# Patient Record
Sex: FEMALE | Race: WHITE | NOT HISPANIC OR LATINO | Employment: OTHER | RURAL
[De-identification: names, ages, dates, MRNs, and addresses within clinical notes are randomized per-mention and may not be internally consistent; named-entity substitution may affect disease eponyms.]

---

## 2020-10-08 ENCOUNTER — HISTORICAL (OUTPATIENT)
Dept: ADMINISTRATIVE | Facility: HOSPITAL | Age: 65
End: 2020-10-08

## 2022-05-20 ENCOUNTER — HOSPITAL ENCOUNTER (OUTPATIENT)
Dept: RADIOLOGY | Facility: HOSPITAL | Age: 67
Discharge: HOME | End: 2022-05-20
Attending: NURSE PRACTITIONER
Payer: MEDICARE

## 2022-05-20 DIAGNOSIS — Z08 ENCOUNTER FOR ROUTINE CANCER FOLLOW-UP: ICD-10-CM

## 2022-05-20 DIAGNOSIS — C64.1 RENAL CARCINOMA, RIGHT: ICD-10-CM

## 2022-05-20 PROCEDURE — 71046 X-RAY EXAM CHEST 2 VIEWS: CPT | Mod: TC

## 2022-09-30 ENCOUNTER — OFFICE VISIT (OUTPATIENT)
Dept: FAMILY MEDICINE | Facility: CLINIC | Age: 67
End: 2022-09-30
Payer: MEDICARE

## 2022-09-30 VITALS
WEIGHT: 248 LBS | SYSTOLIC BLOOD PRESSURE: 118 MMHG | HEART RATE: 88 BPM | TEMPERATURE: 98 F | BODY MASS INDEX: 43.94 KG/M2 | OXYGEN SATURATION: 96 % | DIASTOLIC BLOOD PRESSURE: 72 MMHG | HEIGHT: 63 IN

## 2022-09-30 DIAGNOSIS — H66.91 RIGHT OTITIS MEDIA, UNSPECIFIED OTITIS MEDIA TYPE: Primary | ICD-10-CM

## 2022-09-30 PROCEDURE — 99205 PR OFFICE/OUTPT VISIT, NEW, LEVL V, 60-74 MIN: ICD-10-PCS | Mod: ,,, | Performed by: STUDENT IN AN ORGANIZED HEALTH CARE EDUCATION/TRAINING PROGRAM

## 2022-09-30 PROCEDURE — 99205 OFFICE O/P NEW HI 60 MIN: CPT | Mod: ,,, | Performed by: STUDENT IN AN ORGANIZED HEALTH CARE EDUCATION/TRAINING PROGRAM

## 2022-09-30 RX ORDER — TIZANIDINE 4 MG/1
4 TABLET ORAL 2 TIMES DAILY PRN
COMMUNITY

## 2022-09-30 RX ORDER — AMOXICILLIN AND CLAVULANATE POTASSIUM 875; 125 MG/1; MG/1
1 TABLET, FILM COATED ORAL 2 TIMES DAILY
Qty: 14 TABLET | Refills: 0 | Status: SHIPPED | OUTPATIENT
Start: 2022-09-30 | End: 2022-10-07

## 2022-09-30 RX ORDER — ROSUVASTATIN CALCIUM 40 MG/1
10 TABLET, COATED ORAL NIGHTLY
Status: ON HOLD | COMMUNITY
End: 2023-02-20 | Stop reason: SDUPTHER

## 2022-09-30 RX ORDER — NIFEDIPINE 60 MG/1
60 TABLET, EXTENDED RELEASE ORAL DAILY
Status: ON HOLD | COMMUNITY
End: 2023-02-20 | Stop reason: HOSPADM

## 2022-09-30 RX ORDER — CARVEDILOL 12.5 MG/1
12.5 TABLET ORAL 2 TIMES DAILY WITH MEALS
COMMUNITY
End: 2022-10-17 | Stop reason: SDUPTHER

## 2022-09-30 RX ORDER — ACYCLOVIR 400 MG/1
TABLET ORAL 2 TIMES DAILY
COMMUNITY
End: 2022-12-21

## 2022-09-30 RX ORDER — INSULIN DEGLUDEC 100 U/ML
50 INJECTION, SOLUTION SUBCUTANEOUS DAILY
Status: ON HOLD | COMMUNITY
End: 2023-02-20 | Stop reason: SDUPTHER

## 2022-09-30 RX ORDER — ALLOPURINOL 100 MG/1
100 TABLET ORAL DAILY
COMMUNITY

## 2022-09-30 RX ORDER — ASPIRIN 81 MG/1
81 TABLET ORAL DAILY
Status: ON HOLD | COMMUNITY
End: 2023-02-20 | Stop reason: SDUPTHER

## 2022-09-30 RX ORDER — LIRAGLUTIDE 6 MG/ML
0.6 INJECTION SUBCUTANEOUS DAILY
Status: ON HOLD | COMMUNITY
End: 2023-02-20 | Stop reason: SDUPTHER

## 2022-09-30 RX ORDER — SODIUM BICARBONATE 650 MG/1
650 TABLET ORAL 3 TIMES DAILY
Status: ON HOLD | COMMUNITY
End: 2023-02-20 | Stop reason: SDUPTHER

## 2022-09-30 RX ORDER — AMOXICILLIN 250 MG
2 CAPSULE ORAL
Status: ON HOLD | COMMUNITY
End: 2023-02-20 | Stop reason: SDUPTHER

## 2022-09-30 RX ORDER — OXYMETAZOLINE HCL 0.05 %
2 SPRAY, NON-AEROSOL (ML) NASAL 2 TIMES DAILY
Qty: 15 ML | Refills: 0 | Status: SHIPPED | OUTPATIENT
Start: 2022-09-30 | End: 2022-10-03

## 2022-09-30 NOTE — PROGRESS NOTES
"Subjective:       Patient ID: Jessica Bay is a 67 y.o. female.    Chief Complaint: Sinus Problem (Cough, sore throat, R ear hurts x 1 week)    HPI    67-year-old woman with a complicated medical history listed below, who comes to clinic for right ear pain, cough, sore throat for 1 week.  She has had constitutional symptoms and has felt poorly.  She denies any fever, or chills.  She has taken Tylenol for her symptoms.  She has only had partial improvement.  She comes to the doctor today because she has been sick for several days and has not had any improvement with conservative therapy.  She does not like to use nasal sprays because she finds the post nasal drip uncomfortable.    She has a history of renal cell carcinoma and sees both Nephrology and Oncology.  She has limited mobility in uses a motorized wheelchair.  She also has a history of fibromyalgia diabetes.      She has had similar pain before, and reports a previous episode of meningitis that led to hospitalization.    She denies any allergy to penicillins.        Objective:    /72   Pulse 88   Temp 97.9 °F (36.6 °C) (Oral)   Ht 5' 3" (1.6 m)   Wt 112.5 kg (248 lb)   SpO2 96%   BMI 43.93 kg/m²     Physical Exam    Gen: well-groomed, well-dressed. No apparent distress  HEENT:  NCAT, symmetric; R  canal with some erythema; TM slightly bulging  Pulm: normal work of breathing, no accessory muscle use; speaking complete sentences; rare dry cough  Neuro: CN II-XII grossly normal; in a motorized wheelchair  Psych: pleasant affect, congruent mood. Linear thought; good eye contact  SKIN: no rashes present on face, neck, hands    Assessment / Plan:       Problem List Items Addressed This Visit          ENT    Right otitis media - Primary     S/s of AOM; will empirically treat with augmentin x 7 days. Nasal saline spray and flonase for congestion/obstructive symptoms. Will discuss follow-up with ENT at next visit. Pt. expressed understanding and " agreement with this plan and agreed to RTC  if symptoms worsen or fail to improve.           Relevant Medications    oxymetazoline (AFRIN, OXYMETAZOLINE,) 0.05 % nasal spray    sodium chloride (SALINE NASAL) 0.65 % nasal spray    amoxicillin-clavulanate 875-125mg (AUGMENTIN) 875-125 mg per tablet         Face to face encounter time 25 minutes.    Non face to face encounter time 35 minutes.  Reviewing separate obtained history, counseling and educating the patient, family and/or other caregivers, ordering medications, tests or procedures; referring and communicating with other health care professionals; documenting clinical information in the electronic medical record; care coordination; independently interpreting results and communicating results to the patient, family, and/or caregivers.    Total time for visit  70 minutes

## 2022-10-03 PROBLEM — H66.91 RIGHT OTITIS MEDIA: Status: ACTIVE | Noted: 2022-10-03

## 2022-10-03 NOTE — ASSESSMENT & PLAN NOTE
S/s of AOM; will empirically treat with augmentin x 7 days. Nasal saline spray and flonase for congestion/obstructive symptoms. Will discuss follow-up with ENT at next visit. Pt. expressed understanding and agreement with this plan and agreed to RTC  if symptoms worsen or fail to improve.

## 2022-10-17 ENCOUNTER — OFFICE VISIT (OUTPATIENT)
Dept: FAMILY MEDICINE | Facility: CLINIC | Age: 67
End: 2022-10-17
Payer: MEDICARE

## 2022-10-17 VITALS
WEIGHT: 246 LBS | TEMPERATURE: 98 F | HEART RATE: 86 BPM | HEIGHT: 63 IN | SYSTOLIC BLOOD PRESSURE: 129 MMHG | OXYGEN SATURATION: 96 % | BODY MASS INDEX: 43.59 KG/M2 | DIASTOLIC BLOOD PRESSURE: 80 MMHG

## 2022-10-17 DIAGNOSIS — R10.9 ABDOMINAL PAIN, UNSPECIFIED ABDOMINAL LOCATION: Primary | ICD-10-CM

## 2022-10-17 DIAGNOSIS — I10 HYPERTENSION, UNSPECIFIED TYPE: ICD-10-CM

## 2022-10-17 DIAGNOSIS — J30.89 ALLERGIC RHINITIS DUE TO OTHER ALLERGIC TRIGGER, UNSPECIFIED SEASONALITY: ICD-10-CM

## 2022-10-17 PROBLEM — Z90.5 ACQUIRED ABSENCE OF KIDNEY: Status: ACTIVE | Noted: 2021-09-20

## 2022-10-17 PROBLEM — E11.8 DISORDER ASSOCIATED WITH TYPE 2 DIABETES MELLITUS: Status: ACTIVE | Noted: 2022-10-17

## 2022-10-17 PROBLEM — G93.32 CHRONIC FATIGUE SYNDROME: Status: ACTIVE | Noted: 2022-10-17

## 2022-10-17 PROBLEM — N18.4 STAGE 4 CHRONIC KIDNEY DISEASE: Status: ACTIVE | Noted: 2021-09-20

## 2022-10-17 PROBLEM — M79.7 FIBROMYALGIA: Status: ACTIVE | Noted: 2022-10-17

## 2022-10-17 PROBLEM — E78.5 DYSLIPIDEMIA: Status: ACTIVE | Noted: 2021-09-20

## 2022-10-17 PROBLEM — G57.93 NEUROPATHY OF BOTH FEET: Status: ACTIVE | Noted: 2022-10-17

## 2022-10-17 PROBLEM — J30.9 ALLERGIC RHINITIS: Status: ACTIVE | Noted: 2022-10-17

## 2022-10-17 PROBLEM — E11.22 TYPE 2 DIABETES MELLITUS WITH DIABETIC CHRONIC KIDNEY DISEASE: Status: ACTIVE | Noted: 2021-09-20

## 2022-10-17 PROBLEM — I05.9 MITRAL VALVE DISORDER: Status: ACTIVE | Noted: 2022-10-17

## 2022-10-17 PROBLEM — Z79.4 LONG TERM CURRENT USE OF INSULIN: Status: ACTIVE | Noted: 2022-10-17

## 2022-10-17 PROBLEM — H91.93 BILATERAL HEARING LOSS: Status: ACTIVE | Noted: 2022-10-17

## 2022-10-17 PROBLEM — C64.9 PRIMARY MALIGNANT NEOPLASM OF KIDNEY: Status: ACTIVE | Noted: 2022-10-17

## 2022-10-17 PROBLEM — G25.81 RESTLESS LEGS SYNDROME: Status: ACTIVE | Noted: 2022-10-17

## 2022-10-17 PROBLEM — J30.1 ALLERGIC RHINITIS DUE TO POLLEN: Status: ACTIVE | Noted: 2022-10-17

## 2022-10-17 PROCEDURE — 99215 OFFICE O/P EST HI 40 MIN: CPT | Mod: ,,, | Performed by: STUDENT IN AN ORGANIZED HEALTH CARE EDUCATION/TRAINING PROGRAM

## 2022-10-17 PROCEDURE — 99215 PR OFFICE/OUTPT VISIT, EST, LEVL V, 40-54 MIN: ICD-10-PCS | Mod: ,,, | Performed by: STUDENT IN AN ORGANIZED HEALTH CARE EDUCATION/TRAINING PROGRAM

## 2022-10-17 RX ORDER — CARVEDILOL 12.5 MG/1
12.5 TABLET ORAL 2 TIMES DAILY WITH MEALS
Qty: 60 TABLET | Refills: 2 | Status: ON HOLD | OUTPATIENT
Start: 2022-10-17 | End: 2023-02-20 | Stop reason: HOSPADM

## 2022-10-17 RX ORDER — CHLORPHENIRAMINE MALEATE AND PHENYLEPHRINE HYDROCHLORIDE 4; 10 MG/1; MG/1
1 TABLET, COATED ORAL EVERY 6 HOURS PRN
Qty: 60 TABLET | Refills: 2 | Status: SHIPPED | OUTPATIENT
Start: 2022-10-17 | End: 2022-10-27

## 2022-10-17 NOTE — ASSESSMENT & PLAN NOTE
Encouraged flonase use (and proper administration; counseled on technique to reduce postnasal drip); educated on the ADR with systemic decongestants and recommended topical decongestants instead. Will refill for now; agreeable to try other treatments. Surprisingly, she derives a lot of benefit from using essential oils instead of chlorpheniramine. Continue.

## 2022-10-17 NOTE — PROGRESS NOTES
"Subjective:       Patient ID: Jessica Bay is a 67 y.o. female.    Chief Complaint: Consult (C/o hernia )    HPI    67-year-old woman with the medical problems listed below who comes to clinic  for several concerns.    Over she is feeling much better since last visit.    She is concerned she has a hernia because her RLQ is bigger than the right. It has been uncomfortable at times but it has not been hard or indurated. She has not had any change in the number or quality of bowel movements. No nausea/vomiting changes in bladder function or fever or chills. She thinks she has a hernia.    She has had kidney removal surgery for cancer previously.     Regarding her congestion, she has allergic rhinitis. Uses essential oils for allergies and works faster than chlorpheniramine.   Doesn't like flonase because the feeling on her throat makes her gag. She has not really tried Afrin. She is taking chlorpheniramine every morning, has rebound symptoms toward the end of the day.     Uses peppermint essential oil for mosqitos.    - -    Per review of epic; previously saw renal with Dr. Ren:    "[place]: 61 y/o CF h/o CAD, DM, HTN, DLD, fibromyalgia, OA, LAD stent d/t 90% stenosis has swelling of the legs for which she went to [facility] and was given Lasix and steroids and was told that she has congestive heart failure with pneumonia and sent home with tapering dose of Lasix. she had NEWTON (3.2Cr) and was admitted in [facility] for overdiuresis and was given fluids during which her creatinine got better to 1.1.She sees Dr Kim Khan for migraine and takes Turmeric with other herbal supplements and cherry extract. She takes acetazolamide 250mg qid given by neurology. review of records shows that 5/2/18 Rt Kidney 11.1cm, left kidney 14.9cm. with normal echotexture and no hydronephrosis with left simple renal cyst 6.3cm"    Interval History:     HTN: Well controlled on Coreg 12. 5 mg BID, Nifedipine 60 mg " "daily. Not monitored at home. Her BP is well controlled here in clinic today.     CAD s/p PCI 2018: [facility], [place]. Dr. Anton Jamil.     RCC s/p r nephrectomy: Seen by Dr. Jensen. No metastasis.     Nephrolithiasis: last stone 2019. None since. Not on oral medication.     DM2: well controlled on Tresiba, Ozempic.. Follows with Dr. Diamond, Endocrinologist, [facility]. Right now she is out of her Ozempic and she has an upcoming appointment with her Endocrinologist to get it refilled. "       Objective:    /80   Pulse 86   Temp 97.8 °F (36.6 °C) (Oral)   Ht 5' 3" (1.6 m)   Wt 111.6 kg (246 lb)   SpO2 96%   BMI 43.58 kg/m²     Physical Exam    Gen: well-groomed, well-dressed. No apparent distress  HEENT:  NCAT, symmetric  Pulm: normal work of breathing, no accessory muscle use; speaking complete sentences  Abdomen: obese  Neuro: CN II-XII grossly normal; in a motorized wheelchair  Psych: pleasant affect, congruent mood. Linear thought; good eye contact  SKIN: no rashes present on face, neck, hands  Assessment / Plan       Problem List Items Addressed This Visit          Cardiac/Vascular    Hypertension    Relevant Medications    carvediloL (COREG) 12.5 MG tablet       GI    Abdominal pain - Primary     Off and on; believes she has a hernia. Exam limited by obese body habitus; given her history of renal malignancy and prior abdominal surgery, will refer to gen surg to discuss further evaluation of abdominal pain and potential imaging.          Relevant Medications    chlorpheniramine-phenylephrine (ED A-HIST) 4-10 mg per tablet    Other Relevant Orders    Ambulatory referral/consult to General Surgery     Other Visit Diagnoses       Allergic rhinitis due to other allergic trigger, unspecified seasonality        Relevant Medications    chlorpheniramine-phenylephrine (ED A-HIST) 4-10 mg per tablet              Face to face encounter time 20 minutes.    Non face to face encounter time 30 minutes.  Reviewing " separate obtained history, counseling and educating the patient, family and/or other caregivers, ordering medications, tests or procedures; referring and communicating with other health care professionals; documenting clinical information in the electronic medical record; care coordination; independently interpreting results and communicating results to the patient, family, and/or caregivers.    Total time for visit  50 minutes

## 2022-10-17 NOTE — ASSESSMENT & PLAN NOTE
Off and on; believes she has a hernia. Exam limited by obese body habitus; given her history of renal malignancy and prior abdominal surgery, will refer to gen surg to discuss further evaluation of abdominal pain and potential imaging.

## 2022-11-09 DIAGNOSIS — Z71.89 COMPLEX CARE COORDINATION: ICD-10-CM

## 2022-11-22 ENCOUNTER — HOSPITAL ENCOUNTER (EMERGENCY)
Facility: HOSPITAL | Age: 67
Discharge: OTHER INSTITUTION | End: 2022-11-22
Payer: MEDICARE

## 2022-11-22 ENCOUNTER — OFFICE VISIT (OUTPATIENT)
Dept: FAMILY MEDICINE | Facility: CLINIC | Age: 67
End: 2022-11-22
Payer: MEDICARE

## 2022-11-22 VITALS
TEMPERATURE: 98 F | HEART RATE: 82 BPM | DIASTOLIC BLOOD PRESSURE: 64 MMHG | HEIGHT: 63 IN | OXYGEN SATURATION: 96 % | BODY MASS INDEX: 42.7 KG/M2 | SYSTOLIC BLOOD PRESSURE: 134 MMHG | RESPIRATION RATE: 11 BRPM | WEIGHT: 241 LBS

## 2022-11-22 VITALS
BODY MASS INDEX: 43.58 KG/M2 | SYSTOLIC BLOOD PRESSURE: 135 MMHG | HEIGHT: 63 IN | TEMPERATURE: 98 F | DIASTOLIC BLOOD PRESSURE: 77 MMHG | OXYGEN SATURATION: 99 % | HEART RATE: 77 BPM

## 2022-11-22 DIAGNOSIS — R10.9 ABDOMINAL PAIN, UNSPECIFIED ABDOMINAL LOCATION: ICD-10-CM

## 2022-11-22 DIAGNOSIS — M79.7 FIBROMYALGIA: ICD-10-CM

## 2022-11-22 DIAGNOSIS — R07.9 CHEST PAIN, UNSPECIFIED TYPE: Primary | ICD-10-CM

## 2022-11-22 DIAGNOSIS — N18.4 STAGE 4 CHRONIC KIDNEY DISEASE: ICD-10-CM

## 2022-11-22 DIAGNOSIS — I21.4 NSTEMI (NON-ST ELEVATED MYOCARDIAL INFARCTION): Primary | ICD-10-CM

## 2022-11-22 DIAGNOSIS — R07.9 CHEST PAIN: ICD-10-CM

## 2022-11-22 DIAGNOSIS — R79.89 ELEVATED BRAIN NATRIURETIC PEPTIDE (BNP) LEVEL: ICD-10-CM

## 2022-11-22 LAB
ALBUMIN SERPL BCP-MCNC: 3.3 G/DL (ref 3.5–5)
ALBUMIN/GLOB SERPL: 0.7 {RATIO}
ALP SERPL-CCNC: 104 U/L (ref 55–142)
ALT SERPL W P-5'-P-CCNC: 22 U/L (ref 13–56)
ANION GAP SERPL CALCULATED.3IONS-SCNC: 16 MMOL/L (ref 7–16)
AST SERPL W P-5'-P-CCNC: 26 U/L (ref 15–37)
BASOPHILS # BLD AUTO: 0.02 K/UL (ref 0–0.2)
BASOPHILS NFR BLD AUTO: 0.2 % (ref 0–1)
BILIRUB SERPL-MCNC: 0.3 MG/DL (ref ?–1.2)
BUN SERPL-MCNC: 48 MG/DL (ref 7–18)
BUN/CREAT SERPL: 19 (ref 6–20)
CALCIUM SERPL-MCNC: 9.6 MG/DL (ref 8.5–10.1)
CHLORIDE SERPL-SCNC: 101 MMOL/L (ref 98–107)
CO2 SERPL-SCNC: 25 MMOL/L (ref 21–32)
CREAT SERPL-MCNC: 2.56 MG/DL (ref 0.55–1.02)
DIFFERENTIAL METHOD BLD: ABNORMAL
EGFR (NO RACE VARIABLE) (RUSH/TITUS): 20 ML/MIN/1.73M²
EOSINOPHIL # BLD AUTO: 0.21 K/UL (ref 0–0.5)
EOSINOPHIL NFR BLD AUTO: 2 % (ref 1–4)
ERYTHROCYTE [DISTWIDTH] IN BLOOD BY AUTOMATED COUNT: 15.2 % (ref 11.5–14.5)
GLOBULIN SER-MCNC: 4.5 G/DL (ref 2–4)
GLUCOSE SERPL-MCNC: 132 MG/DL (ref 74–106)
HCT VFR BLD AUTO: 44.3 % (ref 38–47)
HGB BLD-MCNC: 13.8 G/DL (ref 12–16)
LYMPHOCYTES # BLD AUTO: 2.08 K/UL (ref 1–4.8)
LYMPHOCYTES NFR BLD AUTO: 20.1 % (ref 27–41)
MAGNESIUM SERPL-MCNC: 1.9 MG/DL (ref 1.7–2.3)
MCH RBC QN AUTO: 32.2 PG (ref 27–31)
MCHC RBC AUTO-ENTMCNC: 31.2 G/DL (ref 32–36)
MCV RBC AUTO: 103.3 FL (ref 80–96)
MONOCYTES # BLD AUTO: 0.73 K/UL (ref 0–0.8)
MONOCYTES NFR BLD AUTO: 7 % (ref 2–6)
MPC BLD CALC-MCNC: 9.6 FL (ref 9.4–12.4)
NEUTROPHILS # BLD AUTO: 7.32 K/UL (ref 1.8–7.7)
NEUTROPHILS NFR BLD AUTO: 70.7 % (ref 53–65)
NT-PROBNP SERPL-MCNC: 3863 PG/ML (ref 1–125)
PLATELET # BLD AUTO: 288 K/UL (ref 150–400)
POTASSIUM SERPL-SCNC: 4.7 MMOL/L (ref 3.5–5.1)
PROT SERPL-MCNC: 7.8 G/DL (ref 6.4–8.2)
RBC # BLD AUTO: 4.29 M/UL (ref 4.2–5.4)
SODIUM SERPL-SCNC: 137 MMOL/L (ref 136–145)
TROPONIN I SERPL HS-MCNC: 1546.3 PG/ML
WBC # BLD AUTO: 10.36 K/UL (ref 4.5–11)

## 2022-11-22 PROCEDURE — 99215 OFFICE O/P EST HI 40 MIN: CPT | Mod: ,,, | Performed by: STUDENT IN AN ORGANIZED HEALTH CARE EDUCATION/TRAINING PROGRAM

## 2022-11-22 PROCEDURE — 85025 COMPLETE CBC W/AUTO DIFF WBC: CPT | Performed by: NURSE PRACTITIONER

## 2022-11-22 PROCEDURE — 84484 ASSAY OF TROPONIN QUANT: CPT | Performed by: NURSE PRACTITIONER

## 2022-11-22 PROCEDURE — 99215 PR OFFICE/OUTPT VISIT, EST, LEVL V, 40-54 MIN: ICD-10-PCS | Mod: ,,, | Performed by: STUDENT IN AN ORGANIZED HEALTH CARE EDUCATION/TRAINING PROGRAM

## 2022-11-22 PROCEDURE — 80053 COMPREHEN METABOLIC PANEL: CPT | Performed by: NURSE PRACTITIONER

## 2022-11-22 PROCEDURE — 93005 ELECTROCARDIOGRAM TRACING: CPT

## 2022-11-22 PROCEDURE — 93010 ELECTROCARDIOGRAM REPORT: CPT | Mod: ,,, | Performed by: INTERNAL MEDICINE

## 2022-11-22 PROCEDURE — 83880 ASSAY OF NATRIURETIC PEPTIDE: CPT | Performed by: NURSE PRACTITIONER

## 2022-11-22 PROCEDURE — 99284 EMERGENCY DEPT VISIT MOD MDM: CPT | Mod: GF | Performed by: NURSE PRACTITIONER

## 2022-11-22 PROCEDURE — 83735 ASSAY OF MAGNESIUM: CPT | Performed by: NURSE PRACTITIONER

## 2022-11-22 PROCEDURE — 99285 EMERGENCY DEPT VISIT HI MDM: CPT | Mod: 25

## 2022-11-22 PROCEDURE — 93010 EKG 12-LEAD: ICD-10-PCS | Mod: ,,, | Performed by: INTERNAL MEDICINE

## 2022-11-22 PROCEDURE — 94761 N-INVAS EAR/PLS OXIMETRY MLT: CPT

## 2022-11-22 RX ORDER — DULOXETIN HYDROCHLORIDE 30 MG/1
30 CAPSULE, DELAYED RELEASE ORAL DAILY
Qty: 90 CAPSULE | Refills: 0 | Status: ON HOLD | OUTPATIENT
Start: 2022-11-22 | End: 2023-02-20 | Stop reason: SDUPTHER

## 2022-11-22 RX ORDER — ONDANSETRON 2 MG/ML
4 INJECTION INTRAMUSCULAR; INTRAVENOUS
Status: DISCONTINUED | OUTPATIENT
Start: 2022-11-22 | End: 2022-11-22 | Stop reason: HOSPADM

## 2022-11-22 RX ORDER — DULOXETIN HYDROCHLORIDE 30 MG/1
30 CAPSULE, DELAYED RELEASE ORAL DAILY
COMMUNITY
End: 2022-11-22 | Stop reason: SDUPTHER

## 2022-11-22 NOTE — PROGRESS NOTES
"Subjective:       Patient ID: Jessica Bay is a 67 y.o. female.    Chief Complaint: Chest Pain (X 1 week)    HPI    67-year-old women with prior MI and stents x2 to LAD who comes to clinic with 1 week of substernal burning chest pain radiating to her jaw and arms. It started suddenly last week; she called EMS but her EKG was normal per them, and she did not go to the hospital. Her symptoms have persisted daily since then, but they do wax and wane. The pain is severe at times and last approx an hour or longer. Tylenol has not helped. It does not feel like reflux per her and her chest is non-tender. She says it feels exactly like the last time she had a heart attack. She is adherent to ASA 81 daily; she stopped taking Brillinta several months ago and this was d/c'd by her cardiologist.         Objective:    /77   Pulse 77   Temp 97.9 °F (36.6 °C) (Oral)   Ht 5' 3" (1.6 m)   SpO2 99%   BMI 43.58 kg/m²     Physical Exam      Gen: well-groomed, well-dressed. No apparent distress  HEENT:  NCAT, symmetric  CV: normal rate, regular rhythm; no m/r/g appreciated   Pulm: normal work of breathing, no accessory muscle use; speaking complete sentences  Abdomen: obese  Neuro: CN II-XII grossly normal; in a motorized wheelchair  Psych: pleasant affect, congruent mood. Linear thought; good eye contact  SKIN: no rashes present on face, neck, hands  Assessment / Plan     Problem List Items Addressed This Visit          Cardiac/Vascular    Chest pain - Primary     Although she is well-appearing on exam her personal history and HPI today is c/f cardiac cause and needs EKG, troponins, and possible CT chest.             GI    Abdominal pain     Off and on; believes she has a hernia. Exam limited by obese body habitus; given her history of renal malignancy and prior abdominal surgery, will refer to gen surg to discuss further evaluation of abdominal pain and potential imaging.          Relevant Orders    Ambulatory " referral/consult to General Surgery       Orthopedic    Fibromyalgia     Requesting refill of cymbalta for fibromyalgia; rtc 4 weeks to assess for improvement.         Relevant Medications    DULoxetine (CYMBALTA) 30 MG capsule

## 2022-11-22 NOTE — ED NOTES
Pt states due to poor kidney function she can not have diuretics or IVP Dye. Reports left kidney is functioning at 27%. Right kidney was removed due to cancer.

## 2022-11-22 NOTE — ED PROVIDER NOTES
"Encounter Date: 11/22/2022       History     Chief Complaint   Patient presents with    Chest Pain     68 yo WF sent from [facility] for assessment of CP since Wednesday. Pain is intermittent occurring 1-2 x a day but lasting for 1-3 hours. Nothing makes better or worse. Pain is midsternal and radiates into bilateral jaws and bilateral arms. States left jaw is most affected. Described as "pressure and then sharp". Denies pain currently. Last episode occurred this morning appx 0100. Hx of MI and 2 stents. Follows with Dr. Jamil at [facility]. Denies pain currently. Denies cough, SOB. +Nausea when CP occurs, no vomiting. Not diaphoretic during event. Took 325 mg ASA earlier today.     The history is provided by the patient.   Review of patient's allergies indicates:   Allergen Reactions    Bactrim [sulfamethoxazole-trimethoprim] Blisters    Benadryl [diphenhydramine hcl]     Daypro [oxaprozin]      Past Medical History:   Diagnosis Date    Cancer     Diabetes mellitus, type 2     Fibromyalgia     History of kidney cancer 2018    Hyperlipidemia     Hypertension     Migraine headache     Renal cell carcinoma     Renal disorder      Past Surgical History:   Procedure Laterality Date    NEPHRECTOMY       History reviewed. No pertinent family history.  Social History     Tobacco Use    Smoking status: Unknown   Substance Use Topics    Alcohol use: Never    Drug use: Never     Review of Systems   Constitutional:  Negative for appetite change, chills, fatigue and fever.   Respiratory:  Negative for cough, shortness of breath and wheezing.    Cardiovascular:  Positive for chest pain. Negative for palpitations and leg swelling.   Gastrointestinal:  Positive for nausea. Negative for vomiting.   Neurological:  Negative for weakness, light-headedness and headaches.   Psychiatric/Behavioral:  Negative for confusion.    All other systems reviewed and are negative.    Physical Exam     Initial Vitals [11/22/22 1355]   BP Pulse " Resp Temp SpO2   (!) 159/83 77 19 98.4 °F (36.9 °C) 99 %      MAP       --         Physical Exam    Nursing note and vitals reviewed.  Constitutional: She appears well-developed and well-nourished. No distress.   HENT:   Head: Normocephalic and atraumatic.   Right Ear: External ear normal.   Left Ear: External ear normal.   Nose: Nose normal.   Mouth/Throat: Oropharynx is clear and moist.   Eyes: EOM are normal. Pupils are equal, round, and reactive to light.   Neck: Trachea normal. Neck supple.   Normal range of motion.   Full passive range of motion without pain.     Cardiovascular:  Normal rate, regular rhythm and normal heart sounds.           Pulmonary/Chest: Effort normal and breath sounds normal. No accessory muscle usage. No respiratory distress. She has no wheezes.   Abdominal: Abdomen is soft and protuberant. She exhibits no distension. There is no abdominal tenderness.   Musculoskeletal:         General: Normal range of motion.      Cervical back: Full passive range of motion without pain, normal range of motion and neck supple.     Neurological: She is alert and oriented to person, place, and time. She has normal strength. GCS score is 15. GCS eye subscore is 4. GCS verbal subscore is 5. GCS motor subscore is 6.   Skin: Skin is dry. Capillary refill takes less than 2 seconds.   Psychiatric: She has a normal mood and affect. Her behavior is normal. Judgment and thought content normal.       Medical Screening Exam   See Full Note    ED Course   Procedures  Labs Reviewed   COMPREHENSIVE METABOLIC PANEL - Abnormal; Notable for the following components:       Result Value    Glucose 132 (*)     BUN 48 (*)     Creatinine 2.56 (*)     Albumin 3.3 (*)     Globulin 4.5 (*)     eGFR 20 (*)     All other components within normal limits   TROPONIN I - Abnormal; Notable for the following components:    Troponin I High Sensitivity 1,546.3 (*)     All other components within normal limits   NT-PRO NATRIURETIC PEPTIDE -  Abnormal; Notable for the following components:    ProBNP 3,863 (*)     All other components within normal limits   CBC WITH DIFFERENTIAL - Abnormal; Notable for the following components:    .3 (*)     MCH 32.2 (*)     MCHC 31.2 (*)     RDW 15.2 (*)     Neutrophils % 70.7 (*)     Lymphocytes % 20.1 (*)     Monocytes % 7.0 (*)     All other components within normal limits   MAGNESIUM - Normal   CBC W/ AUTO DIFFERENTIAL    Narrative:     The following orders were created for panel order CBC auto differential.  Procedure                               Abnormality         Status                     ---------                               -----------         ------                     CBC with Differential[[id number]]        Abnormal            Final result                 Please view results for these tests on the individual orders.   URINALYSIS, REFLEX TO URINE CULTURE     EKG Readings: (Independently Interpreted)   Initial Reading: No STEMI. Rhythm: Normal Sinus Rhythm. Heart Rate: 76. Ectopy: No Ectopy. Conduction: Normal. Axis: Right Axis Deviation.   ECG Results              EKG 12-lead (In process)  Result time 11/22/22 14:15:10      In process by Interface, Lab In [facility] (11/22/22 14:15:10)                   Narrative:    Test Reason : R07.9,    Vent. Rate : 076 BPM     Atrial Rate : 076 BPM     P-R Int : 148 ms          QRS Dur : 086 ms      QT Int : 396 ms       P-R-T Axes : 000 121 055 degrees     QTc Int : 445 ms    Normal sinus rhythm  Right axis deviation  Abnormal ECG  No previous ECGs available    Referred By: [email or web address]   SELF           Confirmed By:                                   Imaging Results              X-Ray Chest AP Portable (Final result)  Result time 11/22/22 14:22:01      Final result by Emmanuel Tirado II, MD (11/22/22 14:22:01)                   Impression:      No acute findings or significant change.      Electronically signed by: Emmanuel  Wiana cristina  Date:    11/22/2022  Time:    14:22               Narrative:    EXAMINATION:  XR CHEST AP PORTABLE    CLINICAL HISTORY:  Chest Pain;    COMPARISON:  20 May 2022    FINDINGS:  The heart and mediastinum are normal in size and configuration.  The pulmonary vascularity is normal in caliber.  Area of increased density present in the right lower lung similar to previous study.  No new lung infiltrates, effusions, pneumothorax or other abnormality is demonstrated.                                       Medications   ondansetron injection 4 mg (has no administration in time range)     Medical Decision Making:   Clinical Tests:   Lab Tests: Ordered and Reviewed  Radiological Study: Reviewed and Ordered  Medical Tests: Ordered and Reviewed  ED Management:  - 66 yo ZACKERY presents from clinic for intermittent chest pain since Wednesday, last occurrence at 0100. Denies pain currently. Pain midsternal, radiates into bilateral jaws and arms.   - Troponin 1546, BNP 3863. BUN/Cr 48/2.56 - hx of CKD Stage 4  - Patient accepted to [facility] per Dr. Lr in ER.   - Patient agreeable with plan of care and transfer. All questions and concerns addressed.   Additional MDM:     CHAR Score:   Age over 65:                                    1.00   > or = to 3 CAD risk factors:           1.00  Established CAD:                            1.00  > or = to 2 anginal events in the past 24 hours: 1.00  Use of ASA in past 7 days:              1.00  Elevated Enzymes:                         1.00  ST Depression > or = to 0.05 mV:  0.00  CHAR score: 6        ED Course as of 11/22/22 1509   Tue Nov 22, 2022   1424 X-Ray Chest AP Portable  The heart and mediastinum are normal in size and configuration.  The pulmonary vascularity is normal in caliber.  Area of increased density present in the right lower lung similar to previous study.  No new lung infiltrates, effusions, pneumothorax or other abnormality is demonstrated. [MJ]   0686 Troponin I  High Sensitivity(!!): 1,546.3 [MJ]   1452 NT-proBNP(!): 3,863 [MJ]   1452 BUN(!): 48 [MJ]   1452 Creatinine(!): 2.56 [MJ]      ED Course User Index  [MJ] SULMA Ricci          Clinical Impression:   Final diagnoses:  [R07.9] Chest pain  [I21.4] NSTEMI (non-ST elevated myocardial infarction) (Primary)  [R79.89] Elevated brain natriuretic peptide (BNP) level  [N18.4] Stage 4 chronic kidney disease      ED Disposition Condition    Transfer to Another Facility Stable               SULMA Ricci  11/22/22 1510       SULMA Ricci  11/22/22 2111

## 2022-11-22 NOTE — ASSESSMENT & PLAN NOTE
Although she is well-appearing on exam her personal history and HPI today is c/f cardiac cause and needs EKG, troponins, and possible CT chest.

## 2022-11-22 NOTE — ED TRIAGE NOTES
Pt sent from Clinic, CP x1 week. Pt states she does not have CP at this time, last episode of CP around 0130 in center of chest that radiated up to jaw and down both arms.  Pt states pain was heavy. Aox3. EKG obtained upon arrival. Placed on cardiac monitor.

## 2022-12-05 ENCOUNTER — OFFICE VISIT (OUTPATIENT)
Dept: FAMILY MEDICINE | Facility: CLINIC | Age: 67
End: 2022-12-05
Payer: MEDICARE

## 2022-12-05 VITALS
OXYGEN SATURATION: 98 % | SYSTOLIC BLOOD PRESSURE: 134 MMHG | TEMPERATURE: 99 F | DIASTOLIC BLOOD PRESSURE: 79 MMHG | BODY MASS INDEX: 42.35 KG/M2 | HEIGHT: 63 IN | RESPIRATION RATE: 18 BRPM | HEART RATE: 84 BPM | WEIGHT: 239 LBS

## 2022-12-05 DIAGNOSIS — M79.7 FIBROMYALGIA: ICD-10-CM

## 2022-12-05 DIAGNOSIS — E78.5 DYSLIPIDEMIA: ICD-10-CM

## 2022-12-05 DIAGNOSIS — I10 HYPERTENSION, UNSPECIFIED TYPE: ICD-10-CM

## 2022-12-05 DIAGNOSIS — N18.4 STAGE 4 CHRONIC KIDNEY DISEASE: Primary | ICD-10-CM

## 2022-12-05 DIAGNOSIS — I21.4 NSTEMI (NON-ST ELEVATED MYOCARDIAL INFARCTION): ICD-10-CM

## 2022-12-05 PROBLEM — I25.119 CORONARY ARTERY DISEASE INVOLVING NATIVE CORONARY ARTERY OF NATIVE HEART WITH ANGINA PECTORIS: Status: ACTIVE | Noted: 2020-03-05

## 2022-12-05 PROCEDURE — 99215 PR OFFICE/OUTPT VISIT, EST, LEVL V, 40-54 MIN: ICD-10-PCS | Mod: ,,, | Performed by: STUDENT IN AN ORGANIZED HEALTH CARE EDUCATION/TRAINING PROGRAM

## 2022-12-05 PROCEDURE — 99215 OFFICE O/P EST HI 40 MIN: CPT | Mod: ,,, | Performed by: STUDENT IN AN ORGANIZED HEALTH CARE EDUCATION/TRAINING PROGRAM

## 2022-12-05 NOTE — PROGRESS NOTES
"Subjective:       Patient ID: Jessica Bay is a 67 y.o. female.    Chief Complaint: hospital follow-up    HPI    67-year-old woman with the medical problems listed below who comes to clinic after recent hospitalization for NSTEMI s/p PCI.    She has been doing fairly well and feels ok today.    At hospital discharge was recommended to start ticragrelor, ASA 81, and enrol in cardiac rehab (the patient refused.) She has been adherent to this.    Recommended by her nephrologist not to take ace/arb/entresto for now. Has follow-up with him (Dr. Wallace in January) but initially felt that this would be intolerable given her CKD and solitary kidney    - -    She is back on Cymbalta, 30 mg daily. Reports no problems with this.    She lives at home with her  who has had two strokes and dementia. She feels like she has been under a lot more stress and anxiety (especially work stress), almost like panic since having this second heart attack. She is more isolated. She also feels confined to the house and is not able to work in her vegetable/flower garden like she would like to.     She takes OTC vitamin D;   Her son is a charter  [employer]. His father  of kidney cancer (and Ms Bay also had RCC)      Objective:    /79 (BP Location: Left arm, Patient Position: Sitting, BP Method: Medium (Automatic))   Pulse 84   Temp 98.5 °F (36.9 °C) (Oral)   Resp 18   Ht 5' 3" (1.6 m)   Wt 108.4 kg (239 lb)   SpO2 98%   BMI 42.34 kg/m²      Physical Exam      Gen: well-groomed, well-dressed. No apparent distress  HEENT:  NCAT, symmetric  Pulm: normal work of breathing, no accessory muscle use; speaking complete sentences without having to stop   Neuro: CN II-XII grossly normal   Psych: pleasant affect, congruent mood. Linear thought; good eye contact  SKIN: no rashes present on face, neck, hands      Review of recent studies per epic:    [facility] in setting of acute Cp and felt " extending infarct-   Impression: Dr. Dean Keith  1. Critical single-vessel disease with a 99% mid RCA stenosis with CHAR grade II flow.  2. Widely patent overlapping stents in the mid LAD without apparent restenosis.  3. Minor luminal irregularities otherwise noted in the left coronary system.  4. Ventriculography not performed.  5. Successful PCI of the RCA placing a 2.5 x 18 mm Matthew stent at 14 kush with an excellent angiographic outcome.  6. Probable vasospasm of the radial artery during the middle of the procedure requiring 2 additional doses of radial artery cocktail administration and use of a 5 Citizen of [place] guide catheter.      Baseline sCr 2.1; most recent (10 days ago) 2.0      Repeat TTE s/p MI -> not complete yet  Assessment / Plan:        68 y/o F nonsmoker h/o Renal Cell Carcinoma s/p nephrectomy, CKD, DM, HTN, DLD, and CAD s/p PCI to prox LAD with 3.0x32mm Synergy CECY, PCI to mid LAD with 2.5x38mm Synergy CECY, successful balloon angioplasty to ostial 1st diag 6/18/18. EF 60%, mild MR on 10/31/18 echo.      Problem List Items Addressed This Visit          Cardiac/Vascular    Hypertension     Well-conrolled; at goal. continue current medical regimen           Dyslipidemia     Continue high-intensity statin         NSTEMI (non-ST elevated myocardial infarction)     S/p PCI with stent placement. Adherent to medical regimen without any adverse effects noted. continue current medical regimen for now. Has f/u for TTE and cards eval in several weeks with plan to optimize this at that time. Cards coordinating with nephrology given her advanced CKD / solitary kidney             Renal/    Stage 4 chronic kidney disease - Primary       Orthopedic    Fibromyalgia     Continue cymbalta; no adverse effects noted. Referral to intensive outpatient program to focus on anxiety / stress / social isolation with may be contributing to emotional pain. She expressed understanding and agreement with this plan.             Plan:        Follow-up appointments:   Dr. Navarro Diamond - endocrinology f/u 2/3/23  Dr. Jamil - cards f/u scheduled on 1/10/23 (TTE at that visit)   Dr. Raymond Wallace - nephrology 1/4/23    Face to face encounter time 30 minutes.    Non face to face encounter time 30 minutes.  Reviewing separate obtained history, counseling and educating the patient, family and/or other caregivers, ordering medications, tests or procedures; referring and communicating with other health care professionals; documenting clinical information in the electronic medical record; care coordination; independently interpreting results and communicating results to the patient, family, and/or caregivers.    Total time for visit  60 minutes

## 2022-12-05 NOTE — ASSESSMENT & PLAN NOTE
S/p PCI with stent placement. Adherent to medical regimen without any adverse effects noted. continue current medical regimen for now. Has f/u for TTE and cards eval in several weeks with plan to optimize this at that time. Cards coordinating with nephrology given her advanced CKD / solitary kidney

## 2022-12-05 NOTE — ASSESSMENT & PLAN NOTE
Continue cymbalta; no adverse effects noted. Referral to intensive outpatient program to focus on anxiety / stress / social isolation with may be contributing to emotional pain. She expressed understanding and agreement with this plan.

## 2022-12-15 ENCOUNTER — TELEPHONE (OUTPATIENT)
Dept: FAMILY MEDICINE | Facility: CLINIC | Age: 67
End: 2022-12-15
Payer: MEDICARE

## 2022-12-15 DIAGNOSIS — I21.4 NSTEMI (NON-ST ELEVATED MYOCARDIAL INFARCTION): Primary | ICD-10-CM

## 2023-02-12 ENCOUNTER — HOSPITAL ENCOUNTER (EMERGENCY)
Facility: HOSPITAL | Age: 68
Discharge: ANOTHER HOSPITAL | End: 2023-02-12
Payer: MEDICARE

## 2023-02-12 ENCOUNTER — HOSPITAL ENCOUNTER (INPATIENT)
Facility: HOSPITAL | Age: 68
LOS: 8 days | Discharge: NURSING, REHAB OR LONG-TERM CARE | DRG: 871 | End: 2023-02-20
Attending: EMERGENCY MEDICINE | Admitting: INTERNAL MEDICINE
Payer: MEDICARE

## 2023-02-12 VITALS
WEIGHT: 238 LBS | SYSTOLIC BLOOD PRESSURE: 165 MMHG | HEART RATE: 123 BPM | BODY MASS INDEX: 40.63 KG/M2 | DIASTOLIC BLOOD PRESSURE: 77 MMHG | OXYGEN SATURATION: 94 % | HEIGHT: 64 IN | TEMPERATURE: 101 F | RESPIRATION RATE: 27 BRPM

## 2023-02-12 DIAGNOSIS — R09.02 HYPOXIA: ICD-10-CM

## 2023-02-12 DIAGNOSIS — N18.4 CHRONIC RENAL DISEASE, STAGE 4, SEVERELY DECREASED GLOMERULAR FILTRATION RATE (GFR) BETWEEN 15-29 ML/MIN/1.73 SQUARE METER: ICD-10-CM

## 2023-02-12 DIAGNOSIS — Z90.5 ACQUIRED ABSENCE OF KIDNEY: ICD-10-CM

## 2023-02-12 DIAGNOSIS — A41.9 SEPSIS, DUE TO UNSPECIFIED ORGANISM, UNSPECIFIED WHETHER ACUTE ORGAN DYSFUNCTION PRESENT: ICD-10-CM

## 2023-02-12 DIAGNOSIS — R50.9 FEVER, UNSPECIFIED FEVER CAUSE: ICD-10-CM

## 2023-02-12 DIAGNOSIS — R07.9 CHEST PAIN: ICD-10-CM

## 2023-02-12 DIAGNOSIS — I25.10 CORONARY ARTERY DISEASE, UNSPECIFIED VESSEL OR LESION TYPE, UNSPECIFIED WHETHER ANGINA PRESENT, UNSPECIFIED WHETHER NATIVE OR TRANSPLANTED HEART: ICD-10-CM

## 2023-02-12 DIAGNOSIS — R00.0 TACHYCARDIA: ICD-10-CM

## 2023-02-12 DIAGNOSIS — M79.7 FIBROMYALGIA: ICD-10-CM

## 2023-02-12 DIAGNOSIS — R65.10 SIRS (SYSTEMIC INFLAMMATORY RESPONSE SYNDROME): ICD-10-CM

## 2023-02-12 DIAGNOSIS — E78.5 DYSLIPIDEMIA: ICD-10-CM

## 2023-02-12 DIAGNOSIS — N18.9 CHRONIC KIDNEY DISEASE, UNSPECIFIED CKD STAGE: ICD-10-CM

## 2023-02-12 DIAGNOSIS — I10 HYPERTENSION: ICD-10-CM

## 2023-02-12 DIAGNOSIS — R41.82 ALTERED MENTAL STATUS, UNSPECIFIED ALTERED MENTAL STATUS TYPE: ICD-10-CM

## 2023-02-12 DIAGNOSIS — G93.41 ENCEPHALOPATHY, METABOLIC: ICD-10-CM

## 2023-02-12 DIAGNOSIS — A41.9 SEPSIS, DUE TO UNSPECIFIED ORGANISM, UNSPECIFIED WHETHER ACUTE ORGAN DYSFUNCTION PRESENT: Primary | ICD-10-CM

## 2023-02-12 DIAGNOSIS — G93.41 METABOLIC ENCEPHALOPATHY: ICD-10-CM

## 2023-02-12 DIAGNOSIS — G03.9 MENINGITIS: ICD-10-CM

## 2023-02-12 DIAGNOSIS — G00.9 BACTERIAL MENINGITIS: Primary | ICD-10-CM

## 2023-02-12 DIAGNOSIS — R78.81 BACTEREMIA: ICD-10-CM

## 2023-02-12 DIAGNOSIS — N18.4 STAGE 4 CHRONIC KIDNEY DISEASE: ICD-10-CM

## 2023-02-12 DIAGNOSIS — N39.0 URINARY TRACT INFECTION WITHOUT HEMATURIA, SITE UNSPECIFIED: ICD-10-CM

## 2023-02-12 PROBLEM — C64.9 PRIMARY MALIGNANT NEOPLASM OF KIDNEY: Status: RESOLVED | Noted: 2022-10-17 | Resolved: 2023-02-12

## 2023-02-12 PROBLEM — H66.91 RIGHT OTITIS MEDIA: Status: RESOLVED | Noted: 2022-10-03 | Resolved: 2023-02-12

## 2023-02-12 PROBLEM — I25.119 CORONARY ARTERY DISEASE INVOLVING NATIVE CORONARY ARTERY OF NATIVE HEART WITH ANGINA PECTORIS: Status: RESOLVED | Noted: 2020-03-05 | Resolved: 2023-02-12

## 2023-02-12 PROBLEM — J30.1 ALLERGIC RHINITIS DUE TO POLLEN: Status: RESOLVED | Noted: 2022-10-17 | Resolved: 2023-02-12

## 2023-02-12 PROBLEM — J30.9 ALLERGIC RHINITIS: Status: RESOLVED | Noted: 2022-10-17 | Resolved: 2023-02-12

## 2023-02-12 PROBLEM — Z79.4 LONG TERM CURRENT USE OF INSULIN: Status: RESOLVED | Noted: 2022-10-17 | Resolved: 2023-02-12

## 2023-02-12 PROBLEM — H91.93 BILATERAL HEARING LOSS: Status: RESOLVED | Noted: 2022-10-17 | Resolved: 2023-02-12

## 2023-02-12 PROBLEM — E11.8 DISORDER ASSOCIATED WITH TYPE 2 DIABETES MELLITUS: Status: RESOLVED | Noted: 2022-10-17 | Resolved: 2023-02-12

## 2023-02-12 PROBLEM — R10.9 ABDOMINAL PAIN: Status: RESOLVED | Noted: 2022-10-17 | Resolved: 2023-02-12

## 2023-02-12 PROBLEM — I21.4 NSTEMI (NON-ST ELEVATED MYOCARDIAL INFARCTION): Status: RESOLVED | Noted: 2022-11-23 | Resolved: 2023-02-12

## 2023-02-12 LAB
ALBUMIN SERPL BCP-MCNC: 3.2 G/DL (ref 3.5–5)
ALBUMIN/GLOB SERPL: 0.6 {RATIO}
ALP SERPL-CCNC: 108 U/L (ref 55–142)
ALT SERPL W P-5'-P-CCNC: 18 U/L (ref 13–56)
ANION GAP SERPL CALCULATED.3IONS-SCNC: 16 MMOL/L (ref 7–16)
ANION GAP SERPL CALCULATED.3IONS-SCNC: 18 MMOL/L (ref 7–16)
ANISOCYTOSIS BLD QL SMEAR: ABNORMAL
AST SERPL W P-5'-P-CCNC: 23 U/L (ref 15–37)
BACTERIA #/AREA URNS HPF: ABNORMAL /HPF
BASOPHILS # BLD AUTO: 0.02 K/UL (ref 0–0.2)
BASOPHILS # BLD AUTO: 0.09 K/UL (ref 0–0.2)
BASOPHILS NFR BLD AUTO: 0.1 % (ref 0–1)
BASOPHILS NFR BLD AUTO: 0.3 % (ref 0–1)
BILIRUB SERPL-MCNC: 0.3 MG/DL (ref ?–1.2)
BILIRUB UR QL STRIP: NEGATIVE
BUN SERPL-MCNC: 40 MG/DL (ref 7–18)
BUN SERPL-MCNC: 46 MG/DL (ref 7–18)
BUN/CREAT SERPL: 15 (ref 6–20)
BUN/CREAT SERPL: 18 (ref 6–20)
CALCIUM SERPL-MCNC: 8.9 MG/DL (ref 8.5–10.1)
CALCIUM SERPL-MCNC: 9.6 MG/DL (ref 8.5–10.1)
CHLORIDE SERPL-SCNC: 103 MMOL/L (ref 98–107)
CHLORIDE SERPL-SCNC: 107 MMOL/L (ref 98–107)
CLARITY CSF: ABNORMAL
CLARITY UR: CLEAR
CO2 SERPL-SCNC: 20 MMOL/L (ref 21–32)
CO2 SERPL-SCNC: 23 MMOL/L (ref 21–32)
COLOR CSF: ABNORMAL
COLOR UR: YELLOW
CREAT SERPL-MCNC: 2.6 MG/DL (ref 0.55–1.02)
CREAT SERPL-MCNC: 2.6 MG/DL (ref 0.55–1.02)
CRYPTOCOCCUS AG, CSF: NEGATIVE
DIFFERENTIAL METHOD BLD: ABNORMAL
DIFFERENTIAL METHOD BLD: ABNORMAL
EGFR (NO RACE VARIABLE) (RUSH/TITUS): 20 ML/MIN/1.73M²
EGFR (NO RACE VARIABLE) (RUSH/TITUS): 20 ML/MIN/1.73M²
EOSINOPHIL # BLD AUTO: 0 K/UL (ref 0–0.5)
EOSINOPHIL # BLD AUTO: 0.21 K/UL (ref 0–0.5)
EOSINOPHIL NFR BLD AUTO: 0 % (ref 1–4)
EOSINOPHIL NFR BLD AUTO: 1 % (ref 1–4)
ERYTHROCYTE [DISTWIDTH] IN BLOOD BY AUTOMATED COUNT: 15.2 % (ref 11.5–14.5)
ERYTHROCYTE [DISTWIDTH] IN BLOOD BY AUTOMATED COUNT: 15.9 % (ref 11.5–14.5)
FLUAV AG UPPER RESP QL IA.RAPID: NEGATIVE
FLUBV AG UPPER RESP QL IA.RAPID: NEGATIVE
GLOBULIN SER-MCNC: 5.1 G/DL (ref 2–4)
GLUCOSE CSF-MCNC: <1 MG/DL (ref 40–70)
GLUCOSE SERPL-MCNC: 192 MG/DL (ref 70–105)
GLUCOSE SERPL-MCNC: 233 MG/DL (ref 70–105)
GLUCOSE SERPL-MCNC: 234 MG/DL (ref 70–105)
GLUCOSE SERPL-MCNC: 243 MG/DL (ref 74–106)
GLUCOSE SERPL-MCNC: 245 MG/DL (ref 70–105)
GLUCOSE SERPL-MCNC: 271 MG/DL (ref 74–106)
GLUCOSE UR STRIP-MCNC: 250 MG/DL
GRAM STN SPEC: NORMAL
GRANULOCYTES NFR CSF MANUAL: 96 % (ref 0–25)
HCO3 UR-SCNC: 19.2 MMOL/L (ref 21–28)
HCT VFR BLD AUTO: 40.1 % (ref 38–47)
HCT VFR BLD AUTO: 40.2 % (ref 38–47)
HCT VFR BLD CALC: 43 % (ref 35–51)
HGB BLD-MCNC: 12.6 G/DL (ref 12–16)
HGB BLD-MCNC: 12.8 G/DL (ref 12–16)
HYPOCHROMIA BLD QL SMEAR: ABNORMAL
IMM GRANULOCYTES # BLD AUTO: 0.23 K/UL (ref 0–0.04)
IMM GRANULOCYTES NFR BLD: 0.7 % (ref 0–0.4)
KETONES UR STRIP-SCNC: NEGATIVE MG/DL
LACTATE SERPL-SCNC: 1.6 MMOL/L (ref 0.4–2)
LACTATE SERPL-SCNC: 2.3 MMOL/L (ref 0.4–2)
LACTATE SERPL-SCNC: 2.4 MMOL/L (ref 0.4–2)
LACTATE SERPL-SCNC: 3.4 MMOL/L (ref 0.4–2)
LDH SERPL L TO P-CCNC: 1.9 MMOL/L (ref 0.3–1.2)
LEUKOCYTE ESTERASE UR QL STRIP: NEGATIVE
LYMPHOCYTES # BLD AUTO: 0.69 K/UL (ref 1–4.8)
LYMPHOCYTES # BLD AUTO: 1 K/UL (ref 1–4.8)
LYMPHOCYTES NFR BLD AUTO: 2 % (ref 27–41)
LYMPHOCYTES NFR BLD AUTO: 4.8 % (ref 27–41)
LYMPHOCYTES NFR BLD MANUAL: 3 % (ref 27–41)
LYMPHOCYTES NFR CSF MANUAL: 1 % (ref 40–80)
MACROCYTES BLD QL SMEAR: ABNORMAL
MCH RBC QN AUTO: 31.9 PG (ref 27–31)
MCH RBC QN AUTO: 32.1 PG (ref 27–31)
MCHC RBC AUTO-ENTMCNC: 31.3 G/DL (ref 32–36)
MCHC RBC AUTO-ENTMCNC: 31.9 G/DL (ref 32–36)
MCV RBC AUTO: 100 FL (ref 80–96)
MCV RBC AUTO: 102.6 FL (ref 80–96)
MONOCYTES # BLD AUTO: 0.81 K/UL (ref 0–0.8)
MONOCYTES # BLD AUTO: 1.65 K/UL (ref 0–0.8)
MONOCYTES NFR BLD AUTO: 3.9 % (ref 2–6)
MONOCYTES NFR BLD AUTO: 4.7 % (ref 2–6)
MONOCYTES NFR BLD MANUAL: 5 % (ref 2–6)
MONOCYTES NFR CSF MANUAL: 3 %
MPC BLD CALC-MCNC: 9.6 FL (ref 9.4–12.4)
MPC BLD CALC-MCNC: 9.7 FL (ref 9.4–12.4)
NEUTROPHILS # BLD AUTO: 18.77 K/UL (ref 1.8–7.7)
NEUTROPHILS # BLD AUTO: 32.19 K/UL (ref 1.8–7.7)
NEUTROPHILS NFR BLD AUTO: 90.2 % (ref 53–65)
NEUTROPHILS NFR BLD AUTO: 92.3 % (ref 53–65)
NEUTS BAND NFR BLD MANUAL: 20 % (ref 1–5)
NEUTS SEG NFR BLD MANUAL: 72 % (ref 50–62)
NITRITE UR QL STRIP: NEGATIVE
NRBC # BLD AUTO: 0 X10E3/UL
NRBC, AUTO (.00): 0 %
PCO2 BLDA: 29 MMHG (ref 35–48)
PH SMN: 7.43 [PH] (ref 7.35–7.45)
PH UR STRIP: 7 PH UNITS
PLATELET # BLD AUTO: 287 K/UL (ref 150–400)
PLATELET # BLD AUTO: 291 K/UL (ref 150–400)
PLATELET MORPHOLOGY: NORMAL
PO2 BLDA: 59 MMHG (ref 83–108)
POC BASE EXCESS: -3.9 MMOL/L (ref -2–3)
POC CO2: 20.1 MMOL/L
POC IONIZED CALCIUM: 1.17 MMOL/L (ref 1.15–1.35)
POC SATURATED O2: 91 % (ref 95–98)
POCT GLUCOSE: 244 MG/DL (ref 60–95)
POLYCHROMASIA BLD QL SMEAR: ABNORMAL
POTASSIUM BLD-SCNC: 3.8 MMOL/L (ref 3.4–4.5)
POTASSIUM SERPL-SCNC: 3.8 MMOL/L (ref 3.5–5.1)
POTASSIUM SERPL-SCNC: 4.8 MMOL/L (ref 3.5–5.1)
PROT SERPL-MCNC: 8.3 G/DL (ref 6.4–8.2)
PROT UR QL STRIP: >=300
RAPID GROUP B STREP: NEGATIVE
RAPID H. INFLUENZAE: NEGATIVE
RAPID N. MENING E. COLI: NEGATIVE
RAPID N. MENINGITIDIS: NEGATIVE
RAPID S. PNEUMONIAE: NEGATIVE
RBC # BLD AUTO: 3.92 M/UL (ref 4.2–5.4)
RBC # BLD AUTO: 4.01 M/UL (ref 4.2–5.4)
RBC # CSF MANUAL: 685 /CUMM
RBC # UR STRIP: ABNORMAL /UL
RBC #/AREA URNS HPF: ABNORMAL /HPF
SARS-COV+SARS-COV-2 AG RESP QL IA.RAPID: NEGATIVE
SODIUM BLD-SCNC: 142 MMOL/L (ref 136–145)
SODIUM SERPL-SCNC: 137 MMOL/L (ref 136–145)
SODIUM SERPL-SCNC: 141 MMOL/L (ref 136–145)
SP GR UR STRIP: 1.02
SQUAMOUS #/AREA URNS LPF: ABNORMAL /LPF
TUBE # CSF: 3
UROBILINOGEN UR STRIP-ACNC: 0.2 MG/DL
WBC # BLD AUTO: 20.81 K/UL (ref 4.5–11)
WBC # BLD AUTO: 34.85 K/UL (ref 4.5–11)
WBC # CSF MANUAL: 9870 /CUMM (ref 0–5)
WBC #/AREA URNS HPF: ABNORMAL /HPF
XANTHOCHROMIA CSF QL: NEGATIVE

## 2023-02-12 PROCEDURE — 84295 ASSAY OF SERUM SODIUM: CPT

## 2023-02-12 PROCEDURE — 87186 SC STD MICRODIL/AGAR DIL: CPT | Performed by: NURSE PRACTITIONER

## 2023-02-12 PROCEDURE — 93005 ELECTROCARDIOGRAM TRACING: CPT

## 2023-02-12 PROCEDURE — 96375 TX/PRO/DX INJ NEW DRUG ADDON: CPT

## 2023-02-12 PROCEDURE — 82962 GLUCOSE BLOOD TEST: CPT

## 2023-02-12 PROCEDURE — 85014 HEMATOCRIT: CPT

## 2023-02-12 PROCEDURE — 87205 SMEAR GRAM STAIN: CPT | Performed by: NURSE PRACTITIONER

## 2023-02-12 PROCEDURE — 86592 SYPHILIS TEST NON-TREP QUAL: CPT | Mod: 90 | Performed by: EMERGENCY MEDICINE

## 2023-02-12 PROCEDURE — 99285 EMERGENCY DEPT VISIT HI MDM: CPT | Mod: EDII,CS,, | Performed by: NURSE PRACTITIONER

## 2023-02-12 PROCEDURE — 25000003 PHARM REV CODE 250: Performed by: NURSE PRACTITIONER

## 2023-02-12 PROCEDURE — 87070 CULTURE OTHR SPECIMN AEROBIC: CPT | Performed by: EMERGENCY MEDICINE

## 2023-02-12 PROCEDURE — 99285 PR EMERGENCY DEPT VISIT,LEVEL V: ICD-10-PCS | Mod: EDII,CS,, | Performed by: NURSE PRACTITIONER

## 2023-02-12 PROCEDURE — 99285 EMERGENCY DEPT VISIT HI MDM: CPT | Mod: GF

## 2023-02-12 PROCEDURE — 96361 HYDRATE IV INFUSION ADD-ON: CPT

## 2023-02-12 PROCEDURE — 87428 SARSCOV & INF VIR A&B AG IA: CPT | Performed by: NURSE PRACTITIONER

## 2023-02-12 PROCEDURE — 88305 NON-GYN CYTOLOGY: ICD-10-PCS | Mod: 26,,, | Performed by: PATHOLOGY

## 2023-02-12 PROCEDURE — 63600175 PHARM REV CODE 636 W HCPCS: Performed by: NURSE PRACTITIONER

## 2023-02-12 PROCEDURE — 83605 ASSAY OF LACTIC ACID: CPT

## 2023-02-12 PROCEDURE — 87149 DNA/RNA DIRECT PROBE: CPT | Performed by: NURSE PRACTITIONER

## 2023-02-12 PROCEDURE — 83605 ASSAY OF LACTIC ACID: CPT | Performed by: NURSE PRACTITIONER

## 2023-02-12 PROCEDURE — 83605 ASSAY OF LACTIC ACID: CPT | Performed by: EMERGENCY MEDICINE

## 2023-02-12 PROCEDURE — 82947 ASSAY GLUCOSE BLOOD QUANT: CPT

## 2023-02-12 PROCEDURE — 88305 TISSUE EXAM BY PATHOLOGIST: CPT | Mod: 26,,, | Performed by: PATHOLOGY

## 2023-02-12 PROCEDURE — 87077 CULTURE AEROBIC IDENTIFY: CPT | Performed by: NURSE PRACTITIONER

## 2023-02-12 PROCEDURE — 81001 URINALYSIS AUTO W/SCOPE: CPT | Performed by: NURSE PRACTITIONER

## 2023-02-12 PROCEDURE — 99285 EMERGENCY DEPT VISIT HI MDM: CPT | Mod: 25,GC,, | Performed by: EMERGENCY MEDICINE

## 2023-02-12 PROCEDURE — 20000000 HC ICU ROOM

## 2023-02-12 PROCEDURE — 25000003 PHARM REV CODE 250: Performed by: EMERGENCY MEDICINE

## 2023-02-12 PROCEDURE — 62270 DX LMBR SPI PNXR: CPT

## 2023-02-12 PROCEDURE — C9113 INJ PANTOPRAZOLE SODIUM, VIA: HCPCS | Performed by: NURSE PRACTITIONER

## 2023-02-12 PROCEDURE — 85025 COMPLETE CBC W/AUTO DIFF WBC: CPT | Performed by: NURSE PRACTITIONER

## 2023-02-12 PROCEDURE — 86403 PARTICLE AGGLUT ANTBDY SCRN: CPT | Performed by: EMERGENCY MEDICINE

## 2023-02-12 PROCEDURE — 96365 THER/PROPH/DIAG IV INF INIT: CPT | Mod: 59

## 2023-02-12 PROCEDURE — 93010 EKG 12-LEAD: ICD-10-PCS | Mod: ,,, | Performed by: INTERNAL MEDICINE

## 2023-02-12 PROCEDURE — 88108 CYTOPATH CONCENTRATE TECH: CPT | Mod: 26,,, | Performed by: PATHOLOGY

## 2023-02-12 PROCEDURE — 82803 BLOOD GASES ANY COMBINATION: CPT

## 2023-02-12 PROCEDURE — 62270 DX LMBR SPI PNXR: CPT | Mod: ,,, | Performed by: EMERGENCY MEDICINE

## 2023-02-12 PROCEDURE — 89051 BODY FLUID CELL COUNT: CPT | Performed by: EMERGENCY MEDICINE

## 2023-02-12 PROCEDURE — 87529 HSV DNA AMP PROBE: CPT | Mod: 90 | Performed by: NURSE PRACTITIONER

## 2023-02-12 PROCEDURE — 25000003 PHARM REV CODE 250: Performed by: INTERNAL MEDICINE

## 2023-02-12 PROCEDURE — 63600175 PHARM REV CODE 636 W HCPCS: Performed by: EMERGENCY MEDICINE

## 2023-02-12 PROCEDURE — 99285 EMERGENCY DEPT VISIT HI MDM: CPT | Mod: 25

## 2023-02-12 PROCEDURE — 99285 PR EMERGENCY DEPT VISIT,LEVEL V: ICD-10-PCS | Mod: 25,GC,, | Performed by: EMERGENCY MEDICINE

## 2023-02-12 PROCEDURE — 85025 COMPLETE CBC W/AUTO DIFF WBC: CPT | Performed by: EMERGENCY MEDICINE

## 2023-02-12 PROCEDURE — 88108 NON-GYN CYTOLOGY: ICD-10-PCS | Mod: 26,,, | Performed by: PATHOLOGY

## 2023-02-12 PROCEDURE — 80048 BASIC METABOLIC PNL TOTAL CA: CPT | Mod: XB | Performed by: EMERGENCY MEDICINE

## 2023-02-12 PROCEDURE — 93010 ELECTROCARDIOGRAM REPORT: CPT | Mod: ,,, | Performed by: INTERNAL MEDICINE

## 2023-02-12 PROCEDURE — 87102 FUNGUS ISOLATION CULTURE: CPT | Performed by: EMERGENCY MEDICINE

## 2023-02-12 PROCEDURE — 80053 COMPREHEN METABOLIC PANEL: CPT | Performed by: NURSE PRACTITIONER

## 2023-02-12 PROCEDURE — 82945 GLUCOSE OTHER FLUID: CPT | Performed by: EMERGENCY MEDICINE

## 2023-02-12 PROCEDURE — 93010 EKG 12-LEAD: ICD-10-PCS | Mod: 77,,, | Performed by: INTERNAL MEDICINE

## 2023-02-12 PROCEDURE — 88305 TISSUE EXAM BY PATHOLOGIST: CPT | Mod: TC,MCY | Performed by: NURSE PRACTITIONER

## 2023-02-12 PROCEDURE — 93010 ELECTROCARDIOGRAM REPORT: CPT | Mod: 77,,, | Performed by: INTERNAL MEDICINE

## 2023-02-12 PROCEDURE — 84132 ASSAY OF SERUM POTASSIUM: CPT

## 2023-02-12 PROCEDURE — 82330 ASSAY OF CALCIUM: CPT

## 2023-02-12 PROCEDURE — 62270 PR SPINAL PUNCTURE,LUMBAR,DIAGNOSTIC: ICD-10-PCS | Mod: ,,, | Performed by: EMERGENCY MEDICINE

## 2023-02-12 PROCEDURE — 99223 1ST HOSP IP/OBS HIGH 75: CPT | Mod: AI,,, | Performed by: INTERNAL MEDICINE

## 2023-02-12 PROCEDURE — 99223 PR INITIAL HOSPITAL CARE,LEVL III: ICD-10-PCS | Mod: AI,,, | Performed by: INTERNAL MEDICINE

## 2023-02-12 PROCEDURE — 84157 ASSAY OF PROTEIN OTHER: CPT | Performed by: EMERGENCY MEDICINE

## 2023-02-12 RX ORDER — PROCHLORPERAZINE EDISYLATE 5 MG/ML
5 INJECTION INTRAMUSCULAR; INTRAVENOUS
Status: COMPLETED | OUTPATIENT
Start: 2023-02-12 | End: 2023-02-12

## 2023-02-12 RX ORDER — DEXAMETHASONE SODIUM PHOSPHATE 4 MG/ML
10 INJECTION, SOLUTION INTRA-ARTICULAR; INTRALESIONAL; INTRAMUSCULAR; INTRAVENOUS; SOFT TISSUE EVERY 6 HOURS
Status: DISCONTINUED | OUTPATIENT
Start: 2023-02-12 | End: 2023-02-14

## 2023-02-12 RX ORDER — ACETAMINOPHEN 650 MG/1
650 SUPPOSITORY RECTAL EVERY 6 HOURS PRN
Status: DISCONTINUED | OUTPATIENT
Start: 2023-02-12 | End: 2023-02-18

## 2023-02-12 RX ORDER — ENOXAPARIN SODIUM 100 MG/ML
40 INJECTION SUBCUTANEOUS EVERY 24 HOURS
Status: DISCONTINUED | OUTPATIENT
Start: 2023-02-12 | End: 2023-02-13

## 2023-02-12 RX ORDER — MUPIROCIN 20 MG/G
OINTMENT TOPICAL 2 TIMES DAILY
Status: COMPLETED | OUTPATIENT
Start: 2023-02-12 | End: 2023-02-17

## 2023-02-12 RX ORDER — LIDOCAINE HYDROCHLORIDE 10 MG/ML
10 INJECTION, SOLUTION EPIDURAL; INFILTRATION; INTRACAUDAL; PERINEURAL
Status: COMPLETED | OUTPATIENT
Start: 2023-02-12 | End: 2023-02-12

## 2023-02-12 RX ORDER — DEXAMETHASONE SODIUM PHOSPHATE 4 MG/ML
12 INJECTION, SOLUTION INTRA-ARTICULAR; INTRALESIONAL; INTRAMUSCULAR; INTRAVENOUS; SOFT TISSUE
Status: COMPLETED | OUTPATIENT
Start: 2023-02-12 | End: 2023-02-12

## 2023-02-12 RX ORDER — LABETALOL HYDROCHLORIDE 5 MG/ML
20 INJECTION, SOLUTION INTRAVENOUS EVERY 6 HOURS PRN
Status: DISCONTINUED | OUTPATIENT
Start: 2023-02-12 | End: 2023-02-13

## 2023-02-12 RX ORDER — GLUCAGON 1 MG
1 KIT INJECTION
Status: DISCONTINUED | OUTPATIENT
Start: 2023-02-12 | End: 2023-02-20 | Stop reason: HOSPADM

## 2023-02-12 RX ORDER — ASPIRIN 81 MG/1
81 TABLET ORAL DAILY
Status: DISCONTINUED | OUTPATIENT
Start: 2023-02-12 | End: 2023-02-12

## 2023-02-12 RX ORDER — INSULIN ASPART 100 [IU]/ML
1-10 INJECTION, SOLUTION INTRAVENOUS; SUBCUTANEOUS EVERY 6 HOURS PRN
Status: DISCONTINUED | OUTPATIENT
Start: 2023-02-12 | End: 2023-02-14

## 2023-02-12 RX ORDER — SODIUM CHLORIDE, SODIUM GLUCONATE, SODIUM ACETATE, POTASSIUM CHLORIDE AND MAGNESIUM CHLORIDE 30; 37; 368; 526; 502 MG/100ML; MG/100ML; MG/100ML; MG/100ML; MG/100ML
INJECTION, SOLUTION INTRAVENOUS CONTINUOUS
Status: DISPENSED | OUTPATIENT
Start: 2023-02-12 | End: 2023-02-15

## 2023-02-12 RX ORDER — PANTOPRAZOLE SODIUM 40 MG/10ML
40 INJECTION, POWDER, LYOPHILIZED, FOR SOLUTION INTRAVENOUS DAILY
Status: DISCONTINUED | OUTPATIENT
Start: 2023-02-12 | End: 2023-02-19

## 2023-02-12 RX ORDER — SODIUM CHLORIDE, SODIUM GLUCONATE, SODIUM ACETATE, POTASSIUM CHLORIDE AND MAGNESIUM CHLORIDE 30; 37; 368; 526; 502 MG/100ML; MG/100ML; MG/100ML; MG/100ML; MG/100ML
INJECTION, SOLUTION INTRAVENOUS CONTINUOUS
Status: ACTIVE | OUTPATIENT
Start: 2023-02-12 | End: 2023-02-12

## 2023-02-12 RX ORDER — ACETAMINOPHEN 650 MG/1
650 SUPPOSITORY RECTAL
Status: COMPLETED | OUTPATIENT
Start: 2023-02-12 | End: 2023-02-12

## 2023-02-12 RX ADMIN — LABETALOL HYDROCHLORIDE 20 MG: 5 INJECTION INTRAVENOUS at 08:02

## 2023-02-12 RX ADMIN — DEXAMETHASONE SODIUM PHOSPHATE 10 MG: 4 INJECTION, SOLUTION INTRA-ARTICULAR; INTRALESIONAL; INTRAMUSCULAR; INTRAVENOUS; SOFT TISSUE at 01:02

## 2023-02-12 RX ADMIN — VANCOMYCIN HYDROCHLORIDE 1500 MG: 1 INJECTION, POWDER, LYOPHILIZED, FOR SOLUTION INTRAVENOUS at 06:02

## 2023-02-12 RX ADMIN — INSULIN ASPART 1 UNITS: 100 INJECTION, SOLUTION INTRAVENOUS; SUBCUTANEOUS at 11:02

## 2023-02-12 RX ADMIN — AMPICILLIN SODIUM 2 G: 2 INJECTION, POWDER, FOR SOLUTION INTRAMUSCULAR; INTRAVENOUS at 11:02

## 2023-02-12 RX ADMIN — AMPICILLIN SODIUM 2 G: 2 INJECTION, POWDER, FOR SOLUTION INTRAMUSCULAR; INTRAVENOUS at 05:02

## 2023-02-12 RX ADMIN — DEXAMETHASONE SODIUM PHOSPHATE 10 MG: 4 INJECTION, SOLUTION INTRA-ARTICULAR; INTRALESIONAL; INTRAMUSCULAR; INTRAVENOUS; SOFT TISSUE at 11:02

## 2023-02-12 RX ADMIN — LIDOCAINE HYDROCHLORIDE 100 MG: 10 INJECTION, SOLUTION EPIDURAL; INFILTRATION; INTRACAUDAL; PERINEURAL at 09:02

## 2023-02-12 RX ADMIN — CEFTRIAXONE SODIUM 2 G: 2 INJECTION, POWDER, FOR SOLUTION INTRAMUSCULAR; INTRAVENOUS at 10:02

## 2023-02-12 RX ADMIN — SODIUM CHLORIDE 1000 ML: 9 INJECTION, SOLUTION INTRAVENOUS at 10:02

## 2023-02-12 RX ADMIN — ACYCLOVIR SODIUM 550 MG: 50 INJECTION, SOLUTION INTRAVENOUS at 10:02

## 2023-02-12 RX ADMIN — SODIUM CHLORIDE, SODIUM GLUCONATE, SODIUM ACETATE, POTASSIUM CHLORIDE AND MAGNESIUM CHLORIDE: 526; 502; 368; 37; 30 INJECTION, SOLUTION INTRAVENOUS at 01:02

## 2023-02-12 RX ADMIN — SODIUM CHLORIDE 500 ML: 9 INJECTION, SOLUTION INTRAVENOUS at 05:02

## 2023-02-12 RX ADMIN — CEFTRIAXONE SODIUM 2 G: 1 INJECTION, POWDER, FOR SOLUTION INTRAMUSCULAR; INTRAVENOUS at 05:02

## 2023-02-12 RX ADMIN — PROCHLORPERAZINE EDISYLATE 5 MG: 5 INJECTION INTRAMUSCULAR; INTRAVENOUS at 04:02

## 2023-02-12 RX ADMIN — INSULIN ASPART 4 UNITS: 100 INJECTION, SOLUTION INTRAVENOUS; SUBCUTANEOUS at 06:02

## 2023-02-12 RX ADMIN — DEXAMETHASONE SODIUM PHOSPHATE 10 MG: 4 INJECTION, SOLUTION INTRA-ARTICULAR; INTRALESIONAL; INTRAMUSCULAR; INTRAVENOUS; SOFT TISSUE at 05:02

## 2023-02-12 RX ADMIN — SODIUM CHLORIDE 1000 ML: 9 INJECTION, SOLUTION INTRAVENOUS at 06:02

## 2023-02-12 RX ADMIN — ENOXAPARIN SODIUM 40 MG: 100 INJECTION SUBCUTANEOUS at 05:02

## 2023-02-12 RX ADMIN — ACYCLOVIR SODIUM 550 MG: 50 INJECTION, SOLUTION INTRAVENOUS at 11:02

## 2023-02-12 RX ADMIN — PANTOPRAZOLE SODIUM 40 MG: 40 INJECTION, POWDER, FOR SOLUTION INTRAVENOUS at 01:02

## 2023-02-12 RX ADMIN — SODIUM CHLORIDE 500 ML: 9 INJECTION, SOLUTION INTRAVENOUS at 04:02

## 2023-02-12 RX ADMIN — DEXAMETHASONE SODIUM PHOSPHATE 12 MG: 4 INJECTION, SOLUTION INTRA-ARTICULAR; INTRALESIONAL; INTRAMUSCULAR; INTRAVENOUS; SOFT TISSUE at 10:02

## 2023-02-12 RX ADMIN — ACETAMINOPHEN 650 MG: 650 SUPPOSITORY RECTAL at 05:02

## 2023-02-12 RX ADMIN — MUPIROCIN: 20 OINTMENT TOPICAL at 08:02

## 2023-02-12 RX ADMIN — AMPICILLIN SODIUM 2 G: 2 INJECTION, POWDER, FOR SOLUTION INTRAMUSCULAR; INTRAVENOUS at 10:02

## 2023-02-12 RX ADMIN — SODIUM CHLORIDE, SODIUM GLUCONATE, SODIUM ACETATE, POTASSIUM CHLORIDE AND MAGNESIUM CHLORIDE: 526; 502; 368; 37; 30 INJECTION, SOLUTION INTRAVENOUS at 11:02

## 2023-02-12 NOTE — ED NOTES
Pt family member at bedside attempting to call other family members to drive him to [facility].

## 2023-02-12 NOTE — ASSESSMENT & PLAN NOTE
No true source at this time - suspect meningitis   - BC x2  - CT abdomen, pelvis and chest xray without infectious process   - started on broad spectrum abx of vancomycin, rocephin, ampicillin and acyclovir

## 2023-02-12 NOTE — SUBJECTIVE & OBJECTIVE
Past Medical History:   Diagnosis Date    Cancer     Diabetes mellitus, type 2     Fibromyalgia     History of kidney cancer 2018    Hyperlipidemia     Hypertension     Migraine headache     Renal cell carcinoma     Renal disorder        Past Surgical History:   Procedure Laterality Date    NEPHRECTOMY         Review of patient's allergies indicates:   Allergen Reactions    Bactrim [sulfamethoxazole-trimethoprim] Blisters    Benadryl [diphenhydramine hcl]     Daypro [oxaprozin]        Family History    None       Tobacco Use    Smoking status: Unknown    Smokeless tobacco: Not on file   Substance and Sexual Activity    Alcohol use: Not Currently    Drug use: Never    Sexual activity: Not Currently         Review of Systems  Objective:     Vital Signs (Most Recent):  Temp: 100 °F (37.8 °C) (02/12/23 0906)  Pulse: (!) 135 (02/12/23 0859)  Resp: (!) 35 (02/12/23 0859)  BP: (!) 176/68 (02/12/23 0859)  SpO2: (!) 91 % (02/12/23 0859)   Vital Signs (24h Range):  Temp:  [98.7 °F (37.1 °C)-102.4 °F (39.1 °C)] 100 °F (37.8 °C)  Pulse:  [118-135] 135  Resp:  [20-36] 35  SpO2:  [91 %-96 %] 91 %  BP: (144-176)/(65-94) 176/68     Weight: 108 kg (238 lb)  Body mass index is 40.85 kg/m².    No intake or output data in the 24 hours ending 02/12/23 1034    Physical Exam    Vents:       Lines/Drains/Airways       Peripheral Intravenous Line  Duration                  Peripheral IV - Single Lumen 02/12/23 18 G Left Antecubital <1 day         Peripheral IV - Single Lumen 02/12/23 20 G Anterior;Proximal;Right Forearm <1 day                    Significant Labs:    CBC/Anemia Profile:  Recent Labs   Lab 02/12/23 0412 02/12/23 0909 02/12/23 0918   WBC 20.81*  --  34.85*   HGB 12.6  --  12.8   HCT 40.2 43 40.1     --  287   .6*  --  100.0*   RDW 15.2*  --  15.9*        Chemistries:  Recent Labs   Lab 02/12/23 0412 02/12/23 0918    141   K 4.8 3.8    107   CO2 23 20*   BUN 46* 40*   CREATININE 2.60* 2.60*    CALCIUM 9.6 8.9   ALBUMIN 3.2*  --    PROT 8.3*  --    BILITOT 0.3  --    ALKPHOS 108  --    ALT 18  --    AST 23  --        All pertinent labs within the past 24 hours have been reviewed.    Significant Imaging:   I have reviewed all pertinent imaging results/findings within the past 24 hours.

## 2023-02-12 NOTE — ED TRIAGE NOTES
Presents to ED via EMS from [facility] for admission. Patient presented to [facility] for c/o headache and nausea/vomiting. Patient responsive to painful stimuli upon arrival, unable to participate in triage.

## 2023-02-12 NOTE — ASSESSMENT & PLAN NOTE
S/p nephrectomy secondary to renal cell carcinoma  - Cr 2.6  ;   Baseline 2.2   - renally adjust all medications   - avoid nephrotoxic medications  - daily weights   - strict I/Os

## 2023-02-12 NOTE — ED TRIAGE NOTES
67 year female presents to ER via EMS with c/o headache and nausea/vomiting x 2days. N/V abd pain began yesterday. Pt appears to have altered mental status, pt is moaning and c/o headache. Pt does not answer questions correctly, inaudible answers. Pt states her stomach and head are really hurting. EMS reports she has only vomited x once enroute to ER.

## 2023-02-12 NOTE — HPI
67-year-old female who was transferred from outside emergency department for altered mental status and fever.  According to medical records fair she told EMS that her meningitis was acting up.  She was treated with Rocephin and vancomycin before transfer.  On arrival here she is obtunded but oxygenating and ventilating well.  She is unable to provide any review of systems or medical history.  This was obtained from medical records.  Dating back to 2015 she has a medical history of meningitis.  It is unclear if this was viral or bacterial.  LP was performed at bedside by emergency physician.  She had elevated opening pressure of 40.  Fluid was cloudy.   Critical Care was asked to admit for altered mental status and concern for meningitis.    Reviewing her medical records it looks like she has a history of diabetes, hyperlipidemia, hypertension.  She is also had prescriptions for acyclovir written at least twice in the last 6 months. Other history includes fibromyalgia, CKD, renal cell carcinoma s/p nephrectomy. [facility] medical records indicate she had a NSTEMI 11/2022 and had a stent placed to the RCA -- she was discharged home on brilinta.

## 2023-02-12 NOTE — PROGRESS NOTES
Initial Pharmacy Consult    Consulted to assist in the management of Vanc therapy in this 66 y/o female with meningitis.     Labs: BUN    40            SCR:   2.6            CRCL: 25    A one time dose of Vanc 1500mg was given at [facility] prior to transfer Based on the patient's weight of 110kg and the most recent lab data available, the following therapy will be initiated: Vanc 2250mg iv q36hr beginning at 2/13 at 0800  . Will check Vanc  trough prior to the 3rd dose on 2/14 and make adjustments if necessary.  Will Continue to monitor.    DELIA Rios.Ph.  Vanc

## 2023-02-12 NOTE — ASSESSMENT & PLAN NOTE
associated with fever   - suspect meningitis - LP done in ER with cloudy CSF and elevated opening pressure    - also has history of meningitis per medical records  - started broad spectrum abx including vancomycin, rocephin and ampicillin -- also started acyclovir given she has this on her home medication list there is a concern for herpetic meningitis   - will continue to follow LP results and cultures   - she is obtunded at this time but oxygenating and ventilation well according to her ABGs

## 2023-02-12 NOTE — ED NOTES
Spoke with [facility] dispatch for confirmation of fax. Will send EMS truck now.

## 2023-02-12 NOTE — ED NOTES
Faxed pt information to [facility] for transport to [facility] in [place].

## 2023-02-12 NOTE — ED PROVIDER NOTES
ED NOTE:    History of present illness    Patient presents from EMS transferred from [facility].  Case was discussed with [facility] nurse practitioner and with EMS and with the patient and with family.  EMS says patient had told him this morning that she would had a headache and she was getting confused she thought it was a recurrence of meningitis.  Patient was treated with Rocephin 2 g of vancomycin 0.5 g at the [facility].  She was sent to our hospital for further evaluation.    Review of systems was unable to be obtained incomplete because of her altered mental status.  She has had a headache and altered mental status.    Physical exam shows patient is awake but obtunded.  She is confused.  She has not comprehensible speech.  Patient's pupils are 2 mm and reactive bilateral.  Full range of motion.  Patient is able to move all extremities a focal exam.  Lung sounds are clear.  Breathing comfortably.  Heart sounds are regular no murmurs.  Abdomen is soft and nontender.  Skin is warm and dry.  Head exam is atraumatic.    Differential diagnosis is sepsis electrolyte abnormality meningitis.    Pulses 130.  Temperature 99.4°.  Blood pressure 174/75.  O2 sat 100%    See procedure note below.  Assisted residents obtaining a lumbar puncture which was very cloudy raises suspicion for sepsis.    Case discussed with intensivist will be admitted      MDM    Patient presents for emergent evaluation of acute altered mental status that poses a threat to life and/or bodily function.    In the ED patient found to have acute meningitis.    I ordered labs and personally reviewed them.  Labs significant for elevated CSF cell count.      I ordered EKG and personally reviewed it.  EKG significant for no ectopy.  No ST elevation      Admission MDM  I discussed the patient presentationfindings with the consultant for intensive care medicine (speciality).    Patient was managed in the ED with IV  normal saline.    The response to treatment was stable.    Patient required emergent consultation to intensive care medicine (admitting physician) for admission.       Diagnosed with meningitis, altered mental status, urinary tract infection metabolic encephalopathy chronic kidney disease sepsis      RESIDENT ATTESTATION:  Patient was evaluated by the emergency physician.  Also patient was evaluated by resident physician.  Agree with the resident's note.  The resident performed lumbar puncture under direct supervision by the attending physician throughout the procedure but the rest of the care and evaluation was performed by attending physician          Lumbar Puncture    Date/Time: 2/12/2023 10:00 AM  Location procedure was performed: [facility] EMERGENCY DEPARTMENT  Performed by: Orlin Hallman DO  Authorized by: Yaakov Walker MD   Pre-operative diagnosis:  Meningitis ruleout  Consent Done: Emergent Situation  Indications: evaluation for infection and evaluation for altered mental status  Anesthesia: local infiltration    Anesthesia:  Local Anesthetic: lidocaine 1% without epinephrine  Anesthetic total: 7 mL    Patient sedated: no  Preparation: Patient was prepped and draped in the usual sterile fashion.  Lumbar space: L4-L5 interspace  Patient's position: left lateral decubitus  Needle type: spinal needle - Quincke tip  Needle length: 5.0 in  Opening pressure (cm H2O): 40.  Fluid appearance: cloudy  Tubes of fluid: 4  Total volume: 6 ml  Post-procedure: site cleaned and adhesive bandage applied  Complications: No  Estimated blood loss (mL): 1  Specimens: Yes  Implants: No  Patient tolerance: Patient tolerated the procedure well with no immediate complications  Comments: Cloudy CSF, collected 6ml in 4 tubes          Orlin Hallman DO  Resident  02/12/23 1006       Yaakov Walker MD  02/12/23 1652       Yaakov Walker MD  02/12/23 1658       Yaakov Walker MD  02/12/23 1706       Yaakov LEYVA  MD Aaron  02/24/23 0702

## 2023-02-12 NOTE — H&P
[facility] - Emergency Department  Pulmonology  H&P    Patient Name: Jessica Bay  MRN: [id number]  Admission Date: 2/12/2023  Code Status: No Order  Primary Care Provider: Jag Lopez MD   Principal Problem: <principal problem not specified>    Subjective:     HPI:  67-year-old female who was transferred from outside emergency department for altered mental status and fever.  According to medical records fair she told EMS that her meningitis was acting up.  She was treated with Rocephin and vancomycin before transfer.  On arrival here she is obtunded but oxygenating and ventilating well.  She is unable to provide any review of systems or medical history.  This was obtained from medical records.  Dating back to 2015 she has a medical history of meningitis.  It is unclear if this was viral or bacterial.  LP was performed at bedside by emergency physician.  She had elevated opening pressure of 40.  Fluid was cloudy.   Critical Care was asked to admit for altered mental status and concern for meningitis.    Reviewing her medical records it looks like she has a history of diabetes, hyperlipidemia, hypertension.  She is also had prescriptions for acyclovir written at least twice in the last 6 months. Other history includes fibromyalgia, CKD, renal cell carcinoma s/p nephrectomy. [facility]'s medical records indicate she had a NSTEMI 11/2022 and had a stent placed to the RCA -- she was discharged home on brilinta.       Past Medical History:   Diagnosis Date    Cancer     Diabetes mellitus, type 2     Fibromyalgia     History of kidney cancer 2018    Hyperlipidemia     Hypertension     Migraine headache     Renal cell carcinoma     Renal disorder        Past Surgical History:   Procedure Laterality Date    NEPHRECTOMY         Review of patient's allergies indicates:   Allergen Reactions    Bactrim [sulfamethoxazole-trimethoprim] Blisters    Benadryl [diphenhydramine hcl]     Daypro [oxaprozin]         Family History    None       Tobacco Use    Smoking status: Unknown    Smokeless tobacco: Not on file   Substance and Sexual Activity    Alcohol use: Not Currently    Drug use: Never    Sexual activity: Not Currently         Review of Systems  Objective:     Vital Signs (Most Recent):  Temp: 100 °F (37.8 °C) (02/12/23 0906)  Pulse: (!) 135 (02/12/23 0859)  Resp: (!) 35 (02/12/23 0859)  BP: (!) 176/68 (02/12/23 0859)  SpO2: (!) 91 % (02/12/23 0859)   Vital Signs (24h Range):  Temp:  [98.7 °F (37.1 °C)-102.4 °F (39.1 °C)] 100 °F (37.8 °C)  Pulse:  [118-135] 135  Resp:  [20-36] 35  SpO2:  [91 %-96 %] 91 %  BP: (144-176)/(65-94) 176/68     Weight: 108 kg (238 lb)  Body mass index is 40.85 kg/m².    No intake or output data in the 24 hours ending 02/12/23 1034    Physical Exam    Vents:       Lines/Drains/Airways       Peripheral Intravenous Line  Duration                  Peripheral IV - Single Lumen 02/12/23 18 G Left Antecubital <1 day         Peripheral IV - Single Lumen 02/12/23 20 G Anterior;Proximal;Right Forearm <1 day                    Significant Labs:    CBC/Anemia Profile:  Recent Labs   Lab 02/12/23 0412 02/12/23  0909 02/12/23 0918   WBC 20.81*  --  34.85*   HGB 12.6  --  12.8   HCT 40.2 43 40.1     --  287   .6*  --  100.0*   RDW 15.2*  --  15.9*        Chemistries:  Recent Labs   Lab 02/12/23 0412 02/12/23 0918    141   K 4.8 3.8    107   CO2 23 20*   BUN 46* 40*   CREATININE 2.60* 2.60*   CALCIUM 9.6 8.9   ALBUMIN 3.2*  --    PROT 8.3*  --    BILITOT 0.3  --    ALKPHOS 108  --    ALT 18  --    AST 23  --        All pertinent labs within the past 24 hours have been reviewed.    Significant Imaging:   I have reviewed all pertinent imaging results/findings within the past 24 hours.    Assessment/Plan:     Encephalopathy, metabolic  associated with fever   - suspect meningitis - LP done in ER with cloudy CSF and elevated opening pressure    - also has history of  meningitis per medical records  - started broad spectrum abx including vancomycin, rocephin and ampicillin -- also started acyclovir given she has this on her home medication list there is a concern for herpetic meningitis   - will continue to follow LP results and cultures   - she is obtunded at this time but oxygenating and ventilation well according to her ABGs     SIRS (systemic inflammatory response syndrome)  No true source at this time - suspect meningitis   - BC x2  - CT abdomen, pelvis and chest xray without infectious process   - started on broad spectrum abx of vancomycin, rocephin, ampicillin and acyclovir       CAD (coronary artery disease)  S/p stent to [facility] 11/2023 --- will need to resume brillinta and home medications once she is taking in PO     Type 2 diabetes mellitus with diabetic chronic kidney disease  accuchecks Q 6 hrs since she is NPO due to her AMS   Sliding scale insulin     Stage 4 chronic kidney disease  S/p nephrectomy secondary to renal cell carcinoma  - Cr 2.6  ;   Baseline 2.2   - renally adjust all medications   - avoid nephrotoxic medications  - daily weights   - strict I/Os       Acquired absence of kidney  S/p nephrectomy secondary to renal cell carcinoma     Fibromyalgia  Stable     Dyslipidemia  Continue statin when taking in PO     Body mass index (BMI) 40.0-44.9, adult  Complicates all levels of care     Hypertension  Stable, hold HTNs at this time              SACHA Muhammad-ACNP  Pulmonology  [facility] - Emergency Department

## 2023-02-12 NOTE — ASSESSMENT & PLAN NOTE
S/p stent to [facility] 11/2023 --- will need to resume brillinta and home medications once she is taking in PO

## 2023-02-12 NOTE — ED PROVIDER NOTES
"Encounter Date: 2/12/2023       History     Chief Complaint   Patient presents with    Headache    Nausea    Vomiting     67 yr old WF to ED via EMS stretcher with c/o headache for the past 2 days, abdominal pain, nausea today  and vomiting once this morning.  Her  states she woke this morning, feeling worse. Pt is moaning and groaning c/o pain but lethargic and is not answering questions - altered mental status. EMS states she reported "meningitis is acting up".      The history is provided by the patient.   Headache   This is a new problem. The current episode started today. The pain is located in the Occipital region. The pain does not radiate. The quality of the pain is described as excruciating. Associated symptoms include abdominal pain, nausea and vomiting. Pertinent negatives include no coughing. Nothing aggravates the symptoms. She has tried nothing for the symptoms.   Review of patient's allergies indicates:   Allergen Reactions    Bactrim [sulfamethoxazole-trimethoprim] Blisters    Benadryl [diphenhydramine hcl]     Daypro [oxaprozin]      Past Medical History:   Diagnosis Date    Cancer     Diabetes mellitus, type 2     Fibromyalgia     History of kidney cancer 2018    Hyperlipidemia     Hypertension     Migraine headache     Renal cell carcinoma     Renal disorder      Past Surgical History:   Procedure Laterality Date    NEPHRECTOMY       History reviewed. No pertinent family history.  Social History     Tobacco Use    Smoking status: Unknown   Substance Use Topics    Alcohol use: Not Currently    Drug use: Never     Review of Systems   Reason unable to perform ROS: not answering all questions, just telling complaints.   Respiratory:  Negative for cough and shortness of breath.    Gastrointestinal:  Positive for abdominal pain, nausea and vomiting.   Neurological:  Positive for headaches.     Physical Exam     Initial Vitals   BP Pulse Resp Temp SpO2   02/12/23 0420 02/12/23 0420 02/12/23 0457 " 02/12/23 0416 02/12/23 0457   (!) 161/65 (!) 130 20 98.7 °F (37.1 °C) 96 %      MAP       --                Physical Exam    Constitutional: She appears lethargic. She is Obese . She has a sickly appearance. She appears ill.   Neck: Neck supple.   Cardiovascular:    Tachycardia present.         Pulmonary/Chest: No respiratory distress. She has decreased breath sounds in the right lower field and the left lower field.   Abdominal: Abdomen is soft. There is abdominal tenderness.   Musculoskeletal:      Cervical back: Neck supple.     Neurological: She appears lethargic. She is disoriented.   Skin: Skin is dry.       Medical Screening Exam   See Full Note    ED Course   Procedures  Labs Reviewed   COMPREHENSIVE METABOLIC PANEL - Abnormal; Notable for the following components:       Result Value    Glucose 271 (*)     BUN 46 (*)     Creatinine 2.60 (*)     Total Protein 8.3 (*)     Albumin 3.2 (*)     Globulin 5.1 (*)     eGFR 20 (*)     All other components within normal limits   URINALYSIS, REFLEX TO URINE CULTURE - Abnormal; Notable for the following components:    Protein, UA >=300 (*)     Glucose,  (*)     Blood, UA Small (*)     All other components within normal limits   CBC WITH DIFFERENTIAL - Abnormal; Notable for the following components:    WBC 20.81 (*)     RBC 3.92 (*)     .6 (*)     MCH 32.1 (*)     MCHC 31.3 (*)     RDW 15.2 (*)     Neutrophils % 90.2 (*)     Lymphocytes % 4.8 (*)     Neutrophils, Abs 18.77 (*)     Monocytes, Absolute 0.81 (*)     All other components within normal limits   LACTIC ACID, PLASMA - Abnormal; Notable for the following components:    Lactic Acid 3.4 (*)     All other components within normal limits   LACTIC ACID, PLASMA - Abnormal; Notable for the following components:    Lactic Acid 2.4 (*)     All other components within normal limits   URINALYSIS, MICROSCOPIC - Abnormal; Notable for the following components:    WBC, UA 11-15 (*)     RBC, UA 3-5 (*)      Bacteria, UA Moderate (*)     All other components within normal limits   POCT GLUCOSE MONITORING CONTINUOUS - Abnormal; Notable for the following components:    POC Glucose 233 (*)     All other components within normal limits   SARS-COV2 (COVID) W/ FLU ANTIGEN - Normal    Narrative:     Negative SARS-CoV results should not be used as the sole basis for treatment or patient management decisions; negative results should be considered in the context of a patient's recent exposures, history and the presene of clinical signs and symptoms consistent with COVID-19.  Negative results should be treated as presumptive and confirmed by molecular assay, if necessary for patient management.   CULTURE, BLOOD   CULTURE, URINE   CULTURE, BLOOD   CBC W/ AUTO DIFFERENTIAL    Narrative:     The following orders were created for panel order CBC Auto Differential.  Procedure                               Abnormality         Status                     ---------                               -----------         ------                     CBC with Differential[[id number]]        Abnormal            Final result               Manual Differential[[id number]]                              Final result                 Please view results for these tests on the individual orders.   MANUAL DIFFERENTIAL        ECG Results              EKG 12-lead (In process)  Result time 02/12/23 05:02:05      In process by Interface, Lab In [facility] (02/12/23 05:02:05)                   Narrative:    Test Reason : I10,    Vent. Rate : 124 BPM     Atrial Rate : 125 BPM     P-R Int : 132 ms          QRS Dur : 070 ms      QT Int : 294 ms       P-R-T Axes : 114 -06 035 degrees     QTc Int : 422 ms    Accelerated Junctional rhythm  Low voltage QRS  Inferior infarct ,age undetermined  Possible Anterolateral infarct ,age undetermined  Abnormal ECG  When compared with ECG of 22-NOV-2022 13:56,  Junctional rhythm has replaced Sinus rhythm  Vent. rate has increased BY  48  BPM  The axis Shifted left  ST no longer elevated in Inferior leads    Referred By:             Confirmed By:                                   Imaging Results              X-Ray Chest AP Portable (In process)                      CT Head Without Contrast (In process)                      CT Abdomen Pelvis  Without Contrast (In process)                      Medications   vancomycin (VANCOCIN) 1,500 mg in dextrose 5 % (D5W) 250 mL IVPB (1,500 mg Intravenous New Bag 2/12/23 0628)   sodium chloride 0.9% bolus 1,000 mL 1,000 mL (1,000 mLs Intravenous New Bag 2/12/23 0623)   prochlorperazine injection Soln 5 mg (5 mg Intravenous Given 2/12/23 0420)   sodium chloride 0.9% bolus 500 mL 500 mL (0 mLs Intravenous Stopped 2/12/23 0522)   cefTRIAXone (ROCEPHIN) 2 g in dextrose 5 % (D5W) 50 mL IVPB (0 g Intravenous Stopped 2/12/23 0533)   acetaminophen suppository 650 mg (650 mg Rectal Given 2/12/23 0504)   sodium chloride 0.9% bolus 500 mL 500 mL (0 mLs Intravenous Stopped 2/12/23 0601)     Medical Decision Making:   Initial Assessment:   67 yr old WF to ED via EMS stretcher moaning and crying with headache and abdominal pain.  EMS reports vomiting en route.  Pt states c/o nausea. Her  reports she has had a headache for the past 2 days, nausea and abdominal pain Sat  and vomiting tonight.  Pt not acting herself.  Is moaning and groaning, not answering questions.  Will squeeze hands but will not cooperate to complete NIH exam. Moving all extremities spontaneously but not to demand. She is hypertensive and tachycardic.  She has Stage 4 renal disease.  Her  reports the MD has mentioned but has not started dialysis  Differential Diagnosis:   CVA   Bacterial/ viral illness  Sepsis  uti  Clinical Tests:   Lab Tests: Ordered and Reviewed  The following lab test(s) were unremarkable: CBC, CMP, Lactate and Urinalysis       <> Summary of Lab: WBC 20.8, GFR 20-pt with known stage 4 KD, lactic 3.4,   Radiological Study:  Ordered and Reviewed  Medical Tests: Ordered and Reviewed  ED Management:  Further evaluation of altered mental status, pain and vomiting.    AMS, fever, Sepsis of unknown origin.  Tx with abx, consult telemed, request transfer for ICU   Other:   I have discussed this case with another health care provider.       <> Summary of the Discussion: Telemed consult via audio/ video with Dr. Key who suggest transfer to hospital with ICU.  Discussed status and treatment,.  He says ok to give more IV fluids and vanc as needed for sepsis, even though pt with Stage 4 KD.      Consult with Dr Walker, [facility] ED who accepts for transfer.   Transfer to [facility] for further evaluation of sepsis, AMS and ICU            ED Course as of 02/12/23 0712   Sun Feb 12, 2023   0459 Rectal temp 102.4, will treat with tylenol supp.  Pt resting at present.  Drowsy but will respond to verbal / tactile stimuli. [CG]   0552 Pt vomiting when moved for chest xray   [CG]   0615 Telemed consult with Dr. Key who states need to transfer for ICU, pt  request [facility] if possible.  Dr Key states ok to give Vanc and IV fluids for sepsis, states they can do renal adjustment to vanc at receiving hospital. [CG]   0642 Consult with Dr Walker [facility] - ED accepts for transfer.  [CG]      ED Course User Index  [CG] SULMA Mcmahan          Clinical Impression:   Final diagnoses:  [I10] Hypertension  [R78.81] Bacteremia  [A41.9] Sepsis, due to unspecified organism, unspecified whether acute organ dysfunction present (Primary)  [N18.4] Chronic renal disease, stage 4, severely decreased glomerular filtration rate (GFR) between 15-29 mL/min/1.73 square meter  [R50.9] Fever, unspecified fever cause  [R41.82] Altered mental status, unspecified altered mental status type        ED Disposition Condition    Transfer to Another Facility Stable                SULMA Mcmahan  02/12/23 0712

## 2023-02-13 PROBLEM — N39.0 UTI (URINARY TRACT INFECTION): Status: ACTIVE | Noted: 2023-02-13

## 2023-02-13 PROBLEM — G00.9 BACTERIAL MENINGITIS: Status: ACTIVE | Noted: 2023-02-13

## 2023-02-13 LAB
ALBUMIN SERPL BCP-MCNC: 2.6 G/DL (ref 3.5–5)
ALBUMIN/GLOB SERPL: 0.6 {RATIO}
ALP SERPL-CCNC: 101 U/L (ref 55–142)
ALT SERPL W P-5'-P-CCNC: 16 U/L (ref 13–56)
ANION GAP SERPL CALCULATED.3IONS-SCNC: 20 MMOL/L (ref 7–16)
ANISOCYTOSIS BLD QL SMEAR: ABNORMAL
AST SERPL W P-5'-P-CCNC: 17 U/L (ref 15–37)
BASOPHILS # BLD AUTO: 0.04 K/UL (ref 0–0.2)
BASOPHILS NFR BLD AUTO: 0.1 % (ref 0–1)
BILIRUB SERPL-MCNC: 0.3 MG/DL (ref ?–1.2)
BUN SERPL-MCNC: 40 MG/DL (ref 7–18)
BUN/CREAT SERPL: 14 (ref 6–20)
CALCIUM SERPL-MCNC: 8.5 MG/DL (ref 8.5–10.1)
CHLORIDE SERPL-SCNC: 106 MMOL/L (ref 98–107)
CO2 SERPL-SCNC: 20 MMOL/L (ref 21–32)
CREAT SERPL-MCNC: 2.88 MG/DL (ref 0.55–1.02)
DIFFERENTIAL METHOD BLD: ABNORMAL
EGFR (NO RACE VARIABLE) (RUSH/TITUS): 17 ML/MIN/1.73M²
EOSINOPHIL # BLD AUTO: 0 K/UL (ref 0–0.5)
EOSINOPHIL NFR BLD AUTO: 0 % (ref 1–4)
ERYTHROCYTE [DISTWIDTH] IN BLOOD BY AUTOMATED COUNT: 16.1 % (ref 11.5–14.5)
GLOBULIN SER-MCNC: 4.2 G/DL (ref 2–4)
GLUCOSE SERPL-MCNC: 224 MG/DL (ref 70–105)
GLUCOSE SERPL-MCNC: 246 MG/DL (ref 70–105)
GLUCOSE SERPL-MCNC: 249 MG/DL (ref 74–106)
GLUCOSE SERPL-MCNC: 251 MG/DL (ref 70–105)
GLUCOSE SERPL-MCNC: 291 MG/DL (ref 70–105)
HCT VFR BLD AUTO: 39.5 % (ref 38–47)
HGB BLD-MCNC: 12.9 G/DL (ref 12–16)
IMM GRANULOCYTES # BLD AUTO: 0.54 K/UL (ref 0–0.04)
IMM GRANULOCYTES NFR BLD: 1.7 % (ref 0–0.4)
LYMPHOCYTES # BLD AUTO: 0.92 K/UL (ref 1–4.8)
LYMPHOCYTES NFR BLD AUTO: 2.9 % (ref 27–41)
LYMPHOCYTES NFR BLD MANUAL: 5 % (ref 27–41)
MCH RBC QN AUTO: 32.4 PG (ref 27–31)
MCHC RBC AUTO-ENTMCNC: 32.7 G/DL (ref 32–36)
MCV RBC AUTO: 99.2 FL (ref 80–96)
MONOCYTES # BLD AUTO: 0.75 K/UL (ref 0–0.8)
MONOCYTES NFR BLD AUTO: 2.4 % (ref 2–6)
MONOCYTES NFR BLD MANUAL: 3 % (ref 2–6)
MPC BLD CALC-MCNC: 10.2 FL (ref 9.4–12.4)
NEUTROPHILS # BLD AUTO: 29.32 K/UL (ref 1.8–7.7)
NEUTROPHILS NFR BLD AUTO: 92.9 % (ref 53–65)
NEUTS BAND NFR BLD MANUAL: 9 % (ref 1–5)
NEUTS SEG NFR BLD MANUAL: 83 % (ref 50–62)
NRBC # BLD AUTO: 0 X10E3/UL
NRBC, AUTO (.00): 0 %
OVALOCYTES BLD QL SMEAR: ABNORMAL
PLATELET # BLD AUTO: 280 K/UL (ref 150–400)
PLATELET MORPHOLOGY: NORMAL
POLYCHROMASIA BLD QL SMEAR: ABNORMAL
POTASSIUM SERPL-SCNC: 4.1 MMOL/L (ref 3.5–5.1)
PROT CSF-MCNC: 1284 MG/DL (ref 15–45)
PROT SERPL-MCNC: 6.8 G/DL (ref 6.4–8.2)
RBC # BLD AUTO: 3.98 M/UL (ref 4.2–5.4)
SODIUM SERPL-SCNC: 142 MMOL/L (ref 136–145)
TARGETS BLD QL SMEAR: ABNORMAL
VERIGENE RESULT: ABNORMAL
WBC # BLD AUTO: 31.57 K/UL (ref 4.5–11)

## 2023-02-13 PROCEDURE — C9113 INJ PANTOPRAZOLE SODIUM, VIA: HCPCS | Performed by: NURSE PRACTITIONER

## 2023-02-13 PROCEDURE — 80053 COMPREHEN METABOLIC PANEL: CPT | Performed by: NURSE PRACTITIONER

## 2023-02-13 PROCEDURE — 99233 SBSQ HOSP IP/OBS HIGH 50: CPT | Mod: ,,, | Performed by: NURSE PRACTITIONER

## 2023-02-13 PROCEDURE — 85025 COMPLETE CBC W/AUTO DIFF WBC: CPT | Performed by: NURSE PRACTITIONER

## 2023-02-13 PROCEDURE — 25000003 PHARM REV CODE 250: Performed by: NURSE PRACTITIONER

## 2023-02-13 PROCEDURE — 63600175 PHARM REV CODE 636 W HCPCS: Performed by: NURSE PRACTITIONER

## 2023-02-13 PROCEDURE — 25000003 PHARM REV CODE 250: Performed by: INTERNAL MEDICINE

## 2023-02-13 PROCEDURE — 25000003 PHARM REV CODE 250: Performed by: HOSPITALIST

## 2023-02-13 PROCEDURE — 99233 PR SUBSEQUENT HOSPITAL CARE,LEVL III: ICD-10-PCS | Mod: ,,, | Performed by: NURSE PRACTITIONER

## 2023-02-13 PROCEDURE — 63600175 PHARM REV CODE 636 W HCPCS: Performed by: INTERNAL MEDICINE

## 2023-02-13 PROCEDURE — 20000000 HC ICU ROOM

## 2023-02-13 PROCEDURE — 27000221 HC OXYGEN, UP TO 24 HOURS

## 2023-02-13 PROCEDURE — 82962 GLUCOSE BLOOD TEST: CPT

## 2023-02-13 RX ORDER — ALLOPURINOL 100 MG/1
100 TABLET ORAL NIGHTLY
Status: DISCONTINUED | OUTPATIENT
Start: 2023-02-13 | End: 2023-02-20 | Stop reason: HOSPADM

## 2023-02-13 RX ORDER — LABETALOL HYDROCHLORIDE 5 MG/ML
20 INJECTION, SOLUTION INTRAVENOUS
Status: DISCONTINUED | OUTPATIENT
Start: 2023-02-13 | End: 2023-02-20 | Stop reason: HOSPADM

## 2023-02-13 RX ORDER — SODIUM CHLORIDE, SODIUM GLUCONATE, SODIUM ACETATE, POTASSIUM CHLORIDE AND MAGNESIUM CHLORIDE 30; 37; 368; 526; 502 MG/100ML; MG/100ML; MG/100ML; MG/100ML; MG/100ML
INJECTION, SOLUTION INTRAVENOUS CONTINUOUS
Status: ACTIVE | OUTPATIENT
Start: 2023-02-13 | End: 2023-02-13

## 2023-02-13 RX ORDER — METOPROLOL TARTRATE 1 MG/ML
5 INJECTION, SOLUTION INTRAVENOUS ONCE
Status: COMPLETED | OUTPATIENT
Start: 2023-02-13 | End: 2023-02-13

## 2023-02-13 RX ORDER — ENOXAPARIN SODIUM 100 MG/ML
30 INJECTION SUBCUTANEOUS EVERY 24 HOURS
Status: DISCONTINUED | OUTPATIENT
Start: 2023-02-13 | End: 2023-02-20 | Stop reason: HOSPADM

## 2023-02-13 RX ORDER — QUETIAPINE FUMARATE 25 MG/1
50 TABLET, FILM COATED ORAL 2 TIMES DAILY
Status: DISCONTINUED | OUTPATIENT
Start: 2023-02-13 | End: 2023-02-20 | Stop reason: HOSPADM

## 2023-02-13 RX ORDER — TIZANIDINE 4 MG/1
4 TABLET ORAL NIGHTLY
Status: DISCONTINUED | OUTPATIENT
Start: 2023-02-13 | End: 2023-02-20 | Stop reason: HOSPADM

## 2023-02-13 RX ADMIN — QUETIAPINE FUMARATE 50 MG: 25 TABLET ORAL at 09:02

## 2023-02-13 RX ADMIN — CEFTRIAXONE SODIUM 2 G: 2 INJECTION, POWDER, FOR SOLUTION INTRAMUSCULAR; INTRAVENOUS at 03:02

## 2023-02-13 RX ADMIN — HUMAN INSULIN 15 UNITS: 100 INJECTION, SUSPENSION SUBCUTANEOUS at 09:02

## 2023-02-13 RX ADMIN — AMPICILLIN SODIUM 2 G: 2 INJECTION, POWDER, FOR SOLUTION INTRAMUSCULAR; INTRAVENOUS at 04:02

## 2023-02-13 RX ADMIN — INSULIN ASPART 6 UNITS: 100 INJECTION, SOLUTION INTRAVENOUS; SUBCUTANEOUS at 12:02

## 2023-02-13 RX ADMIN — SODIUM CHLORIDE, SODIUM GLUCONATE, SODIUM ACETATE, POTASSIUM CHLORIDE AND MAGNESIUM CHLORIDE: 526; 502; 368; 37; 30 INJECTION, SOLUTION INTRAVENOUS at 10:02

## 2023-02-13 RX ADMIN — LABETALOL HYDROCHLORIDE 20 MG: 5 INJECTION INTRAVENOUS at 03:02

## 2023-02-13 RX ADMIN — ALLOPURINOL 100 MG: 100 TABLET ORAL at 09:02

## 2023-02-13 RX ADMIN — VANCOMYCIN HYDROCHLORIDE 2250 MG: 500 INJECTION, POWDER, LYOPHILIZED, FOR SOLUTION INTRAVENOUS at 08:02

## 2023-02-13 RX ADMIN — METOPROLOL TARTRATE 5 MG: 1 INJECTION, SOLUTION INTRAVENOUS at 01:02

## 2023-02-13 RX ADMIN — AMPICILLIN SODIUM 2 G: 2 INJECTION, POWDER, FOR SOLUTION INTRAMUSCULAR; INTRAVENOUS at 10:02

## 2023-02-13 RX ADMIN — MUPIROCIN: 20 OINTMENT TOPICAL at 08:02

## 2023-02-13 RX ADMIN — MUPIROCIN: 20 OINTMENT TOPICAL at 09:02

## 2023-02-13 RX ADMIN — TIZANIDINE 4 MG: 4 TABLET ORAL at 09:02

## 2023-02-13 RX ADMIN — LABETALOL HYDROCHLORIDE 20 MG: 5 INJECTION INTRAVENOUS at 12:02

## 2023-02-13 RX ADMIN — LABETALOL HYDROCHLORIDE 20 MG: 5 INJECTION INTRAVENOUS at 10:02

## 2023-02-13 RX ADMIN — SODIUM CHLORIDE, SODIUM GLUCONATE, SODIUM ACETATE, POTASSIUM CHLORIDE AND MAGNESIUM CHLORIDE: 526; 502; 368; 37; 30 INJECTION, SOLUTION INTRAVENOUS at 02:02

## 2023-02-13 RX ADMIN — SODIUM CHLORIDE, SODIUM GLUCONATE, SODIUM ACETATE, POTASSIUM CHLORIDE AND MAGNESIUM CHLORIDE: 526; 502; 368; 37; 30 INJECTION, SOLUTION INTRAVENOUS at 12:02

## 2023-02-13 RX ADMIN — INSULIN ASPART 4 UNITS: 100 INJECTION, SOLUTION INTRAVENOUS; SUBCUTANEOUS at 06:02

## 2023-02-13 RX ADMIN — ACYCLOVIR SODIUM 550 MG: 50 INJECTION, SOLUTION INTRAVENOUS at 11:02

## 2023-02-13 RX ADMIN — DEXAMETHASONE SODIUM PHOSPHATE 10 MG: 4 INJECTION, SOLUTION INTRA-ARTICULAR; INTRALESIONAL; INTRAMUSCULAR; INTRAVENOUS; SOFT TISSUE at 05:02

## 2023-02-13 RX ADMIN — ENOXAPARIN SODIUM 30 MG: 30 INJECTION SUBCUTANEOUS at 05:02

## 2023-02-13 RX ADMIN — DEXAMETHASONE SODIUM PHOSPHATE 10 MG: 4 INJECTION, SOLUTION INTRA-ARTICULAR; INTRALESIONAL; INTRAMUSCULAR; INTRAVENOUS; SOFT TISSUE at 06:02

## 2023-02-13 RX ADMIN — DEXAMETHASONE SODIUM PHOSPHATE 10 MG: 4 INJECTION, SOLUTION INTRA-ARTICULAR; INTRALESIONAL; INTRAMUSCULAR; INTRAVENOUS; SOFT TISSUE at 12:02

## 2023-02-13 RX ADMIN — PANTOPRAZOLE SODIUM 40 MG: 40 INJECTION, POWDER, FOR SOLUTION INTRAVENOUS at 08:02

## 2023-02-13 RX ADMIN — AMPICILLIN SODIUM 2 G: 2 INJECTION, POWDER, FOR SOLUTION INTRAMUSCULAR; INTRAVENOUS at 07:02

## 2023-02-13 RX ADMIN — INSULIN ASPART 4 UNITS: 100 INJECTION, SOLUTION INTRAVENOUS; SUBCUTANEOUS at 05:02

## 2023-02-13 NOTE — PROGRESS NOTES
[facility] ICU  Pulmonology  Progress Note    Patient Name: Jessica Bay  MRN: [id number]  Admission Date: 2/12/2023  Hospital Length of Stay: 1 days  Code Status: No Order  Attending Provider: Humberto Justin DO  Primary Care Provider: Jag Lopez MD   Principal Problem: Bacterial meningitis    Subjective:     Interval History: no significant change in neuro status; will moan to stimuli.       Objective:     Vital Signs (Most Recent):  Temp: 97.8 °F (36.6 °C) (02/13/23 0301)  Pulse: (!) 129 (02/13/23 0803)  Resp: (!) 32 (02/13/23 0803)  BP: (!) 182/96 (02/13/23 0600)  SpO2: 95 % (02/13/23 0803)   Vital Signs (24h Range):  Temp:  [97.8 °F (36.6 °C)-99.9 °F (37.7 °C)] 97.8 °F (36.6 °C)  Pulse:  [121-142] 129  Resp:  [11-41] 32  SpO2:  [95 %-100 %] 95 %  BP: (139-192)/(50-96) 182/96     Weight: 108.8 kg (239 lb 13.8 oz)  Body mass index is 41.17 kg/m².      Intake/Output Summary (Last 24 hours) at 2/13/2023 1035  Last data filed at 2/13/2023 0600  Gross per 24 hour   Intake 1915 ml   Output 2875 ml   Net -960 ml       Physical Exam  Vitals reviewed.   Constitutional:       Appearance: She is obese. She is ill-appearing.   HENT:      Right Ear: External ear normal.      Left Ear: External ear normal.      Mouth/Throat:      Mouth: Mucous membranes are dry.      Pharynx: Oropharynx is clear.   Eyes:      Conjunctiva/sclera: Conjunctivae normal.   Cardiovascular:      Rate and Rhythm: Tachycardia present.      Pulses: Normal pulses.      Heart sounds: Normal heart sounds.   Pulmonary:      Breath sounds: Normal breath sounds.   Abdominal:      General: Bowel sounds are normal. There is no distension.      Palpations: Abdomen is soft.   Musculoskeletal:      Right lower leg: No edema.      Left lower leg: No edema.   Skin:     General: Skin is warm and dry.      Capillary Refill: Capillary refill takes less than 2 seconds.   Neurological:      Comments: obtunded   Psychiatric:         Speech:  She is noncommunicative.         Behavior: Behavior is uncooperative.      Comments: Moans but does not follow commands      Review of Systems    Vents:  Oxygen Concentration (%): 32 (02/13/23 0803)    Lines/Drains/Airways       Drain  Duration                  Urethral Catheter 02/12/23 1 day              Peripheral Intravenous Line  Duration                  Peripheral IV - Single Lumen 02/12/23 18 G Left Antecubital 1 day         Peripheral IV - Single Lumen 02/12/23 20 G Anterior;Proximal;Right Forearm 1 day                    Significant Labs:    CBC/Anemia Profile:  Recent Labs   Lab 02/12/23 0412 02/12/23 0909 02/12/23 0918 02/13/23 0451   WBC 20.81*  --  34.85* 31.57*   HGB 12.6  --  12.8 12.9   HCT 40.2 43 40.1 39.5     --  287 280   .6*  --  100.0* 99.2*   RDW 15.2*  --  15.9* 16.1*        Chemistries:  Recent Labs   Lab 02/12/23 0412 02/12/23 0918 02/13/23 0451    141 142   K 4.8 3.8 4.1    107 106   CO2 23 20* 20*   BUN 46* 40* 40*   CREATININE 2.60* 2.60* 2.88*   CALCIUM 9.6 8.9 8.5   ALBUMIN 3.2*  --  2.6*   PROT 8.3*  --  6.8   BILITOT 0.3  --  0.3   ALKPHOS 108  --  101   ALT 18  --  16   AST 23  --  17       All pertinent labs within the past 24 hours have been reviewed.    Significant Imaging:  I have reviewed all pertinent imaging results/findings within the past 24 hours.    Assessment/Plan:     * Bacterial meningitis  9800 WBC on CSF - 96% polys   -glucose 1  - rapid meningitis panel negative   - gram stain with no organisms   - culture pending   - cryptococcal antigen negative   -- she is on ampicillin, vancomycin, rocephin, acyclovir and steroids     UTI (urinary tract infection)  Urine culture >100,000 GNB   - she is on rocephin     Encephalopathy, metabolic  associated with fever   -  meningitis - LP done in ER with cloudy CSF and elevated opening pressure    - also has history of meningitis per medical records  - started broad spectrum abx including  vancomycin, rocephin and ampicillin -- also started acyclovir given she has this on her home medication list there is a concern for herpetic meningitis   - will continue to follow LP results and cultures   - she is obtunded at this time but oxygenating and ventilation well according to her ABGs     2/13-- no significant change in neurological status. She is still oxygenating well but remains to be high risk for intubation.      SIRS (systemic inflammatory response syndrome)  No true source at this time - suspect meningitis   - BC x2  - CT abdomen, pelvis and chest xray without infectious process   - started on broad spectrum abx of vancomycin, rocephin, ampicillin and acyclovir       CAD (coronary artery disease)  S[facility] 11/[facility]23 --- will need to resume brillinta and home medications once she is taking in PO     Type 2 diabetes mellitus with diabetic chronic kidney disease  accuchecks Q 6 hrs since she is NPO due to her AMS   Sliding scale insulin     Elevated glucose - will start  70/30 BID     Stage 4 chronic kidney disease  S/p nephrectomy secondary to renal cell carcinoma  -  Baseline 2.2   - renally adjust all medications   - avoid nephrotoxic medications  - daily weights   - strict I/Os   --- cr up to 2.88 -- will continue to monitor closely -- UOP  2875cc in last 24 hours     Acquired absence of kidney  S/p nephrectomy secondary to renal cell carcinoma     Fibromyalgia  Stable     Hypertension  Elevated BP-- PRN labetalol                  SACHA Muhammad-ACNP  Pulmonology  [facility] ICU

## 2023-02-13 NOTE — PROGRESS NOTES
Pharmacist Renal Dose Adjustment Note    Jessica Bay is a 67 y.o. female being treated with the medication: Acyclovir    Patient Data:    Vital Signs (Most Recent):  Temp: (!) 100.8 °F (38.2 °C) (02/13/23 1511)  Pulse: (!) 119 (02/13/23 1600)  Resp: (!) 22 (02/13/23 1600)  BP: (!) 156/77 (02/13/23 1600)  SpO2: 96 % (02/13/23 1600)   Vital Signs (72h Range):  Temp:  [97.8 °F (36.6 °C)-102.4 °F (39.1 °C)]   Pulse:  [114-142]   Resp:  [11-41]   BP: (139-221)/()   SpO2:  [91 %-100 %]      Recent Labs   Lab 02/12/23  0412 02/12/23  0918 02/13/23  0451   CREATININE 2.60* 2.60* 2.88*     Serum creatinine: 2.88 mg/dL (H) 02/13/23 0451  Estimated creatinine clearance: 22.8 mL/min (A)    Medication:Acyclovir dose: 550mg q12h will be changed to medication:550mg q24hr  Pharmacist's Name: Marshal Michel  Pharmacist's Extension: [phone]

## 2023-02-13 NOTE — SUBJECTIVE & OBJECTIVE
Interval History: no significant change in neuro status; will moan to stimuli.       Objective:     Vital Signs (Most Recent):  Temp: 97.8 °F (36.6 °C) (02/13/23 0301)  Pulse: (!) 129 (02/13/23 0803)  Resp: (!) 32 (02/13/23 0803)  BP: (!) 182/96 (02/13/23 0600)  SpO2: 95 % (02/13/23 0803)   Vital Signs (24h Range):  Temp:  [97.8 °F (36.6 °C)-99.9 °F (37.7 °C)] 97.8 °F (36.6 °C)  Pulse:  [121-142] 129  Resp:  [11-41] 32  SpO2:  [95 %-100 %] 95 %  BP: (139-192)/(50-96) 182/96     Weight: 108.8 kg (239 lb 13.8 oz)  Body mass index is 41.17 kg/m².      Intake/Output Summary (Last 24 hours) at 2/13/2023 1035  Last data filed at 2/13/2023 0600  Gross per 24 hour   Intake 1915 ml   Output 2875 ml   Net -960 ml       Physical Exam  Vitals reviewed.   Constitutional:       Appearance: She is obese. She is ill-appearing.   HENT:      Right Ear: External ear normal.      Left Ear: External ear normal.      Mouth/Throat:      Mouth: Mucous membranes are dry.      Pharynx: Oropharynx is clear.   Eyes:      Conjunctiva/sclera: Conjunctivae normal.   Cardiovascular:      Rate and Rhythm: Tachycardia present.      Pulses: Normal pulses.      Heart sounds: Normal heart sounds.   Pulmonary:      Breath sounds: Normal breath sounds.   Abdominal:      General: Bowel sounds are normal. There is no distension.      Palpations: Abdomen is soft.   Musculoskeletal:      Right lower leg: No edema.      Left lower leg: No edema.   Skin:     General: Skin is warm and dry.      Capillary Refill: Capillary refill takes less than 2 seconds.   Neurological:      Comments: obtunded   Psychiatric:         Speech: She is noncommunicative.         Behavior: Behavior is uncooperative.      Comments: Moans but does not follow commands      Review of Systems    Vents:  Oxygen Concentration (%): 32 (02/13/23 0803)    Lines/Drains/Airways       Drain  Duration                  Urethral Catheter 02/12/23 1 day              Peripheral Intravenous Line   Duration                  Peripheral IV - Single Lumen 02/12/23 18 G Left Antecubital 1 day         Peripheral IV - Single Lumen 02/12/23 20 G Anterior;Proximal;Right Forearm 1 day                    Significant Labs:    CBC/Anemia Profile:  Recent Labs   Lab 02/12/23 0412 02/12/23 0909 02/12/23 0918 02/13/23 0451   WBC 20.81*  --  34.85* 31.57*   HGB 12.6  --  12.8 12.9   HCT 40.2 43 40.1 39.5     --  287 280   .6*  --  100.0* 99.2*   RDW 15.2*  --  15.9* 16.1*        Chemistries:  Recent Labs   Lab 02/12/23 0412 02/12/23 0918 02/13/23 0451    141 142   K 4.8 3.8 4.1    107 106   CO2 23 20* 20*   BUN 46* 40* 40*   CREATININE 2.60* 2.60* 2.88*   CALCIUM 9.6 8.9 8.5   ALBUMIN 3.2*  --  2.6*   PROT 8.3*  --  6.8   BILITOT 0.3  --  0.3   ALKPHOS 108  --  101   ALT 18  --  16   AST 23  --  17       All pertinent labs within the past 24 hours have been reviewed.    Significant Imaging:  I have reviewed all pertinent imaging results/findings within the past 24 hours.

## 2023-02-13 NOTE — ASSESSMENT & PLAN NOTE
9800 WBC on CSF - 96% polys   -glucose 1  - rapid meningitis panel negative   - gram stain with no organisms   - culture pending   - cryptococcal antigen negative   -- she is on ampicillin, vancomycin, rocephin, acyclovir and steroids

## 2023-02-13 NOTE — ASSESSMENT & PLAN NOTE
S/p stent to [facility] 11/2023 --- will need to resume brillinta and home medications once she is taking in PO

## 2023-02-13 NOTE — ASSESSMENT & PLAN NOTE
associated with fever   -  meningitis - LP done in ER with cloudy CSF and elevated opening pressure    - also has history of meningitis per medical records  - started broad spectrum abx including vancomycin, rocephin and ampicillin -- also started acyclovir given she has this on her home medication list there is a concern for herpetic meningitis   - will continue to follow LP results and cultures   - she is obtunded at this time but oxygenating and ventilation well according to her ABGs     2/13-- no significant change in neurological status. She is still oxygenating well but remains to be high risk for intubation.

## 2023-02-13 NOTE — PROGRESS NOTES
Pharmacist Renal Dose Adjustment Note    Jessica Bay is a 67 y.o. female being treated with the medication Ampicillin    Patient Data:    Vital Signs (Most Recent):  Temp: (!) 100.8 °F (38.2 °C) (02/13/23 1511)  Pulse: (!) 119 (02/13/23 1600)  Resp: (!) 22 (02/13/23 1600)  BP: (!) 156/77 (02/13/23 1600)  SpO2: 96 % (02/13/23 1600)   Vital Signs (72h Range):  Temp:  [97.8 °F (36.6 °C)-102.4 °F (39.1 °C)]   Pulse:  [114-142]   Resp:  [11-41]   BP: (139-221)/()   SpO2:  [91 %-100 %]      Recent Labs   Lab 02/12/23  0412 02/12/23  0918 02/13/23  0451   CREATININE 2.60* 2.60* 2.88*     Serum creatinine: 2.88 mg/dL (H) 02/13/23 0451  Estimated creatinine clearance: 22.8 mL/min (A)    Medication:Ampicillin dose: 2gm iv q6h will be changed to medication:Ampicillin dose:2gm iv q8h.  Pharmacist's Name: Marshal Michel  Pharmacist's Extension: [phone]

## 2023-02-13 NOTE — PLAN OF CARE
[facility]  Initial Discharge Assessment       Primary Care Provider: Jag Lopez MD    Admission Diagnosis: Metabolic encephalopathy [G93.41]  Meningitis [G03.9]  Hypoxia [R09.02]  Urinary tract infection without hematuria, site unspecified [N39.0]  Chronic kidney disease, unspecified CKD stage [N18.9]  Sepsis, due to unspecified organism, unspecified whether acute organ dysfunction present [A41.9]    Admission Date: 2/12/2023  Expected Discharge Date:     Discharge Barriers Identified: None    Payor: MEDICARE / Plan: MEDICARE PART A & B / Product Type: Government /     Extended Emergency Contact Information  Primary Emergency Contact: Enrique Bay  Mobile Phone: [phone]  Relation: Spouse  Secondary Emergency Contact: Dean Santana  Mobile Phone: [phone]  Relation: Son  Preferred language: English   needed? No    Discharge Plan A: Home  Discharge Plan B: [facility] [place] [place] - [address]  [address]  [place] [address]  Phone: [phone] Fax: [phone]      Initial Assessment (most recent)       Adult Discharge Assessment - 02/13/23 1220          Discharge Assessment    Assessment Type Discharge Planning Assessment     Source of Information family     If unable to respond/provide information was family/caregiver contacted? Yes     Contact Name/Number ty Nunez [phone]     Communicated RAMOS with patient/caregiver Date not available/Unable to determine     People in Home spouse     Do you expect to return to your current living situation? Yes     Do you have help at home or someone to help you manage your care at home? Yes     Who are your caregiver(s) and their phone number(s)?  Enrique [phone]     Prior to hospitilization cognitive status: Alert/Oriented     Home Accessibility wheelchair accessible     Equipment Currently Used at Home power chair     Patient currently being followed by outpatient case  management? No     Do you currently have service(s) that help you manage your care at home? No     Do you take prescription medications? Yes     Do you have any problems affording any of your prescribed medications? No     Is the patient taking medications as prescribed? yes     Who is going to help you get home at discharge? undecided     How do you get to doctors appointments? car, drives self;family or friend will provide     Are you on dialysis? No     Discharge Plan A Home     Discharge Plan B Home Health     DME Needed Upon Discharge  none     Discharge Plan discussed with: Spouse/sig other     Discharge Barriers Identified None        Physical Activity    On average, how many days per week do you engage in moderate to strenuous exercise (like a brisk walk)? 0 days     On average, how many minutes do you engage in exercise at this level? 0 min        Financial Resource Strain    How hard is it for you to pay for the very basics like food, housing, medical care, and heating? Not hard at all        Housing Stability    In the last 12 months, was there a time when you were not able to pay the mortgage or rent on time? No     In the last 12 months, how many places have you lived? 1     In the last 12 months, was there a time when you did not have a steady place to sleep or slept in a shelter (including now)? No        Transportation Needs    In the past 12 months, has lack of transportation kept you from medical appointments or from getting medications? No     In the past 12 months, has lack of transportation kept you from meetings, work, or from getting things needed for daily living? No        Food Insecurity    Within the past 12 months, you worried that your food would run out before you got the money to buy more. Never true     Within the past 12 months, the food you bought just didn't last and you didn't have money to get more. Never true        Stress    Do you feel stress - tense, restless, nervous, or  anxious, or unable to sleep at night because your mind is troubled all the time - these days? Not at all        Social Connections    In a typical week, how many times do you talk on the phone with family, friends, or neighbors? More than three times a week     How often do you get together with friends or relatives? More than three times a week     How often do you attend Christianity or Temple services? Never     Do you belong to any clubs or organizations such as Christianity groups, unions, fraternal or athletic groups, or school groups? No     How often do you attend meetings of the clubs or organizations you belong to? Never     Are you , , , , never , or living with a partner?         Alcohol Use    Q1: How often do you have a drink containing alcohol? Never     Q2: How many drinks containing alcohol do you have on a typical day when you are drinking? Patient does not drink     Q3: How often do you have six or more drinks on one occasion? Never                   Patient unable to participate in initial dc planning assessment. Spoke with patients  on the phone. They live together. He states patient did not have home health. Has a power chair. 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 Dc plan is home. Verbal IM. Will follow.

## 2023-02-13 NOTE — ASSESSMENT & PLAN NOTE
accuchecks Q 6 hrs since she is NPO due to her AMS   Sliding scale insulin     Elevated glucose - will start  70/30 BID

## 2023-02-13 NOTE — PLAN OF CARE
Problem: Diabetes Comorbidity  Goal: Blood Glucose Level Within Targeted Range  Outcome: Ongoing, Progressing  Intervention: Monitor and Manage Glycemia  Flowsheets (Taken 2/12/2023 2245)  Glycemic Management: blood glucose monitored     Problem: Skin Injury Risk Increased  Goal: Skin Health and Integrity  Outcome: Ongoing, Progressing  Intervention: Optimize Skin Protection  Flowsheets (Taken 2/12/2023 2245)  Pressure Reduction Techniques:   frequent weight shift encouraged   sit time limited to 2 hours  Pressure Reduction Devices:   specialty bed utilized   feet on footrest/footstool   heel offloading device utilized  Skin Protection:   adhesive use limited   skin-to-device areas padded   skin-to-skin areas padded  Head of Bed (HOB) Positioning: HOB at 30 degrees  Intervention: Promote and Optimize Oral Intake  Flowsheets (Taken 2/12/2023 2245)  Oral Nutrition Promotion: safe use of adaptive equipment encouraged

## 2023-02-13 NOTE — ASSESSMENT & PLAN NOTE
S/p nephrectomy secondary to renal cell carcinoma  -  Baseline 2.2   - renally adjust all medications   - avoid nephrotoxic medications  - daily weights   - strict I/Os   --- cr up to 2.88 -- will continue to monitor closely -- UOP  2875cc in last 24 hours

## 2023-02-14 LAB
ALBUMIN SERPL BCP-MCNC: 2.1 G/DL (ref 3.5–5)
ALBUMIN/GLOB SERPL: 0.4 {RATIO}
ALP SERPL-CCNC: 99 U/L (ref 55–142)
ALT SERPL W P-5'-P-CCNC: 18 U/L (ref 13–56)
ANION GAP SERPL CALCULATED.3IONS-SCNC: 15 MMOL/L (ref 7–16)
ANISOCYTOSIS BLD QL SMEAR: ABNORMAL
AST SERPL W P-5'-P-CCNC: 21 U/L (ref 15–37)
BASOPHILS # BLD AUTO: 0.04 K/UL (ref 0–0.2)
BASOPHILS NFR BLD AUTO: 0.2 % (ref 0–1)
BILIRUB SERPL-MCNC: 0.4 MG/DL (ref ?–1.2)
BUN SERPL-MCNC: 48 MG/DL (ref 7–18)
BUN/CREAT SERPL: 15 (ref 6–20)
CALCIUM SERPL-MCNC: 8.8 MG/DL (ref 8.5–10.1)
CHLORIDE SERPL-SCNC: 112 MMOL/L (ref 98–107)
CO2 SERPL-SCNC: 19 MMOL/L (ref 21–32)
CREAT SERPL-MCNC: 3.3 MG/DL (ref 0.55–1.02)
CRENATED CELLS: ABNORMAL
DIFFERENTIAL METHOD BLD: ABNORMAL
EGFR (NO RACE VARIABLE) (RUSH/TITUS): 15 ML/MIN/1.73M²
EOSINOPHIL # BLD AUTO: 0 K/UL (ref 0–0.5)
EOSINOPHIL NFR BLD AUTO: 0 % (ref 1–4)
ERYTHROCYTE [DISTWIDTH] IN BLOOD BY AUTOMATED COUNT: 15.9 % (ref 11.5–14.5)
GLOBULIN SER-MCNC: 5.3 G/DL (ref 2–4)
GLUCOSE SERPL-MCNC: 176 MG/DL (ref 74–106)
GLUCOSE SERPL-MCNC: 179 MG/DL (ref 70–105)
GLUCOSE SERPL-MCNC: 191 MG/DL (ref 70–105)
GLUCOSE SERPL-MCNC: 209 MG/DL (ref 70–105)
GLUCOSE SERPL-MCNC: 214 MG/DL (ref 70–105)
HCT VFR BLD AUTO: 33.4 % (ref 38–47)
HGB BLD-MCNC: 10.9 G/DL (ref 12–16)
HSV1 DNA CSF QL NAA+PROBE: NEGATIVE
HSV2 DNA CSF QL NAA+PROBE: NEGATIVE
IMM GRANULOCYTES # BLD AUTO: 0.4 K/UL (ref 0–0.04)
IMM GRANULOCYTES NFR BLD: 1.6 % (ref 0–0.4)
INSULIN SERPL-ACNC: NORMAL U[IU]/ML
LAB AP CLINICAL INFORMATION: NORMAL
LAB AP GROSS DESCRIPTION: NORMAL
LAB AP LABORATORY NOTES: NORMAL
LAB AP SPECIMEN A NON-GYN GENERAL CATEGORIZATION: NEGATIVE
LAB AP SPECIMEN A NON-GYN INTERPETATION: NORMAL
LYMPHOCYTES # BLD AUTO: 0.84 K/UL (ref 1–4.8)
LYMPHOCYTES NFR BLD AUTO: 3.4 % (ref 27–41)
LYMPHOCYTES NFR BLD MANUAL: 5 % (ref 27–41)
MCH RBC QN AUTO: 32 PG (ref 27–31)
MCHC RBC AUTO-ENTMCNC: 32.6 G/DL (ref 32–36)
MCV RBC AUTO: 97.9 FL (ref 80–96)
MONOCYTES # BLD AUTO: 0.75 K/UL (ref 0–0.8)
MONOCYTES NFR BLD AUTO: 3 % (ref 2–6)
MONOCYTES NFR BLD MANUAL: 4 % (ref 2–6)
MPC BLD CALC-MCNC: 10.3 FL (ref 9.4–12.4)
NEUTROPHILS # BLD AUTO: 22.94 K/UL (ref 1.8–7.7)
NEUTROPHILS NFR BLD AUTO: 91.8 % (ref 53–65)
NEUTS BAND NFR BLD MANUAL: 2 % (ref 1–5)
NEUTS SEG NFR BLD MANUAL: 89 % (ref 50–62)
NRBC # BLD AUTO: 0.02 X10E3/UL
NRBC, AUTO (.00): 0.1 %
PLATELET # BLD AUTO: 246 K/UL (ref 150–400)
PLATELET MORPHOLOGY: NORMAL
POLYCHROMASIA BLD QL SMEAR: ABNORMAL
POTASSIUM SERPL-SCNC: 4 MMOL/L (ref 3.5–5.1)
PROT SERPL-MCNC: 7.4 G/DL (ref 6.4–8.2)
RBC # BLD AUTO: 3.41 M/UL (ref 4.2–5.4)
SODIUM SERPL-SCNC: 142 MMOL/L (ref 136–145)
TARGETS BLD QL SMEAR: ABNORMAL
UA COMPLETE W REFLEX CULTURE PNL UR: ABNORMAL
VANCOMYCIN TROUGH SERPL-MCNC: 17.3 ΜG/ML (ref 10–20)
VDRL CSF QL: NEGATIVE
WBC # BLD AUTO: 24.97 K/UL (ref 4.5–11)

## 2023-02-14 PROCEDURE — 63600175 PHARM REV CODE 636 W HCPCS: Performed by: NURSE PRACTITIONER

## 2023-02-14 PROCEDURE — 25000003 PHARM REV CODE 250: Performed by: HOSPITALIST

## 2023-02-14 PROCEDURE — 99233 PR SUBSEQUENT HOSPITAL CARE,LEVL III: ICD-10-PCS | Mod: ,,, | Performed by: INTERNAL MEDICINE

## 2023-02-14 PROCEDURE — 20000000 HC ICU ROOM

## 2023-02-14 PROCEDURE — 80202 ASSAY OF VANCOMYCIN: CPT | Performed by: INTERNAL MEDICINE

## 2023-02-14 PROCEDURE — 25000003 PHARM REV CODE 250: Performed by: NURSE PRACTITIONER

## 2023-02-14 PROCEDURE — 94761 N-INVAS EAR/PLS OXIMETRY MLT: CPT

## 2023-02-14 PROCEDURE — 99233 SBSQ HOSP IP/OBS HIGH 50: CPT | Mod: ,,, | Performed by: INTERNAL MEDICINE

## 2023-02-14 PROCEDURE — 80053 COMPREHEN METABOLIC PANEL: CPT | Performed by: NURSE PRACTITIONER

## 2023-02-14 PROCEDURE — 85025 COMPLETE CBC W/AUTO DIFF WBC: CPT | Performed by: NURSE PRACTITIONER

## 2023-02-14 PROCEDURE — 82962 GLUCOSE BLOOD TEST: CPT

## 2023-02-14 PROCEDURE — C9113 INJ PANTOPRAZOLE SODIUM, VIA: HCPCS | Performed by: NURSE PRACTITIONER

## 2023-02-14 PROCEDURE — 63600175 PHARM REV CODE 636 W HCPCS: Performed by: INTERNAL MEDICINE

## 2023-02-14 PROCEDURE — 27000221 HC OXYGEN, UP TO 24 HOURS

## 2023-02-14 PROCEDURE — 25000003 PHARM REV CODE 250: Performed by: INTERNAL MEDICINE

## 2023-02-14 RX ORDER — INSULIN ASPART 100 [IU]/ML
1-14 INJECTION, SOLUTION INTRAVENOUS; SUBCUTANEOUS EVERY 6 HOURS PRN
Status: DISCONTINUED | OUTPATIENT
Start: 2023-02-14 | End: 2023-02-20 | Stop reason: HOSPADM

## 2023-02-14 RX ORDER — CARVEDILOL 3.12 MG/1
3.12 TABLET ORAL 2 TIMES DAILY
Status: DISCONTINUED | OUTPATIENT
Start: 2023-02-14 | End: 2023-02-15

## 2023-02-14 RX ORDER — DEXAMETHASONE SODIUM PHOSPHATE 4 MG/ML
10 INJECTION, SOLUTION INTRA-ARTICULAR; INTRALESIONAL; INTRAMUSCULAR; INTRAVENOUS; SOFT TISSUE EVERY 6 HOURS
Status: COMPLETED | OUTPATIENT
Start: 2023-02-14 | End: 2023-02-18

## 2023-02-14 RX ADMIN — SODIUM CHLORIDE, SODIUM GLUCONATE, SODIUM ACETATE, POTASSIUM CHLORIDE AND MAGNESIUM CHLORIDE: 526; 502; 368; 37; 30 INJECTION, SOLUTION INTRAVENOUS at 12:02

## 2023-02-14 RX ADMIN — QUETIAPINE FUMARATE 50 MG: 25 TABLET ORAL at 09:02

## 2023-02-14 RX ADMIN — SODIUM CHLORIDE, SODIUM GLUCONATE, SODIUM ACETATE, POTASSIUM CHLORIDE AND MAGNESIUM CHLORIDE: 526; 502; 368; 37; 30 INJECTION, SOLUTION INTRAVENOUS at 02:02

## 2023-02-14 RX ADMIN — QUETIAPINE FUMARATE 50 MG: 25 TABLET ORAL at 08:02

## 2023-02-14 RX ADMIN — MUPIROCIN: 20 OINTMENT TOPICAL at 08:02

## 2023-02-14 RX ADMIN — PANTOPRAZOLE SODIUM 40 MG: 40 INJECTION, POWDER, FOR SOLUTION INTRAVENOUS at 08:02

## 2023-02-14 RX ADMIN — VANCOMYCIN HYDROCHLORIDE 2250 MG: 500 INJECTION, POWDER, LYOPHILIZED, FOR SOLUTION INTRAVENOUS at 07:02

## 2023-02-14 RX ADMIN — CARVEDILOL 3.12 MG: 3.12 TABLET, FILM COATED ORAL at 11:02

## 2023-02-14 RX ADMIN — DEXAMETHASONE SODIUM PHOSPHATE 10 MG: 4 INJECTION, SOLUTION INTRA-ARTICULAR; INTRALESIONAL; INTRAMUSCULAR; INTRAVENOUS; SOFT TISSUE at 11:02

## 2023-02-14 RX ADMIN — DEXAMETHASONE SODIUM PHOSPHATE 10 MG: 4 INJECTION, SOLUTION INTRA-ARTICULAR; INTRALESIONAL; INTRAMUSCULAR; INTRAVENOUS; SOFT TISSUE at 12:02

## 2023-02-14 RX ADMIN — TIZANIDINE 4 MG: 4 TABLET ORAL at 08:02

## 2023-02-14 RX ADMIN — CEFTRIAXONE SODIUM 2 G: 2 INJECTION, POWDER, FOR SOLUTION INTRAMUSCULAR; INTRAVENOUS at 03:02

## 2023-02-14 RX ADMIN — TICAGRELOR 90 MG: 90 TABLET ORAL at 11:02

## 2023-02-14 RX ADMIN — AMPICILLIN SODIUM 2 G: 2 INJECTION, POWDER, FOR SOLUTION INTRAMUSCULAR; INTRAVENOUS at 06:02

## 2023-02-14 RX ADMIN — INSULIN ASPART 2 UNITS: 100 INJECTION, SOLUTION INTRAVENOUS; SUBCUTANEOUS at 05:02

## 2023-02-14 RX ADMIN — CARVEDILOL 3.12 MG: 3.12 TABLET, FILM COATED ORAL at 08:02

## 2023-02-14 RX ADMIN — INSULIN ASPART 6 UNITS: 100 INJECTION, SOLUTION INTRAVENOUS; SUBCUTANEOUS at 05:02

## 2023-02-14 RX ADMIN — DEXAMETHASONE SODIUM PHOSPHATE 10 MG: 4 INJECTION, SOLUTION INTRA-ARTICULAR; INTRALESIONAL; INTRAMUSCULAR; INTRAVENOUS; SOFT TISSUE at 06:02

## 2023-02-14 RX ADMIN — INSULIN ASPART 4 UNITS: 100 INJECTION, SOLUTION INTRAVENOUS; SUBCUTANEOUS at 12:02

## 2023-02-14 RX ADMIN — AMPICILLIN SODIUM 2 G: 2 INJECTION, POWDER, FOR SOLUTION INTRAMUSCULAR; INTRAVENOUS at 11:02

## 2023-02-14 RX ADMIN — ENOXAPARIN SODIUM 30 MG: 30 INJECTION SUBCUTANEOUS at 04:02

## 2023-02-14 RX ADMIN — AMPICILLIN SODIUM 2 G: 2 INJECTION, POWDER, FOR SOLUTION INTRAMUSCULAR; INTRAVENOUS at 02:02

## 2023-02-14 RX ADMIN — INSULIN ASPART 2 UNITS: 100 INJECTION, SOLUTION INTRAVENOUS; SUBCUTANEOUS at 12:02

## 2023-02-14 RX ADMIN — ALLOPURINOL 100 MG: 100 TABLET ORAL at 09:02

## 2023-02-14 RX ADMIN — ACYCLOVIR SODIUM 550 MG: 50 INJECTION, SOLUTION INTRAVENOUS at 12:02

## 2023-02-14 RX ADMIN — TICAGRELOR 90 MG: 90 TABLET ORAL at 08:02

## 2023-02-14 RX ADMIN — CEFTRIAXONE SODIUM 2 G: 2 INJECTION, POWDER, FOR SOLUTION INTRAMUSCULAR; INTRAVENOUS at 02:02

## 2023-02-14 RX ADMIN — INSULIN DETEMIR 25 UNITS: 100 INJECTION, SOLUTION SUBCUTANEOUS at 08:02

## 2023-02-14 RX ADMIN — INSULIN ASPART 6 UNITS: 100 INJECTION, SOLUTION INTRAVENOUS; SUBCUTANEOUS at 11:02

## 2023-02-14 RX ADMIN — DEXAMETHASONE SODIUM PHOSPHATE 10 MG: 4 INJECTION, SOLUTION INTRA-ARTICULAR; INTRALESIONAL; INTRAMUSCULAR; INTRAVENOUS; SOFT TISSUE at 05:02

## 2023-02-14 RX ADMIN — HUMAN INSULIN 15 UNITS: 100 INJECTION, SUSPENSION SUBCUTANEOUS at 08:02

## 2023-02-14 NOTE — SUBJECTIVE & OBJECTIVE
Interval History:  Patient less lethargic but does not follow commands      Objective:     Vital Signs (Most Recent):  Temp: 99 °F (37.2 °C) (02/14/23 0301)  Pulse: (!) 119 (02/14/23 0600)  Resp: 13 (02/14/23 0600)  BP: (!) 173/89 (02/14/23 0600)  SpO2: 96 % (02/14/23 0600)   Vital Signs (24h Range):  Temp:  [99 °F (37.2 °C)-100.8 °F (38.2 °C)] 99 °F (37.2 °C)  Pulse:  [108-134] 119  Resp:  [11-32] 13  SpO2:  [94 %-99 %] 96 %  BP: (114-221)/() 173/89     Weight: 111.5 kg (245 lb 13 oz)  Body mass index is 42.19 kg/m².      Intake/Output Summary (Last 24 hours) at 2/14/2023 0625  Last data filed at 2/14/2023 0600  Gross per 24 hour   Intake 3425 ml   Output 3500 ml   Net -75 ml       Physical Exam  Vitals reviewed.   Constitutional:       Appearance: Normal appearance.      Interventions: She is not intubated.  HENT:      Head: Normocephalic and atraumatic.      Nose: Nose normal.      Mouth/Throat:      Mouth: Mucous membranes are dry.      Pharynx: Oropharynx is clear.   Eyes:      Extraocular Movements: Extraocular movements intact.      Conjunctiva/sclera: Conjunctivae normal.      Pupils: Pupils are equal, round, and reactive to light.   Cardiovascular:      Rate and Rhythm: Normal rate.      Heart sounds: Normal heart sounds. No murmur heard.  Pulmonary:      Effort: Pulmonary effort is normal. She is not intubated.      Breath sounds: Normal breath sounds.   Abdominal:      General: Abdomen is flat. Bowel sounds are normal.      Palpations: Abdomen is soft.   Musculoskeletal:         General: Normal range of motion.      Cervical back: Normal range of motion and neck supple.      Right lower leg: No edema.      Left lower leg: No edema.   Skin:     General: Skin is warm and dry.      Capillary Refill: Capillary refill takes less than 2 seconds.   Neurological:      General: No focal deficit present.      Mental Status: She is alert and oriented to person, place, and time.   Psychiatric:         Mood and  Affect: Mood normal.         Behavior: Behavior normal.     Review of Systems    Vents:  Oxygen Concentration (%): 32 (02/13/23 0803)    Lines/Drains/Airways       Drain  Duration                  Urethral Catheter 02/12/23 2 days         NG/OG Tube 02/13/23 2101 Left nostril <1 day              Peripheral Intravenous Line  Duration                  Peripheral IV - Single Lumen 02/12/23 18 G Left Antecubital 2 days         Peripheral IV - Single Lumen 02/14/23 0530 20 G Right Breast <1 day                    Significant Labs:    CBC/Anemia Profile:  Recent Labs   Lab 02/12/23  0909 02/12/23  0918 02/13/23  0451   WBC  --  34.85* 31.57*   HGB  --  12.8 12.9   HCT 43 40.1 39.5   PLT  --  287 280   MCV  --  100.0* 99.2*   RDW  --  15.9* 16.1*        Chemistries:  Recent Labs   Lab 02/12/23  0918 02/13/23  0451    142   K 3.8 4.1    106   CO2 20* 20*   BUN 40* 40*   CREATININE 2.60* 2.88*   CALCIUM 8.9 8.5   ALBUMIN  --  2.6*   PROT  --  6.8   BILITOT  --  0.3   ALKPHOS  --  101   ALT  --  16   AST  --  17       Recent Lab Results  (Last 5 results in the past 24 hours)        02/14/23  0511   02/13/23  2359   02/13/23  1758   02/13/23  1549   02/13/23  1140        POC Glucose 191   209   224   251   291                              Significant Imaging:  I have reviewed all pertinent imaging results/findings within the past 24 hours.

## 2023-02-14 NOTE — PROGRESS NOTES
[facility] ICU  Pulmonology  Progress Note    Patient Name: Jessica Bay  MRN: [id number]  Admission Date: 2/12/2023  Hospital Length of Stay: 2 days  Code Status: No Order  Attending Provider: Humberto Justin DO  Primary Care Provider: Jag Lopez MD   Principal Problem: Bacterial meningitis    Subjective:     Interval History:  Patient less lethargic but does not follow commands      Objective:     Vital Signs (Most Recent):  Temp: 99 °F (37.2 °C) (02/14/23 0301)  Pulse: (!) 119 (02/14/23 0600)  Resp: 13 (02/14/23 0600)  BP: (!) 173/89 (02/14/23 0600)  SpO2: 96 % (02/14/23 0600)   Vital Signs (24h Range):  Temp:  [99 °F (37.2 °C)-100.8 °F (38.2 °C)] 99 °F (37.2 °C)  Pulse:  [108-134] 119  Resp:  [11-32] 13  SpO2:  [94 %-99 %] 96 %  BP: (114-221)/() 173/89     Weight: 111.5 kg (245 lb 13 oz)  Body mass index is 42.19 kg/m².      Intake/Output Summary (Last 24 hours) at 2/14/2023 0625  Last data filed at 2/14/2023 0600  Gross per 24 hour   Intake 3425 ml   Output 3500 ml   Net -75 ml       Physical Exam  Vitals reviewed.   Constitutional:       Appearance: Normal appearance.      Interventions: She is not intubated.  HENT:      Head: Normocephalic and atraumatic.      Nose: Nose normal.      Mouth/Throat:      Mouth: Mucous membranes are dry.      Pharynx: Oropharynx is clear.   Eyes:      Extraocular Movements: Extraocular movements intact.      Conjunctiva/sclera: Conjunctivae normal.      Pupils: Pupils are equal, round, and reactive to light.   Cardiovascular:      Rate and Rhythm: Normal rate.      Heart sounds: Normal heart sounds. No murmur heard.  Pulmonary:      Effort: Pulmonary effort is normal. She is not intubated.      Breath sounds: Normal breath sounds.   Abdominal:      General: Abdomen is flat. Bowel sounds are normal.      Palpations: Abdomen is soft.   Musculoskeletal:         General: Normal range of motion.      Cervical back: Normal range of motion and neck  supple.      Right lower leg: No edema.      Left lower leg: No edema.   Skin:     General: Skin is warm and dry.      Capillary Refill: Capillary refill takes less than 2 seconds.   Neurological:      General: No focal deficit present.      Mental Status: She is alert and oriented to person, place, and time.   Psychiatric:         Mood and Affect: Mood normal.         Behavior: Behavior normal.     Review of Systems    Vents:  Oxygen Concentration (%): 32 (02/13/23 0803)    Lines/Drains/Airways       Drain  Duration                  Urethral Catheter 02/12/23 2 days         NG/OG Tube 02/13/23 2101 Left nostril <1 day              Peripheral Intravenous Line  Duration                  Peripheral IV - Single Lumen 02/12/23 18 G Left Antecubital 2 days         Peripheral IV - Single Lumen 02/14/23 0530 20 G Right Breast <1 day                    Significant Labs:    CBC/Anemia Profile:  Recent Labs   Lab 02/12/23  0909 02/12/23  0918 02/13/23  0451   WBC  --  34.85* 31.57*   HGB  --  12.8 12.9   HCT 43 40.1 39.5   PLT  --  287 280   MCV  --  100.0* 99.2*   RDW  --  15.9* 16.1*        Chemistries:  Recent Labs   Lab 02/12/23  0918 02/13/23  0451    142   K 3.8 4.1    106   CO2 20* 20*   BUN 40* 40*   CREATININE 2.60* 2.88*   CALCIUM 8.9 8.5   ALBUMIN  --  2.6*   PROT  --  6.8   BILITOT  --  0.3   ALKPHOS  --  101   ALT  --  16   AST  --  17       Recent Lab Results  (Last 5 results in the past 24 hours)        02/14/23  0511   02/13/23  2359   02/13/23  1758   02/13/23  1549   02/13/23  1140        POC Glucose 191   209   224   251   291                              Significant Imaging:  I have reviewed all pertinent imaging results/findings within the past 24 hours.    Assessment/Plan:     * Bacterial meningitis  9800 WBC on CSF - 96% polys   -glucose 1  - rapid meningitis panel negative   - gram stain with no organisms   - culture pending   - cryptococcal antigen negative   -- she is on ampicillin,  vancomycin, rocephin, acyclovir and steroids   She seems a little more alert but certainly not waking up continue current regimen    UTI (urinary tract infection)  Urine culture >100,000 GNB   - she is on rocephin     Encephalopathy, metabolic  Patient is still maintaining airway on multiple antibiotics waiting for any cultures her PCR on spinal fluid continue broad-spectrum antibiotics including acyclovir     CAD (coronary artery disease)  S/p stent to [facility] 11/2023 --- will need to resume brillinta and home medications once she is taking in PO     Type 2 diabetes mellitus with diabetic chronic kidney disease  Sugars are better     Stage 4 chronic kidney disease  S/p nephrectomy secondary to renal cell carcinoma  -  Baseline 2.2   - renally adjust all medications   - avoid nephrotoxic medications  - daily weights   - strict I/Os   --- cr up to 2.88 -- will continue to monitor closely -- UOP  2875cc in last 24 hours                  Jair Jain MD  Pulmonology  [facility] ICU

## 2023-02-14 NOTE — NURSING
0725 rec'd pt lying in bed. No acute distress noted. Pt has ng tube in place. See flow sheet for full assessment.  0905 went in pt's room to give am meds and pt had pulled her ng tube out.  Replaced it and she pulled it out again. Spoke with NP and got order for restraints. Replaced ng tube and placed tierney soft wrist restraints. Will monitor.  0940 complete bath done with oral and jemal care. Gown and linens changed. Will monitor.

## 2023-02-14 NOTE — ASSESSMENT & PLAN NOTE
S/p stent to [facility] 11/2023 --- will need to resume brillinta and home medications once she is taking in PO

## 2023-02-14 NOTE — PLAN OF CARE
Problem: Diabetes Comorbidity  Goal: Blood Glucose Level Within Targeted Range  Outcome: Ongoing, Progressing  Intervention: Monitor and Manage Glycemia  Flowsheets (Taken 2/13/2023 1501)  Glycemic Management:   blood glucose monitored   supplemental insulin given     Problem: Gas Exchange Impaired  Goal: Optimal Gas Exchange  Outcome: Ongoing, Progressing  Intervention: Optimize Oxygenation and Ventilation  Flowsheets (Taken 2/13/2023 1501)  Airway/Ventilation Management: airway patency maintained  Head of Bed (HOB) Positioning: HOB at 30-45 degrees

## 2023-02-14 NOTE — ASSESSMENT & PLAN NOTE
9800 WBC on CSF - 96% polys   -glucose 1  - rapid meningitis panel negative   - gram stain with no organisms   - culture pending   - cryptococcal antigen negative   -- she is on ampicillin, vancomycin, rocephin, acyclovir and steroids   She seems a little more alert but certainly not waking up continue current regimen

## 2023-02-14 NOTE — PLAN OF CARE
Problem: Adult Inpatient Plan of Care  Goal: Plan of Care Review  Outcome: Ongoing, Progressing  Goal: Patient-Specific Goal (Individualized)  Outcome: Ongoing, Progressing  Goal: Absence of Hospital-Acquired Illness or Injury  Outcome: Ongoing, Progressing  Goal: Optimal Comfort and Wellbeing  Outcome: Ongoing, Progressing  Goal: Readiness for Transition of Care  Outcome: Ongoing, Progressing     Problem: Bariatric Environmental Safety  Goal: Safety Maintained with Care  Outcome: Ongoing, Progressing     Problem: Diabetes Comorbidity  Goal: Blood Glucose Level Within Targeted Range  Outcome: Ongoing, Progressing     Problem: Skin Injury Risk Increased  Goal: Skin Health and Integrity  Outcome: Ongoing, Progressing     Problem: Impaired Wound Healing  Goal: Optimal Wound Healing  Outcome: Ongoing, Progressing     Problem: Infection  Goal: Absence of Infection Signs and Symptoms  Outcome: Ongoing, Progressing     Problem: Gas Exchange Impaired  Goal: Optimal Gas Exchange  Outcome: Ongoing, Progressing

## 2023-02-14 NOTE — PROGRESS NOTES
[facility] ICU  Adult Nutrition  Consult Note         Reason for Assessment  Reason For Assessment: consult (trickle feeds)   Nutrition Risk Screen: tube feeding or parenteral nutrition     Discussed in morning rounds. Recommend trickle feeds of Glucerna 1.5 @ 10ml/h with 20ml free water flush.  Will monitor for tolerance. Consult for increased rate. May consider a different formula as rate increases depending on renal function. RD following.    Bacterial meningitis  9800 WBC on CSF - 96% polys   -glucose 1  - rapid meningitis panel negative   - gram stain with no organisms   - culture pending   - cryptococcal antigen negative   -- she is on ampicillin, vancomycin, rocephin, acyclovir and steroids   She seems a little more alert but certainly not waking up continue current regimen     UTI (urinary tract infection)  Urine culture >100,000 GNB   - she is on rocephin      Encephalopathy, metabolic  Patient is still maintaining airway on multiple antibiotics waiting for any cultures her PCR on spinal fluid continue broad-spectrum antibiotics including acyclovir      CAD (coronary artery disease)  S/p stent to [facility] 11/2023 --- will need to resume brillinta and home medications once she is taking in PO      Type 2 diabetes mellitus with diabetic chronic kidney disease  Sugars are better      Stage 4 chronic kidney disease  S/p nephrectomy secondary to renal cell carcinoma  -  Baseline 2.2   - renally adjust all medications   - avoid nephrotoxic medications  - daily weights   - strict I/Os   --- cr up to 2.88 -- will continue to monitor closely -- UOP  2875cc in last 24 hours          Malnutrition  Is Patient Malnourished: No  Skin Integrity  Parminder Risk Assessment  Sensory Perception: 2-->very limited  Moisture: 4-->rarely moist  Activity: 1-->bedfast  Mobility: 3-->slightly limited  Nutrition: 2-->probably inadequate  Friction and Shear: 2-->potential problem  Parmnider Score: 14    Nutrition  Diagnosis  Altered Nutrition Related Lab Values   related to Diabetes Mellitus as evidenced by elevated blood glucose levels    Nutrition Diagnosis Status: Chronic/ continues        Nutrition Risk  Level of Risk/Frequency of Follow-up: high  Comments on nutrition risk: NG   Recent Labs   Lab 02/14/23  0506 02/14/23  0511   *  --    POCGLU  --  191*     Comments on Glucose: dx of diabetes  Nutrition Prescription / Recommendations  Recommendation/Intervention: Recommend Glucerna 1.5 @ 10ml/h with 20ml free water flush.  Goals: tolerance of tube feeds  Nutrition Goal Status: new  Current Diet Order: NPO  Chewing or Swallowing Difficulty?: Swallowing difficulty  Recommended Diet: Enteral Nutrition  Recommended Oral Supplement: No Oral Supplements  Is Nutrition Support Recommended: Yes   Needs Calculated  Energy Need Method: Kcal/kg Energy Calorie Requirements (kcal): 0110-5176  Protein Requirements: 55-68  Enteral Nutrition   Glucerna 1.5@ 10ml/h with 20ml free water flush  Enteral Nutrition Recommended Order:  Tube feeding via NG/ Solen Sump  Tube feeding formula: Glucerna 1.5 Continuous 10 ml/h  Free Water Flush: 20 ml hourly  Modular Supplements:No Modular Supplements needed  Enteral Nutrition meets needs?: no - trickle feeds  Enteral Nutrition Status: New Order    Is Education Recommended: No  Monitor and Evaluation  % current Intake: New Enteral Nutrition Order with no documentation   % intake to meet estimated needs: Enteral Nutrition   Food and Nutrient Intake: enteral nutrition intake  Food and Nutrient Adminstration: enteral and parenteral nutrition administration  Anthropometric Measurements: weight, weight change, body mass index  Biochemical Data, Medical Tests and Procedures: electrolyte and renal panel, inflammatory profile, gastrointestinal profile, lipid profile, glucose/endocrine profile  Nutrition-Focused Physical Findings: overall appearance, extremities, muscles and bones, head and eyes,  "skin     Current Medical Diagnosis and Past Medical History  Diagnosis: renal disease  Past Medical History:   Diagnosis Date    Cancer     Diabetes mellitus, type 2     Fibromyalgia     History of kidney cancer 2018    Hyperlipidemia     Hypertension     Migraine headache     Renal cell carcinoma     Renal disorder      Nutrition/Diet History  Food Allergies: NKFA  Factors Affecting Nutritional Intake: NPO  Lab/Procedures/Meds  Recent Labs   Lab 02/14/23  0506      K 4.0   BUN 48*   CREATININE 3.30*   CALCIUM 8.8   ALBUMIN 2.1*   *   ALT 18   AST 21     Last A1c:   Lab Results   Component Value Date    HGBA1C 6.9 (H) 11/22/2022     Lab Results   Component Value Date    RBC 3.41 (L) 02/14/2023    HGB 10.9 (L) 02/14/2023    HCT 33.4 (L) 02/14/2023    MCV 97.9 (H) 02/14/2023    MCH 32.0 (H) 02/14/2023    MCHC 32.6 02/14/2023     Pertinent Labs Reviewed: reviewed  Pertinent Labs Comments: 02/14/23 05:06  Sodium: 142  Potassium: 4.0  Chloride: 112 (H)  CO2: 19 (L)  Anion Gap: 15  BUN: 48 (H)  Creatinine: 3.30 (H)  BUN/CREAT RATIO: 15  eGFR: 15 (L)  Glucose: 176 (H)  Calcium: 8.8  Alkaline Phosphatase: 99  PROTEIN TOTAL: 7.4  Albumin: 2.1 (L)      (H): Data is abnormally high  (L): Data is abnormally low  Pertinent Medications Reviewed: reviewed  Pertinent Medications Comments: zovirax, allopurinol, insulin, omnipen, vanc, rocephin  Anthropometrics  Temp: 100.1 °F (37.8 °C)  Height: 5' 4" (162.6 cm)  Height (inches): 64 in  Weight Method: Bed Scale  Weight: 111.5 kg (245 lb 13 oz)  Weight (lb): 245.82 lb  Ideal Body Weight (IBW), Female: 120 lb  % Ideal Body Weight, Female (lb): 201.54 %  BMI (Calculated): 42.2  BMI Grade: greater than 40 - morbid obesity     Estimated/Assessed Needs  Weight Used For Calorie Calculations: 68.8 kg (151 lb 10.8 oz)   Energy Need Method: Kcal/kg Energy Calorie Requirements (kcal): 1267-4018  Weight Used For Protein Calculations: 68.8 kg (151 lb 10.8 oz)  Protein " Requirements: 55-68  Estimated Fluid Requirement Method: RDA Method    RDA Method (mL): 1720     Nutrition by Nursing  Diet/Nutrition Received: NPO           Last Bowel Movement: 02/10/23              Nutrition Follow-Up  RD Follow-up?: Yes

## 2023-02-15 LAB
ALBUMIN SERPL BCP-MCNC: 2.1 G/DL (ref 3.5–5)
ALBUMIN/GLOB SERPL: 0.5 {RATIO}
ALP SERPL-CCNC: 73 U/L (ref 55–142)
ALT SERPL W P-5'-P-CCNC: 80 U/L (ref 13–56)
ANION GAP SERPL CALCULATED.3IONS-SCNC: 17 MMOL/L (ref 7–16)
ANISOCYTOSIS BLD QL SMEAR: ABNORMAL
AST SERPL W P-5'-P-CCNC: 101 U/L (ref 15–37)
BACTERIA BLD CULT: ABNORMAL
BASOPHILS # BLD AUTO: 0.01 K/UL (ref 0–0.2)
BASOPHILS NFR BLD AUTO: 0.1 % (ref 0–1)
BILIRUB SERPL-MCNC: 0.2 MG/DL (ref ?–1.2)
BUN SERPL-MCNC: 66 MG/DL (ref 7–18)
BUN/CREAT SERPL: 20 (ref 6–20)
CALCIUM SERPL-MCNC: 8.6 MG/DL (ref 8.5–10.1)
CHLORIDE SERPL-SCNC: 111 MMOL/L (ref 98–107)
CO2 SERPL-SCNC: 22 MMOL/L (ref 21–32)
CREAT SERPL-MCNC: 3.38 MG/DL (ref 0.55–1.02)
DACRYOCYTES BLD QL SMEAR: ABNORMAL
DIFFERENTIAL METHOD BLD: ABNORMAL
EGFR (NO RACE VARIABLE) (RUSH/TITUS): 14 ML/MIN/1.73M²
EOSINOPHIL # BLD AUTO: 0 K/UL (ref 0–0.5)
EOSINOPHIL NFR BLD AUTO: 0 % (ref 1–4)
ERYTHROCYTE [DISTWIDTH] IN BLOOD BY AUTOMATED COUNT: 15.9 % (ref 11.5–14.5)
GLOBULIN SER-MCNC: 3.9 G/DL (ref 2–4)
GLUCOSE SERPL-MCNC: 170 MG/DL (ref 70–105)
GLUCOSE SERPL-MCNC: 171 MG/DL (ref 70–105)
GLUCOSE SERPL-MCNC: 218 MG/DL (ref 74–106)
GLUCOSE SERPL-MCNC: 224 MG/DL (ref 70–105)
GLUCOSE SERPL-MCNC: 239 MG/DL (ref 70–105)
GRAM STN SPEC: ABNORMAL
GRAM STN SPEC: ABNORMAL
HCT VFR BLD AUTO: 33.6 % (ref 38–47)
HGB BLD-MCNC: 10.8 G/DL (ref 12–16)
IMM GRANULOCYTES # BLD AUTO: 0.27 K/UL (ref 0–0.04)
IMM GRANULOCYTES NFR BLD: 1.8 % (ref 0–0.4)
LYMPHOCYTES # BLD AUTO: 0.64 K/UL (ref 1–4.8)
LYMPHOCYTES NFR BLD AUTO: 4.3 % (ref 27–41)
LYMPHOCYTES NFR BLD MANUAL: 4 % (ref 27–41)
MACROCYTES BLD QL SMEAR: ABNORMAL
MCH RBC QN AUTO: 32.7 PG (ref 27–31)
MCHC RBC AUTO-ENTMCNC: 32.1 G/DL (ref 32–36)
MCV RBC AUTO: 101.8 FL (ref 80–96)
MONOCYTES # BLD AUTO: 0.38 K/UL (ref 0–0.8)
MONOCYTES NFR BLD AUTO: 2.5 % (ref 2–6)
MONOCYTES NFR BLD MANUAL: 2 % (ref 2–6)
MPC BLD CALC-MCNC: 10.6 FL (ref 9.4–12.4)
NEUTROPHILS # BLD AUTO: 13.63 K/UL (ref 1.8–7.7)
NEUTROPHILS NFR BLD AUTO: 91.3 % (ref 53–65)
NEUTS SEG NFR BLD MANUAL: 94 % (ref 50–62)
NRBC # BLD AUTO: 0.03 X10E3/UL
NRBC, AUTO (.00): 0.2 %
PLATELET # BLD AUTO: 235 K/UL (ref 150–400)
PLATELET MORPHOLOGY: NORMAL
POTASSIUM SERPL-SCNC: 3.5 MMOL/L (ref 3.5–5.1)
PROT SERPL-MCNC: 6 G/DL (ref 6.4–8.2)
RBC # BLD AUTO: 3.3 M/UL (ref 4.2–5.4)
SODIUM SERPL-SCNC: 146 MMOL/L (ref 136–145)
WBC # BLD AUTO: 14.93 K/UL (ref 4.5–11)

## 2023-02-15 PROCEDURE — 20000000 HC ICU ROOM

## 2023-02-15 PROCEDURE — 63600175 PHARM REV CODE 636 W HCPCS: Performed by: INTERNAL MEDICINE

## 2023-02-15 PROCEDURE — 25000003 PHARM REV CODE 250: Performed by: HOSPITALIST

## 2023-02-15 PROCEDURE — 94761 N-INVAS EAR/PLS OXIMETRY MLT: CPT

## 2023-02-15 PROCEDURE — 63600175 PHARM REV CODE 636 W HCPCS: Performed by: NURSE PRACTITIONER

## 2023-02-15 PROCEDURE — 99233 SBSQ HOSP IP/OBS HIGH 50: CPT | Mod: ,,, | Performed by: INTERNAL MEDICINE

## 2023-02-15 PROCEDURE — C9113 INJ PANTOPRAZOLE SODIUM, VIA: HCPCS | Performed by: NURSE PRACTITIONER

## 2023-02-15 PROCEDURE — 25000003 PHARM REV CODE 250: Performed by: NURSE PRACTITIONER

## 2023-02-15 PROCEDURE — 25000003 PHARM REV CODE 250: Performed by: INTERNAL MEDICINE

## 2023-02-15 PROCEDURE — 85025 COMPLETE CBC W/AUTO DIFF WBC: CPT | Performed by: NURSE PRACTITIONER

## 2023-02-15 PROCEDURE — 82962 GLUCOSE BLOOD TEST: CPT

## 2023-02-15 PROCEDURE — 99233 PR SUBSEQUENT HOSPITAL CARE,LEVL III: ICD-10-PCS | Mod: ,,, | Performed by: INTERNAL MEDICINE

## 2023-02-15 PROCEDURE — 27000221 HC OXYGEN, UP TO 24 HOURS

## 2023-02-15 PROCEDURE — 80053 COMPREHEN METABOLIC PANEL: CPT | Performed by: NURSE PRACTITIONER

## 2023-02-15 RX ORDER — CARVEDILOL 6.25 MG/1
6.25 TABLET ORAL 2 TIMES DAILY
Status: DISCONTINUED | OUTPATIENT
Start: 2023-02-15 | End: 2023-02-20 | Stop reason: HOSPADM

## 2023-02-15 RX ADMIN — DEXAMETHASONE SODIUM PHOSPHATE 10 MG: 4 INJECTION, SOLUTION INTRA-ARTICULAR; INTRALESIONAL; INTRAMUSCULAR; INTRAVENOUS; SOFT TISSUE at 05:02

## 2023-02-15 RX ADMIN — INSULIN ASPART 4 UNITS: 100 INJECTION, SOLUTION INTRAVENOUS; SUBCUTANEOUS at 12:02

## 2023-02-15 RX ADMIN — PANTOPRAZOLE SODIUM 40 MG: 40 INJECTION, POWDER, FOR SOLUTION INTRAVENOUS at 08:02

## 2023-02-15 RX ADMIN — AMPICILLIN SODIUM 2 G: 2 INJECTION, POWDER, FOR SOLUTION INTRAMUSCULAR; INTRAVENOUS at 02:02

## 2023-02-15 RX ADMIN — AMPICILLIN SODIUM 2 G: 2 INJECTION, POWDER, FOR SOLUTION INTRAMUSCULAR; INTRAVENOUS at 11:02

## 2023-02-15 RX ADMIN — INSULIN ASPART 6 UNITS: 100 INJECTION, SOLUTION INTRAVENOUS; SUBCUTANEOUS at 06:02

## 2023-02-15 RX ADMIN — MUPIROCIN: 20 OINTMENT TOPICAL at 08:02

## 2023-02-15 RX ADMIN — DEXAMETHASONE SODIUM PHOSPHATE 10 MG: 4 INJECTION, SOLUTION INTRA-ARTICULAR; INTRALESIONAL; INTRAMUSCULAR; INTRAVENOUS; SOFT TISSUE at 11:02

## 2023-02-15 RX ADMIN — DEXAMETHASONE SODIUM PHOSPHATE 10 MG: 4 INJECTION, SOLUTION INTRA-ARTICULAR; INTRALESIONAL; INTRAMUSCULAR; INTRAVENOUS; SOFT TISSUE at 12:02

## 2023-02-15 RX ADMIN — TICAGRELOR 90 MG: 90 TABLET ORAL at 08:02

## 2023-02-15 RX ADMIN — TIZANIDINE 4 MG: 4 TABLET ORAL at 08:02

## 2023-02-15 RX ADMIN — SODIUM CHLORIDE, SODIUM GLUCONATE, SODIUM ACETATE, POTASSIUM CHLORIDE AND MAGNESIUM CHLORIDE: 526; 502; 368; 37; 30 INJECTION, SOLUTION INTRAVENOUS at 05:02

## 2023-02-15 RX ADMIN — AMPICILLIN SODIUM 2 G: 2 INJECTION, POWDER, FOR SOLUTION INTRAMUSCULAR; INTRAVENOUS at 06:02

## 2023-02-15 RX ADMIN — INSULIN DETEMIR 30 UNITS: 100 INJECTION, SOLUTION SUBCUTANEOUS at 08:02

## 2023-02-15 RX ADMIN — ALLOPURINOL 100 MG: 100 TABLET ORAL at 08:02

## 2023-02-15 RX ADMIN — CEFTRIAXONE SODIUM 2 G: 2 INJECTION, POWDER, FOR SOLUTION INTRAMUSCULAR; INTRAVENOUS at 02:02

## 2023-02-15 RX ADMIN — QUETIAPINE FUMARATE 50 MG: 25 TABLET ORAL at 08:02

## 2023-02-15 RX ADMIN — CARVEDILOL 6.25 MG: 6.25 TABLET, FILM COATED ORAL at 08:02

## 2023-02-15 RX ADMIN — ENOXAPARIN SODIUM 30 MG: 30 INJECTION SUBCUTANEOUS at 05:02

## 2023-02-15 RX ADMIN — INSULIN ASPART 4 UNITS: 100 INJECTION, SOLUTION INTRAVENOUS; SUBCUTANEOUS at 05:02

## 2023-02-15 RX ADMIN — CARVEDILOL 3.12 MG: 3.12 TABLET, FILM COATED ORAL at 08:02

## 2023-02-15 RX ADMIN — ACYCLOVIR SODIUM 550 MG: 50 INJECTION, SOLUTION INTRAVENOUS at 11:02

## 2023-02-15 NOTE — PROGRESS NOTES
Vancomycin level resulted therapeutic @ 17.3. No changes at this time.     Pharmacy will continue to monitor and make adjustments as needed.

## 2023-02-15 NOTE — ASSESSMENT & PLAN NOTE
Currently no positive cultures awaiting the PCR from herpetic.  I think patient will more alert we may consider tomorrow doing a CT of her head to see if there is any temporal lobe activity

## 2023-02-15 NOTE — PROGRESS NOTES
[facility]  Pulmonology  Progress Note    Patient Name: Jessica Bay  MRN: [id number]  Admission Date: 2/12/2023  Hospital Length of Stay: 3 days  Code Status: No Order  Attending Provider: Humberto Justin DO  Primary Care Provider: Jag Lopez MD   Principal Problem: Bacterial meningitis    Subjective:     Interval History:  Patient more alert but not following commands      Objective:     Vital Signs (Most Recent):  Temp: 96.8 °F (36 °C) (02/15/23 0309)  Pulse: 75 (02/15/23 0600)  Resp: 17 (02/15/23 0600)  BP: (!) 119/55 (02/15/23 0600)  SpO2: 98 % (02/15/23 0600)   Vital Signs (24h Range):  Temp:  [96.8 °F (36 °C)-100.1 °F (37.8 °C)] 96.8 °F (36 °C)  Pulse:  [] 75  Resp:  [10-24] 17  SpO2:  [87 %-100 %] 98 %  BP: (107-181)/(48-99) 119/55     Weight: 96.2 kg (212 lb 1.3 oz)  Body mass index is 36.4 kg/m².      Intake/Output Summary (Last 24 hours) at 2/15/2023 0638  Last data filed at 2/15/2023 0600  Gross per 24 hour   Intake 3776.67 ml   Output 3900 ml   Net -123.33 ml       Physical Exam  Vitals reviewed.   Constitutional:       Appearance: Normal appearance.      Interventions: She is not intubated.  HENT:      Head: Normocephalic and atraumatic.      Nose: Nose normal.      Mouth/Throat:      Mouth: Mucous membranes are dry.      Pharynx: Oropharynx is clear.   Eyes:      Extraocular Movements: Extraocular movements intact.      Conjunctiva/sclera: Conjunctivae normal.      Pupils: Pupils are equal, round, and reactive to light.   Cardiovascular:      Rate and Rhythm: Normal rate.      Heart sounds: Normal heart sounds. No murmur heard.  Pulmonary:      Effort: Pulmonary effort is normal. She is not intubated.      Breath sounds: Normal breath sounds.   Abdominal:      General: Abdomen is flat. Bowel sounds are normal.      Palpations: Abdomen is soft.   Musculoskeletal:         General: Normal range of motion.      Cervical back: Normal range of motion and neck supple.       Right lower leg: No edema.      Left lower leg: No edema.   Skin:     General: Skin is warm and dry.      Capillary Refill: Capillary refill takes less than 2 seconds.   Neurological:      General: No focal deficit present.      Mental Status: She is alert and oriented to person, place, and time.   Psychiatric:         Mood and Affect: Mood normal.         Behavior: Behavior normal.     Review of Systems    Vents:  Oxygen Concentration (%): 32 (02/14/23 1800)    Lines/Drains/Airways       Drain  Duration                  Urethral Catheter 02/12/23 3 days         NG/OG Tube 02/14/23 0905 [place] sump 16 Fr. Right nostril <1 day              Peripheral Intravenous Line  Duration                  Peripheral IV - Single Lumen 02/12/23 18 G Left Antecubital 3 days         Peripheral IV - Single Lumen 02/14/23 0530 20 G Right Breast 1 day                    Significant Labs:    CBC/Anemia Profile:  Recent Labs   Lab 02/14/23  0506   WBC 24.97*   HGB 10.9*   HCT 33.4*      MCV 97.9*   RDW 15.9*        Chemistries:  Recent Labs   Lab 02/14/23  0506 02/15/23  0358    146*   K 4.0 3.5   * 111*   CO2 19* 22   BUN 48* 66*   CREATININE 3.30* 3.38*   CALCIUM 8.8 8.6   ALBUMIN 2.1* 2.1*   PROT 7.4 6.0*   BILITOT 0.4 0.2   ALKPHOS 99 73   ALT 18 80*   AST 21 101*       Recent Lab Results  (Last 5 results in the past 24 hours)        02/15/23  0514   02/15/23  0358   02/14/23  2337   02/14/23  1934   02/14/23  1637        Albumin/Globulin Ratio   0.5             Albumin   2.1             Alkaline Phosphatase   73             ALT   80             Anion Gap   17             AST   101             BILIRUBIN TOTAL   0.2             BUN   66             BUN/CREAT RATIO   20             Calcium   8.6             Chloride   111             CO2   22             Creatinine   3.38             eGFR   14             Globulin, Total   3.9             Glucose   218             POC Glucose 224     239     214       Potassium    3.5             PROTEIN TOTAL   6.0             Sodium   146             Vancomycin-Trough       17.3                                Significant Imaging:  I have reviewed all pertinent imaging results/findings within the past 24 hours.    Assessment/Plan:     * Bacterial meningitis  Currently no positive cultures awaiting the PCR from herpetic.  I think patient will more alert we may consider tomorrow doing a CT of her head to see if there is any temporal lobe activity    UTI (urinary tract infection)  Urine culture >100,000 GNB   - she is on rocephin     Encephalopathy, metabolic  Certainly related to meningitis maybe a little better    CAD (coronary artery disease)  S/p stent to [facility] 11/2023 --- will need to resume brillinta and home medications once she is taking in PO     Type 2 diabetes mellitus with diabetic chronic kidney disease  Watch for now     Stage 4 chronic kidney disease  Creatinine is going up urine output is improving will watch    Body mass index (BMI) 40.0-44.9, adult  Complicates all levels of care     Hypertension  Elevated BP-- PRN labetalol                  Jair Jain MD  Pulmonology  [facility]

## 2023-02-15 NOTE — SUBJECTIVE & OBJECTIVE
Interval History:  Patient more alert but not following commands      Objective:     Vital Signs (Most Recent):  Temp: 96.8 °F (36 °C) (02/15/23 0309)  Pulse: 75 (02/15/23 0600)  Resp: 17 (02/15/23 0600)  BP: (!) 119/55 (02/15/23 0600)  SpO2: 98 % (02/15/23 0600)   Vital Signs (24h Range):  Temp:  [96.8 °F (36 °C)-100.1 °F (37.8 °C)] 96.8 °F (36 °C)  Pulse:  [] 75  Resp:  [10-24] 17  SpO2:  [87 %-100 %] 98 %  BP: (107-181)/(48-99) 119/55     Weight: 96.2 kg (212 lb 1.3 oz)  Body mass index is 36.4 kg/m².      Intake/Output Summary (Last 24 hours) at 2/15/2023 0638  Last data filed at 2/15/2023 0600  Gross per 24 hour   Intake 3776.67 ml   Output 3900 ml   Net -123.33 ml       Physical Exam  Vitals reviewed.   Constitutional:       Appearance: Normal appearance.      Interventions: She is not intubated.  HENT:      Head: Normocephalic and atraumatic.      Nose: Nose normal.      Mouth/Throat:      Mouth: Mucous membranes are dry.      Pharynx: Oropharynx is clear.   Eyes:      Extraocular Movements: Extraocular movements intact.      Conjunctiva/sclera: Conjunctivae normal.      Pupils: Pupils are equal, round, and reactive to light.   Cardiovascular:      Rate and Rhythm: Normal rate.      Heart sounds: Normal heart sounds. No murmur heard.  Pulmonary:      Effort: Pulmonary effort is normal. She is not intubated.      Breath sounds: Normal breath sounds.   Abdominal:      General: Abdomen is flat. Bowel sounds are normal.      Palpations: Abdomen is soft.   Musculoskeletal:         General: Normal range of motion.      Cervical back: Normal range of motion and neck supple.      Right lower leg: No edema.      Left lower leg: No edema.   Skin:     General: Skin is warm and dry.      Capillary Refill: Capillary refill takes less than 2 seconds.   Neurological:      General: No focal deficit present.      Mental Status: She is alert and oriented to person, place, and time.   Psychiatric:         Mood and Affect:  Mood normal.         Behavior: Behavior normal.     Review of Systems    Vents:  Oxygen Concentration (%): 32 (02/14/23 1800)    Lines/Drains/Airways       Drain  Duration                  Urethral Catheter 02/12/23 3 days         NG/OG Tube 02/14/23 0905 [place] sump 16 Fr. Right nostril <1 day              Peripheral Intravenous Line  Duration                  Peripheral IV - Single Lumen 02/12/23 18 G Left Antecubital 3 days         Peripheral IV - Single Lumen 02/14/23 0530 20 G Right Breast 1 day                    Significant Labs:    CBC/Anemia Profile:  Recent Labs   Lab 02/14/23  0506   WBC 24.97*   HGB 10.9*   HCT 33.4*      MCV 97.9*   RDW 15.9*        Chemistries:  Recent Labs   Lab 02/14/23  0506 02/15/23  0358    146*   K 4.0 3.5   * 111*   CO2 19* 22   BUN 48* 66*   CREATININE 3.30* 3.38*   CALCIUM 8.8 8.6   ALBUMIN 2.1* 2.1*   PROT 7.4 6.0*   BILITOT 0.4 0.2   ALKPHOS 99 73   ALT 18 80*   AST 21 101*       Recent Lab Results  (Last 5 results in the past 24 hours)        02/15/23  0514   02/15/23  0358   02/14/23  2337   02/14/23  1934   02/14/23  1637        Albumin/Globulin Ratio   0.5             Albumin   2.1             Alkaline Phosphatase   73             ALT   80             Anion Gap   17             AST   101             BILIRUBIN TOTAL   0.2             BUN   66             BUN/CREAT RATIO   20             Calcium   8.6             Chloride   111             CO2   22             Creatinine   3.38             eGFR   14             Globulin, Total   3.9             Glucose   218             POC Glucose 224     239     214       Potassium   3.5             PROTEIN TOTAL   6.0             Sodium   146             Vancomycin-Trough       17.3                                Significant Imaging:  I have reviewed all pertinent imaging results/findings within the past 24 hours.

## 2023-02-15 NOTE — ASSESSMENT & PLAN NOTE
S/p stent to [facility] 11/2023 --- will need to resume brillinta and home medications once she is taking in PO

## 2023-02-16 LAB
ALBUMIN SERPL BCP-MCNC: 1.9 G/DL (ref 3.5–5)
ALBUMIN/GLOB SERPL: 0.4 {RATIO}
ALP SERPL-CCNC: 67 U/L (ref 55–142)
ALT SERPL W P-5'-P-CCNC: 70 U/L (ref 13–56)
ANION GAP SERPL CALCULATED.3IONS-SCNC: 16 MMOL/L (ref 7–16)
ANISOCYTOSIS BLD QL SMEAR: ABNORMAL
AST SERPL W P-5'-P-CCNC: 47 U/L (ref 15–37)
BASOPHILS # BLD AUTO: 0.01 K/UL (ref 0–0.2)
BASOPHILS NFR BLD AUTO: 0.1 % (ref 0–1)
BILIRUB SERPL-MCNC: 0.3 MG/DL (ref ?–1.2)
BUN SERPL-MCNC: 77 MG/DL (ref 7–18)
BUN/CREAT SERPL: 24 (ref 6–20)
CALCIUM SERPL-MCNC: 8.4 MG/DL (ref 8.5–10.1)
CHLORIDE SERPL-SCNC: 109 MMOL/L (ref 98–107)
CO2 SERPL-SCNC: 26 MMOL/L (ref 21–32)
CREAT SERPL-MCNC: 3.16 MG/DL (ref 0.55–1.02)
DIFFERENTIAL METHOD BLD: ABNORMAL
EGFR (NO RACE VARIABLE) (RUSH/TITUS): 16 ML/MIN/1.73M²
EOSINOPHIL # BLD AUTO: 0 K/UL (ref 0–0.5)
EOSINOPHIL NFR BLD AUTO: 0 % (ref 1–4)
ERYTHROCYTE [DISTWIDTH] IN BLOOD BY AUTOMATED COUNT: 15.7 % (ref 11.5–14.5)
GLOBULIN SER-MCNC: 4.6 G/DL (ref 2–4)
GLUCOSE SERPL-MCNC: 104 MG/DL (ref 70–105)
GLUCOSE SERPL-MCNC: 106 MG/DL (ref 74–106)
GLUCOSE SERPL-MCNC: 163 MG/DL (ref 70–105)
GLUCOSE SERPL-MCNC: 229 MG/DL (ref 70–105)
GLUCOSE SERPL-MCNC: 261 MG/DL (ref 70–105)
GLUCOSE SERPL-MCNC: 263 MG/DL (ref 70–105)
HCT VFR BLD AUTO: 33.6 % (ref 38–47)
HGB BLD-MCNC: 10.9 G/DL (ref 12–16)
HYPOCHROMIA BLD QL SMEAR: ABNORMAL
IMM GRANULOCYTES # BLD AUTO: 0.3 K/UL (ref 0–0.04)
IMM GRANULOCYTES NFR BLD: 2.6 % (ref 0–0.4)
LYMPHOCYTES # BLD AUTO: 0.57 K/UL (ref 1–4.8)
LYMPHOCYTES NFR BLD AUTO: 4.9 % (ref 27–41)
LYMPHOCYTES NFR BLD MANUAL: 8 % (ref 27–41)
MACROCYTES BLD QL SMEAR: ABNORMAL
MCH RBC QN AUTO: 31.7 PG (ref 27–31)
MCHC RBC AUTO-ENTMCNC: 32.4 G/DL (ref 32–36)
MCV RBC AUTO: 97.7 FL (ref 80–96)
MONOCYTES # BLD AUTO: 0.52 K/UL (ref 0–0.8)
MONOCYTES NFR BLD AUTO: 4.5 % (ref 2–6)
MONOCYTES NFR BLD MANUAL: 3 % (ref 2–6)
MPC BLD CALC-MCNC: 10.8 FL (ref 9.4–12.4)
NEUTROPHILS # BLD AUTO: 10.13 K/UL (ref 1.8–7.7)
NEUTROPHILS NFR BLD AUTO: 87.9 % (ref 53–65)
NEUTS SEG NFR BLD MANUAL: 89 % (ref 50–62)
NRBC # BLD AUTO: 0 X10E3/UL
NRBC, AUTO (.00): 0 %
PLATELET # BLD AUTO: 236 K/UL (ref 150–400)
PLATELET MORPHOLOGY: NORMAL
POTASSIUM SERPL-SCNC: 3.6 MMOL/L (ref 3.5–5.1)
PROT SERPL-MCNC: 6.5 G/DL (ref 6.4–8.2)
RBC # BLD AUTO: 3.44 M/UL (ref 4.2–5.4)
SODIUM SERPL-SCNC: 147 MMOL/L (ref 136–145)
WBC # BLD AUTO: 11.53 K/UL (ref 4.5–11)

## 2023-02-16 PROCEDURE — S5010 5% DEXTROSE AND 0.45% SALINE: HCPCS | Performed by: INTERNAL MEDICINE

## 2023-02-16 PROCEDURE — 25000003 PHARM REV CODE 250: Performed by: INTERNAL MEDICINE

## 2023-02-16 PROCEDURE — 99233 SBSQ HOSP IP/OBS HIGH 50: CPT | Mod: ,,, | Performed by: INTERNAL MEDICINE

## 2023-02-16 PROCEDURE — 99900035 HC TECH TIME PER 15 MIN (STAT)

## 2023-02-16 PROCEDURE — 80053 COMPREHEN METABOLIC PANEL: CPT | Performed by: NURSE PRACTITIONER

## 2023-02-16 PROCEDURE — 63600175 PHARM REV CODE 636 W HCPCS: Performed by: INTERNAL MEDICINE

## 2023-02-16 PROCEDURE — 82962 GLUCOSE BLOOD TEST: CPT

## 2023-02-16 PROCEDURE — 20000000 HC ICU ROOM

## 2023-02-16 PROCEDURE — 25000003 PHARM REV CODE 250: Performed by: NURSE PRACTITIONER

## 2023-02-16 PROCEDURE — 25000003 PHARM REV CODE 250: Performed by: HOSPITALIST

## 2023-02-16 PROCEDURE — 99233 PR SUBSEQUENT HOSPITAL CARE,LEVL III: ICD-10-PCS | Mod: ,,, | Performed by: INTERNAL MEDICINE

## 2023-02-16 PROCEDURE — 63600175 PHARM REV CODE 636 W HCPCS: Performed by: NURSE PRACTITIONER

## 2023-02-16 PROCEDURE — C9113 INJ PANTOPRAZOLE SODIUM, VIA: HCPCS | Performed by: NURSE PRACTITIONER

## 2023-02-16 PROCEDURE — 85025 COMPLETE CBC W/AUTO DIFF WBC: CPT | Performed by: NURSE PRACTITIONER

## 2023-02-16 PROCEDURE — 27000221 HC OXYGEN, UP TO 24 HOURS

## 2023-02-16 PROCEDURE — 94761 N-INVAS EAR/PLS OXIMETRY MLT: CPT

## 2023-02-16 RX ORDER — DEXTROSE MONOHYDRATE AND SODIUM CHLORIDE 5; .45 G/100ML; G/100ML
INJECTION, SOLUTION INTRAVENOUS CONTINUOUS
Status: DISCONTINUED | OUTPATIENT
Start: 2023-02-16 | End: 2023-02-20

## 2023-02-16 RX ADMIN — AMPICILLIN SODIUM 2 G: 2 INJECTION, POWDER, FOR SOLUTION INTRAMUSCULAR; INTRAVENOUS at 11:02

## 2023-02-16 RX ADMIN — DEXAMETHASONE SODIUM PHOSPHATE 10 MG: 4 INJECTION, SOLUTION INTRA-ARTICULAR; INTRALESIONAL; INTRAMUSCULAR; INTRAVENOUS; SOFT TISSUE at 05:02

## 2023-02-16 RX ADMIN — ENOXAPARIN SODIUM 30 MG: 30 INJECTION SUBCUTANEOUS at 05:02

## 2023-02-16 RX ADMIN — QUETIAPINE FUMARATE 50 MG: 25 TABLET ORAL at 08:02

## 2023-02-16 RX ADMIN — INSULIN ASPART 8 UNITS: 100 INJECTION, SOLUTION INTRAVENOUS; SUBCUTANEOUS at 11:02

## 2023-02-16 RX ADMIN — TIZANIDINE 4 MG: 4 TABLET ORAL at 08:02

## 2023-02-16 RX ADMIN — INSULIN ASPART 6 UNITS: 100 INJECTION, SOLUTION INTRAVENOUS; SUBCUTANEOUS at 05:02

## 2023-02-16 RX ADMIN — DEXAMETHASONE SODIUM PHOSPHATE 10 MG: 4 INJECTION, SOLUTION INTRA-ARTICULAR; INTRALESIONAL; INTRAMUSCULAR; INTRAVENOUS; SOFT TISSUE at 11:02

## 2023-02-16 RX ADMIN — ALLOPURINOL 100 MG: 100 TABLET ORAL at 08:02

## 2023-02-16 RX ADMIN — TICAGRELOR 90 MG: 90 TABLET ORAL at 08:02

## 2023-02-16 RX ADMIN — CEFTRIAXONE SODIUM 2 G: 2 INJECTION, POWDER, FOR SOLUTION INTRAMUSCULAR; INTRAVENOUS at 03:02

## 2023-02-16 RX ADMIN — CEFTRIAXONE SODIUM 2 G: 2 INJECTION, POWDER, FOR SOLUTION INTRAMUSCULAR; INTRAVENOUS at 02:02

## 2023-02-16 RX ADMIN — MUPIROCIN: 20 OINTMENT TOPICAL at 08:02

## 2023-02-16 RX ADMIN — CARVEDILOL 6.25 MG: 6.25 TABLET, FILM COATED ORAL at 08:02

## 2023-02-16 RX ADMIN — VANCOMYCIN HYDROCHLORIDE 2250 MG: 500 INJECTION, POWDER, LYOPHILIZED, FOR SOLUTION INTRAVENOUS at 08:02

## 2023-02-16 RX ADMIN — INSULIN ASPART 4 UNITS: 100 INJECTION, SOLUTION INTRAVENOUS; SUBCUTANEOUS at 12:02

## 2023-02-16 RX ADMIN — AMPICILLIN SODIUM 2 G: 2 INJECTION, POWDER, FOR SOLUTION INTRAMUSCULAR; INTRAVENOUS at 03:02

## 2023-02-16 RX ADMIN — DEXAMETHASONE SODIUM PHOSPHATE 10 MG: 4 INJECTION, SOLUTION INTRA-ARTICULAR; INTRALESIONAL; INTRAMUSCULAR; INTRAVENOUS; SOFT TISSUE at 06:02

## 2023-02-16 RX ADMIN — INSULIN DETEMIR 25 UNITS: 100 INJECTION, SOLUTION SUBCUTANEOUS at 08:02

## 2023-02-16 RX ADMIN — PANTOPRAZOLE SODIUM 40 MG: 40 INJECTION, POWDER, FOR SOLUTION INTRAVENOUS at 08:02

## 2023-02-16 RX ADMIN — DEXTROSE AND SODIUM CHLORIDE: 5; 450 INJECTION, SOLUTION INTRAVENOUS at 06:02

## 2023-02-16 RX ADMIN — DEXTROSE AND SODIUM CHLORIDE: 5; 450 INJECTION, SOLUTION INTRAVENOUS at 08:02

## 2023-02-16 NOTE — PLAN OF CARE
Problem: Adult Inpatient Plan of Care  Goal: Plan of Care Review  Outcome: Ongoing, Progressing  Goal: Patient-Specific Goal (Individualized)  Outcome: Ongoing, Progressing  Goal: Absence of Hospital-Acquired Illness or Injury  Outcome: Ongoing, Progressing  Goal: Optimal Comfort and Wellbeing  Outcome: Ongoing, Progressing  Goal: Readiness for Transition of Care  Outcome: Ongoing, Progressing     Problem: Bariatric Environmental Safety  Goal: Safety Maintained with Care  Outcome: Ongoing, Progressing     Problem: Diabetes Comorbidity  Goal: Blood Glucose Level Within Targeted Range  Outcome: Ongoing, Progressing     Problem: Skin Injury Risk Increased  Goal: Skin Health and Integrity  Outcome: Ongoing, Progressing     Problem: Impaired Wound Healing  Goal: Optimal Wound Healing  Outcome: Ongoing, Progressing     Problem: Infection  Goal: Absence of Infection Signs and Symptoms  Outcome: Ongoing, Progressing     Problem: Gas Exchange Impaired  Goal: Optimal Gas Exchange  Outcome: Ongoing, Progressing     Problem: Fall Injury Risk  Goal: Absence of Fall and Fall-Related Injury  Outcome: Ongoing, Progressing     Problem: Restraint, Nonbehavioral (Nonviolent)  Goal: Absence of Harm or Injury  Outcome: Ongoing, Progressing

## 2023-02-16 NOTE — PROGRESS NOTES
[facility]  Pulmonology  Progress Note    Patient Name: Jessica Bay  MRN: [id number]  Admission Date: 2/12/2023  Hospital Length of Stay: 4 days  Code Status: Full Code  Attending Provider: Humberto Justin DO  Primary Care Provider: Jag Lopez MD   Principal Problem: Bacterial meningitis    Subjective:     Interval History:  Patient without complaints      Objective:     Vital Signs (Most Recent):  Temp: 98.4 °F (36.9 °C) (02/16/23 0308)  Pulse: 78 (02/16/23 0530)  Resp: 19 (02/16/23 0530)  BP: (!) 142/59 (02/16/23 0530)  SpO2: 98 % (02/16/23 0530)   Vital Signs (24h Range):  Temp:  [96.9 °F (36.1 °C)-99.3 °F (37.4 °C)] 98.4 °F (36.9 °C)  Pulse:  [] 78  Resp:  [10-24] 19  SpO2:  [81 %-100 %] 98 %  BP: ()/(39-97) 142/59     Weight: 96.4 kg (212 lb 8.4 oz)  Body mass index is 36.48 kg/m².      Intake/Output Summary (Last 24 hours) at 2/16/2023 0614  Last data filed at 2/16/2023 0540  Gross per 24 hour   Intake 2310 ml   Output 2900 ml   Net -590 ml       Physical Exam  Vitals reviewed.   Constitutional:       Appearance: Normal appearance.      Interventions: She is not intubated.  HENT:      Head: Normocephalic and atraumatic.      Nose: Nose normal.      Mouth/Throat:      Mouth: Mucous membranes are dry.      Pharynx: Oropharynx is clear.   Eyes:      Extraocular Movements: Extraocular movements intact.      Conjunctiva/sclera: Conjunctivae normal.      Pupils: Pupils are equal, round, and reactive to light.   Cardiovascular:      Rate and Rhythm: Normal rate.      Heart sounds: Normal heart sounds. No murmur heard.  Pulmonary:      Effort: Pulmonary effort is normal. She is not intubated.      Breath sounds: Normal breath sounds.   Abdominal:      General: Abdomen is flat. Bowel sounds are normal.      Palpations: Abdomen is soft.   Musculoskeletal:         General: Normal range of motion.      Cervical back: Normal range of motion and neck supple.      Right lower leg:  No edema.      Left lower leg: No edema.   Skin:     General: Skin is warm and dry.      Capillary Refill: Capillary refill takes less than 2 seconds.   Neurological:      General: No focal deficit present.      Mental Status: She is alert and oriented to person, place, and time.   Psychiatric:         Mood and Affect: Mood normal.         Behavior: Behavior normal.     Review of Systems    Vents:  Oxygen Concentration (%): 32 (02/15/23 1915)    Lines/Drains/Airways       Drain  Duration                  Urethral Catheter 02/12/23 4 days         NG/OG Tube 02/14/23 0905 [place] sump 16 Fr. Right nostril 1 day              Peripheral Intravenous Line  Duration                  Peripheral IV - Single Lumen 02/14/23 0530 20 G Right Breast 2 days         Peripheral IV - Single Lumen 02/15/23 2342 20 G Left Breast <1 day                    Significant Labs:    CBC/Anemia Profile:  Recent Labs   Lab 02/15/23  0358   WBC 14.93*   HGB 10.8*   HCT 33.6*      .8*   RDW 15.9*        Chemistries:  Recent Labs   Lab 02/15/23  0358   *   K 3.5   *   CO2 22   BUN 66*   CREATININE 3.38*   CALCIUM 8.6   ALBUMIN 2.1*   PROT 6.0*   BILITOT 0.2   ALKPHOS 73   ALT 80*   *       Recent Lab Results         02/16/23  0514   02/15/23  2354   02/15/23  1655   02/15/23  1142        POC Glucose 104   163   170   171               Significant Imaging:  I have reviewed all pertinent imaging results/findings within the past 24 hours.    Assessment/Plan:     * Bacterial meningitis  Currently no positive cultures PCR for herpes is pending hemodynamically stable and she is actually more alert today and can speak in sentences    UTI (urinary tract infection)  E coli sensitive to Rocephin    Encephalopathy, metabolic  I think continues to improve with antibiotics r    CAD (coronary artery disease)  S/p stent to [facility] 11/2023 --- will need to resume brillinta and home medications once she is taking in PO     Type 2 diabetes  mellitus with diabetic chronic kidney disease  Good control     Stage 4 chronic kidney disease  Urine output good sodium up add some fluids    Body mass index (BMI) 40.0-44.9, adult  Complicates all levels of care     Hypertension  Elevated BP-- PRN labetalol , overall better                 Jair Jain MD  Pulmonology  [facility]

## 2023-02-16 NOTE — ASSESSMENT & PLAN NOTE
Currently no positive cultures PCR for herpes is pending hemodynamically stable and she is actually more alert today and can speak in sentences

## 2023-02-16 NOTE — PROGRESS NOTES
[facility]  Adult Nutrition  Follow-up Note         Reason for Assessment  Reason For Assessment: RD follow-up  Nutrition Risk Screen: tube feeding or parenteral nutrition    RD follow up. RD secure chat MD regarding tube feeding advancement from trickle feed to goal rate; RD to adjust order.    Recommend advancing tube feeding Glucerna 1.5 up 10ml/hr q6-8 hrs to goal rate. Goal rate Glucerna 1.5 @ 43ml/hr provides 1548kcal (meets 90% of estimated energy needs), 85g pro (meets 125% of estimated protein needs), 730ml water. Recommend free water flush 30ml/hr for 720ml. Formula + FWF provides 1450ml water/day.     Keep HOB 30-45 degrees, monitor for abdominal pain/distention, n/v/c/d, gastric residuals >500ml. Monitor renal labs - may adjust recommended formula to better accommodate renal needs.    Patient currently receiving Glucerna 1.5 @10ml/hr + 20ml/hr FWF. As discussed in rounds this morning, able to advance tube feeding rate today.     Recommend advancing tube feeding Glucerna 1.5 up 10ml/hr q6-8 hrs to goal rate as medically appropriate/tolerated. Goal rate Glucerna 1.5 @ 43ml/hr provides 1548kcal (meets 90% of estimated energy needs), 85g pro (meets 125% of estimated protein needs), 730ml water. Recommend free water flush 30ml/hr for 720ml. Formula + FWF provides 1450ml water/day. Pt receiving IVF @ 100ml/hr per med list. Will monitor renal labs and adjust recommended formula as needed.     Wt Readings from Last 3 Encounters:   02/16/23 0308 96.4 kg (212 lb 8.4 oz)   02/15/23 0309 96.2 kg (212 lb 1.3 oz)   02/14/23 0301 111.5 kg (245 lb 13 oz)   02/13/23 0245 108.8 kg (239 lb 13.8 oz)   02/12/23 1245 109.7 kg (241 lb 13.5 oz)   02/12/23 0907 108 kg (238 lb)   02/12/23 0459 108 kg (238 lb)   12/05/22 1125 108.4 kg (239 lb)   Unsure etiology of weight fluctuation - will continue to monitor weight trend.     Last BM 2/10 per flowsheets - recommend bowel regimen. Will continue to monitor  tube feeding advancement/tolerance/adequacy, meds, labs, weight trend, updates in patient condition. RD following.    Malnutrition  Is Patient Malnourished: No    Nutrition Diagnosis  Altered Nutrition Related Lab Values   related to Diabetes Mellitus as evidenced by elevated blood glucose levels     Nutrition Diagnosis Status: Chronic/ continues    Nutrition Risk  Level of Risk/Frequency of Follow-up: high   Chewing or Swallowing Difficulty?: pt receiving via NGT     Estimated/Assessed Needs    Temp: 97.9 °F (36.6 °C)Axillary  Weight Used For Calorie Calculations: 68.8 kg (151 lb 10.8 oz)   Energy Need Method: Kcal/kg Energy Calorie Requirements (kcal): 5767-5762  Weight Used For Protein Calculations: 68.8 kg (151 lb 10.8 oz)  Protein Requirements: 55-68  Estimated Fluid Requirement Method: RDA Method    RDA Method (mL): 1720     Nutrition Prescription / Recommendations  Recommendation/Intervention: Recommend advancing tube feeding Glucerna 1.5 up 10ml/hr q6-8 hrs to goal rate. Goal rate Glucerna 1.5 @ 43ml/hr provides 1548kcal (meets 90% of estimated energy needs), 85g pro (meets 125% of estimated protein needs), 730ml water. Recommend free water flush 30ml/hr for 720ml. Formula + FWF provides 1450ml water/day. Keep HOB 30-45 degrees, monitor for abdominal pain/distention, n/v/c/d, gastric residuals >500ml. Monitor renal labs - may adjust recommended formula to better accommodate renal needs.  Goals: tube feeding adequacy/tolerance/advance to goal rate, weight maintenance  Nutrition Goal Status: new  Current Diet Order: NPO  Nutrition Order Comments: pt receiving enteral nutrition via NGT  Current Nutrition Support Formula Ordered: Glucerna 1.5  Current Nutrition Support Rate Ordered: 10 (ml)  Current Nutrition Support Frequency Ordered: hourly  Recommended Diet: Enteral Nutrition  Recommended Oral Supplement: No Oral Supplements  Is Nutrition Support Recommended: Yes     Needs Calculated  Energy Need Method:  Kcal/kg Energy Calorie Requirements (kcal): 1115-8449  Protein Requirements: 55-68  Enteral Nutrition   Enteral Nutrition Formula Provides:  1548 kcals Propofol Rate: No  85 g Protein  137 g Carbohydrates  77 g Fat Propofol Rate: No  730 ml Fluid without Flush    720 ml Fluid by flush   1450 ml Total Fluid  Enteral Nutrition Recommended Order:  Tube feeding via NG/ [place] Sump  Tube feeding formula: Glucerna 1.5 Continuous 43 ml/h  Free Water Flush: 30 ml hourly  Modular Supplements:No Modular Supplements needed  Enteral Nutrition meets needs?: yes  Enteral Nutrition Status: Recommended but Not Ordered  Is Education Recommended: No    Monitor and Evaluation  % current Intake: Other: Enteral Nutrition at trickle feed goal  % intake to meet estimated needs: Enteral Nutrition   Food and Nutrient Intake: enteral nutrition intake  Food and Nutrient Adminstration: enteral and parenteral nutrition administration  Anthropometric Measurements: weight, weight change, body mass index  Biochemical Data, Medical Tests and Procedures: electrolyte and renal panel, inflammatory profile, gastrointestinal profile, lipid profile, glucose/endocrine profile  Nutrition-Focused Physical Findings: overall appearance, extremities, muscles and bones, head and eyes, skin  Enteral Calories (kcal): 360  Enteral Protein (gm): 19.8  Enteral (Free Water) Fluid (mL): 170  Free Water Flush Fluid (mL): 480  Total Calories (kcal/kg): 3.7  % Kcal Needs: 21  % Protein Needs: 36  Total Fluid Intake (mL): 650  Tolerance: tolerating    Current Medical Diagnosis and Past Medical History  Diagnosis: other (see comments) (bacterial meningitis)  Past Medical History:   Diagnosis Date    Cancer     Diabetes mellitus, type 2     Fibromyalgia     History of kidney cancer 2018    Hyperlipidemia     Hypertension     Migraine headache     Renal cell carcinoma     Renal disorder      Nutrition/Diet History  Food Allergies: NKFA  Factors Affecting Nutritional Intake:  "NPO    Lab/Procedures/Meds  Recent Labs   Lab 02/16/23  0513   *   K 3.6   BUN 77*   CREATININE 3.16*      CALCIUM 8.4*   ALBUMIN 1.9*   *   ALT 70*   AST 47*     Last A1c:   Lab Results   Component Value Date    HGBA1C 6.9 (H) 11/22/2022     Lab Results   Component Value Date    RBC 3.44 (L) 02/16/2023    HGB 10.9 (L) 02/16/2023    HCT 33.6 (L) 02/16/2023    MCV 97.7 (H) 02/16/2023    MCH 31.7 (H) 02/16/2023    MCHC 32.4 02/16/2023     Pertinent Labs Reviewed: reviewed  Pertinent Labs Comments:   Na 147 (H), Cl 109 (H) - pt receiving IVF; BUN 77 (H), creatinine 3.16 (H), BUN/CREAT RATIO 24 (H), eGFR 16 (L), albumin 1.9 (L) - pt with stg 4 CKD; Ca 8.4 (L) - replete to WNL as needed; AST 47 (H), ALT 70 (H), total globulin 4.6 (H) - unsure etiology, will continue to monitor altered labs  Pertinent Medications Reviewed: reviewed  Pertinent Medications Comments:   allopurinol, omnipen, carvedilol, rocephin, dexamethasone, enoxaparin, insulin, pantoprazole, quetiapine, ticagrelor, tizanidine, vancocin, IVF 100ml/hr, insulin PRN    Anthropometrics  Temp: 97.9 °F (36.6 °C)  Height: 5' 4" (162.6 cm)  Height (inches): 64 in  Weight Method: Bed Scale  Weight: 96.4 kg (212 lb 8.4 oz)  Weight (lb): 212.53 lb  Ideal Body Weight (IBW), Female: 120 lb  % Ideal Body Weight, Female (lb): 201.54 %  BMI (Calculated): 36.5  BMI Grade: greater than 40 - morbid obesity     Nutrition by Nursing  Diet/Nutrition Received: tube feeding  Last Bowel Movement: 02/10/23       NG/OG Tube 02/14/23 0905 [place] sump 16 Fr. Right nostril-Formula Name: [place] 1.5    Nutrition Follow-Up  RD Follow-up?: Yes  "

## 2023-02-16 NOTE — PLAN OF CARE
Problem: Fall Injury Risk  Goal: Absence of Fall and Fall-Related Injury  Outcome: Ongoing, Progressing  Intervention: Identify and Manage Contributors  Flowsheets (Taken 2/15/2023 1501)  Self-Care Promotion:   independence encouraged   safe use of adaptive equipment encouraged  Medication Review/Management: medications reviewed     Problem: Restraint, Nonbehavioral (Nonviolent)  Goal: Absence of Harm or Injury  Outcome: Ongoing, Progressing  Intervention: Education  Flowsheets (Taken 2/15/2023 1501)  Discontinuation Criteria: Absence of behavior that required restraint  Criteria Explained: Yes  Patient's Response: NL  Patient / Family Notification: (son)   Patient   Family (who)  Patient / Family Teaching:   Need for restraint   Restraint monitoring   DC criteria   Reevaluation time frames   Attempted alternatives

## 2023-02-16 NOTE — SUBJECTIVE & OBJECTIVE
Interval History:  Patient without complaints      Objective:     Vital Signs (Most Recent):  Temp: 98.4 °F (36.9 °C) (02/16/23 0308)  Pulse: 78 (02/16/23 0530)  Resp: 19 (02/16/23 0530)  BP: (!) 142/59 (02/16/23 0530)  SpO2: 98 % (02/16/23 0530)   Vital Signs (24h Range):  Temp:  [96.9 °F (36.1 °C)-99.3 °F (37.4 °C)] 98.4 °F (36.9 °C)  Pulse:  [] 78  Resp:  [10-24] 19  SpO2:  [81 %-100 %] 98 %  BP: ()/(39-97) 142/59     Weight: 96.4 kg (212 lb 8.4 oz)  Body mass index is 36.48 kg/m².      Intake/Output Summary (Last 24 hours) at 2/16/2023 0614  Last data filed at 2/16/2023 0540  Gross per 24 hour   Intake 2310 ml   Output 2900 ml   Net -590 ml       Physical Exam  Vitals reviewed.   Constitutional:       Appearance: Normal appearance.      Interventions: She is not intubated.  HENT:      Head: Normocephalic and atraumatic.      Nose: Nose normal.      Mouth/Throat:      Mouth: Mucous membranes are dry.      Pharynx: Oropharynx is clear.   Eyes:      Extraocular Movements: Extraocular movements intact.      Conjunctiva/sclera: Conjunctivae normal.      Pupils: Pupils are equal, round, and reactive to light.   Cardiovascular:      Rate and Rhythm: Normal rate.      Heart sounds: Normal heart sounds. No murmur heard.  Pulmonary:      Effort: Pulmonary effort is normal. She is not intubated.      Breath sounds: Normal breath sounds.   Abdominal:      General: Abdomen is flat. Bowel sounds are normal.      Palpations: Abdomen is soft.   Musculoskeletal:         General: Normal range of motion.      Cervical back: Normal range of motion and neck supple.      Right lower leg: No edema.      Left lower leg: No edema.   Skin:     General: Skin is warm and dry.      Capillary Refill: Capillary refill takes less than 2 seconds.   Neurological:      General: No focal deficit present.      Mental Status: She is alert and oriented to person, place, and time.   Psychiatric:         Mood and Affect: Mood normal.          Behavior: Behavior normal.     Review of Systems    Vents:  Oxygen Concentration (%): 32 (02/15/23 1915)    Lines/Drains/Airways       Drain  Duration                  Urethral Catheter 02/12/23 4 days         NG/OG Tube 02/14/23 0905 [place] sump 16 Fr. Right nostril 1 day              Peripheral Intravenous Line  Duration                  Peripheral IV - Single Lumen 02/14/23 0530 20 G Right Breast 2 days         Peripheral IV - Single Lumen 02/15/23 2342 20 G Left Breast <1 day                    Significant Labs:    CBC/Anemia Profile:  Recent Labs   Lab 02/15/23  0358   WBC 14.93*   HGB 10.8*   HCT 33.6*      .8*   RDW 15.9*        Chemistries:  Recent Labs   Lab 02/15/23  0358   *   K 3.5   *   CO2 22   BUN 66*   CREATININE 3.38*   CALCIUM 8.6   ALBUMIN 2.1*   PROT 6.0*   BILITOT 0.2   ALKPHOS 73   ALT 80*   *       Recent Lab Results         02/16/23  0514   02/15/23  2354   02/15/23  1655   02/15/23  1142        POC Glucose 104   163   170   171               Significant Imaging:  I have reviewed all pertinent imaging results/findings within the past 24 hours.

## 2023-02-16 NOTE — ASSESSMENT & PLAN NOTE
S/p stent to [facility] 11/2023 --- will need to resume brillinta and home medications once she is taking in PO

## 2023-02-17 LAB
ALBUMIN SERPL BCP-MCNC: 1.8 G/DL (ref 3.5–5)
ALBUMIN/GLOB SERPL: 0.4 {RATIO}
ALP SERPL-CCNC: 60 U/L (ref 55–142)
ALT SERPL W P-5'-P-CCNC: 56 U/L (ref 13–56)
ANION GAP SERPL CALCULATED.3IONS-SCNC: 17 MMOL/L (ref 7–16)
ANISOCYTOSIS BLD QL SMEAR: ABNORMAL
AST SERPL W P-5'-P-CCNC: 27 U/L (ref 15–37)
BASOPHILS # BLD AUTO: 0.02 K/UL (ref 0–0.2)
BASOPHILS NFR BLD AUTO: 0.2 % (ref 0–1)
BILIRUB SERPL-MCNC: 0.2 MG/DL (ref ?–1.2)
BUN SERPL-MCNC: 78 MG/DL (ref 7–18)
BUN/CREAT SERPL: 27 (ref 6–20)
CALCIUM SERPL-MCNC: 7.9 MG/DL (ref 8.5–10.1)
CHLORIDE SERPL-SCNC: 105 MMOL/L (ref 98–107)
CO2 SERPL-SCNC: 24 MMOL/L (ref 21–32)
CREAT SERPL-MCNC: 2.85 MG/DL (ref 0.55–1.02)
DIFFERENTIAL METHOD BLD: ABNORMAL
EGFR (NO RACE VARIABLE) (RUSH/TITUS): 18 ML/MIN/1.73M²
EOSINOPHIL # BLD AUTO: 0 K/UL (ref 0–0.5)
EOSINOPHIL NFR BLD AUTO: 0 % (ref 1–4)
ERYTHROCYTE [DISTWIDTH] IN BLOOD BY AUTOMATED COUNT: 15.4 % (ref 11.5–14.5)
GLOBULIN SER-MCNC: 4.1 G/DL (ref 2–4)
GLUCOSE SERPL-MCNC: 162 MG/DL (ref 70–105)
GLUCOSE SERPL-MCNC: 207 MG/DL (ref 74–106)
GLUCOSE SERPL-MCNC: 219 MG/DL (ref 70–105)
GLUCOSE SERPL-MCNC: 231 MG/DL (ref 70–105)
HCT VFR BLD AUTO: 31.4 % (ref 38–47)
HGB BLD-MCNC: 9.9 G/DL (ref 12–16)
HYPOCHROMIA BLD QL SMEAR: ABNORMAL
IMM GRANULOCYTES # BLD AUTO: 0.36 K/UL (ref 0–0.04)
IMM GRANULOCYTES NFR BLD: 2.9 % (ref 0–0.4)
LYMPHOCYTES # BLD AUTO: 0.44 K/UL (ref 1–4.8)
LYMPHOCYTES NFR BLD AUTO: 3.6 % (ref 27–41)
LYMPHOCYTES NFR BLD MANUAL: 4 % (ref 27–41)
MACROCYTES BLD QL SMEAR: ABNORMAL
MCH RBC QN AUTO: 31.7 PG (ref 27–31)
MCHC RBC AUTO-ENTMCNC: 31.5 G/DL (ref 32–36)
MCV RBC AUTO: 100.6 FL (ref 80–96)
METAMYELOCYTES NFR BLD MANUAL: 1 %
MONOCYTES # BLD AUTO: 0.54 K/UL (ref 0–0.8)
MONOCYTES NFR BLD AUTO: 4.4 % (ref 2–6)
MONOCYTES NFR BLD MANUAL: 6 % (ref 2–6)
MPC BLD CALC-MCNC: 10.7 FL (ref 9.4–12.4)
NEUTROPHILS # BLD AUTO: 10.96 K/UL (ref 1.8–7.7)
NEUTROPHILS NFR BLD AUTO: 88.9 % (ref 53–65)
NEUTS BAND NFR BLD MANUAL: 3 % (ref 1–5)
NEUTS SEG NFR BLD MANUAL: 86 % (ref 50–62)
NRBC # BLD AUTO: 0.02 X10E3/UL
NRBC, AUTO (.00): 0.2 %
PLATELET # BLD AUTO: 242 K/UL (ref 150–400)
PLATELET MORPHOLOGY: NORMAL
POLYCHROMASIA BLD QL SMEAR: ABNORMAL
POTASSIUM SERPL-SCNC: 3.5 MMOL/L (ref 3.5–5.1)
PROT SERPL-MCNC: 5.9 G/DL (ref 6.4–8.2)
RBC # BLD AUTO: 3.12 M/UL (ref 4.2–5.4)
SODIUM SERPL-SCNC: 142 MMOL/L (ref 136–145)
WBC # BLD AUTO: 12.32 K/UL (ref 4.5–11)

## 2023-02-17 PROCEDURE — 11000001 HC ACUTE MED/SURG PRIVATE ROOM

## 2023-02-17 PROCEDURE — 25000003 PHARM REV CODE 250: Performed by: HOSPITALIST

## 2023-02-17 PROCEDURE — 63600175 PHARM REV CODE 636 W HCPCS: Performed by: NURSE PRACTITIONER

## 2023-02-17 PROCEDURE — 25000003 PHARM REV CODE 250: Performed by: NURSE PRACTITIONER

## 2023-02-17 PROCEDURE — 25000003 PHARM REV CODE 250: Performed by: INTERNAL MEDICINE

## 2023-02-17 PROCEDURE — 82962 GLUCOSE BLOOD TEST: CPT

## 2023-02-17 PROCEDURE — S5010 5% DEXTROSE AND 0.45% SALINE: HCPCS | Performed by: INTERNAL MEDICINE

## 2023-02-17 PROCEDURE — C9113 INJ PANTOPRAZOLE SODIUM, VIA: HCPCS | Performed by: NURSE PRACTITIONER

## 2023-02-17 PROCEDURE — 80053 COMPREHEN METABOLIC PANEL: CPT | Performed by: NURSE PRACTITIONER

## 2023-02-17 PROCEDURE — 99233 SBSQ HOSP IP/OBS HIGH 50: CPT | Mod: ,,, | Performed by: INTERNAL MEDICINE

## 2023-02-17 PROCEDURE — 85025 COMPLETE CBC W/AUTO DIFF WBC: CPT | Performed by: NURSE PRACTITIONER

## 2023-02-17 PROCEDURE — 99233 PR SUBSEQUENT HOSPITAL CARE,LEVL III: ICD-10-PCS | Mod: ,,, | Performed by: INTERNAL MEDICINE

## 2023-02-17 PROCEDURE — 63600175 PHARM REV CODE 636 W HCPCS: Performed by: INTERNAL MEDICINE

## 2023-02-17 PROCEDURE — 27000221 HC OXYGEN, UP TO 24 HOURS

## 2023-02-17 RX ORDER — CARVEDILOL 12.5 MG/1
12.5 TABLET ORAL 2 TIMES DAILY
Status: CANCELLED | OUTPATIENT
Start: 2023-02-17

## 2023-02-17 RX ORDER — NOREPINEPHRINE BITARTRATE/D5W 4MG/250ML
PLASTIC BAG, INJECTION (ML) INTRAVENOUS
Status: DISPENSED
Start: 2023-02-17 | End: 2023-02-17

## 2023-02-17 RX ORDER — AMLODIPINE BESYLATE 5 MG/1
5 TABLET ORAL DAILY
Status: DISCONTINUED | OUTPATIENT
Start: 2023-02-17 | End: 2023-02-20 | Stop reason: HOSPADM

## 2023-02-17 RX ADMIN — ENOXAPARIN SODIUM 30 MG: 30 INJECTION SUBCUTANEOUS at 05:02

## 2023-02-17 RX ADMIN — AMPICILLIN SODIUM 2 G: 2 INJECTION, POWDER, FOR SOLUTION INTRAMUSCULAR; INTRAVENOUS at 11:02

## 2023-02-17 RX ADMIN — PANTOPRAZOLE SODIUM 40 MG: 40 INJECTION, POWDER, FOR SOLUTION INTRAVENOUS at 09:02

## 2023-02-17 RX ADMIN — DEXAMETHASONE SODIUM PHOSPHATE 10 MG: 4 INJECTION, SOLUTION INTRA-ARTICULAR; INTRALESIONAL; INTRAMUSCULAR; INTRAVENOUS; SOFT TISSUE at 05:02

## 2023-02-17 RX ADMIN — CEFTRIAXONE SODIUM 2 G: 2 INJECTION, POWDER, FOR SOLUTION INTRAMUSCULAR; INTRAVENOUS at 03:02

## 2023-02-17 RX ADMIN — INSULIN DETEMIR 25 UNITS: 100 INJECTION, SOLUTION SUBCUTANEOUS at 09:02

## 2023-02-17 RX ADMIN — CARVEDILOL 6.25 MG: 6.25 TABLET, FILM COATED ORAL at 09:02

## 2023-02-17 RX ADMIN — INSULIN ASPART 6 UNITS: 100 INJECTION, SOLUTION INTRAVENOUS; SUBCUTANEOUS at 05:02

## 2023-02-17 RX ADMIN — TICAGRELOR 90 MG: 90 TABLET ORAL at 09:02

## 2023-02-17 RX ADMIN — DEXTROSE AND SODIUM CHLORIDE: 5; 450 INJECTION, SOLUTION INTRAVENOUS at 05:02

## 2023-02-17 RX ADMIN — AMPICILLIN SODIUM 2 G: 2 INJECTION, POWDER, FOR SOLUTION INTRAMUSCULAR; INTRAVENOUS at 03:02

## 2023-02-17 RX ADMIN — QUETIAPINE FUMARATE 50 MG: 25 TABLET ORAL at 09:02

## 2023-02-17 RX ADMIN — DEXAMETHASONE SODIUM PHOSPHATE 10 MG: 4 INJECTION, SOLUTION INTRA-ARTICULAR; INTRALESIONAL; INTRAMUSCULAR; INTRAVENOUS; SOFT TISSUE at 06:02

## 2023-02-17 RX ADMIN — AMLODIPINE BESYLATE 5 MG: 5 TABLET ORAL at 09:02

## 2023-02-17 RX ADMIN — ALLOPURINOL 100 MG: 100 TABLET ORAL at 09:02

## 2023-02-17 RX ADMIN — INSULIN ASPART 6 UNITS: 100 INJECTION, SOLUTION INTRAVENOUS; SUBCUTANEOUS at 12:02

## 2023-02-17 RX ADMIN — INSULIN ASPART 4 UNITS: 100 INJECTION, SOLUTION INTRAVENOUS; SUBCUTANEOUS at 05:02

## 2023-02-17 RX ADMIN — DEXAMETHASONE SODIUM PHOSPHATE 10 MG: 4 INJECTION, SOLUTION INTRA-ARTICULAR; INTRALESIONAL; INTRAMUSCULAR; INTRAVENOUS; SOFT TISSUE at 11:02

## 2023-02-17 RX ADMIN — TIZANIDINE 4 MG: 4 TABLET ORAL at 09:02

## 2023-02-17 RX ADMIN — MUPIROCIN: 20 OINTMENT TOPICAL at 09:02

## 2023-02-17 NOTE — ASSESSMENT & PLAN NOTE
S/p stent to [facility] 11/2023 --- will need to resume brillinta and home medications once she is taking in PO

## 2023-02-17 NOTE — SUBJECTIVE & OBJECTIVE
Interval History:  Patient awake alert but can not hear      Objective:     Vital Signs (Most Recent):  Temp: 98.3 °F (36.8 °C) (02/17/23 0000)  Pulse: 68 (02/17/23 0600)  Resp: 14 (02/17/23 0600)  BP: (!) 137/58 (02/17/23 0600)  SpO2: 98 % (02/17/23 0600)   Vital Signs (24h Range):  Temp:  [97.9 °F (36.6 °C)-98.7 °F (37.1 °C)] 98.3 °F (36.8 °C)  Pulse:  [55-85] 68  Resp:  [10-21] 14  SpO2:  [92 %-100 %] 98 %  BP: (106-154)/(50-70) 137/58     Weight: 103.8 kg (228 lb 13.4 oz)  Body mass index is 39.28 kg/m².      Intake/Output Summary (Last 24 hours) at 2/17/2023 0617  Last data filed at 2/17/2023 0541  Gross per 24 hour   Intake 3615 ml   Output 1500 ml   Net 2115 ml       Physical Exam  Vitals reviewed.   Constitutional:       Appearance: Normal appearance.      Interventions: She is not intubated.  HENT:      Head: Normocephalic and atraumatic.      Nose: Nose normal.      Mouth/Throat:      Mouth: Mucous membranes are dry.      Pharynx: Oropharynx is clear.   Eyes:      Extraocular Movements: Extraocular movements intact.      Conjunctiva/sclera: Conjunctivae normal.      Pupils: Pupils are equal, round, and reactive to light.   Cardiovascular:      Rate and Rhythm: Normal rate.      Heart sounds: Normal heart sounds. No murmur heard.  Pulmonary:      Effort: Pulmonary effort is normal. She is not intubated.      Breath sounds: Normal breath sounds.   Abdominal:      General: Abdomen is flat. Bowel sounds are normal.      Palpations: Abdomen is soft.   Musculoskeletal:         General: Normal range of motion.      Cervical back: Normal range of motion and neck supple.      Right lower leg: No edema.      Left lower leg: No edema.   Skin:     General: Skin is warm and dry.      Capillary Refill: Capillary refill takes less than 2 seconds.   Neurological:      General: No focal deficit present.      Mental Status: She is alert and oriented to person, place, and time.   Psychiatric:         Mood and Affect: Mood  normal.         Behavior: Behavior normal.     Review of Systems    Vents:  Oxygen Concentration (%): 32 (02/16/23 1925)    Lines/Drains/Airways       Drain  Duration                  Urethral Catheter 02/12/23 5 days         NG/OG Tube 02/14/23 0905 [place] sump 16 Fr. Right nostril 2 days              Peripheral Intravenous Line  Duration                  Peripheral IV - Single Lumen 02/14/23 0530 20 G Right Breast 3 days         Peripheral IV - Single Lumen 02/15/23 2342 20 G Left Breast 1 day                    Significant Labs:    CBC/Anemia Profile:  Recent Labs   Lab 02/16/23  0513   WBC 11.53*   HGB 10.9*   HCT 33.6*      MCV 97.7*   RDW 15.7*        Chemistries:  Recent Labs   Lab 02/16/23  0513   *   K 3.6   *   CO2 26   BUN 77*   CREATININE 3.16*   CALCIUM 8.4*   ALBUMIN 1.9*   PROT 6.5   BILITOT 0.3   ALKPHOS 67   ALT 70*   AST 47*       Recent Lab Results         02/17/23  0529   02/16/23  2307   02/16/23  1728   02/16/23  1107        POC Glucose 162   261   229   263               Significant Imaging:  I have reviewed all pertinent imaging results/findings within the past 24 hours.

## 2023-02-17 NOTE — ASSESSMENT & PLAN NOTE
Good control    Quality 47: Advance Care Plan: Advance Care Planning discussed and documented; advance care plan or surrogate decision maker documented in the medical record. Detail Level: Detailed Quality 110: Preventive Care And Screening: Influenza Immunization: Influenza Immunization not Administered because Patient Refused. Name And Contact Information For Health Care Proxy: Pretty Silva Quality 226: Preventive Care And Screening: Tobacco Use: Screening And Cessation Intervention: Patient screened for tobacco and is an ex-smoker Quality 130: Documentation Of Current Medications In The Medical Record: Current Medications Documented

## 2023-02-17 NOTE — PROGRESS NOTES
[facility]  Adult Nutrition  Follow-up Note         Reason for Assessment  Reason For Assessment: RD follow-up  Nutrition Risk Screen: tube feeding or parenteral nutrition    RD follow up. Per MD notes Patient awake alert but can not hear.    Pt is now advanced to a consistent carbohydrate 1800 kcal diet at this time. Diet order is appropriate. Will monitor her tolerance of oral diet and po intakes. If po intakes suboptimal, recommend addition of Glucerna supplements to supplement kcal/PRO.     If pt requires resumption of tube feedings for nutrition support, recommend resuming Glucerna 1.5.       Wt Readings from Last 3 Encounters:   02/17/23 0524 103.8 kg (228 lb 13.4 oz)   02/16/23 0308 96.4 kg (212 lb 8.4 oz)   02/15/23 0309 96.2 kg (212 lb 1.3 oz)   02/14/23 0301 111.5 kg (245 lb 13 oz)   02/13/23 0245 108.8 kg (239 lb 13.8 oz)   02/12/23 1245 109.7 kg (241 lb 13.5 oz)   02/12/23 0907 108 kg (238 lb)   02/12/23 0459 108 kg (238 lb)   12/05/22 1125 108.4 kg (239 lb)   Unsure etiology of weight fluctuation - will continue to monitor weight trend.     Last BM 2/10 per flowsheets - recommend bowel regimen. Will continue to monitor tube feeding advancement/tolerance/adequacy, meds, labs, weight trend, updates in patient condition. RD following.    Malnutrition  Is Patient Malnourished: No    Nutrition Diagnosis  Altered Nutrition Related Lab Values   related to Diabetes Mellitus as evidenced by elevated blood glucose levels     Nutrition Diagnosis Status: Chronic/ continues    Nutrition Risk  Level of Risk/Frequency of Follow-up: high   Chewing or Swallowing Difficulty?: pt receiving via NGT     Estimated/Assessed Needs    Temp: 98.9 °F (37.2 °C)Oral  Weight Used For Calorie Calculations: 68.8 kg (151 lb 10.8 oz)   Energy Need Method: Kcal/kg Energy Calorie Requirements (kcal): 2974-5912  Weight Used For Protein Calculations: 68.8 kg (151 lb 10.8 oz)  Protein Requirements: 55-68  Estimated  Fluid Requirement Method: RDA Method    RDA Method (mL): 1720     Nutrition Prescription / Recommendations  Recommendation/Intervention: Pt is now advanced to a consistent carbohydrate 1800 kcal diet at this time. Diet order is appropriate. Will monitor her tolerance of oral diet and po intakes. If po intakes suboptimal, recommend addition of Glucerna supplements to supplement kcal/PRO.  Goals: po intakes adequate to meet needs, weight stability, lab stability/normalization,  Nutrition Goal Status: new  Current Diet Order: Consistent carbohydrate 1800 kcal diet  Nutrition Order Comments: appropraite  Current Nutrition Support Formula Ordered:  (off tube feedings)  Current Nutrition Support Rate Ordered: 0 (ml)  Current Nutrition Support Frequency Ordered: hourly  Recommended Diet: Enteral Nutrition  Recommended Oral Supplement: No Oral Supplements  Is Nutrition Support Recommended: No  Is Education Recommended: No    Monitor and Evaluation  % current Intake: New Diet order with no P.O. intake documented currently  % intake to meet estimated needs: 75 - 100 %  Food and Nutrient Intake: enteral nutrition intake  Food and Nutrient Adminstration: enteral and parenteral nutrition administration  Anthropometric Measurements: weight, weight change, body mass index  Biochemical Data, Medical Tests and Procedures: electrolyte and renal panel, inflammatory profile, gastrointestinal profile, lipid profile, glucose/endocrine profile  Nutrition-Focused Physical Findings: overall appearance, extremities, muscles and bones, head and eyes, skin  Enteral Calories (kcal): 360  Enteral Protein (gm): 19.8  Enteral (Free Water) Fluid (mL): 170  Free Water Flush Fluid (mL): 480  Total Calories (kcal/kg): 3.7  % Kcal Needs: 21  % Protein Needs: 36  Total Fluid Intake (mL): 650  Tolerance: tolerating    Current Medical Diagnosis and Past Medical History  Diagnosis: other (see comments) (bacterial meningitis)  Past Medical History:  "  Diagnosis Date    Cancer     Diabetes mellitus, type 2     Fibromyalgia     History of kidney cancer 2018    Hyperlipidemia     Hypertension     Migraine headache     Renal cell carcinoma     Renal disorder      Nutrition/Diet History  Food Allergies: NKFA  Factors Affecting Nutritional Intake: NPO    Lab/Procedures/Meds  Recent Labs   Lab 02/17/23  0648      K 3.5   BUN 78*   CREATININE 2.85*   *   CALCIUM 7.9*   ALBUMIN 1.8*      ALT 56   AST 27     Last A1c:   Lab Results   Component Value Date    HGBA1C 6.9 (H) 11/22/2022     Lab Results   Component Value Date    RBC 3.12 (L) 02/17/2023    HGB 9.9 (L) 02/17/2023    HCT 31.4 (L) 02/17/2023    .6 (H) 02/17/2023    MCH 31.7 (H) 02/17/2023    MCHC 31.5 (L) 02/17/2023     Pertinent Labs Reviewed: reviewed  Pertinent Labs Comments:   Anion gap 17(H)-unsure etiology will monitor trends; BUN 78 (H), creatinine 2.85 (H), BUN/CREAT RATIO 27 (H), eGFR 18 (L), total PRO 5.9(L) albumin 1.8 (L) - pt with stg 4 CKD and bacterial meningitis so possible inflammatory response driving down protein and albumin; (H)- hx DM2; Ca 7.9 (L) - replete to WNL as needed; total globulin 4.1 (H) - unsure etiology, will continue to monitor altered labs  Pertinent Medications Reviewed: reviewed  Pertinent Medications Comments:   allopurinol, amlodipine, omnipen, carvedilol, rocephin, dexamethasone, enoxaparin, insulin, pantoprazole, quetiapine, ticagrelor, tizanidine, D5% and 0.45% NaCl    Anthropometrics  Temp: 98.9 °F (37.2 °C)  Height: 5' 4" (162.6 cm)  Height (inches): 64 in  Weight Method: Bed Scale  Weight: 103.8 kg (228 lb 13.4 oz)  Weight (lb): 228.84 lb  Ideal Body Weight (IBW), Female: 120 lb  % Ideal Body Weight, Female (lb): 201.54 %  BMI (Calculated): 39.3  BMI Grade: greater than 40 - morbid obesity     Nutrition by Nursing  Diet/Nutrition Received: NPO, tube feeding  Last Bowel Movement: 02/16/23       NG/OG Tube 02/14/23 0905 [place] 16 Fr. " Right nostril-Formula Name: jose luis 1.5    Nutrition Follow-Up  RD Follow-up?: Yes

## 2023-02-17 NOTE — ASSESSMENT & PLAN NOTE
Patient is markedly improved she is now carry on conversations she can not hear which I think is secondary to her meningitis will get ENT to see her at some point.  She will probably need treatment for 14-21 days with bacterial meningitis PCR pending.  White count down to 11,000

## 2023-02-17 NOTE — PLAN OF CARE
Per unit mtg, pt improved and for transfer to floor today, pt cannot hear so will get ent consult, st to eval, following

## 2023-02-17 NOTE — PROGRESS NOTES
[facility]  Pulmonology  Progress Note    Patient Name: Jessica Bay  MRN: [id number]  Admission Date: 2/12/2023  Hospital Length of Stay: 5 days  Code Status: Full Code  Attending Provider: Humbetro Justin DO  Primary Care Provider: Jag Lopez MD   Principal Problem: Bacterial meningitis    Subjective:     Interval History:  Patient awake alert but can not hear      Objective:     Vital Signs (Most Recent):  Temp: 98.3 °F (36.8 °C) (02/17/23 0000)  Pulse: 68 (02/17/23 0600)  Resp: 14 (02/17/23 0600)  BP: (!) 137/58 (02/17/23 0600)  SpO2: 98 % (02/17/23 0600)   Vital Signs (24h Range):  Temp:  [97.9 °F (36.6 °C)-98.7 °F (37.1 °C)] 98.3 °F (36.8 °C)  Pulse:  [55-85] 68  Resp:  [10-21] 14  SpO2:  [92 %-100 %] 98 %  BP: (106-154)/(50-70) 137/58     Weight: 103.8 kg (228 lb 13.4 oz)  Body mass index is 39.28 kg/m².      Intake/Output Summary (Last 24 hours) at 2/17/2023 0617  Last data filed at 2/17/2023 0541  Gross per 24 hour   Intake 3615 ml   Output 1500 ml   Net 2115 ml       Physical Exam  Vitals reviewed.   Constitutional:       Appearance: Normal appearance.      Interventions: She is not intubated.  HENT:      Head: Normocephalic and atraumatic.      Nose: Nose normal.      Mouth/Throat:      Mouth: Mucous membranes are dry.      Pharynx: Oropharynx is clear.   Eyes:      Extraocular Movements: Extraocular movements intact.      Conjunctiva/sclera: Conjunctivae normal.      Pupils: Pupils are equal, round, and reactive to light.   Cardiovascular:      Rate and Rhythm: Normal rate.      Heart sounds: Normal heart sounds. No murmur heard.  Pulmonary:      Effort: Pulmonary effort is normal. She is not intubated.      Breath sounds: Normal breath sounds.   Abdominal:      General: Abdomen is flat. Bowel sounds are normal.      Palpations: Abdomen is soft.   Musculoskeletal:         General: Normal range of motion.      Cervical back: Normal range of motion and neck supple.       Right lower leg: No edema.      Left lower leg: No edema.   Skin:     General: Skin is warm and dry.      Capillary Refill: Capillary refill takes less than 2 seconds.   Neurological:      General: No focal deficit present.      Mental Status: She is alert and oriented to person, place, and time.   Psychiatric:         Mood and Affect: Mood normal.         Behavior: Behavior normal.     Review of Systems    Vents:  Oxygen Concentration (%): 32 (02/16/23 1925)    Lines/Drains/Airways       Drain  Duration                  Urethral Catheter 02/12/23 5 days         NG/OG Tube 02/14/23 0905 [place] sump 16 Fr. Right nostril 2 days              Peripheral Intravenous Line  Duration                  Peripheral IV - Single Lumen 02/14/23 0530 20 G Right Breast 3 days         Peripheral IV - Single Lumen 02/15/23 2342 20 G Left Breast 1 day                    Significant Labs:    CBC/Anemia Profile:  Recent Labs   Lab 02/16/23  0513   WBC 11.53*   HGB 10.9*   HCT 33.6*      MCV 97.7*   RDW 15.7*        Chemistries:  Recent Labs   Lab 02/16/23  0513   *   K 3.6   *   CO2 26   BUN 77*   CREATININE 3.16*   CALCIUM 8.4*   ALBUMIN 1.9*   PROT 6.5   BILITOT 0.3   ALKPHOS 67   ALT 70*   AST 47*       Recent Lab Results         02/17/23  0529   02/16/23  2307   02/16/23  1728   02/16/23  1107        POC Glucose 162   261   229   263               Significant Imaging:  I have reviewed all pertinent imaging results/findings within the past 24 hours.    Assessment/Plan:     Neuro  Encephalopathy, metabolic  Markedly better I think she can go to the floor she will need swallowing test    Cardiac/Vascular  Dyslipidemia  Continue statin when taking in PO     Hypertension  Will start on po bp meds    CAD (coronary artery disease)  S/p stent to [facility] 11/2023 --- will need to resume brillinta and home medications once she is taking in PO     Renal/  Acquired absence of kidney  S/p nephrectomy secondary to renal cell  carcinoma     Stage 4 chronic kidney disease  Creatinine down to 3.16 yesterday awaiting labs today    UTI (urinary tract infection)  E coli sensitive to Rocephin    ID  * Bacterial meningitis  Patient is markedly improved she is now carry on conversations she can not hear which I think is secondary to her meningitis will get ENT to see her at some point.  She will probably need treatment for 14-21 days with bacterial meningitis PCR pending.  White count down to 11,000    SIRS (systemic inflammatory response syndrome)  No true source at this time - suspect meningitis   - BC x2  - CT abdomen, pelvis and chest xray without infectious process   - started on broad spectrum abx of vancomycin, rocephin, ampicillin and acyclovir       Endocrine  Type 2 diabetes mellitus with diabetic chronic kidney disease  Good control     Orthopedic  Fibromyalgia  Stable     Other  Body mass index (BMI) 40.0-44.9, adult  Complicates all levels of care                  Jair Jain MD  Pulmonology  [facility]

## 2023-02-18 LAB
BACTERIA BLD CULT: NORMAL
GLUCOSE SERPL-MCNC: 242 MG/DL (ref 70–105)
GLUCOSE SERPL-MCNC: 256 MG/DL (ref 70–105)
GLUCOSE SERPL-MCNC: 284 MG/DL (ref 70–105)
GLUCOSE SERPL-MCNC: 303 MG/DL (ref 70–105)
TROPONIN I SERPL HS-MCNC: 14 PG/ML
TROPONIN I SERPL HS-MCNC: 15.3 PG/ML
TROPONIN I SERPL HS-MCNC: 16.1 PG/ML

## 2023-02-18 PROCEDURE — 82962 GLUCOSE BLOOD TEST: CPT

## 2023-02-18 PROCEDURE — 25000003 PHARM REV CODE 250: Performed by: INTERNAL MEDICINE

## 2023-02-18 PROCEDURE — 99232 PR SUBSEQUENT HOSPITAL CARE,LEVL II: ICD-10-PCS | Mod: ,,, | Performed by: INTERNAL MEDICINE

## 2023-02-18 PROCEDURE — 99232 SBSQ HOSP IP/OBS MODERATE 35: CPT | Mod: ,,, | Performed by: INTERNAL MEDICINE

## 2023-02-18 PROCEDURE — 93010 EKG 12-LEAD: ICD-10-PCS | Mod: ,,, | Performed by: INTERNAL MEDICINE

## 2023-02-18 PROCEDURE — 97166 OT EVAL MOD COMPLEX 45 MIN: CPT

## 2023-02-18 PROCEDURE — 93010 ELECTROCARDIOGRAM REPORT: CPT | Mod: ,,, | Performed by: INTERNAL MEDICINE

## 2023-02-18 PROCEDURE — 25000003 PHARM REV CODE 250: Performed by: NURSE PRACTITIONER

## 2023-02-18 PROCEDURE — 11000001 HC ACUTE MED/SURG PRIVATE ROOM

## 2023-02-18 PROCEDURE — S5010 5% DEXTROSE AND 0.45% SALINE: HCPCS | Performed by: INTERNAL MEDICINE

## 2023-02-18 PROCEDURE — 84484 ASSAY OF TROPONIN QUANT: CPT | Performed by: INTERNAL MEDICINE

## 2023-02-18 PROCEDURE — 93005 ELECTROCARDIOGRAM TRACING: CPT

## 2023-02-18 PROCEDURE — C9113 INJ PANTOPRAZOLE SODIUM, VIA: HCPCS | Performed by: NURSE PRACTITIONER

## 2023-02-18 PROCEDURE — 63600175 PHARM REV CODE 636 W HCPCS: Performed by: NURSE PRACTITIONER

## 2023-02-18 PROCEDURE — 63600175 PHARM REV CODE 636 W HCPCS: Performed by: INTERNAL MEDICINE

## 2023-02-18 PROCEDURE — 25000242 PHARM REV CODE 250 ALT 637 W/ HCPCS: Performed by: INTERNAL MEDICINE

## 2023-02-18 PROCEDURE — 25000003 PHARM REV CODE 250: Performed by: HOSPITALIST

## 2023-02-18 PROCEDURE — 87040 BLOOD CULTURE FOR BACTERIA: CPT | Performed by: INTERNAL MEDICINE

## 2023-02-18 PROCEDURE — 97162 PT EVAL MOD COMPLEX 30 MIN: CPT

## 2023-02-18 RX ORDER — NITROGLYCERIN 0.4 MG/1
0.4 TABLET SUBLINGUAL EVERY 5 MIN PRN
Status: DISCONTINUED | OUTPATIENT
Start: 2023-02-18 | End: 2023-02-20 | Stop reason: HOSPADM

## 2023-02-18 RX ORDER — ACETAMINOPHEN 325 MG/1
650 TABLET ORAL EVERY 6 HOURS PRN
Status: DISCONTINUED | OUTPATIENT
Start: 2023-02-18 | End: 2023-02-20 | Stop reason: HOSPADM

## 2023-02-18 RX ADMIN — DEXTROSE AND SODIUM CHLORIDE: 5; 450 INJECTION, SOLUTION INTRAVENOUS at 03:02

## 2023-02-18 RX ADMIN — INSULIN ASPART 8 UNITS: 100 INJECTION, SOLUTION INTRAVENOUS; SUBCUTANEOUS at 05:02

## 2023-02-18 RX ADMIN — TIZANIDINE 4 MG: 4 TABLET ORAL at 10:02

## 2023-02-18 RX ADMIN — DEXTROSE AND SODIUM CHLORIDE: 5; 450 INJECTION, SOLUTION INTRAVENOUS at 12:02

## 2023-02-18 RX ADMIN — INSULIN ASPART 10 UNITS: 100 INJECTION, SOLUTION INTRAVENOUS; SUBCUTANEOUS at 02:02

## 2023-02-18 RX ADMIN — DEXAMETHASONE SODIUM PHOSPHATE 10 MG: 4 INJECTION, SOLUTION INTRA-ARTICULAR; INTRALESIONAL; INTRAMUSCULAR; INTRAVENOUS; SOFT TISSUE at 12:02

## 2023-02-18 RX ADMIN — ENOXAPARIN SODIUM 30 MG: 30 INJECTION SUBCUTANEOUS at 05:02

## 2023-02-18 RX ADMIN — TICAGRELOR 90 MG: 90 TABLET ORAL at 10:02

## 2023-02-18 RX ADMIN — CARVEDILOL 6.25 MG: 6.25 TABLET, FILM COATED ORAL at 09:02

## 2023-02-18 RX ADMIN — QUETIAPINE FUMARATE 50 MG: 25 TABLET ORAL at 09:02

## 2023-02-18 RX ADMIN — AMPICILLIN SODIUM 2 G: 2 INJECTION, POWDER, FOR SOLUTION INTRAMUSCULAR; INTRAVENOUS at 12:02

## 2023-02-18 RX ADMIN — PANTOPRAZOLE SODIUM 40 MG: 40 INJECTION, POWDER, FOR SOLUTION INTRAVENOUS at 09:02

## 2023-02-18 RX ADMIN — AMPICILLIN SODIUM 2 G: 2 INJECTION, POWDER, FOR SOLUTION INTRAMUSCULAR; INTRAVENOUS at 02:02

## 2023-02-18 RX ADMIN — TICAGRELOR 90 MG: 90 TABLET ORAL at 09:02

## 2023-02-18 RX ADMIN — AMLODIPINE BESYLATE 5 MG: 5 TABLET ORAL at 09:02

## 2023-02-18 RX ADMIN — QUETIAPINE FUMARATE 50 MG: 25 TABLET ORAL at 10:02

## 2023-02-18 RX ADMIN — INSULIN ASPART 6 UNITS: 100 INJECTION, SOLUTION INTRAVENOUS; SUBCUTANEOUS at 06:02

## 2023-02-18 RX ADMIN — ACETAMINOPHEN 650 MG: 325 TABLET ORAL at 12:02

## 2023-02-18 RX ADMIN — CARVEDILOL 6.25 MG: 6.25 TABLET, FILM COATED ORAL at 10:02

## 2023-02-18 RX ADMIN — INSULIN DETEMIR 25 UNITS: 100 INJECTION, SOLUTION SUBCUTANEOUS at 10:02

## 2023-02-18 RX ADMIN — CEFTRIAXONE SODIUM 2 G: 2 INJECTION, POWDER, FOR SOLUTION INTRAMUSCULAR; INTRAVENOUS at 03:02

## 2023-02-18 RX ADMIN — INSULIN ASPART 8 UNITS: 100 INJECTION, SOLUTION INTRAVENOUS; SUBCUTANEOUS at 12:02

## 2023-02-18 RX ADMIN — DEXAMETHASONE SODIUM PHOSPHATE 10 MG: 4 INJECTION, SOLUTION INTRA-ARTICULAR; INTRALESIONAL; INTRAMUSCULAR; INTRAVENOUS; SOFT TISSUE at 05:02

## 2023-02-18 RX ADMIN — ALLOPURINOL 100 MG: 100 TABLET ORAL at 10:02

## 2023-02-18 RX ADMIN — CEFTRIAXONE SODIUM 2 G: 2 INJECTION, POWDER, FOR SOLUTION INTRAMUSCULAR; INTRAVENOUS at 02:02

## 2023-02-18 NOTE — NURSING
Refused nitroglycerin SL.  Medication use and possible side effects discussed with her. Verbalized understanding.  She says she had a bad experience with nitro in the past and she just does not want to take it. States she will just wait to see what her lab work shows. Dr. Sanaz Cummings.

## 2023-02-18 NOTE — PLAN OF CARE
Problem: Physical Therapy  Goal: Physical Therapy Goal  Description: Short Term Goals to be met by: 3/4/23    Patient will increase functional independence with mobility by performin. Supine to sit with Minimal Assist  2. Sit to stand transfer with Minimal Assist using Rolling walker  3. Bed to chair transfer with Minimal Assist using Rolling walker  4. Gait  x 50 feet with Minimal Assist using Rolling walker  5. Lower extremity exercise program x30 reps per handout, with assistance as needed    Long Term Goals to be met by: 3/18/23    Pt will regain full independent functional mobility with Rolling walker to return to home situation and prior activities of daily living.   Outcome: Ongoing, Progressing

## 2023-02-18 NOTE — NURSING
Awake in bed complaining of dull sternal chest pain. B/p 139/8  HR.73 No acute distress. Concerned that it may be serious because she had a heart attack thanksgiving weekend. Orders needed Dr. Yo notified via secure chat.

## 2023-02-18 NOTE — PLAN OF CARE
Problem: Occupational Therapy  Goal: Occupational Therapy Goal  Description: STG:  Pt will perform grooming with setup  Pt will bathe with min a   Pt will perform UE dressing with setup  Pt will perform LE dressing with min a with AD as needed  Pt will sit EOB x 10 min with (I)  Pt will transfer bed/chair/bsc with Min a with AD  Pt will tolerate 15 minutes of tx without fatigue      LT.Restore to max I with self care and mobility.     Outcome: Ongoing, Progressing

## 2023-02-18 NOTE — HOSPITAL COURSE
February 18, 2023   Patient can not hear, but she feels fine.    I communicated with her through writing.    Her vital signs stable and she is afebrile.    I reviewed her records, CSF culture is negative.    UA is positive for E coli urinary tract infection sensitive to Rocephin.    Had blood culture positive on December 12th for Streptococcus pneumoniae and that was positive sensitive to ceftriaxone at that time.    I will repeat blood cultures   Continue Rocephin  I will discontinue ampicillin.  No evidence of meningitis on culture or CSF workup, blood culture on February 12th grew pneumococcus pneumonia, no clear source of Streptococcus pneumonia.  Same-day blood cultures are negative.  I will repeat blood cultures.  Continue supportive care   Patient will benefit from swing bed placement   We will treat for a total of 2 weeks of IV antibiotics.      February 19, 2023   Patient is complaining of cough and requesting cough medicine, also requesting to be seen by nephrologist (has history of renal cell carcinoma status post nephrectomy, she has single kidney now).    Vital signs stable   Hemoglobin down to 7.9, likely anemia of chronic renal failure.    Urine culture grew E coli.    Blood cultures February 12th grew Streptococcus pneumonia, repeat cultures negative.    Will continue Rocephin therapy bacteremia does.    Initial CSF cell count suggestive of meningitis, cultures and g stain negative DD   Continue supportive care   Awaiting swing bed placement  Will treat for total of 2 weeks of IV antibiotics.      02/20--Pt with no new issues or concerns.  Has been accepted to [facility] for IV ABT and continued monitoring, pt is stable for discharge.

## 2023-02-18 NOTE — PT/OT/SLP EVAL
Physical Therapy Evaluation    Patient Name:  Jessica Bay   MRN:  [id number]    Recommendations:     Discharge Recommendations: nursing facility, skilled, rehabilitation facility   Discharge Equipment Recommendations:  (to be determined)   Barriers to discharge:  complicated hospitalization    Assessment:     Jessica Bay is a 67 y.o. female admitted with a medical diagnosis of Bacterial meningitis.  She presents with the following impairments/functional limitations: weakness, impaired endurance, impaired functional mobility, gait instability, impaired balance, decreased lower extremity function.    Since hospitalized, pt has lost her hearing and just regained her speech yesterday per . Pt previously able to hear normally. Pt would benefit from swingbed or rehab facility at d/c to maximize mobility prior to return home with .     Rehab Prognosis: Good and Fair; patient would benefit from acute skilled PT services to address these deficits and reach maximum level of function.    Recent Surgery: * No surgery found *      Plan:     During this hospitalization, patient to be seen 5 x/week to address the identified rehab impairments via gait training, therapeutic activities, therapeutic exercises, neuromuscular re-education and progress toward the following goals:    Plan of Care Expires:  03/18/23    Subjective     Chief Complaint: bacterial meningitis  Patient/Family Comments/goals: agreeable to eval  Pain/Comfort:  Pain Rating 1: 0/10    Patients cultural, spiritual, Zoroastrianism conflicts given the current situation: no    Living Environment:  Home with  in 1 level home with ramp.   Prior to admission, patients level of function was indep in transfers, bed mob and short distance household ambulation, but pt primarily used power chair d/t pain in back. Pt also has van that she drives that is able to accommodate her powerchair as well as her 's.  Equipment used at home: power chair,  shower chair, grab bar.  DME owned (not currently used): single point cane.  Upon discharge, patient will have assistance from rehab staff.    Objective:     Communicated with Pat Das RN prior to session.  Patient found HOB elevated with PureWick, peripheral IV, pressure relief boots  upon PT entry to room.    General Precautions: Standard, fall, special contact  Orthopedic Precautions:N/A   Braces: N/A  Respiratory Status: Room air    Exams:  Cognitive Exam:  Patient is oriented to Person, Place, and Situation  Sensation:    -       Intact  RLE ROM: WFL  RLE Strength: Deficits: 2+/5  LLE ROM: WFL  LLE Strength: Deficits: 2-/5    Functional Mobility:  Bed Mobility:     Rolling Left:  moderate assistance and of 2 persons  Rolling Right: moderate assistance and of 1-2 persons  Scooting: minimum assistance to HOB  Supine to Sit: moderate assistance and of 2 persons  Sit to Supine: moderate assistance and of 2 persons  Transfers:     Sit to Stand:  moderate assistance and of 2 persons with hand-held assist  Gait: unable to sidestep to HOB  Balance: EOB sitting Fair+ to Good-      AM-PAC 6 CLICK MOBILITY  Total Score:9       Treatment & Education:  Pt educated on PT role/POC.   Importance of OOB activity with staff assistance.  Importance of sitting up in the chair throughout the day as tolerated, especially for meals   Safety during functional t/f and mobility with use of RW  White board updated   Multiple self-care tasks/functional mobility completed- assistance level noted above   All questions/concerns answered within PT scope of practice     Patient left supine with all lines intact, call button in reach, and RN notified.    GOALS:   Multidisciplinary Problems       Physical Therapy Goals          Problem: Physical Therapy    Goal Priority Disciplines Outcome Goal Variances Interventions   Physical Therapy Goal     PT, PT/OT Ongoing, Progressing     Description: Short Term Goals to be met by:  3/4/23    Patient will increase functional independence with mobility by performin. Supine to sit with Minimal Assist  2. Sit to stand transfer with Minimal Assist using Rolling walker  3. Bed to chair transfer with Minimal Assist using Rolling walker  4. Gait  x 50 feet with Minimal Assist using Rolling walker  5. Lower extremity exercise program x30 reps per handout, with assistance as needed    Long Term Goals to be met by: 3/18/23    Pt will regain full independent functional mobility with Rolling walker to return to home situation and prior activities of daily living.                        History:     Past Medical History:   Diagnosis Date    Cancer     Diabetes mellitus, type 2     Fibromyalgia     History of kidney cancer 2018    Hyperlipidemia     Hypertension     Migraine headache     Renal cell carcinoma     Renal disorder        Past Surgical History:   Procedure Laterality Date    NEPHRECTOMY         Time Tracking:     PT Received On: 23  PT Start Time: 1505     PT Stop Time: 1540  PT Total Time (min): 35 min     Billable Minutes: Evaluation moderate complexity      2023

## 2023-02-18 NOTE — NURSING
EKG faxed to Dr. Yo's office. Informed that the pt has requested to have a troponin level. States she normally has normal EKG's but her last heart attack was identified by an elevated troponin level. Awaiting response.

## 2023-02-18 NOTE — SUBJECTIVE & OBJECTIVE
Interval History:  Patient without complaints      Objective:     Vital Signs (Most Recent):  Temp: 98 °F (36.7 °C) (02/18/23 1100)  Pulse: 71 (02/18/23 1100)  Resp: 18 (02/18/23 1100)  BP: (!) 152/78 (02/18/23 1100)  SpO2: 98 % (02/18/23 1100)   Vital Signs (24h Range):  Temp:  [97.6 °F (36.4 °C)-99.3 °F (37.4 °C)] 98 °F (36.7 °C)  Pulse:  [62-98] 71  Resp:  [12-20] 18  SpO2:  [95 %-99 %] 98 %  BP: (130-161)/(58-90) 152/78     Weight: 103.8 kg (228 lb 13.4 oz)  Body mass index is 39.28 kg/m².      Intake/Output Summary (Last 24 hours) at 2/18/2023 1215  Last data filed at 2/18/2023 0653  Gross per 24 hour   Intake 1185 ml   Output 1200 ml   Net -15 ml         Physical Exam  Vitals reviewed.   Constitutional:       Appearance: Normal appearance.      Interventions: She is not intubated.  HENT:      Head: Normocephalic and atraumatic.      Nose: Nose normal.      Mouth/Throat:      Mouth: Mucous membranes are dry.      Pharynx: Oropharynx is clear.   Eyes:      Extraocular Movements: Extraocular movements intact.      Conjunctiva/sclera: Conjunctivae normal.      Pupils: Pupils are equal, round, and reactive to light.   Cardiovascular:      Rate and Rhythm: Normal rate.      Heart sounds: Normal heart sounds. No murmur heard.  Pulmonary:      Effort: Pulmonary effort is normal. She is not intubated.      Breath sounds: Normal breath sounds.   Abdominal:      General: Abdomen is flat. Bowel sounds are normal.      Palpations: Abdomen is soft.   Musculoskeletal:         General: Normal range of motion.      Cervical back: Normal range of motion and neck supple.      Right lower leg: No edema.      Left lower leg: No edema.   Skin:     General: Skin is warm and dry.      Capillary Refill: Capillary refill takes less than 2 seconds.   Neurological:      General: No focal deficit present.      Mental Status: She is alert and oriented to person, place, and time.   Psychiatric:         Mood and Affect: Mood normal.          Behavior: Behavior normal.     Review of Systems   Constitutional:  Negative for chills and fever.   HENT:  Positive for hearing loss. Negative for congestion.    Eyes:  Negative for pain and discharge.   Respiratory:  Negative for cough, chest tightness, shortness of breath, wheezing and stridor.    Cardiovascular:  Negative for chest pain, palpitations and leg swelling.   Gastrointestinal:  Negative for abdominal distention, abdominal pain, blood in stool, diarrhea, nausea and vomiting.   Endocrine: Negative for cold intolerance, polydipsia and polyuria.   Genitourinary:  Negative for difficulty urinating, dysuria, frequency, hematuria and urgency.   Musculoskeletal:  Negative for arthralgias, back pain and neck pain.   Neurological:  Negative for dizziness, seizures, syncope, weakness, numbness and headaches.   Hematological:  Negative for adenopathy.   Psychiatric/Behavioral:  Negative for behavioral problems.    All other systems reviewed and are negative.    Vents:  Oxygen Concentration (%): 32 (02/17/23 0746)    Lines/Drains/Airways       Drain  Duration             Female External Urinary Catheter 02/17/23 1515 <1 day              Peripheral Intravenous Line  Duration                  Peripheral IV - Single Lumen 02/14/23 0530 20 G Right Breast 4 days         Peripheral IV - Single Lumen 02/15/23 2342 20 G Left Breast 2 days         Peripheral IV - Single Lumen 02/17/23 0333 20 G Posterior;Right Forearm 1 day                    Significant Labs:    CBC/Anemia Profile:  Recent Labs   Lab 02/17/23  0649   WBC 12.32*   HGB 9.9*   HCT 31.4*      .6*   RDW 15.4*          Chemistries:  Recent Labs   Lab 02/17/23  0648      K 3.5      CO2 24   BUN 78*   CREATININE 2.85*   CALCIUM 7.9*   ALBUMIN 1.8*   PROT 5.9*   BILITOT 0.2   ALKPHOS 60   ALT 56   AST 27         Recent Lab Results  (Last 5 results in the past 24 hours)        02/18/23  1119   02/18/23  0905   02/18/23  0543    02/18/23  0419   02/18/23  0000        POC Glucose 256       242   303       Troponin I High Sensitivity   15.3   14.0                                  Significant Imaging:  I have reviewed all pertinent imaging results/findings within the past 24 hours.

## 2023-02-18 NOTE — PT/OT/SLP EVAL
Occupational Therapy   Evaluation    Name: Jessica Bay  MRN: [id number]  Admitting Diagnosis: Bacterial meningitis  Recent Surgery: * No surgery found *      Recommendations:     Discharge Recommendations: rehabilitation facility, nursing facility, skilled  Discharge Equipment Recommendations:   (to be determined)  Barriers to discharge:  None    Assessment:     Jessica Bay is a 67 y.o. female with a medical diagnosis of Bacterial meningitis.  She presents with decline in functional status. Pt has loss of hearing due to meningitis.pt agreed to OT treatment Performance deficits affecting function: weakness, impaired endurance, impaired self care skills, impaired functional mobility, gait instability, impaired balance, decreased safety awareness, decreased upper extremity function, decreased lower extremity function.      Rehab Prognosis: Good; patient would benefit from acute skilled OT services to address these deficits and reach maximum level of function.       Plan:     Patient to be seen   to address the above listed problems via self-care/home management, therapeutic activities, therapeutic exercises  Plan of Care Expires:    Plan of Care Reviewed with: patient, spouse    Subjective     Chief Complaint: Bacterial meningitis  Patient/Family Comments/goals: To return home    Occupational Profile:  Living Environment: Pt lives with  in 1 story home   Previous level of function: Pt was (I) with self care prior  Roles and Routines: I with daily activities. Pt used a powerchair  Equipment Used at Home: power chair, shower chair, grab bar, cane, straight  Assistance upon Discharge:  who is also in a powerchair    Pain/Comfort:  Pain Rating 1: 0/10  Pain Rating Post-Intervention 1: 0/10    Patients cultural, spiritual, Christianity conflicts given the current situation: no    Objective:     Communicated with: KASIA Das prior to session.  Patient found HOB elevated with PureWick, peripheral IV,  pressure relief boots upon OT entry to room.    General Precautions: Standard, fall, contact  Orthopedic Precautions: N/A  Braces: N/A  Respiratory Status: Room air    Occupational Performance:    Bed Mobility:    Patient completed Supine to Sit with moderate assistance and x 2 persons  Patient completed Sit to Supine with minimum assistance and x 2 persons    Functional Mobility/Transfers:  Patient completed Sit <> Stand Transfer with moderate assistance and of x 2 persons  with  gait belt to stand to RW   Functional Mobility: Unable    Activities of Daily Living:  Upper Body Dressing: minimum assistance donning gown.  Lower Body Dressing: dependence .    Cognitive/Visual Perceptual:  Cognitive/Psychosocial Skills:     -       Oriented to: Person and Situation   -       Follows Commands/attention:Follows one-step commands  -       Communication: impaired due to loss of hearing  Visual/Perceptual:      -wears glasses. Hearing is impaired .    Physical Exam:  Balance:    -       SBA with EOB sitting, Mod a x 2 with standing balance  Skin integrity: Visible skin intact  Edema:  None noted  Sensation:    -       Intact  Motor Planning:    -       wfl  Dominant hand:    -       Right  Upper Extremity Range of Motion:     -       Right Upper Extremity: WFL  -       Left Upper Extremity: WFL  Upper Extremity Strength:    -       Right Upper Extremity: WFL  -       Left Upper Extremity: WFL   Strength:    -       Right Upper Extremity: WFL  -       Left Upper Extremity: WFL    AMPAC 6 Click ADL:  AMPAC Total Score: 14    Treatment & Education:  OT evaluation completed. See eval for details  Pt educated on OT role/POC.   Importance of OOB activity with staff assistance.  Importance of sitting up in the chair throughout the day as tolerated, especially for meals   Safety during functional t/f and mobility with use of AD as needed  Importance of assisting with self-care activities   All questions/concerns answered within  OT scope of practice     Patient left HOB elevated with all lines intact, call button in reach, bed alarm on, and  present    GOALS:   Multidisciplinary Problems       Occupational Therapy Goals          Problem: Occupational Therapy    Goal Priority Disciplines Outcome Interventions   Occupational Therapy Goal     OT, PT/OT Ongoing, Progressing    Description: STG:  Pt will perform grooming with setup  Pt will bathe with min a   Pt will perform UE dressing with setup  Pt will perform LE dressing with min a with AD as needed  Pt will sit EOB x 10 min with (I)  Pt will transfer bed/chair/bsc with Min a with AD  Pt will tolerate 15 minutes of tx without fatigue      LT.Restore to max I with self care and mobility.                          History:     Past Medical History:   Diagnosis Date    Cancer     Diabetes mellitus, type 2     Fibromyalgia     History of kidney cancer 2018    Hyperlipidemia     Hypertension     Migraine headache     Renal cell carcinoma     Renal disorder          Past Surgical History:   Procedure Laterality Date    NEPHRECTOMY         Time Tracking:     OT Date of Treatment: 23  OT Start Time: 1508  OT Stop Time: 1538  OT Total Time (min): 30 min    Billable Minutes:Evaluation 30    2023

## 2023-02-18 NOTE — PROGRESS NOTES
[facility] - [facility] Tele[facility] Hospital Medicine  Progress Note    Patient Name: Jessica Bay  MRN: [id number]  Patient Class: IP- Inpatient   Admission Date: 2/12/2023  Length of Stay: 6 days  Attending Physician: Michael Zendejas MD  Primary Care Provider: Jag Lopez MD        Subjective:     Principal Problem:Bacterial meningitis        HPI:  No notes on file    Overview/Hospital Course:  February 18, 2023   Patient can not hear, but she feels fine.    I communicated with her through writing.    Her vital signs stable and she is afebrile.    I reviewed her records, CSF culture is negative.    UA is positive for E coli urinary tract infection sensitive to Rocephin.    Had blood culture positive on December 12th for Streptococcus pneumoniae and that was positive sensitive to ceftriaxone at that time.    I will repeat blood cultures   Continue Rocephin  I will discontinue ampicillin.  No evidence of meningitis on culture or CSF workup, blood culture on February 12th grew pneumococcus pneumonia, no clear source of Streptococcus pneumonia.  Same-day blood cultures are negative.  I will repeat blood cultures.  Continue supportive care   Patient will benefit from swing bed placement   We will treat for a total of 2 weeks of IV antibiotics.          Interval History:  Patient without complaints      Objective:     Vital Signs (Most Recent):  Temp: 98 °F (36.7 °C) (02/18/23 1100)  Pulse: 71 (02/18/23 1100)  Resp: 18 (02/18/23 1100)  BP: (!) 152/78 (02/18/23 1100)  SpO2: 98 % (02/18/23 1100)   Vital Signs (24h Range):  Temp:  [97.6 °F (36.4 °C)-99.3 °F (37.4 °C)] 98 °F (36.7 °C)  Pulse:  [62-98] 71  Resp:  [12-20] 18  SpO2:  [95 %-99 %] 98 %  BP: (130-161)/(58-90) 152/78     Weight: 103.8 kg (228 lb 13.4 oz)  Body mass index is 39.28 kg/m².      Intake/Output Summary (Last 24 hours) at 2/18/2023 1215  Last data filed at 2/18/2023 0653  Gross per 24 hour   Intake 1185 ml   Output 1200 ml   Net -15  ml         Physical Exam  Vitals reviewed.   Constitutional:       Appearance: Normal appearance.      Interventions: She is not intubated.  HENT:      Head: Normocephalic and atraumatic.      Nose: Nose normal.      Mouth/Throat:      Mouth: Mucous membranes are dry.      Pharynx: Oropharynx is clear.   Eyes:      Extraocular Movements: Extraocular movements intact.      Conjunctiva/sclera: Conjunctivae normal.      Pupils: Pupils are equal, round, and reactive to light.   Cardiovascular:      Rate and Rhythm: Normal rate.      Heart sounds: Normal heart sounds. No murmur heard.  Pulmonary:      Effort: Pulmonary effort is normal. She is not intubated.      Breath sounds: Normal breath sounds.   Abdominal:      General: Abdomen is flat. Bowel sounds are normal.      Palpations: Abdomen is soft.   Musculoskeletal:         General: Normal range of motion.      Cervical back: Normal range of motion and neck supple.      Right lower leg: No edema.      Left lower leg: No edema.   Skin:     General: Skin is warm and dry.      Capillary Refill: Capillary refill takes less than 2 seconds.   Neurological:      General: No focal deficit present.      Mental Status: She is alert and oriented to person, place, and time.   Psychiatric:         Mood and Affect: Mood normal.         Behavior: Behavior normal.     Review of Systems   Constitutional:  Negative for chills and fever.   HENT:  Positive for hearing loss. Negative for congestion.    Eyes:  Negative for pain and discharge.   Respiratory:  Negative for cough, chest tightness, shortness of breath, wheezing and stridor.    Cardiovascular:  Negative for chest pain, palpitations and leg swelling.   Gastrointestinal:  Negative for abdominal distention, abdominal pain, blood in stool, diarrhea, nausea and vomiting.   Endocrine: Negative for cold intolerance, polydipsia and polyuria.   Genitourinary:  Negative for difficulty urinating, dysuria, frequency, hematuria and urgency.    Musculoskeletal:  Negative for arthralgias, back pain and neck pain.   Neurological:  Negative for dizziness, seizures, syncope, weakness, numbness and headaches.   Hematological:  Negative for adenopathy.   Psychiatric/Behavioral:  Negative for behavioral problems.    All other systems reviewed and are negative.    Vents:  Oxygen Concentration (%): 32 (02/17/23 0746)    Lines/Drains/Airways       Drain  Duration             Female External Urinary Catheter 02/17/23 1515 <1 day              Peripheral Intravenous Line  Duration                  Peripheral IV - Single Lumen 02/14/23 0530 20 G Right Breast 4 days         Peripheral IV - Single Lumen 02/15/23 2342 20 G Left Breast 2 days         Peripheral IV - Single Lumen 02/17/23 0333 20 G Posterior;Right Forearm 1 day                    Significant Labs:    CBC/Anemia Profile:  Recent Labs   Lab 02/17/23  0649   WBC 12.32*   HGB 9.9*   HCT 31.4*      .6*   RDW 15.4*          Chemistries:  Recent Labs   Lab 02/17/23  0648      K 3.5      CO2 24   BUN 78*   CREATININE 2.85*   CALCIUM 7.9*   ALBUMIN 1.8*   PROT 5.9*   BILITOT 0.2   ALKPHOS 60   ALT 56   AST 27         Recent Lab Results  (Last 5 results in the past 24 hours)        02/18/23  1119   02/18/23  0905   02/18/23  0543   02/18/23  0419   02/18/23  0000        POC Glucose 256       242   303       Troponin I High Sensitivity   15.3   14.0                                  Significant Imaging:  I have reviewed all pertinent imaging results/findings within the past 24 hours.      Assessment/Plan:      No notes have been filed under this hospital service.  Service: Hospital Medicine    VTE Risk Mitigation (From admission, onward)         Ordered     enoxaparin injection 30 mg  Daily         02/13/23 0851                Discharge Planning   RAMOS:      Code Status: Full Code   Is the patient medically ready for discharge?:     Reason for patient still in hospital (select all that apply):  Treatment  Discharge Plan A: Home                  Michael Zendejas MD  Department of Hospital Medicine   [facility] - [address] [place] [address]

## 2023-02-19 LAB
ANION GAP SERPL CALCULATED.3IONS-SCNC: 16 MMOL/L (ref 7–16)
BACTERIA SPEC BFLD CULT: NORMAL
BASOPHILS # BLD AUTO: 0.03 K/UL (ref 0–0.2)
BASOPHILS NFR BLD AUTO: 0.3 % (ref 0–1)
BUN SERPL-MCNC: 84 MG/DL (ref 7–18)
BUN/CREAT SERPL: 30 (ref 6–20)
CALCIUM SERPL-MCNC: 8 MG/DL (ref 8.5–10.1)
CHLORIDE SERPL-SCNC: 108 MMOL/L (ref 98–107)
CO2 SERPL-SCNC: 25 MMOL/L (ref 21–32)
CREAT SERPL-MCNC: 2.83 MG/DL (ref 0.55–1.02)
DIFFERENTIAL METHOD BLD: ABNORMAL
EGFR (NO RACE VARIABLE) (RUSH/TITUS): 18 ML/MIN/1.73M²
EOSINOPHIL # BLD AUTO: 0.12 K/UL (ref 0–0.5)
EOSINOPHIL NFR BLD AUTO: 1.2 % (ref 1–4)
ERYTHROCYTE [DISTWIDTH] IN BLOOD BY AUTOMATED COUNT: 14.1 % (ref 11.5–14.5)
GLUCOSE SERPL-MCNC: 140 MG/DL (ref 70–105)
GLUCOSE SERPL-MCNC: 161 MG/DL (ref 70–105)
GLUCOSE SERPL-MCNC: 179 MG/DL (ref 74–106)
GLUCOSE SERPL-MCNC: 192 MG/DL (ref 70–105)
GLUCOSE SERPL-MCNC: 211 MG/DL (ref 70–105)
HCT VFR BLD AUTO: 24.4 % (ref 38–47)
HGB BLD-MCNC: 7.9 G/DL (ref 12–16)
IMM GRANULOCYTES # BLD AUTO: 0.07 K/UL (ref 0–0.04)
IMM GRANULOCYTES NFR BLD: 0.7 % (ref 0–0.4)
LYMPHOCYTES # BLD AUTO: 1.92 K/UL (ref 1–4.8)
LYMPHOCYTES NFR BLD AUTO: 18.9 % (ref 27–41)
MCH RBC QN AUTO: 28.4 PG (ref 27–31)
MCHC RBC AUTO-ENTMCNC: 32.4 G/DL (ref 32–36)
MCV RBC AUTO: 87.8 FL (ref 80–96)
MONOCYTES # BLD AUTO: 0.99 K/UL (ref 0–0.8)
MONOCYTES NFR BLD AUTO: 9.8 % (ref 2–6)
MPC BLD CALC-MCNC: 10.4 FL (ref 9.4–12.4)
NEUTROPHILS # BLD AUTO: 7.02 K/UL (ref 1.8–7.7)
NEUTROPHILS NFR BLD AUTO: 69.1 % (ref 53–65)
NRBC # BLD AUTO: 0 X10E3/UL
NRBC, AUTO (.00): 0 %
PLATELET # BLD AUTO: 197 K/UL (ref 150–400)
POTASSIUM SERPL-SCNC: 3.7 MMOL/L (ref 3.5–5.1)
RBC # BLD AUTO: 2.78 M/UL (ref 4.2–5.4)
SODIUM SERPL-SCNC: 145 MMOL/L (ref 136–145)
VANCOMYCIN TROUGH SERPL-MCNC: 21.4 ΜG/ML (ref 10–20)
WBC # BLD AUTO: 10.15 K/UL (ref 4.5–11)

## 2023-02-19 PROCEDURE — 80202 ASSAY OF VANCOMYCIN: CPT | Performed by: INTERNAL MEDICINE

## 2023-02-19 PROCEDURE — 85025 COMPLETE CBC W/AUTO DIFF WBC: CPT | Performed by: INTERNAL MEDICINE

## 2023-02-19 PROCEDURE — C9113 INJ PANTOPRAZOLE SODIUM, VIA: HCPCS | Performed by: NURSE PRACTITIONER

## 2023-02-19 PROCEDURE — 63600175 PHARM REV CODE 636 W HCPCS: Performed by: NURSE PRACTITIONER

## 2023-02-19 PROCEDURE — 25000003 PHARM REV CODE 250: Performed by: INTERNAL MEDICINE

## 2023-02-19 PROCEDURE — 99232 PR SUBSEQUENT HOSPITAL CARE,LEVL II: ICD-10-PCS | Mod: ,,, | Performed by: INTERNAL MEDICINE

## 2023-02-19 PROCEDURE — 99223 PR INITIAL HOSPITAL CARE,LEVL III: ICD-10-PCS | Mod: ,,, | Performed by: INTERNAL MEDICINE

## 2023-02-19 PROCEDURE — 99232 SBSQ HOSP IP/OBS MODERATE 35: CPT | Mod: ,,, | Performed by: INTERNAL MEDICINE

## 2023-02-19 PROCEDURE — 82962 GLUCOSE BLOOD TEST: CPT

## 2023-02-19 PROCEDURE — 63600175 PHARM REV CODE 636 W HCPCS: Performed by: INTERNAL MEDICINE

## 2023-02-19 PROCEDURE — 25000003 PHARM REV CODE 250: Performed by: HOSPITALIST

## 2023-02-19 PROCEDURE — 99223 1ST HOSP IP/OBS HIGH 75: CPT | Mod: ,,, | Performed by: INTERNAL MEDICINE

## 2023-02-19 PROCEDURE — S5010 5% DEXTROSE AND 0.45% SALINE: HCPCS | Performed by: INTERNAL MEDICINE

## 2023-02-19 PROCEDURE — 25000003 PHARM REV CODE 250: Performed by: NURSE PRACTITIONER

## 2023-02-19 PROCEDURE — 11000001 HC ACUTE MED/SURG PRIVATE ROOM

## 2023-02-19 PROCEDURE — 80048 BASIC METABOLIC PNL TOTAL CA: CPT | Performed by: INTERNAL MEDICINE

## 2023-02-19 RX ORDER — PANTOPRAZOLE SODIUM 40 MG/1
40 TABLET, DELAYED RELEASE ORAL DAILY
Status: DISCONTINUED | OUTPATIENT
Start: 2023-02-20 | End: 2023-02-20 | Stop reason: HOSPADM

## 2023-02-19 RX ORDER — BENZONATATE 100 MG/1
200 CAPSULE ORAL 3 TIMES DAILY
Status: DISCONTINUED | OUTPATIENT
Start: 2023-02-19 | End: 2023-02-20 | Stop reason: HOSPADM

## 2023-02-19 RX ADMIN — PANTOPRAZOLE SODIUM 40 MG: 40 INJECTION, POWDER, FOR SOLUTION INTRAVENOUS at 10:02

## 2023-02-19 RX ADMIN — ALLOPURINOL 100 MG: 100 TABLET ORAL at 09:02

## 2023-02-19 RX ADMIN — AMLODIPINE BESYLATE 5 MG: 5 TABLET ORAL at 10:02

## 2023-02-19 RX ADMIN — DEXTROSE AND SODIUM CHLORIDE: 5; 450 INJECTION, SOLUTION INTRAVENOUS at 06:02

## 2023-02-19 RX ADMIN — TICAGRELOR 90 MG: 90 TABLET ORAL at 10:02

## 2023-02-19 RX ADMIN — TIZANIDINE 4 MG: 4 TABLET ORAL at 09:02

## 2023-02-19 RX ADMIN — INSULIN ASPART 4 UNITS: 100 INJECTION, SOLUTION INTRAVENOUS; SUBCUTANEOUS at 06:02

## 2023-02-19 RX ADMIN — ACETAMINOPHEN 650 MG: 325 TABLET ORAL at 09:02

## 2023-02-19 RX ADMIN — CARVEDILOL 6.25 MG: 6.25 TABLET, FILM COATED ORAL at 10:02

## 2023-02-19 RX ADMIN — BENZONATATE 200 MG: 100 CAPSULE ORAL at 09:02

## 2023-02-19 RX ADMIN — CARVEDILOL 6.25 MG: 6.25 TABLET, FILM COATED ORAL at 09:02

## 2023-02-19 RX ADMIN — BENZONATATE 200 MG: 100 CAPSULE ORAL at 04:02

## 2023-02-19 RX ADMIN — QUETIAPINE FUMARATE 50 MG: 25 TABLET ORAL at 10:02

## 2023-02-19 RX ADMIN — CEFTRIAXONE SODIUM 2 G: 2 INJECTION, POWDER, FOR SOLUTION INTRAMUSCULAR; INTRAVENOUS at 02:02

## 2023-02-19 RX ADMIN — ACETAMINOPHEN 650 MG: 325 TABLET ORAL at 10:02

## 2023-02-19 RX ADMIN — TICAGRELOR 90 MG: 90 TABLET ORAL at 09:02

## 2023-02-19 RX ADMIN — ENOXAPARIN SODIUM 30 MG: 30 INJECTION SUBCUTANEOUS at 04:02

## 2023-02-19 RX ADMIN — INSULIN DETEMIR 25 UNITS: 100 INJECTION, SOLUTION SUBCUTANEOUS at 09:02

## 2023-02-19 RX ADMIN — QUETIAPINE FUMARATE 50 MG: 25 TABLET ORAL at 09:02

## 2023-02-19 NOTE — SUBJECTIVE & OBJECTIVE
Interval History:  Patient without complaints      Objective:     Vital Signs (Most Recent):  Temp: 98 °F (36.7 °C) (02/19/23 0713)  Pulse: 66 (02/19/23 0713)  Resp: 20 (02/19/23 0713)  BP: (!) 155/85 (02/19/23 0713)  SpO2: 100 % (02/19/23 0713)   Vital Signs (24h Range):  Temp:  [98 °F (36.7 °C)-98.3 °F (36.8 °C)] 98 °F (36.7 °C)  Pulse:  [66-86] 66  Resp:  [18-20] 20  SpO2:  [98 %-100 %] 100 %  BP: (149-173)/(73-88) 155/85     Weight: 103.8 kg (228 lb 13.4 oz)  Body mass index is 39.28 kg/m².      Intake/Output Summary (Last 24 hours) at 2/19/2023 1105  Last data filed at 2/19/2023 0644  Gross per 24 hour   Intake 240 ml   Output 2500 ml   Net -2260 ml         Physical Exam  Vitals reviewed.   Constitutional:       Appearance: Normal appearance.      Interventions: She is not intubated.  HENT:      Head: Normocephalic and atraumatic.      Nose: Nose normal.      Mouth/Throat:      Mouth: Mucous membranes are dry.      Pharynx: Oropharynx is clear.   Eyes:      Extraocular Movements: Extraocular movements intact.      Conjunctiva/sclera: Conjunctivae normal.      Pupils: Pupils are equal, round, and reactive to light.   Cardiovascular:      Rate and Rhythm: Normal rate.      Heart sounds: Normal heart sounds. No murmur heard.  Pulmonary:      Effort: Pulmonary effort is normal. She is not intubated.      Breath sounds: Normal breath sounds.   Abdominal:      General: Abdomen is flat. Bowel sounds are normal.      Palpations: Abdomen is soft.   Musculoskeletal:         General: Normal range of motion.      Cervical back: Normal range of motion and neck supple.      Right lower leg: No edema.      Left lower leg: No edema.   Skin:     General: Skin is warm and dry.      Capillary Refill: Capillary refill takes less than 2 seconds.   Neurological:      General: No focal deficit present.      Mental Status: She is alert and oriented to person, place, and time.   Psychiatric:         Mood and Affect: Mood normal.          Behavior: Behavior normal.     Review of Systems   Constitutional:  Negative for chills and fever.   HENT:  Positive for hearing loss. Negative for congestion.    Eyes:  Negative for pain and discharge.   Respiratory:  Negative for cough, chest tightness, shortness of breath, wheezing and stridor.    Cardiovascular:  Negative for chest pain, palpitations and leg swelling.   Gastrointestinal:  Negative for abdominal distention, abdominal pain, blood in stool, diarrhea, nausea and vomiting.   Endocrine: Negative for cold intolerance, polydipsia and polyuria.   Genitourinary:  Negative for difficulty urinating, dysuria, frequency, hematuria and urgency.   Musculoskeletal:  Negative for arthralgias, back pain and neck pain.   Neurological:  Negative for dizziness, seizures, syncope, weakness, numbness and headaches.   Hematological:  Negative for adenopathy.   Psychiatric/Behavioral:  Negative for behavioral problems.    All other systems reviewed and are negative.    Vents:  Oxygen Concentration (%): 32 (02/17/23 0746)    Lines/Drains/Airways       Drain  Duration             Female External Urinary Catheter 02/17/23 1515 1 day              Peripheral Intravenous Line  Duration                  Peripheral IV - Single Lumen 02/14/23 0530 20 G Right Breast 5 days         Peripheral IV - Single Lumen 02/15/23 2342 20 G Left Breast 3 days         Peripheral IV - Single Lumen 02/17/23 0333 20 G Posterior;Right Forearm 2 days                    Significant Labs:    CBC/Anemia Profile:  Recent Labs   Lab 02/19/23  0444   WBC 10.15   HGB 7.9*   HCT 24.4*      MCV 87.8   RDW 14.1          Chemistries:  Recent Labs   Lab 02/19/23  0444      K 3.7   *   CO2 25   BUN 84*   CREATININE 2.83*   CALCIUM 8.0*         Recent Lab Results  (Last 5 results in the past 24 hours)        02/19/23  0714   02/19/23  0528   02/19/23  0444   02/18/23  1600   02/18/23  1211        Anion Gap     16           Baso #     0.03            Basophil %     0.3           BUN     84           BUN/CREAT RATIO     30           Calcium     8.0           Chloride     108           CO2     25           Creatinine     2.83           Differential Type     Auto           eGFR     18           Eos #     0.12           Eosinophil %     1.2           Glucose     179           Hematocrit     24.4           Hemoglobin     7.9           Immature Grans (Abs)     0.07           Immature Granulocytes     0.7           Lymph #     1.92           Lymph %     18.9           MCH     28.4           MCHC     32.4           MCV     87.8           Mono #     0.99           Mono %     9.8           MPV     10.4           Neutrophils, Abs     7.02           Neutrophils Relative     69.1           nRBC     0.0           NUCLEATED RBC ABSOLUTE     0.00           Platelets     197           POC Glucose   211     284         Potassium     3.7           RBC     2.78           RDW     14.1           Sodium     145           Troponin I High Sensitivity         16.1       Vancomycin-Trough 21.4               WBC     10.15                                  Significant Imaging:  I have reviewed all pertinent imaging results/findings within the past 24 hours.

## 2023-02-19 NOTE — PLAN OF CARE
Problem: Adult Inpatient Plan of Care  Goal: Plan of Care Review  Outcome: Ongoing, Progressing  Goal: Patient-Specific Goal (Individualized)  Outcome: Ongoing, Progressing  Goal: Absence of Hospital-Acquired Illness or Injury  Outcome: Ongoing, Progressing  Goal: Optimal Comfort and Wellbeing  Outcome: Ongoing, Progressing  Goal: Readiness for Transition of Care  Outcome: Ongoing, Progressing     Problem: Bariatric Environmental Safety  Goal: Safety Maintained with Care  Outcome: Ongoing, Progressing     Problem: Diabetes Comorbidity  Goal: Blood Glucose Level Within Targeted Range  Outcome: Ongoing, Progressing     Problem: Skin Injury Risk Increased  Goal: Skin Health and Integrity  Outcome: Ongoing, Progressing     Problem: Impaired Wound Healing  Goal: Optimal Wound Healing  Outcome: Ongoing, Progressing     Problem: Gas Exchange Impaired  Goal: Optimal Gas Exchange  Outcome: Ongoing, Progressing     Problem: Fall Injury Risk  Goal: Absence of Fall and Fall-Related Injury  Outcome: Ongoing, Progressing     Problem: Restraint, Nonbehavioral (Nonviolent)  Goal: Absence of Harm or Injury  Outcome: Ongoing, Progressing

## 2023-02-19 NOTE — PROGRESS NOTES
[facility] - [facility] Hospital Medicine  Progress Note    Patient Name: Jessica Bay  MRN: [id number]  Patient Class: IP- Inpatient   Admission Date: 2/12/2023  Length of Stay: 7 days  Attending Physician: Michael Zendejas MD  Primary Care Provider: Jag Lopez MD        Subjective:     Principal Problem:Bacterial meningitis        HPI:  No notes on file    Overview/Hospital Course:  February 18, 2023   Patient can not hear, but she feels fine.    I communicated with her through writing.    Her vital signs stable and she is afebrile.    I reviewed her records, CSF culture is negative.    UA is positive for E coli urinary tract infection sensitive to Rocephin.    Had blood culture positive on December 12th for Streptococcus pneumoniae and that was positive sensitive to ceftriaxone at that time.    I will repeat blood cultures   Continue Rocephin  I will discontinue ampicillin.  No evidence of meningitis on culture or CSF workup, blood culture on February 12th grew pneumococcus pneumonia, no clear source of Streptococcus pneumonia.  Same-day blood cultures are negative.  I will repeat blood cultures.  Continue supportive care   Patient will benefit from swing bed placement   We will treat for a total of 2 weeks of IV antibiotics.      February 19, 2023   Patient is complaining of cough and requesting cough medicine, also requesting to be seen by nephrologist (has history of renal cell carcinoma status post nephrectomy, she has single kidney now).    Vital signs stable   Hemoglobin down to 7.9, likely anemia of chronic renal failure.    Urine culture grew E coli.    Blood cultures February 12th grew Streptococcus pneumonia, repeat cultures negative.    Will continue Rocephin therapy bacteremia does.    Initial CSF cell count suggestive of meningitis, cultures and g stain negative DD   Continue supportive care   Awaiting swing bed placement  Will treat for total of 2 weeks of IV  antibiotics.            Interval History:  Patient without complaints      Objective:     Vital Signs (Most Recent):  Temp: 98 °F (36.7 °C) (02/19/23 0713)  Pulse: 66 (02/19/23 0713)  Resp: 20 (02/19/23 0713)  BP: (!) 155/85 (02/19/23 0713)  SpO2: 100 % (02/19/23 0713)   Vital Signs (24h Range):  Temp:  [98 °F (36.7 °C)-98.3 °F (36.8 °C)] 98 °F (36.7 °C)  Pulse:  [66-86] 66  Resp:  [18-20] 20  SpO2:  [98 %-100 %] 100 %  BP: (149-173)/(73-88) 155/85     Weight: 103.8 kg (228 lb 13.4 oz)  Body mass index is 39.28 kg/m².      Intake/Output Summary (Last 24 hours) at 2/19/2023 1105  Last data filed at 2/19/2023 0644  Gross per 24 hour   Intake 240 ml   Output 2500 ml   Net -2260 ml         Physical Exam  Vitals reviewed.   Constitutional:       Appearance: Normal appearance.      Interventions: She is not intubated.  HENT:      Head: Normocephalic and atraumatic.      Nose: Nose normal.      Mouth/Throat:      Mouth: Mucous membranes are dry.      Pharynx: Oropharynx is clear.   Eyes:      Extraocular Movements: Extraocular movements intact.      Conjunctiva/sclera: Conjunctivae normal.      Pupils: Pupils are equal, round, and reactive to light.   Cardiovascular:      Rate and Rhythm: Normal rate.      Heart sounds: Normal heart sounds. No murmur heard.  Pulmonary:      Effort: Pulmonary effort is normal. She is not intubated.      Breath sounds: Normal breath sounds.   Abdominal:      General: Abdomen is flat. Bowel sounds are normal.      Palpations: Abdomen is soft.   Musculoskeletal:         General: Normal range of motion.      Cervical back: Normal range of motion and neck supple.      Right lower leg: No edema.      Left lower leg: No edema.   Skin:     General: Skin is warm and dry.      Capillary Refill: Capillary refill takes less than 2 seconds.   Neurological:      General: No focal deficit present.      Mental Status: She is alert and oriented to person, place, and time.   Psychiatric:         Mood and  Affect: Mood normal.         Behavior: Behavior normal.     Review of Systems   Constitutional:  Negative for chills and fever.   HENT:  Positive for hearing loss. Negative for congestion.    Eyes:  Negative for pain and discharge.   Respiratory:  Negative for cough, chest tightness, shortness of breath, wheezing and stridor.    Cardiovascular:  Negative for chest pain, palpitations and leg swelling.   Gastrointestinal:  Negative for abdominal distention, abdominal pain, blood in stool, diarrhea, nausea and vomiting.   Endocrine: Negative for cold intolerance, polydipsia and polyuria.   Genitourinary:  Negative for difficulty urinating, dysuria, frequency, hematuria and urgency.   Musculoskeletal:  Negative for arthralgias, back pain and neck pain.   Neurological:  Negative for dizziness, seizures, syncope, weakness, numbness and headaches.   Hematological:  Negative for adenopathy.   Psychiatric/Behavioral:  Negative for behavioral problems.    All other systems reviewed and are negative.    Vents:  Oxygen Concentration (%): 32 (02/17/23 0746)    Lines/Drains/Airways       Drain  Duration             Female External Urinary Catheter 02/17/23 1515 1 day              Peripheral Intravenous Line  Duration                  Peripheral IV - Single Lumen 02/14/23 0530 20 G Right Breast 5 days         Peripheral IV - Single Lumen 02/15/23 2342 20 G Left Breast 3 days         Peripheral IV - Single Lumen 02/17/23 0333 20 G Posterior;Right Forearm 2 days                    Significant Labs:    CBC/Anemia Profile:  Recent Labs   Lab 02/19/23  0444   WBC 10.15   HGB 7.9*   HCT 24.4*      MCV 87.8   RDW 14.1          Chemistries:  Recent Labs   Lab 02/19/23  0444      K 3.7   *   CO2 25   BUN 84*   CREATININE 2.83*   CALCIUM 8.0*         Recent Lab Results  (Last 5 results in the past 24 hours)        02/19/23  0714   02/19/23  0528   02/19/23  0444   02/18/23  1600   02/18/23  1211        Anion Gap     16            Baso #     0.03           Basophil %     0.3           BUN     84           BUN/CREAT RATIO     30           Calcium     8.0           Chloride     108           CO2     25           Creatinine     2.83           Differential Type     Auto           eGFR     18           Eos #     0.12           Eosinophil %     1.2           Glucose     179           Hematocrit     24.4           Hemoglobin     7.9           Immature Grans (Abs)     0.07           Immature Granulocytes     0.7           Lymph #     1.92           Lymph %     18.9           MCH     28.4           MCHC     32.4           MCV     87.8           Mono #     0.99           Mono %     9.8           MPV     10.4           Neutrophils, Abs     7.02           Neutrophils Relative     69.1           nRBC     0.0           NUCLEATED RBC ABSOLUTE     0.00           Platelets     197           POC Glucose   211     284         Potassium     3.7           RBC     2.78           RDW     14.1           Sodium     145           Troponin I High Sensitivity         16.1       Vancomycin-Trough 21.4               WBC     10.15                                  Significant Imaging:  I have reviewed all pertinent imaging results/findings within the past 24 hours.      Assessment/Plan:      No notes have been filed under this hospital service.  Service: Hospital Medicine    VTE Risk Mitigation (From admission, onward)         Ordered     enoxaparin injection 30 mg  Daily         02/13/23 0851                Discharge Planning   RAMOS:      Code Status: Full Code   Is the patient medically ready for discharge?:     Reason for patient still in hospital (select all that apply): Treatment  Discharge Plan A: Home                  Michael Zendejas MD  Department of Hospital Medicine   [facility] - [facility]

## 2023-02-19 NOTE — CONSULTS
" [facility] Consult History and Physical   Patient Name: Jessica Bay  MRN: [id number]  Age: 67 y.o.  : 1955  Time:  11:21 AM  Admission Date: 2023    Consulted for:  [place]  Consulted by: Dr. Zendejas     HPI:   Jessica Bay is a 66 yo female with medical history significant for chronic kidney disease, renal cell carcinoma (s/p right nephrectomy), HTN, DM who presents to the hospital on  with concern for fevers, headaches, and altered mental status. She has history of meningitis in the past. Patient had an LP performed in ED with elevated opening pressure of 40. She received Rocephin and Vancomycin on admission. She was admitted to [facility] for treatment of meningitis. She remains on Rocephin.     Regarding her CKD, she is followed in [place] by Dr. Raymond Wallace at [facility]. She is baseline CKD IV with sCr ~2.5. Nephrology consulted for CKD IV.     Past Medical History:  has a past medical history of Cancer, Diabetes mellitus, type 2, Fibromyalgia, History of kidney cancer (2018), Hyperlipidemia, Hypertension, Migraine headache, Renal cell carcinoma, and Renal disorder.     Past Surgical History:   has a past surgical history that includes Nephrectomy.     Family History:  family history is not on file. No family history of kidney disease.     Social History:   reports that she does not currently use alcohol. She reports that she does not use drugs.     Allergies: is allergic to bactrim [sulfamethoxazole-trimethoprim], benadryl [diphenhydramine hcl], and daypro [oxaprozin].     Medications prior to admission: Reviewed     Old records have been reviewed.       Review of Systems  ROS: A 10 point ROS was limited by patient factors       Physical Exam:   BP (!) 148/70 (BP Location: Left arm, Patient Position: Lying)   Pulse 87   Temp 98.2 °F (36.8 °C) (Oral)   Resp 20   Ht 5' 4" (1.626 m)   Wt 103.8 kg (228 lb 13.4 oz)   SpO2 95%   " Breastfeeding No   BMI 39.28 kg/m²     Constitutional: lying in bed, in NAD  Eyes: EOMI, white sclera  ENMT: moist mucus membranes, nares patent  Cardiovascular: normal rate, S1/S2 noted, no edema  Respiratory: symmetrical chest expansion, CTA-B  Gastrointestinal: +BS, soft, NT/ND  Musculoskeletal: normal, no joint erythema/effusions  Skin: no rash, no purpura, warm extremities  Neurological: Alert and awake    Data Review:  Lab:   Labs reviewed and significant values discussed below.    Recent Labs     02/17/23  0648 02/19/23  0444   CALCIUM 7.9* 8.0*    145   K 3.5 3.7    108*   CO2 24 25   BUN 78* 84*   CREATININE 2.85* 2.83*   * 179*       Imaging:  Reviewed     Assessment/Plan:     Patient Active Problem List   Diagnosis    Hypertension    Body mass index (BMI) 40.0-44.9, adult    Chronic fatigue syndrome    Dyslipidemia    Fibromyalgia    Mitral valve disorder    Acquired absence of kidney    Neuropathy of both feet    Restless legs syndrome    Stage 4 chronic kidney disease    Type 2 diabetes mellitus with diabetic chronic kidney disease    CAD (coronary artery disease)    SIRS (systemic inflammatory response syndrome)    Encephalopathy, metabolic    Bacterial meningitis    UTI (urinary tract infection)         Jessica Bay is a  66 yo female with medical history significant for chronic kidney disease, renal cell carcinoma (s/p right nephrectomy), HTN, DM who presents to the hospital on 2/12 with concern for fevers, headaches, and altered mental status concerning for meningitis.     - Renal function stable. Near baseline. No urgent need for RRT.   - Will continue to monitor.   - Please avoid nephrotoxic agents/NSAIDs  - Renally dose all medications   - Please obtain daily BMP, Mg, and Phos levels  - Please monitor strict UOP  - Daily weights    Thank you for the consult. Will follow along. Please call with questions.    Alisha S. Parker, DO [facility]   02/19/2023

## 2023-02-20 ENCOUNTER — HOSPITAL ENCOUNTER (INPATIENT)
Facility: HOSPITAL | Age: 68
LOS: 11 days | Discharge: NURSING, REHAB OR LONG-TERM CARE | DRG: 095 | End: 2023-03-03
Attending: INTERNAL MEDICINE | Admitting: INTERNAL MEDICINE
Payer: MEDICARE

## 2023-02-20 VITALS
RESPIRATION RATE: 18 BRPM | WEIGHT: 228.81 LBS | TEMPERATURE: 97 F | OXYGEN SATURATION: 97 % | HEIGHT: 64 IN | BODY MASS INDEX: 39.06 KG/M2 | SYSTOLIC BLOOD PRESSURE: 125 MMHG | DIASTOLIC BLOOD PRESSURE: 59 MMHG | HEART RATE: 72 BPM

## 2023-02-20 DIAGNOSIS — G00.9 BACTERIAL MENINGITIS: ICD-10-CM

## 2023-02-20 DIAGNOSIS — N18.4 CKD (CHRONIC KIDNEY DISEASE), STAGE IV: Primary | ICD-10-CM

## 2023-02-20 DIAGNOSIS — G03.9 MENINGITIS: ICD-10-CM

## 2023-02-20 PROBLEM — R65.10 SIRS (SYSTEMIC INFLAMMATORY RESPONSE SYNDROME): Status: RESOLVED | Noted: 2023-02-12 | Resolved: 2023-02-20

## 2023-02-20 LAB
ANION GAP SERPL CALCULATED.3IONS-SCNC: 16 MMOL/L (ref 7–16)
BASOPHILS # BLD AUTO: 0.05 K/UL (ref 0–0.2)
BASOPHILS NFR BLD AUTO: 0.3 % (ref 0–1)
BUN SERPL-MCNC: 82 MG/DL (ref 7–18)
BUN/CREAT SERPL: 30 (ref 6–20)
CALCIUM SERPL-MCNC: 7.2 MG/DL (ref 8.5–10.1)
CHLORIDE SERPL-SCNC: 108 MMOL/L (ref 98–107)
CO2 SERPL-SCNC: 24 MMOL/L (ref 21–32)
CREAT SERPL-MCNC: 2.75 MG/DL (ref 0.55–1.02)
DIFFERENTIAL METHOD BLD: ABNORMAL
EGFR (NO RACE VARIABLE) (RUSH/TITUS): 18 ML/MIN/1.73M²
EOSINOPHIL # BLD AUTO: 0.04 K/UL (ref 0–0.5)
EOSINOPHIL NFR BLD AUTO: 0.2 % (ref 1–4)
ERYTHROCYTE [DISTWIDTH] IN BLOOD BY AUTOMATED COUNT: 15.2 % (ref 11.5–14.5)
GLUCOSE SERPL-MCNC: 136 MG/DL (ref 70–105)
GLUCOSE SERPL-MCNC: 149 MG/DL (ref 74–106)
GLUCOSE SERPL-MCNC: 220 MG/DL (ref 70–105)
GLUCOSE SERPL-MCNC: 292 MG/DL (ref 70–105)
GLUCOSE SERPL-MCNC: 332 MG/DL (ref 70–105)
HCT VFR BLD AUTO: 30 % (ref 38–47)
HGB BLD-MCNC: 9.7 G/DL (ref 12–16)
IMM GRANULOCYTES # BLD AUTO: 1.17 K/UL (ref 0–0.04)
IMM GRANULOCYTES NFR BLD: 6.8 % (ref 0–0.4)
LYMPHOCYTES # BLD AUTO: 2.44 K/UL (ref 1–4.8)
LYMPHOCYTES NFR BLD AUTO: 14.2 % (ref 27–41)
LYMPHOCYTES NFR BLD MANUAL: 13 % (ref 27–41)
MCH RBC QN AUTO: 31.8 PG (ref 27–31)
MCHC RBC AUTO-ENTMCNC: 32.3 G/DL (ref 32–36)
MCV RBC AUTO: 98.4 FL (ref 80–96)
MONOCYTES # BLD AUTO: 0.65 K/UL (ref 0–0.8)
MONOCYTES NFR BLD AUTO: 3.8 % (ref 2–6)
MPC BLD CALC-MCNC: 11 FL (ref 9.4–12.4)
NEUTROPHILS # BLD AUTO: 12.84 K/UL (ref 1.8–7.7)
NEUTROPHILS NFR BLD AUTO: 74.7 % (ref 53–65)
NEUTS BAND NFR BLD MANUAL: 4 % (ref 1–5)
NEUTS SEG NFR BLD MANUAL: 83 % (ref 50–62)
NRBC # BLD AUTO: 0 X10E3/UL
NRBC, AUTO (.00): 0 %
PLATELET # BLD AUTO: 298 K/UL (ref 150–400)
PLATELET MORPHOLOGY: NORMAL
POTASSIUM SERPL-SCNC: 3.3 MMOL/L (ref 3.5–5.1)
RBC # BLD AUTO: 3.05 M/UL (ref 4.2–5.4)
RBC MORPH BLD: NORMAL
SODIUM SERPL-SCNC: 145 MMOL/L (ref 136–145)
WBC # BLD AUTO: 17.19 K/UL (ref 4.5–11)

## 2023-02-20 PROCEDURE — 11000001 HC ACUTE MED/SURG PRIVATE ROOM

## 2023-02-20 PROCEDURE — 25000003 PHARM REV CODE 250: Performed by: INTERNAL MEDICINE

## 2023-02-20 PROCEDURE — 25000003 PHARM REV CODE 250: Performed by: HOSPITALIST

## 2023-02-20 PROCEDURE — 82962 GLUCOSE BLOOD TEST: CPT

## 2023-02-20 PROCEDURE — 85025 COMPLETE CBC W/AUTO DIFF WBC: CPT | Performed by: INTERNAL MEDICINE

## 2023-02-20 PROCEDURE — 63600175 PHARM REV CODE 636 W HCPCS: Performed by: INTERNAL MEDICINE

## 2023-02-20 PROCEDURE — 99239 HOSP IP/OBS DSCHRG MGMT >30: CPT | Mod: ,,, | Performed by: INTERNAL MEDICINE

## 2023-02-20 PROCEDURE — 99239 PR HOSPITAL DISCHARGE DAY,>30 MIN: ICD-10-PCS | Mod: ,,, | Performed by: INTERNAL MEDICINE

## 2023-02-20 PROCEDURE — S5010 5% DEXTROSE AND 0.45% SALINE: HCPCS | Performed by: INTERNAL MEDICINE

## 2023-02-20 PROCEDURE — 94761 N-INVAS EAR/PLS OXIMETRY MLT: CPT

## 2023-02-20 PROCEDURE — 25000003 PHARM REV CODE 250: Performed by: NURSE PRACTITIONER

## 2023-02-20 PROCEDURE — 51702 INSERT TEMP BLADDER CATH: CPT

## 2023-02-20 PROCEDURE — 80048 BASIC METABOLIC PNL TOTAL CA: CPT | Performed by: INTERNAL MEDICINE

## 2023-02-20 RX ORDER — TIZANIDINE 4 MG/1
4 TABLET ORAL NIGHTLY
Status: CANCELLED | OUTPATIENT
Start: 2023-02-20

## 2023-02-20 RX ORDER — ENOXAPARIN SODIUM 100 MG/ML
30 INJECTION SUBCUTANEOUS EVERY 24 HOURS
Status: CANCELLED | OUTPATIENT
Start: 2023-02-20

## 2023-02-20 RX ORDER — ROSUVASTATIN CALCIUM 10 MG/1
10 TABLET, COATED ORAL NIGHTLY
Status: ON HOLD
Start: 2023-02-20 | End: 2023-02-28 | Stop reason: CLARIF

## 2023-02-20 RX ORDER — PANTOPRAZOLE SODIUM 40 MG/1
40 TABLET, DELAYED RELEASE ORAL DAILY
Status: CANCELLED | OUTPATIENT
Start: 2023-02-21

## 2023-02-20 RX ORDER — DULOXETIN HYDROCHLORIDE 30 MG/1
30 CAPSULE, DELAYED RELEASE ORAL DAILY
Qty: 90 CAPSULE | Refills: 0 | Status: ON HOLD
Start: 2023-02-20 | End: 2023-02-28 | Stop reason: CLARIF

## 2023-02-20 RX ORDER — AMLODIPINE BESYLATE 5 MG/1
5 TABLET ORAL DAILY
Status: DISCONTINUED | OUTPATIENT
Start: 2023-02-21 | End: 2023-03-03 | Stop reason: HOSPADM

## 2023-02-20 RX ORDER — ALLOPURINOL 100 MG/1
100 TABLET ORAL NIGHTLY
Status: DISCONTINUED | OUTPATIENT
Start: 2023-02-20 | End: 2023-03-03 | Stop reason: HOSPADM

## 2023-02-20 RX ORDER — TIZANIDINE 4 MG/1
4 TABLET ORAL NIGHTLY
Status: DISCONTINUED | OUTPATIENT
Start: 2023-02-20 | End: 2023-03-03 | Stop reason: HOSPADM

## 2023-02-20 RX ORDER — CARVEDILOL 6.25 MG/1
6.25 TABLET ORAL 2 TIMES DAILY
Status: CANCELLED | OUTPATIENT
Start: 2023-02-20

## 2023-02-20 RX ORDER — PANTOPRAZOLE SODIUM 40 MG/1
40 TABLET, DELAYED RELEASE ORAL DAILY
Status: DISCONTINUED | OUTPATIENT
Start: 2023-02-21 | End: 2023-03-03 | Stop reason: HOSPADM

## 2023-02-20 RX ORDER — BENZONATATE 200 MG/1
200 CAPSULE ORAL 3 TIMES DAILY
Qty: 30 CAPSULE | Refills: 0 | Status: ON HOLD
Start: 2023-02-20 | End: 2023-02-28 | Stop reason: CLARIF

## 2023-02-20 RX ORDER — QUETIAPINE FUMARATE 25 MG/1
50 TABLET, FILM COATED ORAL 2 TIMES DAILY
Status: CANCELLED | OUTPATIENT
Start: 2023-02-20

## 2023-02-20 RX ORDER — CARVEDILOL 6.25 MG/1
6.25 TABLET ORAL 2 TIMES DAILY
Qty: 60 TABLET | Refills: 11 | Status: ON HOLD
Start: 2023-02-20 | End: 2023-02-28 | Stop reason: CLARIF

## 2023-02-20 RX ORDER — INSULIN DEGLUDEC 100 U/ML
50 INJECTION, SOLUTION SUBCUTANEOUS DAILY
Status: ON HOLD
Start: 2023-02-20 | End: 2023-02-28 | Stop reason: CLARIF

## 2023-02-20 RX ORDER — INSULIN ASPART 100 [IU]/ML
1-14 INJECTION, SOLUTION INTRAVENOUS; SUBCUTANEOUS EVERY 6 HOURS PRN
Status: DISCONTINUED | OUTPATIENT
Start: 2023-02-20 | End: 2023-02-28

## 2023-02-20 RX ORDER — ENOXAPARIN SODIUM 100 MG/ML
30 INJECTION SUBCUTANEOUS EVERY 24 HOURS
Status: DISCONTINUED | OUTPATIENT
Start: 2023-02-20 | End: 2023-03-03 | Stop reason: HOSPADM

## 2023-02-20 RX ORDER — ALLOPURINOL 100 MG/1
100 TABLET ORAL NIGHTLY
Status: CANCELLED | OUTPATIENT
Start: 2023-02-20

## 2023-02-20 RX ORDER — ACETAMINOPHEN 325 MG/1
650 TABLET ORAL EVERY 6 HOURS PRN
Status: DISCONTINUED | OUTPATIENT
Start: 2023-02-20 | End: 2023-02-24

## 2023-02-20 RX ORDER — ALLOPURINOL 100 MG/1
100 TABLET ORAL NIGHTLY
Status: DISCONTINUED | OUTPATIENT
Start: 2023-02-20 | End: 2023-02-20

## 2023-02-20 RX ORDER — CARVEDILOL 6.25 MG/1
6.25 TABLET ORAL 2 TIMES DAILY
Status: DISCONTINUED | OUTPATIENT
Start: 2023-02-20 | End: 2023-03-03 | Stop reason: HOSPADM

## 2023-02-20 RX ORDER — GUAIFENESIN AND DEXTROMETHORPHAN HYDROBROMIDE 1200; 60 MG/1; MG/1
1 TABLET, EXTENDED RELEASE ORAL 2 TIMES DAILY
Qty: 20 TABLET | Refills: 0 | Status: ON HOLD
Start: 2023-02-20 | End: 2023-02-28 | Stop reason: CLARIF

## 2023-02-20 RX ORDER — AMLODIPINE BESYLATE 5 MG/1
5 TABLET ORAL DAILY
Status: CANCELLED | OUTPATIENT
Start: 2023-02-21

## 2023-02-20 RX ORDER — INSULIN ASPART 100 [IU]/ML
1-14 INJECTION, SOLUTION INTRAVENOUS; SUBCUTANEOUS EVERY 6 HOURS PRN
Status: CANCELLED | OUTPATIENT
Start: 2023-02-20

## 2023-02-20 RX ORDER — NITROGLYCERIN 0.4 MG/1
0.4 TABLET SUBLINGUAL EVERY 5 MIN PRN
Status: CANCELLED | OUTPATIENT
Start: 2023-02-20

## 2023-02-20 RX ORDER — NITROGLYCERIN 0.4 MG/1
0.4 TABLET SUBLINGUAL EVERY 5 MIN PRN
Status: DISCONTINUED | OUTPATIENT
Start: 2023-02-20 | End: 2023-03-03 | Stop reason: HOSPADM

## 2023-02-20 RX ORDER — LABETALOL HYDROCHLORIDE 5 MG/ML
20 INJECTION, SOLUTION INTRAVENOUS
Status: CANCELLED | OUTPATIENT
Start: 2023-02-20

## 2023-02-20 RX ORDER — BENZONATATE 100 MG/1
200 CAPSULE ORAL 3 TIMES DAILY
Status: CANCELLED | OUTPATIENT
Start: 2023-02-20

## 2023-02-20 RX ORDER — SODIUM BICARBONATE 650 MG/1
650 TABLET ORAL 3 TIMES DAILY
Status: ON HOLD
Start: 2023-02-20 | End: 2023-02-28 | Stop reason: CLARIF

## 2023-02-20 RX ORDER — GLUCAGON 1 MG
1 KIT INJECTION
Status: CANCELLED | OUTPATIENT
Start: 2023-02-20

## 2023-02-20 RX ORDER — PANTOPRAZOLE SODIUM 40 MG/1
40 TABLET, DELAYED RELEASE ORAL DAILY
Qty: 30 TABLET | Refills: 11 | Status: ON HOLD
Start: 2023-02-21 | End: 2023-02-28 | Stop reason: CLARIF

## 2023-02-20 RX ORDER — LIRAGLUTIDE 6 MG/ML
0.6 INJECTION SUBCUTANEOUS DAILY
Status: ON HOLD
Start: 2023-02-20 | End: 2023-02-28 | Stop reason: CLARIF

## 2023-02-20 RX ORDER — DEXTROSE MONOHYDRATE AND SODIUM CHLORIDE 5; .45 G/100ML; G/100ML
INJECTION, SOLUTION INTRAVENOUS CONTINUOUS
Status: CANCELLED | OUTPATIENT
Start: 2023-02-20

## 2023-02-20 RX ORDER — ASPIRIN 81 MG/1
81 TABLET ORAL DAILY
Refills: 0 | Status: ON HOLD
Start: 2023-02-20 | End: 2023-02-28 | Stop reason: CLARIF

## 2023-02-20 RX ORDER — AMOXICILLIN 250 MG
2 CAPSULE ORAL DAILY
Status: ON HOLD
Start: 2023-02-20 | End: 2023-02-28 | Stop reason: CLARIF

## 2023-02-20 RX ORDER — ACETAMINOPHEN 325 MG/1
650 TABLET ORAL EVERY 6 HOURS PRN
Status: CANCELLED | OUTPATIENT
Start: 2023-02-20

## 2023-02-20 RX ORDER — LABETALOL HYDROCHLORIDE 5 MG/ML
20 INJECTION, SOLUTION INTRAVENOUS
Status: DISCONTINUED | OUTPATIENT
Start: 2023-02-20 | End: 2023-03-03 | Stop reason: HOSPADM

## 2023-02-20 RX ORDER — AMLODIPINE BESYLATE 5 MG/1
5 TABLET ORAL DAILY
Qty: 30 TABLET | Refills: 11 | Status: ON HOLD
Start: 2023-02-21 | End: 2023-02-28 | Stop reason: CLARIF

## 2023-02-20 RX ORDER — MUPIROCIN 20 MG/G
OINTMENT TOPICAL 2 TIMES DAILY
Status: DISPENSED | OUTPATIENT
Start: 2023-02-20 | End: 2023-02-25

## 2023-02-20 RX ORDER — QUETIAPINE FUMARATE 50 MG/1
50 TABLET, FILM COATED ORAL 2 TIMES DAILY
Qty: 60 TABLET | Refills: 11 | Status: ON HOLD
Start: 2023-02-20 | End: 2023-02-28 | Stop reason: CLARIF

## 2023-02-20 RX ORDER — BENZONATATE 100 MG/1
200 CAPSULE ORAL 3 TIMES DAILY
Status: DISCONTINUED | OUTPATIENT
Start: 2023-02-20 | End: 2023-03-03 | Stop reason: HOSPADM

## 2023-02-20 RX ORDER — QUETIAPINE FUMARATE 25 MG/1
50 TABLET, FILM COATED ORAL 2 TIMES DAILY
Status: DISCONTINUED | OUTPATIENT
Start: 2023-02-20 | End: 2023-03-03 | Stop reason: HOSPADM

## 2023-02-20 RX ORDER — GLUCAGON 1 MG
1 KIT INJECTION
Status: DISCONTINUED | OUTPATIENT
Start: 2023-02-20 | End: 2023-03-03 | Stop reason: HOSPADM

## 2023-02-20 RX ORDER — INSULIN ASPART 100 [IU]/ML
1-14 INJECTION, SOLUTION INTRAVENOUS; SUBCUTANEOUS EVERY 6 HOURS PRN
Status: ON HOLD
Start: 2023-02-20 | End: 2023-02-28 | Stop reason: CLARIF

## 2023-02-20 RX ADMIN — MUPIROCIN: 20 OINTMENT TOPICAL at 09:02

## 2023-02-20 RX ADMIN — CEFTRIAXONE SODIUM 2 G: 2 INJECTION, POWDER, FOR SOLUTION INTRAMUSCULAR; INTRAVENOUS at 04:02

## 2023-02-20 RX ADMIN — ACETAMINOPHEN 650 MG: 325 TABLET ORAL at 09:02

## 2023-02-20 RX ADMIN — BENZONATATE 200 MG: 100 CAPSULE ORAL at 09:02

## 2023-02-20 RX ADMIN — ALLOPURINOL 100 MG: 100 TABLET ORAL at 09:02

## 2023-02-20 RX ADMIN — PANTOPRAZOLE SODIUM 40 MG: 40 TABLET, DELAYED RELEASE ORAL at 09:02

## 2023-02-20 RX ADMIN — AMLODIPINE BESYLATE 5 MG: 5 TABLET ORAL at 09:02

## 2023-02-20 RX ADMIN — CARVEDILOL 6.25 MG: 6.25 TABLET, FILM COATED ORAL at 09:02

## 2023-02-20 RX ADMIN — ACETAMINOPHEN 650 MG: 325 TABLET ORAL at 05:02

## 2023-02-20 RX ADMIN — TICAGRELOR 90 MG: 90 TABLET ORAL at 09:02

## 2023-02-20 RX ADMIN — ENOXAPARIN SODIUM 30 MG: 30 INJECTION SUBCUTANEOUS at 05:02

## 2023-02-20 RX ADMIN — QUETIAPINE FUMARATE 50 MG: 25 TABLET ORAL at 09:02

## 2023-02-20 RX ADMIN — DEXTROSE AND SODIUM CHLORIDE: 5; 450 INJECTION, SOLUTION INTRAVENOUS at 04:02

## 2023-02-20 RX ADMIN — TIZANIDINE 4 MG: 4 TABLET ORAL at 09:02

## 2023-02-20 RX ADMIN — BENZONATATE 200 MG: 100 CAPSULE ORAL at 03:02

## 2023-02-20 RX ADMIN — CEFTRIAXONE SODIUM 2 G: 2 INJECTION, POWDER, FOR SOLUTION INTRAMUSCULAR; INTRAVENOUS at 03:02

## 2023-02-20 RX ADMIN — VANCOMYCIN HYDROCHLORIDE 2000 MG: 1 INJECTION, POWDER, LYOPHILIZED, FOR SOLUTION INTRAVENOUS at 05:02

## 2023-02-20 RX ADMIN — INSULIN DETEMIR 25 UNITS: 100 INJECTION, SOLUTION SUBCUTANEOUS at 09:02

## 2023-02-20 NOTE — PROCEDURES
Procedure performed by Wei HAWK under the supervision of Dr. Holly . 5 Slovenian PICC line placed in cephalic vein in left upper extremity. Informed consent obtained including risks and possible complications. Patient pepped with chloro prep and draped in a sterile fashion. 2cc 1% lidocaine infused. Utilizing U/S guidance a 44 cm 5 Slovenian PICC line placed and position verified by fluoroscopy. PICC line flushed with heparinized saline.Statlock and bandage applied. Patient tolerated procedure well with no immediate complication.

## 2023-02-20 NOTE — ASSESSMENT & PLAN NOTE
Body mass index is 39.28 kg/m². Morbid obesity complicates all aspects of disease management from diagnostic modalities to treatment. Weight loss encouraged and health benefits explained to patient.

## 2023-02-20 NOTE — DISCHARGE SUMMARY
[facility] - [facility] Hospital Medicine  Discharge Summary      Patient Name: Jessica Bay  MRN: [id number]  KANDIS: [id number]  Patient Class: IP- Inpatient  Admission Date: 2/12/2023  Hospital Length of Stay: 8 days  Discharge Date and Time:  02/20/2023 1:02 PM  Attending Physician:Michael Zendejas MD   Discharging Provider: SULMA Aragon  Primary Care Provider: Jag Lopez MD    Primary Care Team: Networked reference to record PCT     HPI:   67-year-old female patient with past medical history of diabetes, history of kidney cancer status post nephrectomy, has single kidney, dyslipidemia, hypertension, coronary artery disease status post NSTEMI November 2022 and stent placement to the RCA, history of fibromyalgia, and chronic kidney disease.  Admitted to the hospital 02/12/2023 with altered mental status and fever, patient has history of meningitis in 2015.    Patient admitted to the hospital underwent lumbar puncture which showed evidence of acute meningitis, but patient is CSF cultures remain negative, Gram stain showed no organisms.  Her urine culture grew E coli in her blood culture on February 12th grew Streptococcus pneumoniae sensitive to Rocephin.  Patient continued on Rocephin bacteremia dose.  Patient will need 2-4 weeks of IV antibiotics.    Patient is very hard of hearing, likely related to her previous meningitis.    Patient admitted to specialty Hospital for IV antibiotics and rehab.        * No surgery found *      Hospital Course:   February 18, 2023   Patient can not hear, but she feels fine.    I communicated with her through writing.    Her vital signs stable and she is afebrile.    I reviewed her records, CSF culture is negative.    UA is positive for E coli urinary tract infection sensitive to Rocephin.    Had blood culture positive on December 12th for Streptococcus pneumoniae and that was positive sensitive to ceftriaxone at that time.    I will  repeat blood cultures   Continue Rocephin  I will discontinue ampicillin.  No evidence of meningitis on culture or CSF workup, blood culture on February 12th grew pneumococcus pneumonia, no clear source of Streptococcus pneumonia.  Same-day blood cultures are negative.  I will repeat blood cultures.  Continue supportive care   Patient will benefit from swing bed placement   We will treat for a total of 2 weeks of IV antibiotics.      February 19, 2023   Patient is complaining of cough and requesting cough medicine, also requesting to be seen by nephrologist (has history of renal cell carcinoma status post nephrectomy, she has single kidney now).    Vital signs stable   Hemoglobin down to 7.9, likely anemia of chronic renal failure.    Urine culture grew E coli.    Blood cultures February 12th grew Streptococcus pneumonia, repeat cultures negative.    Will continue Rocephin therapy bacteremia does.    Initial CSF cell count suggestive of meningitis, cultures and g stain negative DD   Continue supportive care   Awaiting swing bed placement  Will treat for total of 2 weeks of IV antibiotics.      02/20--Pt with no new issues or concerns.  Has been accepted to [facility] for IV ABT and continued monitoring, pt is stable for discharge.           Goals of Care Treatment Preferences:  Code Status: Full Code      Consults:   Consults (From admission, onward)        Status Ordering Provider     Inpatient consult to Nephrology  Once        Provider:  Luba Ren DO    Completed JENIFER HASSAN     Inpatient consult to Registered Dietitian/Nutritionist  Once        Provider:  (Not yet assigned)    Completed NATHEN DELAROSA     Pharmacy to renally dose medication  Once        Provider:  (Not yet assigned)    Acknowledged ABAD ESPINOZA     Pharmacy to dose Vancomycin consult  Once        Provider:  (Not yet assigned)    Acknowledged ABAD ESPINOZA          Cardiac/Vascular  CAD (coronary artery disease)    Status post NSTEMI  November 2022   Continue DA PT therapy   Continue beta-blockers   Continue on statins.    Dyslipidemia    -statin    Hypertension  Continue same antihypertensive medications   Hydralazine IV if needed.    02/20--stable continue present management at discharge     Renal/  UTI (urinary tract infection)  Urine culture grew E coli UTI, sensitive to Rocephin.    Stage 4 chronic kidney disease  Creatinine stable around 2.    Nephrologist consulted for evaluation.    02/20-- stable scr 2.75    Acquired absence of kidney  History of renal cell carcinoma status post nephrectomy 15 years ago.      ID  * Bacterial meningitis  Patient sees if cell count upon admission to [facility] was suggestive of bacterial meningitis, culture and Gram stain negative.    Has positive blood cultures with pneumococcus pneumonia sensitive to Rocephin.    Repeat blood cultures negative   We will need to continue antibiotics for 2-4 weeks of IV antibiotics.  Already received 1 week of IV antibiotic at [facility].    02/20-transfer to [facility] for continued IV ABT therapy    Endocrine  Type 2 diabetes mellitus with diabetic chronic kidney disease  Patient's FSGs are controlled on current medication regimen.  Last A1c reviewed-   Lab Results   Component Value Date    HGBA1C 6.9 (H) 11/22/2022     Most recent fingerstick glucose reviewed- No results for input(s): POCTGLUCOSE in the last 24 hours.  Current correctional scale  Medium  Maintain anti-hyperglycemic dose as follows-   Antihyperglycemics (From admission, onward)    None        Hold Oral hypoglycemics while patient is in the hospital.    Orthopedic  Fibromyalgia        Other  Body mass index (BMI) 40.0-44.9, adult  Body mass index is 39.28 kg/m². Morbid obesity complicates all aspects of disease management from diagnostic modalities to treatment. Weight loss encouraged and health benefits explained to patient.           Final Active Diagnoses:    Diagnosis Date Noted POA    PRINCIPAL  PROBLEM:  Bacterial meningitis [G00.9] 02/13/2023 Yes    UTI (urinary tract infection) [N39.0] 02/13/2023 Yes    Hypertension [I10] 10/17/2022 Yes    Fibromyalgia [M79.7] 10/17/2022 Yes    Body mass index (BMI) 40.0-44.9, adult [Z68.41] 10/17/2022 Not Applicable    Acquired absence of kidney [Z90.5] 09/20/2021 Not Applicable    Stage 4 chronic kidney disease [N18.4] 09/20/2021 Yes    Dyslipidemia [E78.5] 09/20/2021 Yes    Type 2 diabetes mellitus with diabetic chronic kidney disease [E11.22] 09/20/2021 Yes    CAD (coronary artery disease) [I25.10] 03/05/2020 Yes      Problems Resolved During this Admission:    Diagnosis Date Noted Date Resolved POA    SIRS (systemic inflammatory response syndrome) [R65.10] 02/12/2023 02/20/2023 Yes       Discharged Condition: stable    Disposition: Home or Self Care    Follow Up:   Follow-up Information     [facility] Follow up.    Contact information:  [address] [place] [id number] [phone]                     Patient Instructions:      Diet diabetic     Notify your health care provider if you experience any of the following:  temperature >100.4     Notify your health care provider if you experience any of the following:  persistent nausea and vomiting or diarrhea     Notify your health care provider if you experience any of the following:  severe uncontrolled pain     Notify your health care provider if you experience any of the following:  redness, tenderness, or signs of infection (pain, swelling, redness, odor or green/yellow discharge around incision site)     Notify your health care provider if you experience any of the following:  difficulty breathing or increased cough     Notify your health care provider if you experience any of the following:  severe persistent headache     Notify your health care provider if you experience any of the following:  worsening rash     Notify your health care provider if you  experience any of the following:  persistent dizziness, light-headedness, or visual disturbances     Notify your health care provider if you experience any of the following:  increased confusion or weakness     Activity as tolerated       Significant Diagnostic Studies: Labs:   BMP:   Recent Labs   Lab 02/19/23 0444 02/20/23  0305   * 149*    145   K 3.7 3.3*   * 108*   CO2 25 24   BUN 84* 82*   CREATININE 2.83* 2.75*   CALCIUM 8.0* 7.2*    and CBC   Recent Labs   Lab 02/19/23 0444 02/20/23  0305   WBC 10.15 17.19*   HGB 7.9* 9.7*   HCT 24.4* 30.0*    298       Pending Diagnostic Studies:     Procedure Component Value Units Date/Time    EKG 12-lead [[id number]] Collected: 02/18/23 0439    Order Status: Sent Lab Status: In process Updated: 02/18/23 0526    Narrative:      Test Reason : R07.9,    Vent. Rate : 069 BPM     Atrial Rate : 069 BPM     P-R Int : 162 ms          QRS Dur : 092 ms      QT Int : 442 ms       P-R-T Axes : 044 023 067 degrees     QTc Int : 473 ms    Normal sinus rhythm  Low voltage QRS  Borderline Abnormal ECG  When compared with ECG of 12-FEB-2023 09:05,  PREVIOUS ECG IS PRESENT    Referred By: BELINDA ROBLERO           Confirmed By:     IR PICC Line Placement w/o Port w/ Img > 6 Y/O [[id number]]     Order Status: Sent Lab Status: No result     US Guided Vascular Access [[id number]]     Order Status: Sent Lab Status: No result          Medications:  Reconciled Home Medications:      Medication List      START taking these medications    amLODIPine 5 MG tablet  Commonly known as: NORVASC  Take 1 tablet (5 mg total) by mouth once daily.  Start taking on: February 21, 2023     benzonatate 200 MG capsule  Commonly known as: TESSALON  Take 1 capsule (200 mg total) by mouth 3 (three) times daily. for 10 days     dextrose 5 % in water (D5W) 5 % PgBk 50 mL with cefTRIAXone 2 gram SolR 2 g  Inject 2 g into the vein every 12 (twelve) hours.     insulin aspart U-100 100 unit/mL  injection  Commonly known as: NovoLOG  Inject 1-14 Units into the skin every 6 (six) hours as needed for High Blood Sugar.     insulin detemir U-100 100 unit/mL injection  Commonly known as: Levemir  Inject 25 Units into the skin every evening.     pantoprazole 40 MG tablet  Commonly known as: PROTONIX  Take 1 tablet (40 mg total) by mouth once daily.  Start taking on: February 21, 2023     QUEtiapine 50 MG tablet  Commonly known as: SEROQUEL  Take 1 tablet (50 mg total) by mouth 2 (two) times daily.        CHANGE how you take these medications    aspirin 81 MG EC tablet  Commonly known as: ECOTRIN  Take 1 tablet (81 mg total) by mouth once daily. Hold pending admission to [facility]  What changed: additional instructions     carvediloL 6.25 MG tablet  Commonly known as: COREG  Take 1 tablet (6.25 mg total) by mouth 2 (two) times daily.  What changed:   · medication strength  · how much to take  · when to take this     dextromethorphan-guaiFENesin 60-1,200 mg per 12 hr tablet  Commonly known as: MUCINEX DM  Take 1 tablet by mouth 2 (two) times a day. Hold pending admission to [facility] for 10 days  What changed:   · medication strength  · how much to take  · when to take this  · additional instructions     DULoxetine 30 MG capsule  Commonly known as: CYMBALTA  Take 1 capsule (30 mg total) by mouth once daily. Hold pending admission to [facility]  What changed: additional instructions     insulin degludec 100 unit/mL (3 mL) insulin pen  Commonly known as: TRESIBA FLEXTOUCH U-100  Inject 50 Units into the skin once daily. Hold pending admission to [facility]  What changed: additional instructions     rosuvastatin 10 MG tablet  Commonly known as: CRESTOR  Take 1 tablet (10 mg total) by mouth every evening. Hold pending admission to [facility]  What changed:   · medication strength  · additional instructions     senna-docusate 8.6-50 mg 8.6-50 mg per tablet  Commonly known as: PERICOLACE  Take 2 tablets by mouth once daily. Hold pending admission to  [facility]  What changed:   · when to take this  · additional instructions     sodium bicarbonate 650 MG tablet  Take 1 tablet (650 mg total) by mouth 3 (three) times daily. Hold pending admission to [facility]  What changed: additional instructions     VICTOZA 2-TONI 0.6 mg/0.1 mL (18 mg/3 mL) Pnij pen  Generic drug: liraglutide 0.6 mg/0.1 mL (18 mg/3 mL) subq PNIJ  Inject 0.6 mg into the skin once daily. Hold pending admission to [facility]  What changed: additional instructions        CONTINUE taking these medications    allopurinoL 100 MG tablet  Commonly known as: ZYLOPRIM  Take 100 mg by mouth once daily.     APPLE CIDER VINEGAR COMPLEX ORAL  Take by mouth. Take 480 mg daily     ticagrelor 90 mg tablet  Commonly known as: BRILINTA  Take 1 tablet (90 mg total) by mouth 2 (two) times a day.     tiZANidine 4 mg Cap  Take 1 capsule by mouth nightly.        STOP taking these medications    acyclovir 400 MG tablet  Commonly known as: ZOVIRAX     NIFEdipine 60 MG (OSM) 24 hr tablet  Commonly known as: PROCARDIA-XL            Indwelling Lines/Drains at time of discharge:   Lines/Drains/Airways     Drain  Duration           Female External Urinary Catheter 02/17/23 1515 2 days                Time spent on the discharge of patient: >30 minutes         SULMA Aragon  Department of Hospital Medicine  [facility] - [facility]

## 2023-02-20 NOTE — PLAN OF CARE
Problem: Adult Inpatient Plan of Care  Goal: Plan of Care Review  Outcome: Ongoing, Progressing  Goal: Patient-Specific Goal (Individualized)  Outcome: Ongoing, Progressing  Goal: Absence of Hospital-Acquired Illness or Injury  Outcome: Ongoing, Progressing  Goal: Optimal Comfort and Wellbeing  Outcome: Ongoing, Progressing  Goal: Readiness for Transition of Care  Outcome: Ongoing, Progressing     Problem: Diabetes Comorbidity  Goal: Blood Glucose Level Within Targeted Range  Outcome: Ongoing, Progressing     Problem: Infection  Goal: Absence of Infection Signs and Symptoms  Outcome: Ongoing, Progressing     Problem: Impaired Wound Healing  Goal: Optimal Wound Healing  Outcome: Ongoing, Progressing     Problem: Skin Injury Risk Increased  Goal: Skin Health and Integrity  Outcome: Ongoing, Progressing     Problem: Bariatric Environmental Safety  Goal: Safety Maintained with Care  Outcome: Ongoing, Progressing

## 2023-02-20 NOTE — ASSESSMENT & PLAN NOTE
Continue same antihypertensive medications   Hydralazine IV if needed.    02/20--stable continue present management at discharge

## 2023-02-20 NOTE — H&P
[facility] - [address] [place] [address] Medicine  History & Physical    Patient Name: Jessica Bay  MRN: [id number]  Patient Class: IP- Inpatient  Admission Date: 2/12/2023  Attending Physician: Michael Zendejas MD   Primary Care Provider: Jag Lopez MD         Patient information was obtained from patient and past medical records.     Subjective:     Principal Problem:Bacterial meningitis    Chief Complaint:   Chief Complaint   Patient presents with    Altered Mental Status    Fever    Transfer from [facility] for IV antibiotics.        HPI: 67-year-old female patient with past medical history of diabetes, history of kidney cancer status post nephrectomy, has single kidney, dyslipidemia, hypertension, coronary artery disease status post NSTEMI November 2022 and stent placement to the RCA, history of fibromyalgia, and chronic kidney disease.  Admitted to the hospital 02/12/2023 with altered mental status and fever, patient has history of meningitis in 2015.    Patient admitted to the hospital underwent lumbar puncture which showed evidence of acute meningitis, but patient is CSF cultures remain negative, Gram stain showed no organisms.  Her urine culture grew E coli in her blood culture on February 12th grew Streptococcus pneumoniae sensitive to Rocephin.  Patient continued on Rocephin bacteremia dose.  Patient will need 2-4 weeks of IV antibiotics.    Patient is very hard of hearing, likely related to her previous meningitis.    Patient admitted to specialty Hospital for IV antibiotics and rehab.        Interval History:  Patient without complaints      Objective:     Vital Signs (Most Recent):  Temp: 97.4 °F (36.3 °C) (02/20/23 0402)  Pulse: 72 (02/20/23 0402)  Resp: 18 (02/20/23 0402)  BP: (!) 125/59 (02/20/23 0402)  SpO2: 97 % (02/20/23 0402)   Vital Signs (24h Range):  Temp:  [97.4 °F (36.3 °C)-98.7 °F (37.1 °C)] 97.4 °F (36.3 °C)  Pulse:  [70-90] 72  Resp:  [16-22] 18  SpO2:   [95 %-97 %] 97 %  BP: (109-135)/(45-62) 125/59     Weight: 103.8 kg (228 lb 13.4 oz)  Body mass index is 39.28 kg/m².      Intake/Output Summary (Last 24 hours) at 2/20/2023 1120  Last data filed at 2/19/2023 2307  Gross per 24 hour   Intake --   Output 1300 ml   Net -1300 ml         Physical Exam  Vitals reviewed.   Constitutional:       Appearance: Normal appearance.      Interventions: She is not intubated.  HENT:      Head: Normocephalic and atraumatic.      Ears:      Comments: Patient is very hard of hearing.     Nose: Nose normal.      Mouth/Throat:      Pharynx: Oropharynx is clear.   Eyes:      Extraocular Movements: Extraocular movements intact.      Conjunctiva/sclera: Conjunctivae normal.      Pupils: Pupils are equal, round, and reactive to light.   Cardiovascular:      Rate and Rhythm: Normal rate.      Heart sounds: Normal heart sounds. No murmur heard.  Pulmonary:      Effort: Pulmonary effort is normal. She is not intubated.      Breath sounds: Normal breath sounds.   Abdominal:      General: Abdomen is flat. Bowel sounds are normal.      Palpations: Abdomen is soft.   Musculoskeletal:         General: Normal range of motion.      Cervical back: Normal range of motion and neck supple.      Right lower leg: No edema.      Left lower leg: No edema.   Skin:     General: Skin is warm and dry.      Capillary Refill: Capillary refill takes less than 2 seconds.   Neurological:      General: No focal deficit present.      Mental Status: She is alert and oriented to person, place, and time.   Psychiatric:         Mood and Affect: Mood normal.         Behavior: Behavior normal.     Review of Systems   Constitutional:  Negative for chills and fever.   HENT:  Positive for hearing loss. Negative for congestion.    Eyes:  Negative for pain and discharge.   Respiratory:  Negative for cough, chest tightness, shortness of breath, wheezing and stridor.    Cardiovascular:  Negative for chest pain, palpitations and leg  swelling.   Gastrointestinal:  Negative for abdominal distention, abdominal pain, blood in stool, diarrhea, nausea and vomiting.   Endocrine: Negative for cold intolerance, polydipsia and polyuria.   Genitourinary:  Negative for difficulty urinating, dysuria, frequency, hematuria and urgency.   Musculoskeletal:  Negative for arthralgias, back pain and neck pain.   Neurological:  Negative for dizziness, seizures, syncope, weakness, numbness and headaches.   Hematological:  Negative for adenopathy.   Psychiatric/Behavioral:  Negative for behavioral problems.    All other systems reviewed and are negative.    Vents:  Oxygen Concentration (%): 32 (02/17/23 0746)    Lines/Drains/Airways       Drain  Duration             Female External Urinary Catheter 02/17/23 1515 2 days              Peripheral Intravenous Line  Duration                  Peripheral IV - Single Lumen 02/14/23 0530 20 G Right Breast 6 days         Peripheral IV - Single Lumen 02/15/23 2342 20 G Left Breast 4 days                    Significant Labs:    CBC/Anemia Profile:  Recent Labs   Lab 02/19/23  0444 02/20/23  0305   WBC 10.15 17.19*   HGB 7.9* 9.7*   HCT 24.4* 30.0*    298   MCV 87.8 98.4*   RDW 14.1 15.2*          Chemistries:  Recent Labs   Lab 02/19/23  0444 02/20/23  0305    145   K 3.7 3.3*   * 108*   CO2 25 24   BUN 84* 82*   CREATININE 2.83* 2.75*   CALCIUM 8.0* 7.2*         Recent Lab Results  (Last 5 results in the past 24 hours)        02/20/23  0347   02/20/23  0305   02/20/23  0101   02/19/23 2008 02/19/23  1612        Anion Gap   16             Bands   4             Baso #   0.05             Basophil %   0.3             BUN   82             BUN/CREAT RATIO   30             Calcium   7.2             Chloride   108             CO2   24             Creatinine   2.75             Differential Type   Manual             eGFR   18             Eos #   0.04             Eosinophil %   0.2             Glucose   149              Hematocrit   30.0             Hemoglobin   9.7             Immature Grans (Abs)   1.17             Immature Granulocytes   6.8             Lymph #   2.44             Lymph %   14.2                13             MCH   31.8             MCHC   32.3             MCV   98.4             Mono #   0.65             Mono %   3.8             MPV   11.0             Neutrophils, Abs   12.84             Neutrophils Relative   74.7             nRBC   0.0             NUCLEATED RBC ABSOLUTE   0.00             PLATELET MORPHOLOGY   Normal             Platelets   298             POC Glucose 136     220   192   161       Potassium   3.3             RBC   3.05             RBC Morphology   Normal             RDW   15.2             Segmented Neutrophils, Man %   83             Sodium   145             WBC   17.19                                    Significant Imaging:  I have reviewed all pertinent imaging results/findings within the past 24 hours.      CRANIAL NERVES     CN III, IV, VI   Pupils are equal, round, and reactive to light.    Assessment/Plan:     * Bacterial meningitis  Patient sees if cell count upon admission to [facility] was suggestive of bacterial meningitis, culture and Gram stain negative.    Has positive blood cultures with pneumococcus pneumonia sensitive to Rocephin.    Repeat blood cultures negative   We will need to continue antibiotics for 2-4 weeks of IV antibiotics.  Already received 1 week of IV antibiotic at [facility].      UTI (urinary tract infection)  Urine culture grew E coli UTI, sensitive to Rocephin.    CAD (coronary artery disease)    Status post NSTEMI November 2022   Continue DA PT therapy   Continue beta-blockers   Continue on statins.    Type 2 diabetes mellitus with diabetic chronic kidney disease  Patient's FSGs are controlled on current medication regimen.  Last A1c reviewed-   Lab Results   Component Value Date    HGBA1C 6.9 (H) 11/22/2022     Most recent fingerstick glucose reviewed-  No results for input(s): POCTGLUCOSE in the last 24 hours.  Current correctional scale  Medium  Maintain anti-hyperglycemic dose as follows-   Antihyperglycemics (From admission, onward)    Start     Stop Route Frequency Ordered    02/16/23 2100  insulin detemir U-100 injection 25 Units         -- SubQ Nightly 02/16/23 0817    02/14/23 1105  insulin aspart U-100 injection 1-14 Units         -- SubQ Every 6 hours PRN 02/14/23 1105        Hold Oral hypoglycemics while patient is in the hospital.    Stage 4 chronic kidney disease  Creatinine stable around 2.    Nephrologist consulted for evaluation.      Acquired absence of kidney  History of renal cell carcinoma status post nephrectomy 15 years ago.      Hypertension  Continue same antihypertensive medications   Hydralazine IV if needed.        VTE Risk Mitigation (From admission, onward)         Ordered     enoxaparin injection 30 mg  Daily         02/13/23 0851                   Michael Zendejas MD  Department of Hospital Medicine   [facility] - [facility]

## 2023-02-20 NOTE — ASSESSMENT & PLAN NOTE
Patient's FSGs are controlled on current medication regimen.  Last A1c reviewed-   Lab Results   Component Value Date    HGBA1C 6.9 (H) 11/22/2022     Most recent fingerstick glucose reviewed- No results for input(s): POCTGLUCOSE in the last 24 hours.  Current correctional scale  Medium  Maintain anti-hyperglycemic dose as follows-   Antihyperglycemics (From admission, onward)    None        Hold Oral hypoglycemics while patient is in the hospital.

## 2023-02-20 NOTE — HPI
67-year-old female patient with past medical history of diabetes, history of kidney cancer status post nephrectomy, has single kidney, dyslipidemia, hypertension, coronary artery disease status post NSTEMI November 2022 and stent placement to the RCA, history of fibromyalgia, and chronic kidney disease.  Admitted to the hospital 02/12/2023 with altered mental status and fever, patient has history of meningitis in 2015.    Patient admitted to the hospital underwent lumbar puncture which showed evidence of acute meningitis, but patient is CSF cultures remain negative, Gram stain showed no organisms.  Her urine culture grew E coli in her blood culture on February 12th grew Streptococcus pneumoniae sensitive to Rocephin.  Patient continued on Rocephin bacteremia dose.  Patient will need 2-4 weeks of IV antibiotics.    Patient is very hard of hearing, likely related to her previous meningitis.    Patient admitted to specialty Hospital for IV antibiotics and rehab.

## 2023-02-20 NOTE — PROGRESS NOTES
[facility] - [facility]  Adult Nutrition  Follow-up Note         Reason for Assessment  Reason For Assessment: RD follow-up  Nutrition Risk Screen: no indicators present    RD follow up.    Pt is now advanced to a consistent carbohydrate 1800 kcal diet at this time. Diet order is appropriate. Will monitor her tolerance of oral diet and po intakes. If po intakes suboptimal, recommend addition of Glucerna supplements to supplement kcal/PRO.    If pt requires resumption of tube feedings for nutrition support, recommend resuming Glucerna 1.5.     Pt being followed by nephrology. Per 2/19 MD note, pt awaiting swing bed placement.    Pt receiving Consistent Carbohydrate 1800kcal Diet with 75% PO intake documented on 2/17. Recommend documenting PO intakes to determine if pt needs oral nutrition supplement (such as Glucerna) if PO intakes decline. Encourage good PO intakes.     Wt Readings from Last 3 Encounters:   02/17/23 0524 103.8 kg (228 lb 13.4 oz)   02/16/23 0308 96.4 kg (212 lb 8.4 oz)   02/15/23 0309 96.2 kg (212 lb 1.3 oz)   02/14/23 0301 111.5 kg (245 lb 13 oz)   02/13/23 0245 108.8 kg (239 lb 13.8 oz)   02/12/23 1245 109.7 kg (241 lb 13.5 oz)   02/12/23 0907 108 kg (238 lb)   02/12/23 0459 108 kg (238 lb)   12/05/22 1125 108.4 kg (239 lb)   Unsure etiology of weight fluctuation - will continue to monitor weight trend. Recommend weight check.    Last BM 2/18 per flowsheets. Will continue to monitor PO intakes, meds, labs, weight trend, updates in patient condition. RD following.     Malnutrition  Is Patient Malnourished: No     Nutrition Diagnosis  Altered Nutrition Related Lab Values   related to Diabetes Mellitus as evidenced by elevated blood glucose levels     Nutrition Diagnosis Status: Chronic/ continues     Nutrition Risk  Level of Risk/Frequency of Follow-up: moderate   Chewing or Swallowing Difficulty?: pt receiving via NGT     Estimated/Assessed Needs    Temp: 97.4 °F (36.3  °C)Oral  Weight Used For Calorie Calculations: 68.8 kg (151 lb 10.8 oz)   Energy Need Method: Kcal/kg Energy Calorie Requirements (kcal): 3416-8205  Weight Used For Protein Calculations: 68.8 kg (151 lb 10.8 oz)  Protein Requirements: 55-68  Estimated Fluid Requirement Method: RDA Method    RDA Method (mL): 1720     Nutrition Prescription / Recommendations  Recommendation/Intervention: Pt is now advanced to a consistent carbohydrate 1800 kcal diet at this time. Diet order is appropriate. Will monitor her tolerance of oral diet and po intakes. If po intakes suboptimal, recommend addition of Glucerna supplements to supplement kcal/PRO.  Goals: po intakes adequate to meet needs, weight stability, lab stability/normalization,  Nutrition Goal Status: progressing towards goal  Current Diet Order: Consistent carbohydrate 1800 kcal diet  Nutrition Order Comments: Appropriate  Current Nutrition Support Formula Ordered:  (off tube feedings)  Current Nutrition Support Rate Ordered: 0 (ml)  Current Nutrition Support Frequency Ordered: hourly  Recommended Diet: Consistent Carbohydrate 1800 (60g Carbs)  Recommended Oral Supplement: No Oral Supplements  Is Nutrition Support Recommended: No  Is Education Recommended: No    Monitor and Evaluation  % current Intake: Other: PO intake 2/17 75%  % intake to meet estimated needs: 75 - 100 %  Food and Nutrient Intake: energy intake, food and beverage intake  Food and Nutrient Adminstration: diet order  Anthropometric Measurements: weight, weight change, body mass index  Biochemical Data, Medical Tests and Procedures: electrolyte and renal panel, inflammatory profile, gastrointestinal profile, lipid profile, glucose/endocrine profile  Nutrition-Focused Physical Findings: overall appearance, extremities, muscles and bones, head and eyes, skin    Current Medical Diagnosis and Past Medical History  Diagnosis: other (see comments) (bacterial meningitis)  Past Medical History:   Diagnosis Date  "   Cancer     Diabetes mellitus, type 2     Fibromyalgia     History of kidney cancer 2018    Hyperlipidemia     Hypertension     Migraine headache     Renal cell carcinoma     Renal disorder      Nutrition/Diet History  Spiritual, Cultural Beliefs, Yazidi Practices, Values that Affect Care: no  Food Allergies: NKFA  Factors Affecting Nutritional Intake: None identified at this time    Lab/Procedures/Meds  Recent Labs   Lab 02/20/23  0305      K 3.3*   BUN 82*   CREATININE 2.75*   *   CALCIUM 7.2*   *     Last A1c:   Lab Results   Component Value Date    HGBA1C 6.9 (H) 11/22/2022     Lab Results   Component Value Date    RBC 3.05 (L) 02/20/2023    HGB 9.7 (L) 02/20/2023    HCT 30.0 (L) 02/20/2023    MCV 98.4 (H) 02/20/2023    MCH 31.8 (H) 02/20/2023    MCHC 32.3 02/20/2023     Pertinent Labs Reviewed: reviewed  Pertinent Labs Comments:   K 3.3 (L), Ca 7.2 (L) - replete to WNL as needed; Cl 108 (H) - possibly r/t fluid status; BUN 82 (H), creatinine 2.75 (H), BUN/CREAT RATIO 30 (H), eGFR 18 (L), total PRO 5.9(L) albumin 1.8 (L) - pt with stg 4 CKD and bacterial meningitis so possible inflammatory response driving down protein and albumin; (H)- hx DM2; total globulin 4.1 (H) - unsure etiology, will continue to monitor altered labs  Pertinent Medications Reviewed: reviewed  Pertinent Medications Comments:   allopurinol, amlodipine, benzonatate, carvedilol, rocephin, rocephin, enoxaparin, insulin, pantoprazole, quetiapine, ticagrelor, tizanidine, D5% and 0.45% NaCl IVF 75ml/hr    Anthropometrics  Temp: 97.4 °F (36.3 °C)  Height: 5' 4" (162.6 cm)  Height (inches): 64 in  Weight Method: Bed Scale  Weight: 103.8 kg (228 lb 13.4 oz)  Weight (lb): 228.84 lb  Ideal Body Weight (IBW), Female: 120 lb  % Ideal Body Weight, Female (lb): 201.54 %  BMI (Calculated): 39.3  BMI Grade: 35 - 39.9 - obesity - grade II     Nutrition by Nursing  Diet/Nutrition Received: consistent carb/diabetic diet  Intake " (%): 75%  Last Bowel Movement: 02/18/23  [REMOVED]      NG/OG Tube 02/14/23 0905 Whitney london 16 Fr. Right nostril-Formula Name: jose luis 1.5    Nutrition Follow-Up  RD Follow-up?: Yes

## 2023-02-20 NOTE — PLAN OF CARE
SW spoke with pt's . Choice obtained for [facility]. Ss following.  Dean Santana (son) [phone] informed and agreeable.

## 2023-02-20 NOTE — ASSESSMENT & PLAN NOTE
Patient sees if cell count upon admission to [facility] was suggestive of bacterial meningitis, culture and Gram stain negative.    Has positive blood cultures with pneumococcus pneumonia sensitive to Rocephin.    Repeat blood cultures negative   We will need to continue antibiotics for 2-4 weeks of IV antibiotics.  Already received 1 week of IV antibiotic at [facility].    02/20-transfer to [facility] for continued IV ABT therapy

## 2023-02-20 NOTE — ASSESSMENT & PLAN NOTE
Status post NSTEMI November 2022   Continue DA PT therapy   Continue beta-blockers   Continue on statins.

## 2023-02-20 NOTE — ASSESSMENT & PLAN NOTE
Patient sees if cell count upon admission to [facility] was suggestive of bacterial meningitis, culture and Gram stain negative.    Has positive blood cultures with pneumococcus pneumonia sensitive to Rocephin.    Repeat blood cultures negative   We will need to continue antibiotics for 2-4 weeks of IV antibiotics.  Already received 1 week of IV antibiotic at [facility].

## 2023-02-20 NOTE — PROGRESS NOTES
Pharmacokinetic Initial Assessment: IV Vancomycin    Assessment/Plan:    Initiate intravenous vancomycin as 2000 mg IV q72h  Desired empiric serum trough concentration is 15 to 20 mcg/mL  Draw vancomycin trough level 30 min prior to third dose on 2/26 at approximately 1730  Pharmacy will continue to follow and monitor vancomycin.      Please contact pharmacy at extension [phone] with any questions regarding this assessment.     Patient brief summary:  Jessica Bay is a 67 y.o. female initiated on antimicrobial therapy with IV Vancomycin for treatment of suspected meningitis    Drug Allergies:   Review of patient's allergies indicates:   Allergen Reactions    Bactrim [sulfamethoxazole-trimethoprim] Blisters    Benadryl [diphenhydramine hcl]     Daypro [oxaprozin]        Actual Body Weight:   103.8 kg per [facility] chart    Renal Function:   CrCl cannot be calculated (Unknown ideal weight.).,     Dialysis Method (if applicable):  N/A    CBC (last 72 hours):  Recent Labs   Lab Result Units 02/19/23  0444 02/20/23  0305   WBC K/uL 10.15 17.19*   Hemoglobin g/dL 7.9* 9.7*   Hematocrit % 24.4* 30.0*   Platelet Count K/uL 197 298   Lymphocytes % % 18.9* 14.2*   Lymphocytes, Man % %  --  13*   Monocytes % % 9.8* 3.8   Eosinophils % % 1.2 0.2*   Basophils % % 0.3 0.3       Metabolic Panel (last 72 hours):  Recent Labs   Lab Result Units 02/19/23  0444 02/20/23  0305   Sodium mmol/L 145 145   Potassium mmol/L 3.7 3.3*   Chloride mmol/L 108* 108*   CO2 mmol/L 25 24   Glucose mg/dL 179* 149*   BUN mg/dL 84* 82*   Creatinine mg/dL 2.83* 2.75*       Drug levels (last 3 results):  Recent Labs   Lab Result Units 02/19/23  0714   Vancomycin, Trough µg/mL 21.4*       Microbiologic Results:  Microbiology Results (last 7 days)       ** No results found for the last 168 hours. **

## 2023-02-20 NOTE — SUBJECTIVE & OBJECTIVE
Interval History:  Patient without complaints      Objective:     Vital Signs (Most Recent):  Temp: 97.4 °F (36.3 °C) (02/20/23 0402)  Pulse: 72 (02/20/23 0402)  Resp: 18 (02/20/23 0402)  BP: (!) 125/59 (02/20/23 0402)  SpO2: 97 % (02/20/23 0402)   Vital Signs (24h Range):  Temp:  [97.4 °F (36.3 °C)-98.7 °F (37.1 °C)] 97.4 °F (36.3 °C)  Pulse:  [70-90] 72  Resp:  [16-22] 18  SpO2:  [95 %-97 %] 97 %  BP: (109-135)/(45-62) 125/59     Weight: 103.8 kg (228 lb 13.4 oz)  Body mass index is 39.28 kg/m².      Intake/Output Summary (Last 24 hours) at 2/20/2023 1120  Last data filed at 2/19/2023 2307  Gross per 24 hour   Intake --   Output 1300 ml   Net -1300 ml         Physical Exam  Vitals reviewed.   Constitutional:       Appearance: Normal appearance.      Interventions: She is not intubated.  HENT:      Head: Normocephalic and atraumatic.      Ears:      Comments: Patient is very hard of hearing.     Nose: Nose normal.      Mouth/Throat:      Pharynx: Oropharynx is clear.   Eyes:      Extraocular Movements: Extraocular movements intact.      Conjunctiva/sclera: Conjunctivae normal.      Pupils: Pupils are equal, round, and reactive to light.   Cardiovascular:      Rate and Rhythm: Normal rate.      Heart sounds: Normal heart sounds. No murmur heard.  Pulmonary:      Effort: Pulmonary effort is normal. She is not intubated.      Breath sounds: Normal breath sounds.   Abdominal:      General: Abdomen is flat. Bowel sounds are normal.      Palpations: Abdomen is soft.   Musculoskeletal:         General: Normal range of motion.      Cervical back: Normal range of motion and neck supple.      Right lower leg: No edema.      Left lower leg: No edema.   Skin:     General: Skin is warm and dry.      Capillary Refill: Capillary refill takes less than 2 seconds.   Neurological:      General: No focal deficit present.      Mental Status: She is alert and oriented to person, place, and time.   Psychiatric:         Mood and Affect:  Mood normal.         Behavior: Behavior normal.     Review of Systems   Constitutional:  Negative for chills and fever.   HENT:  Positive for hearing loss. Negative for congestion.    Eyes:  Negative for pain and discharge.   Respiratory:  Negative for cough, chest tightness, shortness of breath, wheezing and stridor.    Cardiovascular:  Negative for chest pain, palpitations and leg swelling.   Gastrointestinal:  Negative for abdominal distention, abdominal pain, blood in stool, diarrhea, nausea and vomiting.   Endocrine: Negative for cold intolerance, polydipsia and polyuria.   Genitourinary:  Negative for difficulty urinating, dysuria, frequency, hematuria and urgency.   Musculoskeletal:  Negative for arthralgias, back pain and neck pain.   Neurological:  Negative for dizziness, seizures, syncope, weakness, numbness and headaches.   Hematological:  Negative for adenopathy.   Psychiatric/Behavioral:  Negative for behavioral problems.    All other systems reviewed and are negative.    Vents:  Oxygen Concentration (%): 32 (02/17/23 0746)    Lines/Drains/Airways       Drain  Duration             Female External Urinary Catheter 02/17/23 1515 2 days              Peripheral Intravenous Line  Duration                  Peripheral IV - Single Lumen 02/14/23 0530 20 G Right Breast 6 days         Peripheral IV - Single Lumen 02/15/23 2342 20 G Left Breast 4 days                    Significant Labs:    CBC/Anemia Profile:  Recent Labs   Lab 02/19/23  0444 02/20/23  0305   WBC 10.15 17.19*   HGB 7.9* 9.7*   HCT 24.4* 30.0*    298   MCV 87.8 98.4*   RDW 14.1 15.2*          Chemistries:  Recent Labs   Lab 02/19/23  0444 02/20/23  0305    145   K 3.7 3.3*   * 108*   CO2 25 24   BUN 84* 82*   CREATININE 2.83* 2.75*   CALCIUM 8.0* 7.2*         Recent Lab Results  (Last 5 results in the past 24 hours)        02/20/23  0347   02/20/23  0305   02/20/23  0101   02/19/23 2008 02/19/23  1612        Anion Gap   16              Bands   4             Baso #   0.05             Basophil %   0.3             BUN   82             BUN/CREAT RATIO   30             Calcium   7.2             Chloride   108             CO2   24             Creatinine   2.75             Differential Type   Manual             eGFR   18             Eos #   0.04             Eosinophil %   0.2             Glucose   149             Hematocrit   30.0             Hemoglobin   9.7             Immature Grans (Abs)   1.17             Immature Granulocytes   6.8             Lymph #   2.44             Lymph %   14.2                13             MCH   31.8             MCHC   32.3             MCV   98.4             Mono #   0.65             Mono %   3.8             MPV   11.0             Neutrophils, Abs   12.84             Neutrophils Relative   74.7             nRBC   0.0             NUCLEATED RBC ABSOLUTE   0.00             PLATELET MORPHOLOGY   Normal             Platelets   298             POC Glucose 136     220   192   161       Potassium   3.3             RBC   3.05             RBC Morphology   Normal             RDW   15.2             Segmented Neutrophils, Man %   83             Sodium   145             WBC   17.19                                    Significant Imaging:  I have reviewed all pertinent imaging results/findings within the past 24 hours.      CRANIAL NERVES     CN III, IV, VI   Pupils are equal, round, and reactive to light.

## 2023-02-20 NOTE — PLAN OF CARE
[facility] Telemetry  Discharge Final Note    Primary Care Provider: Jag Lopez MD    Expected Discharge Date: 2/20/2023    Final Discharge Note (most recent)       Final Note - 02/20/23 1239          Final Note    Assessment Type Final Discharge Note     Anticipated Discharge Disposition Long Term Acute Care     What phone number can be called within the next 1-3 days to see how you are doing after discharge? [phone]        Post-Acute Status    Post-Acute Authorization Placement     Post-Acute Placement Status Set-up Complete/Auth obtained     Patient choice form signed by patient/caregiver List with quality metrics by geographic area provided;List from CMS Compare;List from System Post-Acute Care     Discharge Delays None known at this time                     Important Message from Medicare  Important Message from Medicare regarding Discharge Appeal Rights: Explained to patient/caregiver     Date IMM was signed: 02/20/23  Time IMM was signed: 0911    Contact Info       [address] [address]   Phone: [phone]       Next Steps: Follow up          Pt will dc to [facility] on today. No further needs at dc.

## 2023-02-20 NOTE — ASSESSMENT & PLAN NOTE
Patient's FSGs are controlled on current medication regimen.  Last A1c reviewed-   Lab Results   Component Value Date    HGBA1C 6.9 (H) 11/22/2022     Most recent fingerstick glucose reviewed- No results for input(s): POCTGLUCOSE in the last 24 hours.  Current correctional scale  Medium  Maintain anti-hyperglycemic dose as follows-   Antihyperglycemics (From admission, onward)    Start     Stop Route Frequency Ordered    02/16/23 2100  insulin detemir U-100 injection 25 Units         -- SubQ Nightly 02/16/23 0817    02/14/23 1105  insulin aspart U-100 injection 1-14 Units         -- SubQ Every 6 hours PRN 02/14/23 1105        Hold Oral hypoglycemics while patient is in the hospital.

## 2023-02-21 LAB
ANION GAP SERPL CALCULATED.3IONS-SCNC: 12 MMOL/L (ref 7–16)
ANISOCYTOSIS BLD QL SMEAR: ABNORMAL
BASOPHILS # BLD AUTO: 0.02 K/UL (ref 0–0.2)
BASOPHILS NFR BLD AUTO: 0.1 % (ref 0–1)
BUN SERPL-MCNC: 65 MG/DL (ref 7–18)
BUN/CREAT SERPL: 26 (ref 6–20)
CALCIUM SERPL-MCNC: 7.7 MG/DL (ref 8.5–10.1)
CHLORIDE SERPL-SCNC: 112 MMOL/L (ref 98–107)
CO2 SERPL-SCNC: 22 MMOL/L (ref 21–32)
CREAT SERPL-MCNC: 2.49 MG/DL (ref 0.55–1.02)
CRENATED CELLS: ABNORMAL
DIFFERENTIAL METHOD BLD: ABNORMAL
EGFR (NO RACE VARIABLE) (RUSH/TITUS): 21 ML/MIN/1.73M²
EOSINOPHIL # BLD AUTO: 0.23 K/UL (ref 0–0.5)
EOSINOPHIL NFR BLD AUTO: 1.5 % (ref 1–4)
EOSINOPHIL NFR BLD MANUAL: 1 % (ref 1–4)
ERYTHROCYTE [DISTWIDTH] IN BLOOD BY AUTOMATED COUNT: 15.4 % (ref 11.5–14.5)
GLUCOSE SERPL-MCNC: 120 MG/DL (ref 70–105)
GLUCOSE SERPL-MCNC: 138 MG/DL (ref 74–106)
GLUCOSE SERPL-MCNC: 189 MG/DL (ref 70–105)
GLUCOSE SERPL-MCNC: 313 MG/DL (ref 70–105)
GLUCOSE SERPL-MCNC: 374 MG/DL (ref 70–105)
HCT VFR BLD AUTO: 27.1 % (ref 38–47)
HGB BLD-MCNC: 8.8 G/DL (ref 12–16)
IMM GRANULOCYTES # BLD AUTO: 1.35 K/UL (ref 0–0.04)
IMM GRANULOCYTES NFR BLD: 8.5 % (ref 0–0.4)
LYMPHOCYTES # BLD AUTO: 1.55 K/UL (ref 1–4.8)
LYMPHOCYTES NFR BLD AUTO: 9.8 % (ref 27–41)
LYMPHOCYTES NFR BLD MANUAL: 14 % (ref 27–41)
MCH RBC QN AUTO: 31.8 PG (ref 27–31)
MCHC RBC AUTO-ENTMCNC: 32.5 G/DL (ref 32–36)
MCV RBC AUTO: 97.8 FL (ref 80–96)
MONOCYTES # BLD AUTO: 0.63 K/UL (ref 0–0.8)
MONOCYTES NFR BLD AUTO: 4 % (ref 2–6)
MONOCYTES NFR BLD MANUAL: 3 % (ref 2–6)
MPC BLD CALC-MCNC: 10.9 FL (ref 9.4–12.4)
MYELOCYTES NFR BLD MANUAL: 4 %
NEUTROPHILS # BLD AUTO: 12.01 K/UL (ref 1.8–7.7)
NEUTROPHILS NFR BLD AUTO: 76.1 % (ref 53–65)
NEUTS BAND NFR BLD MANUAL: 2 % (ref 1–5)
NEUTS SEG NFR BLD MANUAL: 76 % (ref 50–62)
NRBC # BLD AUTO: 0 X10E3/UL
NRBC, AUTO (.00): 0 %
PLATELET # BLD AUTO: 288 K/UL (ref 150–400)
PLATELET MORPHOLOGY: ABNORMAL
POLYCHROMASIA BLD QL SMEAR: ABNORMAL
POTASSIUM SERPL-SCNC: 3.5 MMOL/L (ref 3.5–5.1)
RBC # BLD AUTO: 2.77 M/UL (ref 4.2–5.4)
SODIUM SERPL-SCNC: 142 MMOL/L (ref 136–145)
WBC # BLD AUTO: 15.79 K/UL (ref 4.5–11)

## 2023-02-21 PROCEDURE — 25000003 PHARM REV CODE 250: Performed by: HOSPITALIST

## 2023-02-21 PROCEDURE — 96372 THER/PROPH/DIAG INJ SC/IM: CPT

## 2023-02-21 PROCEDURE — 25000003 PHARM REV CODE 250: Performed by: INTERNAL MEDICINE

## 2023-02-21 PROCEDURE — 85025 COMPLETE CBC W/AUTO DIFF WBC: CPT | Performed by: INTERNAL MEDICINE

## 2023-02-21 PROCEDURE — 99223 1ST HOSP IP/OBS HIGH 75: CPT | Mod: ,,, | Performed by: INTERNAL MEDICINE

## 2023-02-21 PROCEDURE — 99233 PR SUBSEQUENT HOSPITAL CARE,LEVL III: ICD-10-PCS | Mod: ,,, | Performed by: HOSPITALIST

## 2023-02-21 PROCEDURE — 63600175 PHARM REV CODE 636 W HCPCS: Performed by: INTERNAL MEDICINE

## 2023-02-21 PROCEDURE — 97162 PT EVAL MOD COMPLEX 30 MIN: CPT

## 2023-02-21 PROCEDURE — 80048 BASIC METABOLIC PNL TOTAL CA: CPT | Performed by: INTERNAL MEDICINE

## 2023-02-21 PROCEDURE — 82962 GLUCOSE BLOOD TEST: CPT

## 2023-02-21 PROCEDURE — 11000001 HC ACUTE MED/SURG PRIVATE ROOM

## 2023-02-21 PROCEDURE — 99223 PR INITIAL HOSPITAL CARE,LEVL III: ICD-10-PCS | Mod: ,,, | Performed by: INTERNAL MEDICINE

## 2023-02-21 PROCEDURE — 94761 N-INVAS EAR/PLS OXIMETRY MLT: CPT

## 2023-02-21 PROCEDURE — 97166 OT EVAL MOD COMPLEX 45 MIN: CPT

## 2023-02-21 PROCEDURE — 99233 SBSQ HOSP IP/OBS HIGH 50: CPT | Mod: ,,, | Performed by: HOSPITALIST

## 2023-02-21 RX ADMIN — ACETAMINOPHEN 650 MG: 325 TABLET ORAL at 09:02

## 2023-02-21 RX ADMIN — BENZONATATE 200 MG: 100 CAPSULE ORAL at 08:02

## 2023-02-21 RX ADMIN — AMLODIPINE BESYLATE 5 MG: 5 TABLET ORAL at 08:02

## 2023-02-21 RX ADMIN — CEFTRIAXONE SODIUM 2 G: 2 INJECTION, POWDER, FOR SOLUTION INTRAMUSCULAR; INTRAVENOUS at 05:02

## 2023-02-21 RX ADMIN — INSULIN ASPART 12 UNITS: 100 INJECTION, SOLUTION INTRAVENOUS; SUBCUTANEOUS at 06:02

## 2023-02-21 RX ADMIN — MUPIROCIN: 20 OINTMENT TOPICAL at 08:02

## 2023-02-21 RX ADMIN — CARVEDILOL 6.25 MG: 6.25 TABLET, FILM COATED ORAL at 08:02

## 2023-02-21 RX ADMIN — CEFTRIAXONE SODIUM 2 G: 2 INJECTION, POWDER, FOR SOLUTION INTRAMUSCULAR; INTRAVENOUS at 03:02

## 2023-02-21 RX ADMIN — PANTOPRAZOLE SODIUM 40 MG: 40 TABLET, DELAYED RELEASE ORAL at 08:02

## 2023-02-21 RX ADMIN — QUETIAPINE FUMARATE 50 MG: 25 TABLET ORAL at 09:02

## 2023-02-21 RX ADMIN — TIZANIDINE 4 MG: 4 TABLET ORAL at 09:02

## 2023-02-21 RX ADMIN — INSULIN ASPART 4 UNITS: 100 INJECTION, SOLUTION INTRAVENOUS; SUBCUTANEOUS at 01:02

## 2023-02-21 RX ADMIN — ALLOPURINOL 100 MG: 100 TABLET ORAL at 09:02

## 2023-02-21 RX ADMIN — BENZONATATE 200 MG: 100 CAPSULE ORAL at 09:02

## 2023-02-21 RX ADMIN — ENOXAPARIN SODIUM 30 MG: 30 INJECTION SUBCUTANEOUS at 05:02

## 2023-02-21 RX ADMIN — TICAGRELOR 90 MG: 90 TABLET ORAL at 08:02

## 2023-02-21 RX ADMIN — BENZONATATE 200 MG: 100 CAPSULE ORAL at 03:02

## 2023-02-21 RX ADMIN — TICAGRELOR 90 MG: 90 TABLET ORAL at 09:02

## 2023-02-21 RX ADMIN — CARVEDILOL 6.25 MG: 6.25 TABLET, FILM COATED ORAL at 09:02

## 2023-02-21 RX ADMIN — QUETIAPINE FUMARATE 50 MG: 25 TABLET ORAL at 08:02

## 2023-02-21 RX ADMIN — INSULIN DETEMIR 25 UNITS: 100 INJECTION, SOLUTION SUBCUTANEOUS at 09:02

## 2023-02-21 NOTE — PT/OT/SLP EVAL
Physical Therapy Evaluation    Patient Name:  Jessica Bay   MRN:  [id number]    Recommendations:     Discharge Recommendations: rehabilitation facility, nursing facility, skilled   Discharge Equipment Recommendations:  (to be determined)   Barriers to discharge:  complicated hospitalization    Assessment:     Jessica Bay is a 67 y.o. female admitted with a medical diagnosis of <principal problem not specified>.  She presents with the following impairments/functional limitations: weakness, impaired endurance, impaired functional mobility, gait instability, decreased lower extremity function.    Since hospitalized, pt has lost her hearing and just regained her speech Saturday per . Pt previously able to hear normally. Pt would benefit from swingbed or rehab facility at d/c to maximize mobility prior to return home with .     Rehab Prognosis: Good and Fair; patient would benefit from acute skilled PT services to address these deficits and reach maximum level of function.    Recent Surgery: * No surgery found *      Plan:     During this hospitalization, patient to be seen 5 x/week to address the identified rehab impairments via gait training, therapeutic activities, therapeutic exercises and progress toward the following goals:    Plan of Care Expires:  03/21/23    Subjective     Chief Complaint: bacterial meningitis  Patient/Family Comments/goals: agreeable to eval  Pain/Comfort:  Pain Rating 1: 0/10    Patients cultural, spiritual, Christian conflicts given the current situation:      Living Environment:  Home with  in 1 level home with ramp.   Prior to admission, patients level of function was indep in transfers, bed mob and short distance household ambulation, but pt primarily used power chair d/t pain in back. Pt also has van that she drives that is able to accommodate her powerchair as well as her 's.  Equipment used at home: power chair, shower chair, grab bar.  DME owned (not  currently used): single point cane.  Upon discharge, patient will have assistance from rehab staff.    Objective:     Communicated with SILVESTRE Vizcaino RN prior to session.  Patient found HOB elevated with luz catheter, PICC line  upon PT entry to room.    General Precautions: Standard, fall, droplet, special contact, hearing impaired  Orthopedic Precautions:N/A   Braces: N/A  Respiratory Status: Room air    Exams:  Cognitive Exam:  Patient is oriented to Person, Place, and Situation  Sensation:    -       Intact  RLE ROM: WFL  RLE Strength: Deficits: 2+/5  LLE ROM: WFL  LLE Strength: Deficits: 2-/5    Functional Mobility:  Bed Mobility:     Scooting: minimum assistance to HOB  Supine to Sit: moderate assistance  Transfers:     Sit to Stand:  moderate assistance and of 2 persons with hand-held assist  Gait: steps to recliner with bilat HHA, Min A x 2 to Mod A x 2  Balance: EOB sitting Fair+ to Good-      AM-PAC 6 CLICK MOBILITY  Total Score:10       Treatment & Education:  Pt educated on PT role/POC.   Importance of OOB activity with staff assistance.  Importance of sitting up in the chair throughout the day as tolerated, especially for meals   Safety during functional t/f and mobility with use of RW  White board updated   Multiple self-care tasks/functional mobility completed- assistance level noted above   All questions/concerns answered within PT scope of practice     Patient left up in chair with all lines intact, call button in reach, and RN notified.    GOALS:   Multidisciplinary Problems       Physical Therapy Goals          Problem: Physical Therapy    Goal Priority Disciplines Outcome Goal Variances Interventions   Physical Therapy Goal     PT, PT/OT Ongoing, Progressing     Description: Short Term Goals to be met by: 3/7/23    Patient will increase functional independence with mobility by performin. Supine to sit with contact guard assist  2. Sit to stand transfer with contact guard assist using Rolling  walker  3. Bed to chair transfer with contact guard assist using Rolling walker  4. Gait  x 100 feet with contact guard assist using Rolling walker  5. Lower extremity exercise program x30 reps per handout, with assistance as needed    Long Term Goals to be met by: 3/21/23    Pt will regain full independent functional mobility with Rolling walker to return to home situation and prior activities of daily living.                        History:     Past Medical History:   Diagnosis Date    Cancer     Diabetes mellitus, type 2     Fibromyalgia     History of kidney cancer 2018    Hyperlipidemia     Hypertension     Migraine headache     Renal cell carcinoma     Renal disorder        Past Surgical History:   Procedure Laterality Date    NEPHRECTOMY         Time Tracking:     PT Received On: 02/21/23  PT Start Time: 0910     PT Stop Time: 0934  PT Total Time (min): 24 min     Billable Minutes: Evaluation moderate complexity      02/21/2023

## 2023-02-21 NOTE — CONSULTS
[facility] Consult History and Physical   Patient Name: Jessica Bay  MRN: [id number]  Age: 67 y.o.  : 1955  Time:  11:17 AM  Admission Date: 2023    Consulted for:  NEWTON  Consulted by: Dr. Zendejas    HPI:   Jessica Bay is a  66 yo female with medical history significant for chronic kidney disease, renal cell carcinoma (s/p right nephrectomy), HTN, DM who presents to the hospital on  with concern for fevers, headaches, and altered mental status. She has history of meningitis in the past. Patient had an LP performed in ED with elevated opening pressure of 40. She received Rocephin and Vancomycin on admission. She was admitted to [facility] for treatment of meningitis. She remains on Rocephin.      Regarding her CKD, she is followed in [place] by Dr. Raymond Wallace at [facility]. She is baseline CKD IV with sCr ~2.5. Nephrology consulted for CKD IV.     She is hard of hearing. You have to communicate with a white board. She has no complaints today. She is glad her renal function is stable.     Past Medical History:  has a past medical history of Cancer, Diabetes mellitus, type 2, Fibromyalgia, History of kidney cancer (2018), Hyperlipidemia, Hypertension, Migraine headache, Renal cell carcinoma, and Renal disorder.     Past Surgical History:   has a past surgical history that includes Nephrectomy.     Family History:  family history is not on file. No family history of kidney disease.     Social History:   reports that she does not currently use alcohol. She reports that she does not use drugs.    Allergies: is allergic to bactrim [sulfamethoxazole-trimethoprim], benadryl [diphenhydramine hcl], and daypro [oxaprozin].     Medications prior to admission: Reviewed including OTC medications, herbal supplements, and NSAIDS.     Old records have been reviewed.       Review of Systems  ROS: A 10 point ROS was completed and found to be negative  "except for that mentioned above in the HPI.       Physical Exam:   BP (!) 146/51   Pulse 70   Temp 98.4 °F (36.9 °C)   Resp 20   Ht 5' 4" (1.626 m)   Wt 113.9 kg (251 lb)   SpO2 97%   BMI 43.08 kg/m²     Constitutional: lying in bed, in NAD  Eyes: EOMI, white sclera  ENMT: moist mucus membranes, nares patent  Cardiovascular: normal rate, S1/S2 noted, no edema  Respiratory: symmetrical chest expansion, CTA-B  Gastrointestinal: +BS, soft, NT/ND  Musculoskeletal: normal, no joint erythema/effusions  Skin: no rash, no purpura, warm extremities  Neurological: Alert and Oriented x 4, afocal    Data Review:  Lab:   Labs reviewed and significant values discussed below.    Recent Labs     02/19/23  0444 02/20/23  0305 02/21/23  0638   CALCIUM 8.0* 7.2* 7.7*    145 142   K 3.7 3.3* 3.5   * 108* 112*   CO2 25 24 22   BUN 84* 82* 65*   CREATININE 2.83* 2.75* 2.49*   * 149* 138*       Imaging:  Reviewed     Assessment/Plan:     Patient Active Problem List   Diagnosis    Hypertension    Body mass index (BMI) 40.0-44.9, adult    Chronic fatigue syndrome    Dyslipidemia    Fibromyalgia    Mitral valve disorder    Acquired absence of kidney    Neuropathy of both feet    Restless legs syndrome    Stage 4 chronic kidney disease    Type 2 diabetes mellitus with diabetic chronic kidney disease    CAD (coronary artery disease)    Bacterial meningitis    UTI (urinary tract infection)     Jessica Bay is a  68 yo female with medical history significant for chronic kidney disease, renal cell carcinoma (s/p right nephrectomy), HTN, DM who presents to the hospital on 2/12 with concern for fevers, headaches, and altered mental status concerning for meningitis.      - Renal function stable. At baseline  - Encouraged her to eat protein due to her hypoalbuminemia. Will order supplemental shakes.  - Will continue to monitor.   - Please avoid nephrotoxic agents/NSAIDs  - Renally dose all medications   - Please obtain " daily BMP, Mg, and Phos levels  - Please monitor strict UOP  - Daily weights    Thank you for the consult. Will follow along. Please call with questions.    Alisha S. Parker, DO [facility]   02/21/2023

## 2023-02-21 NOTE — PLAN OF CARE
[facility]  Initial Discharge Assessment       Primary Care Provider: Jag Lopez MD    Admission Diagnosis: Meningitis [G03.9]    Admission Date: 2/20/2023  Expected Discharge Date:     Discharge Barriers Identified: None    Payor: MEDICARE / Plan: MEDICARE PART A & B / Product Type: Government /     Extended Emergency Contact Information  Primary Emergency Contact: Enrique Bay  Mobile Phone: [phone]  Relation: Spouse  Secondary Emergency Contact: SantanaDean  Mobile Phone: [phone]  Relation: Son  Preferred language: English   needed? No    Discharge Plan A: Home with family  Discharge Plan B: Skilled Nursing Facility      [facility] [place] [address]  [address]  [place] [address]  Phone: [phone] Fax: [phone]    [facility] [place] - [address]  [address]  [place] [address]  Phone: [phone] Fax: [phone]      Initial Assessment (most recent)       Adult Discharge Assessment - 02/21/23 1008          Discharge Assessment    Assessment Type Discharge Planning Assessment     Source of Information family     If unable to respond/provide information was family/caregiver contacted? Yes     Contact Name/Number Enrique Bay (Spouse) [phone]     People in Home spouse     Do you expect to return to your current living situation? Other (see comments)   TBD    Do you have help at home or someone to help you manage your care at home? No     Who are your caregiver(s) and their phone number(s)? Enrique Bay (Spouse) [phone]     Walking or Climbing Stairs ambulation difficulty, requires equipment     Home Accessibility wheelchair accessible     Home Layout Able to live on 1st floor     Equipment Currently Used at Home power chair     Readmission within 30 days? Yes     Patient currently being followed by outpatient case management? Unable to determine (comments)     Do you currently have service(s)  "that help you manage your care at home? No     Do you take prescription medications? Yes     Do you have prescription coverage? Yes     Do you have any problems affording any of your prescribed medications? No     Who is going to help you get home at discharge? [facility]     How do you get to doctors appointments? family or friend will provide     Are you on dialysis? No     Do you take coumadin? No     Discharge Plan A Home with family     Discharge Plan B Skilled Nursing Facility     Discharge Plan discussed with: Spouse/sig other     Name(s) and Number(s) Enrique Bay (Spouse) [phone]     Discharge Barriers Identified None                        SW spoke with Enrique Bay (Spouse) [phone], to complete dcp initial assessment. Pt lived home with spouse. No hh, has power chair. Dcp is home vs swb pending clinical course. Password set by spouse as "malcolm". spouse informed about weekly IDT meeting and stated that he will not be able to come due to lack of transportation. Ss following for further dc needs.    1016 SW unable to add password to chart. Will notify nurse.  "

## 2023-02-21 NOTE — PLAN OF CARE
Problem: Physical Therapy  Goal: Physical Therapy Goal  Description: Short Term Goals to be met by: 3/7/23    Patient will increase functional independence with mobility by performin. Supine to sit with contact guard assist  2. Sit to stand transfer with contact guard assist using Rolling walker  3. Bed to chair transfer with contact guard assist using Rolling walker  4. Gait  x 100 feet with contact guard assist using Rolling walker  5. Lower extremity exercise program x30 reps per handout, with assistance as needed    Long Term Goals to be met by: 3/21/23    Pt will regain full independent functional mobility with Rolling walker to return to home situation and prior activities of daily living.   Outcome: Ongoing, Progressing

## 2023-02-21 NOTE — PLAN OF CARE
Problem: Occupational Therapy  Goal: Occupational Therapy Goal  Description: STG:  Pt will perform grooming with setup  Pt will bathe with min a  Pt will perform UE dressing with setup  Pt will perform LE dressing with min a with AD as needed  Pt will sit EOB x 15 min with (I)  Pt will transfer bed/chair/bsc with min a with AD as needed  Pt will tolerate 20 minutes of tx without fatigue      LT.Restore to max I with self care and mobility.     Outcome: Ongoing, Progressing

## 2023-02-21 NOTE — PT/OT/SLP EVAL
Occupational Therapy   Evaluation    Name: Jessica Bay  MRN: [id number]  Admitting Diagnosis: <principal problem not specified>  Recent Surgery: * No surgery found *      Recommendations:     Discharge Recommendations: rehabilitation facility, nursing facility, skilled  Discharge Equipment Recommendations:   (to be determined)  Barriers to discharge:  None    Assessment:     Jessica Bay is a 67 y.o. female with a medical diagnosis of <principal problem not specified>.  She presents with no complaint.Pt expressing fear regarding getting up and transferring to chair. Performance deficits affecting function: weakness, impaired endurance, impaired self care skills, impaired functional mobility, gait instability, decreased upper extremity function, decreased lower extremity function, decreased safety awareness.      Rehab Prognosis: Good; patient would benefit from acute skilled OT services to address these deficits and reach maximum level of function.       Plan:     Patient to be seen 5 x/week to address the above listed problems via self-care/home management, therapeutic activities, therapeutic exercises  Plan of Care Expires:    Plan of Care Reviewed with: patient    Subjective     Chief Complaint: Meningitis  Patient/Family Comments/goals: To return home    Occupational Profile:  Living Environment: Pt lives at home with   Previous level of function: Pt reports being (I) with self care  prior  Roles and Routines: (I) with daily activities prior  Equipment Used at Home: power chair  Assistance upon Discharge:     Pain/Comfort:  Pain Rating 1: 0/10  Pain Rating Post-Intervention 1: 0/10    Patients cultural, spiritual, Hoahaoism conflicts given the current situation:      Objective:     Communicated with: KASIA Vizcaino prior to session.  Patient found HOB elevated with luz catheter, PICC line upon OT entry to room.    General Precautions: Standard, fall, special contact  Orthopedic Precautions:  N/A  Braces: N/A  Respiratory Status: Room air    Occupational Performance:    Bed Mobility:    Patient completed Supine to Sit with moderate assistance    Functional Mobility/Transfers:  Patient completed Sit <> Stand Transfer with moderate assistance and of x 2  persons  with  hand-held assist and gait belt   Patient completed Bed <> Chair Transfer using Step Transfer technique with moderate assistance and of x 2 persons with hand-held assist and gait belt  Functional Mobility: Mod a x 2    Activities of Daily Living:  Upper Body Dressing: minimum assistance donning gown    Cognitive/Visual Perceptual:  Cognitive/Psychosocial Skills:  -       Oriented to: Person and Situation   -       Follows Commands/attention:Follows one-step commands  -       Communication: impaired due to being [place]  Visual/Perceptual:      -wears glasses. Hearing is impaired .    Physical Exam:  Balance:    -       SBA/S with EOB sitting. Mod a x 2 standing balance  Skin integrity: Visible skin intact  Edema:  None noted  Sensation:    -       Intact  Motor Planning:    -       wfl  Dominant hand:    -       Right  Upper Extremity Range of Motion:     -       Right Upper Extremity: WFL  -       Left Upper Extremity: WFL  Upper Extremity Strength:    -       Right Upper Extremity: WFL  -       Left Upper Extremity: WFL   Strength:    -       Right Upper Extremity: WFL  -       Left Upper Extremity: WFL    AMPAC 6 Click ADL:  AMPAC Total Score: 15    Treatment & Education:  OT evaluation completed. See eval for details.   Pt educated on OT role/POC.   Importance of OOB activity with staff assistance.  Importance of sitting up in the chair throughout the day as tolerated, especially for meals   Safety during functional t/f and mobility with use of AD as needed  Importance of assisting with self-care activities   All questions/concerns answered within OT scope of practice     Patient left up in chair with all lines intact, call button in  reach, chair alarm on, and nurse notified    GOALS:   Multidisciplinary Problems       Occupational Therapy Goals          Problem: Occupational Therapy    Goal Priority Disciplines Outcome Interventions   Occupational Therapy Goal     OT, PT/OT Ongoing, Progressing    Description: STG:  Pt will perform grooming with setup  Pt will bathe with min a  Pt will perform UE dressing with setup  Pt will perform LE dressing with min a with AD as needed  Pt will sit EOB x 15 min with (I)  Pt will transfer bed/chair/bsc with min a with AD as needed  Pt will tolerate 20 minutes of tx without fatigue      LT.Restore to max I with self care and mobility.                          History:     Past Medical History:   Diagnosis Date    Cancer     Diabetes mellitus, type 2     Fibromyalgia     History of kidney cancer 2018    Hyperlipidemia     Hypertension     Migraine headache     Renal cell carcinoma     Renal disorder          Past Surgical History:   Procedure Laterality Date    NEPHRECTOMY         Time Tracking:     OT Date of Treatment: 23  OT Start Time: 909  OT Stop Time: 931  OT Total Time (min): 22 min    Billable Minutes:Evaluation 2023

## 2023-02-21 NOTE — PLAN OF CARE
Problem: Adult Inpatient Plan of Care  Goal: Plan of Care Review  Outcome: Ongoing, Progressing  Goal: Patient-Specific Goal (Individualized)  Outcome: Ongoing, Progressing  Goal: Absence of Hospital-Acquired Illness or Injury  Outcome: Ongoing, Progressing  Goal: Optimal Comfort and Wellbeing  Outcome: Ongoing, Progressing  Goal: Readiness for Transition of Care  Outcome: Ongoing, Progressing     Problem: Diabetes Comorbidity  Goal: Blood Glucose Level Within Targeted Range  Outcome: Ongoing, Progressing  blood glucose monitored 4x's daily AC&HS  basal dose insulin scheduled        Problem: Infection  Goal: Absence of Infection Signs and Symptoms  Outcome: Ongoing, Progressing   fluid intake increased   lightweight bedding   lightweight clothing  Hand hygiene performed before, after, and in between interventions  Scrub hub thoroughly with alcohol let dry 30 seconds  Tubing changed  Infection Management: aseptic technique maintained  Guillen care performed     Problem: Impaired Wound Healing  Goal: Optimal Wound Healing  Outcome: Ongoing, Progressing     Problem: Skin Injury Risk Increased  Goal: Skin Health and Integrity  Outcome: Ongoing, Progressing   Foam dressing used as protective barrier for pressure points in place on sacrum and bilateral heels; weight adjustment encouraged    Problem: Bariatric Environmental Safety  Goal: Safety Maintained with Care  Outcome: Ongoing, Progressing      Statement Selected

## 2023-02-22 LAB
ANION GAP SERPL CALCULATED.3IONS-SCNC: 9 MMOL/L (ref 7–16)
BASOPHILS # BLD AUTO: 0.04 K/UL (ref 0–0.2)
BASOPHILS NFR BLD AUTO: 0.3 % (ref 0–1)
BUN SERPL-MCNC: 60 MG/DL (ref 7–18)
BUN/CREAT SERPL: 25 (ref 6–20)
CALCIUM SERPL-MCNC: 7.9 MG/DL (ref 8.5–10.1)
CHLORIDE SERPL-SCNC: 115 MMOL/L (ref 98–107)
CO2 SERPL-SCNC: 25 MMOL/L (ref 21–32)
CREAT SERPL-MCNC: 2.43 MG/DL (ref 0.55–1.02)
DIFFERENTIAL METHOD BLD: ABNORMAL
EGFR (NO RACE VARIABLE) (RUSH/TITUS): 21 ML/MIN/1.73M²
EOSINOPHIL # BLD AUTO: 0.21 K/UL (ref 0–0.5)
EOSINOPHIL NFR BLD AUTO: 1.4 % (ref 1–4)
EOSINOPHIL NFR BLD MANUAL: 1 % (ref 1–4)
ERYTHROCYTE [DISTWIDTH] IN BLOOD BY AUTOMATED COUNT: 15.8 % (ref 11.5–14.5)
GLUCOSE SERPL-MCNC: 153 MG/DL (ref 70–105)
GLUCOSE SERPL-MCNC: 235 MG/DL (ref 70–105)
GLUCOSE SERPL-MCNC: 259 MG/DL (ref 70–105)
GLUCOSE SERPL-MCNC: 79 MG/DL (ref 74–106)
GLUCOSE SERPL-MCNC: 90 MG/DL (ref 70–105)
HCT VFR BLD AUTO: 26.6 % (ref 38–47)
HGB BLD-MCNC: 8.3 G/DL (ref 12–16)
IMM GRANULOCYTES # BLD AUTO: 1.2 K/UL (ref 0–0.04)
IMM GRANULOCYTES NFR BLD: 8.1 % (ref 0–0.4)
LYMPHOCYTES # BLD AUTO: 2.05 K/UL (ref 1–4.8)
LYMPHOCYTES NFR BLD AUTO: 13.9 % (ref 27–41)
LYMPHOCYTES NFR BLD MANUAL: 11 % (ref 27–41)
MACROCYTES BLD QL SMEAR: ABNORMAL
MCH RBC QN AUTO: 31.9 PG (ref 27–31)
MCHC RBC AUTO-ENTMCNC: 31.2 G/DL (ref 32–36)
MCV RBC AUTO: 102.3 FL (ref 80–96)
MONOCYTES # BLD AUTO: 0.72 K/UL (ref 0–0.8)
MONOCYTES NFR BLD AUTO: 4.9 % (ref 2–6)
MONOCYTES NFR BLD MANUAL: 3 % (ref 2–6)
MPC BLD CALC-MCNC: 10.9 FL (ref 9.4–12.4)
NEUTROPHILS # BLD AUTO: 10.57 K/UL (ref 1.8–7.7)
NEUTROPHILS NFR BLD AUTO: 71.4 % (ref 53–65)
NEUTS BAND NFR BLD MANUAL: 7 % (ref 1–5)
NEUTS SEG NFR BLD MANUAL: 78 % (ref 50–62)
NRBC # BLD AUTO: 0 X10E3/UL
NRBC, AUTO (.00): 0 %
PLATELET # BLD AUTO: 282 K/UL (ref 150–400)
PLATELET MORPHOLOGY: NORMAL
POTASSIUM SERPL-SCNC: 3.5 MMOL/L (ref 3.5–5.1)
RBC # BLD AUTO: 2.6 M/UL (ref 4.2–5.4)
SODIUM SERPL-SCNC: 145 MMOL/L (ref 136–145)
WBC # BLD AUTO: 14.79 K/UL (ref 4.5–11)

## 2023-02-22 PROCEDURE — 97110 THERAPEUTIC EXERCISES: CPT | Mod: CQ

## 2023-02-22 PROCEDURE — 27000221 HC OXYGEN, UP TO 24 HOURS

## 2023-02-22 PROCEDURE — 99233 SBSQ HOSP IP/OBS HIGH 50: CPT | Mod: ,,, | Performed by: HOSPITALIST

## 2023-02-22 PROCEDURE — 25000003 PHARM REV CODE 250: Performed by: HOSPITALIST

## 2023-02-22 PROCEDURE — 97110 THERAPEUTIC EXERCISES: CPT

## 2023-02-22 PROCEDURE — 85025 COMPLETE CBC W/AUTO DIFF WBC: CPT | Performed by: INTERNAL MEDICINE

## 2023-02-22 PROCEDURE — 99233 SBSQ HOSP IP/OBS HIGH 50: CPT | Mod: ,,, | Performed by: INTERNAL MEDICINE

## 2023-02-22 PROCEDURE — 82962 GLUCOSE BLOOD TEST: CPT

## 2023-02-22 PROCEDURE — 11000001 HC ACUTE MED/SURG PRIVATE ROOM

## 2023-02-22 PROCEDURE — 80048 BASIC METABOLIC PNL TOTAL CA: CPT | Performed by: INTERNAL MEDICINE

## 2023-02-22 PROCEDURE — 63600175 PHARM REV CODE 636 W HCPCS: Performed by: INTERNAL MEDICINE

## 2023-02-22 PROCEDURE — 99233 PR SUBSEQUENT HOSPITAL CARE,LEVL III: ICD-10-PCS | Mod: ,,, | Performed by: HOSPITALIST

## 2023-02-22 PROCEDURE — 99233 PR SUBSEQUENT HOSPITAL CARE,LEVL III: ICD-10-PCS | Mod: ,,, | Performed by: INTERNAL MEDICINE

## 2023-02-22 PROCEDURE — 25000003 PHARM REV CODE 250: Performed by: INTERNAL MEDICINE

## 2023-02-22 PROCEDURE — 97530 THERAPEUTIC ACTIVITIES: CPT | Mod: CQ

## 2023-02-22 RX ADMIN — TICAGRELOR 90 MG: 90 TABLET ORAL at 09:02

## 2023-02-22 RX ADMIN — PANTOPRAZOLE SODIUM 40 MG: 40 TABLET, DELAYED RELEASE ORAL at 09:02

## 2023-02-22 RX ADMIN — CEFTRIAXONE SODIUM 2 G: 2 INJECTION, POWDER, FOR SOLUTION INTRAMUSCULAR; INTRAVENOUS at 05:02

## 2023-02-22 RX ADMIN — QUETIAPINE FUMARATE 50 MG: 25 TABLET ORAL at 09:02

## 2023-02-22 RX ADMIN — BENZONATATE 200 MG: 100 CAPSULE ORAL at 04:02

## 2023-02-22 RX ADMIN — BENZONATATE 200 MG: 100 CAPSULE ORAL at 09:02

## 2023-02-22 RX ADMIN — CARVEDILOL 6.25 MG: 6.25 TABLET, FILM COATED ORAL at 09:02

## 2023-02-22 RX ADMIN — MUPIROCIN: 20 OINTMENT TOPICAL at 09:02

## 2023-02-22 RX ADMIN — AMLODIPINE BESYLATE 5 MG: 5 TABLET ORAL at 09:02

## 2023-02-22 RX ADMIN — ACETAMINOPHEN 650 MG: 325 TABLET ORAL at 09:02

## 2023-02-22 RX ADMIN — TIZANIDINE 4 MG: 4 TABLET ORAL at 09:02

## 2023-02-22 RX ADMIN — ALLOPURINOL 100 MG: 100 TABLET ORAL at 09:02

## 2023-02-22 RX ADMIN — INSULIN ASPART 2 UNITS: 100 INJECTION, SOLUTION INTRAVENOUS; SUBCUTANEOUS at 11:02

## 2023-02-22 RX ADMIN — ENOXAPARIN SODIUM 30 MG: 30 INJECTION SUBCUTANEOUS at 04:02

## 2023-02-22 RX ADMIN — INSULIN DETEMIR 25 UNITS: 100 INJECTION, SOLUTION SUBCUTANEOUS at 09:02

## 2023-02-22 RX ADMIN — INSULIN ASPART 6 UNITS: 100 INJECTION, SOLUTION INTRAVENOUS; SUBCUTANEOUS at 05:02

## 2023-02-22 RX ADMIN — INSULIN ASPART 4 UNITS: 100 INJECTION, SOLUTION INTRAVENOUS; SUBCUTANEOUS at 11:02

## 2023-02-22 RX ADMIN — CEFTRIAXONE SODIUM 2 G: 2 INJECTION, POWDER, FOR SOLUTION INTRAMUSCULAR; INTRAVENOUS at 04:02

## 2023-02-22 RX ADMIN — ACETAMINOPHEN 650 MG: 325 TABLET ORAL at 04:02

## 2023-02-22 RX ADMIN — ACETAMINOPHEN 650 MG: 325 TABLET ORAL at 11:02

## 2023-02-22 NOTE — PLAN OF CARE
Problem: Adult Inpatient Plan of Care  Goal: Patient-Specific Goal (Individualized)  2/21/2023 1813 by Lorraine Vizcaino RN  Outcome: Ongoing, Progressing  2/21/2023 1812 by Lorraine Vizcaino RN  Outcome: Ongoing, Progressing     Problem: Infection  Goal: Absence of Infection Signs and Symptoms  Outcome: Ongoing, Progressing     Problem: Skin Injury Risk Increased  Goal: Skin Health and Integrity  Outcome: Ongoing, Progressing

## 2023-02-22 NOTE — ASSESSMENT & PLAN NOTE
Patient's FSGs are controlled on current medication regimen.  Last A1c reviewed-   Lab Results   Component Value Date    HGBA1C 6.9 (H) 11/22/2022     Most recent fingerstick glucose reviewed- No results for input(s): POCTGLUCOSE in the last 24 hours.  Current correctional scale  Low  Maintain anti-hyperglycemic dose as follows-   Antihyperglycemics (From admission, onward)    Start     Stop Route Frequency Ordered    02/20/23 2100  insulin detemir U-100 injection 25 Units         -- SubQ Nightly 02/20/23 1404    02/20/23 1404  insulin aspart U-100 injection 1-14 Units         -- SubQ Every 6 hours PRN 02/20/23 1404        Hold Oral hypoglycemics while patient is in the hospital.

## 2023-02-22 NOTE — PT/OT/SLP PROGRESS
"Physical Therapy Treatment    Patient Name:  Jessica Bay   MRN:  [id number]    Recommendations:     Discharge Recommendations: rehabilitation facility, nursing facility, skilled  Discharge Equipment Recommendations: other (see comments) (to be determined)  Barriers to discharge:  ongoing medical tretment    Assessment:     Jessica Bay is a 67 y.o. female admitted with a medical diagnosis of Bacterial meningitis.  She presents with the following impairments/functional limitations: weakness, impaired endurance, impaired self care skills, decreased safety awareness.    Pt able to assist with all activities much better than she states/believes.  Pt is somewhat self limiting and requires encouragement to work to maximum potential, pt with recent hearing loss requiring communication with mini-dry eraser board    PT POC discussed with Ailin Robbins DPT     Rehab Prognosis: Good and Fair; patient would benefit from acute skilled PT services to address these deficits and reach maximum level of function.    Recent Surgery: * No surgery found *      Plan:     During this hospitalization, patient to be seen 5 x/week to address the identified rehab impairments via gait training, therapeutic activities, therapeutic exercises and progress toward the following goals:    Plan of Care Expires:  03/21/23    Subjective     Chief Complaint: bacterial meningitis  Patient/Family Comments/goals: "I'm dead weight from waist down"  Pain/Comfort:         Objective:     Communicated with Sherlyn Velasquez LPN prior to session.  Patient found supine with PureWick, bed alarm, peripheral IV upon PT entry to room.     General Precautions: Standard, fall, hearing impaired, droplet  Orthopedic Precautions: N/A  Braces: N/A  Respiratory Status: Room air     Functional Mobility:  Bed Mobility:     Supine to Sit: minimum assistance and increased effort  Transfers:     Sit to Stand:  minimum assistance and of 1-2 persons with rolling walker  Bed to " Chair: minimum assistance and of 1-2 persons with  rolling walker  using  Step Transfer  Gait: 3-4 steps around to chair with RW; sitting before fully aligning with recliner chair      AM-PAC 6 CLICK MOBILITY  Turning over in bed (including adjusting bedclothes, sheets and blankets)?: 3  Sitting down on and standing up from a chair with arms (e.g., wheelchair, bedside commode, etc.): 3  Moving from lying on back to sitting on the side of the bed?: 3  Moving to and from a bed to a chair (including a wheelchair)?: 3  Need to walk in hospital room?: 3 (steps around to chair)  Climbing 3-5 steps with a railing?: 1  Basic Mobility Total Score: 16       Treatment & Education:  Pt performed 2 x 15 reps (B) LE exercises: ap, quad set, glut set, straight leg raise, hip ab/adduction, long arc quad, heel slide with active assist range of motion     Standby assist sitting EOB x 5 minutes    Patient left up in chair with all lines intact and call button in reach..    GOALS:   Multidisciplinary Problems       Physical Therapy Goals          Problem: Physical Therapy    Goal Priority Disciplines Outcome Goal Variances Interventions   Physical Therapy Goal     PT, PT/OT Ongoing, Progressing     Description: Short Term Goals to be met by: 3/7/23    Patient will increase functional independence with mobility by performin. Supine to sit with contact guard assist  2. Sit to stand transfer with contact guard assist using Rolling walker  3. Bed to chair transfer with contact guard assist using Rolling walker  4. Gait  x 100 feet with contact guard assist using Rolling walker  5. Lower extremity exercise program x30 reps per handout, with assistance as needed    Long Term Goals to be met by: 3/21/23    Pt will regain full independent functional mobility with Rolling walker to return to home situation and prior activities of daily living.                        Time Tracking:     PT Received On: 23  PT Start Time: 1033     PT  Stop Time: 1114  PT Total Time (min): 41 min     Billable Minutes: Therapeutic Activity 8 and Therapeutic Exercise 15    Treatment Type: Treatment  PT/PTA: PTA     PTA Visit Number: 1 02/22/2023

## 2023-02-22 NOTE — PROGRESS NOTES
[facility] Hospital Medicine  Progress Note    Patient Name: Jessica Bay  MRN: [id number]  Patient Class: IP- Inpatient   Admission Date: 2/20/2023  Length of Stay: 1 days  Attending Physician: Josephine Collins MD  Primary Care Provider: Jag Lopez MD        Subjective:     Principal Problem:Bacterial meningitis    HPI:  67-year-old female patient with past medical history of diabetes, history of kidney cancer status post nephrectomy, has single kidney, dyslipidemia, hypertension, coronary artery disease status post NSTEMI November 2022 and stent placement to the RCA, history of fibromyalgia, and chronic kidney disease.  Admitted to the hospital 02/12/2023 with altered mental status and fever, patient has history of meningitis in 2015.    Patient admitted to the hospital underwent lumbar puncture which showed evidence of acute meningitis, but patient is CSF cultures remain negative, Gram stain showed no organisms.  Her urine culture grew E coli in her blood culture on February 12th grew Streptococcus pneumoniae sensitive to Rocephin.  Patient continued on Rocephin bacteremia dose.  Patient will need 2-4 weeks of IV antibiotics.    Patient is very hard of hearing, likely related to her previous meningitis.    Patient admitted to [facility] for IV antibiotics and rehab.      Overview/Hospital Course:  Patient admitted to [facility] for continued IV antibiotics.    Plan to also contact [facility] for evaluation by patient's neurologist as well as her infectious disease specialist.      Interval History:     Review of Systems   Constitutional:  Negative for chills and fever.   HENT:  Positive for hearing loss. Negative for congestion.    Eyes:  Negative for pain and discharge.   Respiratory:  Negative for cough, chest tightness, shortness of breath, wheezing and stridor.    Cardiovascular:  Negative for chest pain, palpitations and leg swelling.    Gastrointestinal:  Negative for abdominal distention, abdominal pain, blood in stool, diarrhea, nausea and vomiting.   Endocrine: Negative for cold intolerance, polydipsia and polyuria.   Genitourinary:  Negative for difficulty urinating, dysuria, frequency, hematuria and urgency.   Musculoskeletal:  Negative for arthralgias, back pain and neck pain.   Neurological:  Negative for dizziness, seizures, syncope, weakness, numbness and headaches.   Hematological:  Negative for adenopathy.   Psychiatric/Behavioral:  Negative for behavioral problems.    All other systems reviewed and are negative.  Objective:     Vital Signs (Most Recent):  Temp: 98.8 °F (37.1 °C) (02/21/23 1943)  Pulse: 85 (02/21/23 1943)  Resp: 20 (02/21/23 1943)  BP: (!) 181/83 (02/21/23 1943)  SpO2: 98 % (02/21/23 1943)   Vital Signs (24h Range):  Temp:  [97.4 °F (36.3 °C)-98.8 °F (37.1 °C)] 98.8 °F (37.1 °C)  Pulse:  [68-85] 85  Resp:  [20] 20  SpO2:  [96 %-100 %] 98 %  BP: (108-181)/(51-83) 181/83     Weight: 113.9 kg (251 lb)  Body mass index is 43.08 kg/m².    Intake/Output Summary (Last 24 hours) at 2/21/2023 2023  Last data filed at 2/21/2023 1740  Gross per 24 hour   Intake 290 ml   Output 750 ml   Net -460 ml      Physical Exam  Vitals reviewed.   Constitutional:       Appearance: Normal appearance.      Interventions: She is not intubated.  HENT:      Head: Normocephalic and atraumatic.      Ears:      Comments: Patient is very hard of hearing.     Nose: Nose normal.      Mouth/Throat:      Pharynx: Oropharynx is clear.   Eyes:      Extraocular Movements: Extraocular movements intact.      Conjunctiva/sclera: Conjunctivae normal.      Pupils: Pupils are equal, round, and reactive to light.   Cardiovascular:      Rate and Rhythm: Normal rate.      Heart sounds: Normal heart sounds. No murmur heard.  Pulmonary:      Effort: Pulmonary effort is normal. She is not intubated.      Breath sounds: Normal breath sounds.   Abdominal:      General:  Abdomen is flat. Bowel sounds are normal.      Palpations: Abdomen is soft.   Musculoskeletal:         General: Normal range of motion.      Cervical back: Normal range of motion and neck supple.      Right lower leg: No edema.      Left lower leg: No edema.   Skin:     General: Skin is warm and dry.      Capillary Refill: Capillary refill takes less than 2 seconds.   Neurological:      General: No focal deficit present.      Mental Status: She is alert and oriented to person, place, and time.   Psychiatric:         Mood and Affect: Mood normal.         Behavior: Behavior normal.       Significant Labs: All pertinent labs within the past 24 hours have been reviewed.    Significant Imaging: I have reviewed all pertinent imaging results/findings within the past 24 hours.      Assessment/Plan:      * Bacterial meningitis  Patient sees if cell count upon admission to [facility] was suggestive of bacterial meningitis, culture and Gram stain negative.    Has positive blood cultures with pneumococcus pneumonia sensitive to Rocephin.    Repeat blood cultures negative   We will need to continue antibiotics for 2-4 weeks of IV antibiotics.  Already received 1 week of IV antibiotic at [facility]     Antibiotics (From admission, onward)    Start     Stop Route Frequency Ordered    02/20/23 2100  mupirocin 2 % ointment         02/25 2059 Nasl 2 times daily 02/20/23 1506    02/20/23 1800  vancomycin (VANCOCIN) 2,000 mg in dextrose 5 % (D5W) 500 mL IVPB         -- IV Every 72 hours 02/20/23 1505    02/20/23 1606  vancomycin - pharmacy to dose         -- IV pharmacy to manage frequency 02/20/23 1506    02/20/23 1600  cefTRIAXone (ROCEPHIN) 2 g in dextrose 5 % in water (D5W) 5 % 50 mL IVPB (MB+)         02/27 0359 IV Every 12 hours (non-standard times) 02/20/23 1404            UTI (urinary tract infection)  Urine culture grew E coli UTI, sensitive to Rocephin.         CAD (coronary artery disease)  Status post NSTEMI November  2022   Continue DA PT therapy   Continue beta-blockers   Continue on statins.      Type 2 diabetes mellitus with diabetic chronic kidney disease  Patient's FSGs are controlled on current medication regimen.  Last A1c reviewed-   Lab Results   Component Value Date    HGBA1C 6.9 (H) 11/22/2022     Most recent fingerstick glucose reviewed- No results for input(s): POCTGLUCOSE in the last 24 hours.  Current correctional scale  Low  Maintain anti-hyperglycemic dose as follows-   Antihyperglycemics (From admission, onward)    Start     Stop Route Frequency Ordered    02/20/23 2100  insulin detemir U-100 injection 25 Units         -- SubQ Nightly 02/20/23 1404    02/20/23 1404  insulin aspart U-100 injection 1-14 Units         -- SubQ Every 6 hours PRN 02/20/23 1404        Hold Oral hypoglycemics while patient is in the hospital.    CKD (chronic kidney disease), stage IV  Creatinine stable around 2.    Nephrologist consulted for evaluation.      Acquired absence of kidney  History of renal cell carcinoma status post nephrectomy 15 years ago.      Hypertension  Continue same antihypertensive medications   Hydralazine IV if needed.        VTE Risk Mitigation (From admission, onward)         Ordered     enoxaparin injection 30 mg  Daily         02/20/23 1404                Discharge Planning   RAMOS:      Code Status: Full Code   Is the patient medically ready for discharge?:     Reason for patient still in hospital (select all that apply): Treatment  Discharge Plan A: Home with family                  Josephine Collins MD  Department of Hospital Medicine   [facility]

## 2023-02-22 NOTE — PLAN OF CARE
Problem: Adult Inpatient Plan of Care  Goal: Plan of Care Review  Outcome: Ongoing, Progressing  Goal: Patient-Specific Goal (Individualized)  Outcome: Ongoing, Progressing  Goal: Absence of Hospital-Acquired Illness or Injury  Outcome: Ongoing, Progressing  Goal: Optimal Comfort and Wellbeing  Outcome: Ongoing, Progressing  Goal: Readiness for Transition of Care  Outcome: Ongoing, Progressing     Problem: Diabetes Comorbidity  Goal: Blood Glucose Level Within Targeted Range  Outcome: Ongoing, Progressing  blood glucose monitored 4x's daily AC&HS  basal dose insulin scheduled        Problem: Infection  Goal: Absence of Infection Signs and Symptoms  Outcome: Ongoing, Progressing   fluid intake increased   lightweight bedding   lightweight clothing  Hand hygiene performed before, after, and in between interventions  Scrub hub thoroughly with alcohol let dry 30 seconds  Tubing changed  Infection Management: aseptic technique maintained  Purewick change every shift and PRN     Problem: Impaired Wound Healing  Goal: Optimal Wound Healing  Outcome: Ongoing, Progressing     Problem: Skin Injury Risk Increased  Goal: Skin Health and Integrity  Outcome: Ongoing, Progressing   Foam dressing used as protective barrier for pressure points in place on sacrum and bilateral heels; weight adjustment encouraged     Problem: Bariatric Environmental Safety  Goal: Safety Maintained with Care  Outcome: Ongoing, Progressing

## 2023-02-22 NOTE — H&P
Patient information was obtained from patient and past medical records.      Subjective:      Principal Problem:Bacterial meningitis     Chief Complaint:       Chief Complaint   Patient presents with    Altered Mental Status    Fever    Transfer from [facility] for IV antibiotics.         HPI: 67-year-old female patient with past medical history of diabetes, history of kidney cancer status post nephrectomy, has single kidney, dyslipidemia, hypertension, coronary artery disease status post NSTEMI November 2022 and stent placement to the RCA, history of fibromyalgia, and chronic kidney disease.  Admitted to the hospital 02/12/2023 with altered mental status and fever, patient has history of meningitis in 2015.    Patient admitted to the hospital underwent lumbar puncture which showed evidence of acute meningitis, but patient is CSF cultures remain negative, Gram stain showed no organisms.  Her urine culture grew E coli in her blood culture on February 12th grew Streptococcus pneumoniae sensitive to Rocephin.  Patient continued on Rocephin bacteremia dose.  Patient will need 2-4 weeks of IV antibiotics.    Patient is very hard of hearing, likely related to her previous meningitis.    Patient admitted to [facility] for IV antibiotics and rehab.           Interval History:  Patient without complaints        Objective:      Vital Signs (Most Recent):  Temp: 97.4 °F (36.3 °C) (02/20/23 0402)  Pulse: 72 (02/20/23 0402)  Resp: 18 (02/20/23 0402)  BP: (!) 125/59 (02/20/23 0402)  SpO2: 97 % (02/20/23 0402)    Vital Signs (24h Range):  Temp:  [97.4 °F (36.3 °C)-98.7 °F (37.1 °C)] 97.4 °F (36.3 °C)  Pulse:  [70-90] 72  Resp:  [16-22] 18  SpO2:  [95 %-97 %] 97 %  BP: (109-135)/(45-62) 125/59      Weight: 103.8 kg (228 lb 13.4 oz)  Body mass index is 39.28 kg/m².        Intake/Output Summary (Last 24 hours) at 2/20/2023 1120  Last data filed at 2/19/2023 2307      Gross per 24 hour   Intake --   Output 1300 ml   Net  -1300 ml            Physical Exam  Vitals reviewed.   Constitutional:       Appearance: Normal appearance.      Interventions: She is not intubated.  HENT:      Head: Normocephalic and atraumatic.      Ears:      Comments: Patient is very hard of hearing.     Nose: Nose normal.      Mouth/Throat:      Pharynx: Oropharynx is clear.   Eyes:      Extraocular Movements: Extraocular movements intact.      Conjunctiva/sclera: Conjunctivae normal.      Pupils: Pupils are equal, round, and reactive to light.   Cardiovascular:      Rate and Rhythm: Normal rate.      Heart sounds: Normal heart sounds. No murmur heard.  Pulmonary:      Effort: Pulmonary effort is normal. She is not intubated.      Breath sounds: Normal breath sounds.   Abdominal:      General: Abdomen is flat. Bowel sounds are normal.      Palpations: Abdomen is soft.   Musculoskeletal:         General: Normal range of motion.      Cervical back: Normal range of motion and neck supple.      Right lower leg: No edema.      Left lower leg: No edema.   Skin:     General: Skin is warm and dry.      Capillary Refill: Capillary refill takes less than 2 seconds.   Neurological:      General: No focal deficit present.      Mental Status: She is alert and oriented to person, place, and time.   Psychiatric:         Mood and Affect: Mood normal.         Behavior: Behavior normal.      Review of Systems   Constitutional:  Negative for chills and fever.   HENT:  Positive for hearing loss. Negative for congestion.    Eyes:  Negative for pain and discharge.   Respiratory:  Negative for cough, chest tightness, shortness of breath, wheezing and stridor.    Cardiovascular:  Negative for chest pain, palpitations and leg swelling.   Gastrointestinal:  Negative for abdominal distention, abdominal pain, blood in stool, diarrhea, nausea and vomiting.   Endocrine: Negative for cold intolerance, polydipsia and polyuria.   Genitourinary:  Negative for difficulty urinating, dysuria,  frequency, hematuria and urgency.   Musculoskeletal:  Negative for arthralgias, back pain and neck pain.   Neurological:  Negative for dizziness, seizures, syncope, weakness, numbness and headaches.   Hematological:  Negative for adenopathy.   Psychiatric/Behavioral:  Negative for behavioral problems.    All other systems reviewed and are negative.     Vents:  Oxygen Concentration (%): 32 (02/17/23 0746)     Lines/Drains/Airways         Drain  Duration                Female External Urinary Catheter 02/17/23 1515 2 days                  Peripheral Intravenous Line  Duration                     Peripheral IV - Single Lumen 02/14/23 0530 20 G Right Breast 6 days          Peripheral IV - Single Lumen 02/15/23 2342 20 G Left Breast 4 days                          Significant Labs:     CBC/Anemia Profile:       Recent Labs   Lab 02/19/23  0444 02/20/23  0305   WBC 10.15 17.19*   HGB 7.9* 9.7*   HCT 24.4* 30.0*    298   MCV 87.8 98.4*   RDW 14.1 15.2*            Chemistries:       Recent Labs   Lab 02/19/23  0444 02/20/23  0305    145   K 3.7 3.3*   * 108*   CO2 25 24   BUN 84* 82*   CREATININE 2.83* 2.75*   CALCIUM 8.0* 7.2*            Recent Lab Results  (Last 5 results in the past 24 hours)          02/20/23  0347   02/20/23  0305   02/20/23  0101   02/19/23 2008 02/19/23  1612         Anion Gap     16                      Bands     4                      Baso #     0.05                      Basophil %     0.3                      BUN     82                      BUN/CREAT RATIO     30                      Calcium     7.2                      Chloride     108                      CO2     24                      Creatinine     2.75                      Differential Type     Manual                      eGFR     18                      Eos #     0.04                      Eosinophil %     0.2                      Glucose     149                      Hematocrit     30.0                      Hemoglobin      9.7                      Immature Grans (Abs)     1.17                      Immature Granulocytes     6.8                      Lymph #     2.44                      Lymph %     14.2                            13                      MCH     31.8                      MCHC     32.3                      MCV     98.4                      Mono #     0.65                      Mono %     3.8                      MPV     11.0                      Neutrophils, Abs     12.84                      Neutrophils Relative     74.7                      nRBC     0.0                      NUCLEATED RBC ABSOLUTE     0.00                      PLATELET MORPHOLOGY     Normal                      Platelets     298                      POC Glucose 136        220    192    161          Potassium     3.3                      RBC     3.05                      RBC Morphology     Normal                      RDW     15.2                      Segmented Neutrophils, Man %     83                      Sodium     145                      WBC     17.19                                                             Significant Imaging:  I have reviewed all pertinent imaging results/findings within the past 24 hours.        CRANIAL NERVES      CN III, IV, VI   Pupils are equal, round, and reactive to light.     Assessment/Plan:      * Bacterial meningitis  Patient sees if cell count upon admission to [facility] was suggestive of bacterial meningitis, culture and Gram stain negative.    Has positive blood cultures with pneumococcus pneumonia sensitive to Rocephin.    Repeat blood cultures negative   We will need to continue antibiotics for 2-4 weeks of IV antibiotics.  Already received 1 week of IV antibiotic at [facility].        UTI (urinary tract infection)  Urine culture grew E coli UTI, sensitive to Rocephin.     CAD (coronary artery disease)     Status post NSTEMI November 2022   Continue DA PT therapy   Continue beta-blockers   Continue on  statins.     Type 2 diabetes mellitus with diabetic chronic kidney disease  Patient's FSGs are controlled on current medication regimen.  Last A1c reviewed-         Lab Results   Component Value Date     HGBA1C 6.9 (H) 11/22/2022      Most recent fingerstick glucose reviewed- No results for input(s): POCTGLUCOSE in the last 24 hours.  Current correctional scale  Medium  Maintain anti-hyperglycemic dose as follows-             Antihyperglycemics (From admission, onward)     Start     Stop Route Frequency Ordered     02/16/23 2100   insulin detemir U-100 injection 25 Units         -- SubQ Nightly 02/16/23 0817     02/14/23 1105   insulin aspart U-100 injection 1-14 Units         -- SubQ Every 6 hours PRN 02/14/23 1105          Hold Oral hypoglycemics while patient is in the hospital.     Stage 4 chronic kidney disease  Creatinine stable around 2.    Nephrologist consulted for evaluation.        Acquired absence of kidney  History of renal cell carcinoma status post nephrectomy 15 years ago.        Hypertension  Continue same antihypertensive medications   Hydralazine IV if needed.               VTE Risk Mitigation (From admission, onward)           Ordered       enoxaparin injection 30 mg  Daily         02/13/23 0851                       Michael Zendejas MD  Department of Hospital Medicine

## 2023-02-22 NOTE — PLAN OF CARE
Problem: Physical Therapy  Goal: Physical Therapy Goal  Description: Short Term Goals to be met by: 3/7/23    Patient will increase functional independence with mobility by performin. Supine to sit with contact guard assist  2. Sit to stand transfer with contact guard assist using Rolling walker  3. Bed to chair transfer with contact guard assist using Rolling walker  4. Gait  x 100 feet with contact guard assist using Rolling walker  5. Lower extremity exercise program x30 reps per handout, with assistance as needed    Long Term Goals to be met by: 3/21/23    Pt will regain full independent functional mobility with Rolling walker to return to home situation and prior activities of daily living.   Outcome: Ongoing, Progressing       Supine to Sit: minimum assistance and increased effort  Sitting EOB:  standby assistance     Sit to Stand:  minimum assistance and of 1-2 persons with rolling walker  Bed to Chair: minimum assistance and of 1-2 persons with  rolling walker  using  Step Transfer  Gait: 3-4 steps around to chair with RW; sitting before fully aligning with recliner chair    Pt is a recent eval and will benefit from cont PT to maximize functional potential

## 2023-02-22 NOTE — PROGRESS NOTES
Wound care note;  Patient skin was evaluated today.   Patient up in chair, Assisted back to bed x3 people.   Sacral and Bilateral heels with out pressure injury noted.   Has old bruising to abdomen and hips   Able to assist with turning and repositioning   Uses electric wheelchair at home   Guillen catheter patent   Continent of bowel  Encourage to turn and reposition frequently   Pillows to offload heels /waffle boots  On low air loss mattress  Wound care team to follow

## 2023-02-22 NOTE — PT/OT/SLP PROGRESS
Occupational Therapy   Treatment    Name: Jessica Bay  MRN: [id number]  Admitting Diagnosis:  Bacterial meningitis       Recommendations:     Discharge Recommendations: rehabilitation facility, nursing facility, skilled  Discharge Equipment Recommendations:   (to be determined)  Barriers to discharge:  None    Assessment:     Jessica Bay is a 67 y.o. female with a medical diagnosis of Bacterial meningitis.  She presents with no complaints. Performance deficits affecting function are weakness, impaired endurance.     Rehab Prognosis:  Good; patient would benefit from acute skilled OT services to address these deficits and reach maximum level of function.       Plan:     Patient to be seen 5 x/week to address the above listed problems via self-care/home management, therapeutic activities, therapeutic exercises  Plan of Care Expires:    Plan of Care Reviewed with: patient    Subjective     Pain/Comfort:  Pain Rating 1: 0/10  Pain Rating Post-Intervention 1: 0/10    Objective:     Communicated with: TREVOR Velasquez prior to session.  Patient found up in chair with PureWick, bed alarm, peripheral IV upon OT entry to room.    General Precautions: Standard, fall    Orthopedic Precautions:N/A  Braces: N/A  Respiratory Status: Room air     Occupational Performance:     Bed Mobility:         Functional Mobility/Transfers:    Functional Mobility:     Activities of Daily Living:  Grooming: I with combing hair and washing face and hands .      [facility] 6 Click ADL:      Treatment & Education:  Pt performed UE strengthening exercises. 2 lb hand wt x 2 sets of 15 reps (B) shoulder flexion/extension, adduction/abduction and (B) elbow and wrist flexion/extension. Red theraband x 2 sets of 15 reps (B) elbow flexion and extension. Pt participated well with tx.    Patient left up in chair with all lines intact, call button in reach, and nurse present    GOALS:   Multidisciplinary Problems       Occupational Therapy Goals           Problem: Occupational Therapy    Goal Priority Disciplines Outcome Interventions   Occupational Therapy Goal     OT, PT/OT Ongoing, Progressing    Description: STG:  Pt will perform grooming with setup  Pt will bathe with min a  Pt will perform UE dressing with setup  Pt will perform LE dressing with min a with AD as needed  Pt will sit EOB x 15 min with (I)  Pt will transfer bed/chair/bsc with min a with AD as needed  Pt will tolerate 20 minutes of tx without fatigue      LT.Restore to max I with self care and mobility.                          Time Tracking:     OT Date of Treatment: 23  OT Start Time: 1132  OT Stop Time: 1200  OT Total Time (min): 28 min    Billable Minutes:Therapeutic Exercise 20    OT/GLADYS: OT          2023

## 2023-02-22 NOTE — PLAN OF CARE
Problem: Occupational Therapy  Goal: Occupational Therapy Goal  Description: STG:  Pt will perform grooming with setup  Pt will bathe with min a  Pt will perform UE dressing with setup  Pt will perform LE dressing with min a with AD as needed  Pt will sit EOB x 15 min with (I)  Pt will transfer bed/chair/bsc with min a with AD as needed  Pt will tolerate 20 minutes of tx without fatigue      LT.Restore to max I with self care and mobility.     Outcome: Ongoing, Progressing    Pt was evaluated on 23. Plan is to continue with addressing established goals.

## 2023-02-22 NOTE — ASSESSMENT & PLAN NOTE
Patient sees if cell count upon admission to [facility] was suggestive of bacterial meningitis, culture and Gram stain negative.    Has positive blood cultures with pneumococcus pneumonia sensitive to Rocephin.    Repeat blood cultures negative   We will need to continue antibiotics for 2-4 weeks of IV antibiotics.  Already received 1 week of IV antibiotic at [facility]     Antibiotics (From admission, onward)    Start     Stop Route Frequency Ordered    02/20/23 2100  mupirocin 2 % ointment         02/25 2059 Nasl 2 times daily 02/20/23 1506    02/20/23 1800  vancomycin (VANCOCIN) 2,000 mg in dextrose 5 % (D5W) 500 mL IVPB         -- IV Every 72 hours 02/20/23 1505    02/20/23 1606  vancomycin - pharmacy to dose         -- IV pharmacy to manage frequency 02/20/23 1506    02/20/23 1600  cefTRIAXone (ROCEPHIN) 2 g in dextrose 5 % in water (D5W) 5 % 50 mL IVPB (MB+)         02/27 0359 IV Every 12 hours (non-standard times) 02/20/23 1404

## 2023-02-22 NOTE — HOSPITAL COURSE
[facility] admitted to [facility] for continued IV antibiotics.    Plan to also contact [facility] for evaluation by patient's neurologist as well as her infectious disease specialist.    2/22:  Discussed case with Neurology at [facility].  This was the preference of the patient.  Patient previously had meningitis in 2015 and neurologist at that time was Dr. Liriano.  Dr. Kim Liriano she recommended an MRI with and without contrast.  Number that she can be reached at [phone].    Of note, patient had glucose of less than 1 in CSF during this episode of meningitis.  The organism was strep pneumo.  Dr. Adam stated that mortality is very high in meningitis cases this severe.  She also stated that patient will very likely regain her hearing over time.      2/23:  Patient states that she would need an open MRI in order to have an MRI.  She further states that her kidneys did not permit her to have contrast MRI.  All discuss this further with Dr. Liriano.  It is notable that the patient's son states that she had fluid in her ears and this contributed to her having difficulty caring in the past.    2/24:  Continue with current care for now.  Consult ID at [facility] on Monday.      2/25: Added PTH per patient request. Consult ID on Monday for duration and choice of antibiotics. F/u with neurology.    Discuss dispo with social work.  Patient requests swing-bed on discharge.      2/26:  PTH was elevated at 306.  Renal panel ordered.  Likely related to renal disease versus immobilization.  Can discuss further with Nephrology.    Patient has several diet request and we are attempting took accommodate those.    2/27:  Reports headache today.  Will attempt to have a repeat LP.  Patient was on Brilinta unable to have the repeat LP for the next 5 days.  Contacted [facility] Infectious disease and [facility].    2/28: complete 14 additional days of Ceftriaxone since patient has lost her  hearing.  Contact neurology if needed.    Dr. Kim Liriano she recommended an MRI with and without contrast.  Patient cannot have contrast.  Give anxiety medication and she may be willing to do MRI without contrast.  Number for Dr. Liriano: [phone].    Lumbar puncture in 3-4 days (had to hold brilinta for 5 days).  Patient reports headache so we may monitor CSF.     3/1- will repeat lumbar puncture on 3/3- last dose brillenta 2/27.   3/2-LP tomorrow  3/3-Stable for discharge- refusing MRI.  Will follow up repeat LP results.  Will continue IV Rocephin with end date of 3/12/23.  Stable for DC to swingbed.  Needs to follow up with PCP.

## 2023-02-22 NOTE — PLAN OF CARE
Problem: Adult Inpatient Plan of Care  Goal: Plan of Care Review  Outcome: Ongoing, Progressing  Goal: Patient-Specific Goal (Individualized)  Outcome: Ongoing, Progressing  Goal: Optimal Comfort and Wellbeing  Outcome: Ongoing, Progressing  Goal: Readiness for Transition of Care  Outcome: Ongoing, Progressing     Problem: Diabetes Comorbidity  Goal: Blood Glucose Level Within Targeted Range  Outcome: Ongoing, Progressing

## 2023-02-23 LAB
ANION GAP SERPL CALCULATED.3IONS-SCNC: 16 MMOL/L (ref 7–16)
ANISOCYTOSIS BLD QL SMEAR: ABNORMAL
BASOPHILS # BLD AUTO: 0.03 K/UL (ref 0–0.2)
BASOPHILS NFR BLD AUTO: 0.2 % (ref 0–1)
BUN SERPL-MCNC: 65 MG/DL (ref 7–18)
BUN/CREAT SERPL: 26 (ref 6–20)
CALCIUM SERPL-MCNC: 7.9 MG/DL (ref 8.5–10.1)
CHLORIDE SERPL-SCNC: 111 MMOL/L (ref 98–107)
CO2 SERPL-SCNC: 24 MMOL/L (ref 21–32)
CREAT SERPL-MCNC: 2.54 MG/DL (ref 0.55–1.02)
CRENATED CELLS: ABNORMAL
DIFFERENTIAL METHOD BLD: ABNORMAL
EGFR (NO RACE VARIABLE) (RUSH/TITUS): 20 ML/MIN/1.73M²
EOSINOPHIL # BLD AUTO: 0.2 K/UL (ref 0–0.5)
EOSINOPHIL NFR BLD AUTO: 1.5 % (ref 1–4)
EOSINOPHIL NFR BLD MANUAL: 2 % (ref 1–4)
ERYTHROCYTE [DISTWIDTH] IN BLOOD BY AUTOMATED COUNT: 16.1 % (ref 11.5–14.5)
GLUCOSE SERPL-MCNC: 145 MG/DL (ref 70–105)
GLUCOSE SERPL-MCNC: 195 MG/DL (ref 70–105)
GLUCOSE SERPL-MCNC: 204 MG/DL (ref 70–105)
GLUCOSE SERPL-MCNC: 308 MG/DL (ref 70–105)
GLUCOSE SERPL-MCNC: 65 MG/DL (ref 74–106)
GLUCOSE SERPL-MCNC: 74 MG/DL (ref 70–105)
HCT VFR BLD AUTO: 27.7 % (ref 38–47)
HGB BLD-MCNC: 8.6 G/DL (ref 12–16)
IMM GRANULOCYTES # BLD AUTO: 0.93 K/UL (ref 0–0.04)
IMM GRANULOCYTES NFR BLD: 7.1 % (ref 0–0.4)
LYMPHOCYTES # BLD AUTO: 2.06 K/UL (ref 1–4.8)
LYMPHOCYTES NFR BLD AUTO: 15.7 % (ref 27–41)
LYMPHOCYTES NFR BLD MANUAL: 13 % (ref 27–41)
MACROCYTES BLD QL SMEAR: ABNORMAL
MCH RBC QN AUTO: 31.6 PG (ref 27–31)
MCHC RBC AUTO-ENTMCNC: 31 G/DL (ref 32–36)
MCV RBC AUTO: 101.8 FL (ref 80–96)
METAMYELOCYTES NFR BLD MANUAL: 3 %
MONOCYTES # BLD AUTO: 0.8 K/UL (ref 0–0.8)
MONOCYTES NFR BLD AUTO: 6.1 % (ref 2–6)
MONOCYTES NFR BLD MANUAL: 4 % (ref 2–6)
MPC BLD CALC-MCNC: 10.6 FL (ref 9.4–12.4)
MYELOCYTES NFR BLD MANUAL: 1 %
NEUTROPHILS # BLD AUTO: 9.1 K/UL (ref 1.8–7.7)
NEUTROPHILS NFR BLD AUTO: 69.4 % (ref 53–65)
NEUTS SEG NFR BLD MANUAL: 77 % (ref 50–62)
NRBC # BLD AUTO: 0 X10E3/UL
NRBC, AUTO (.00): 0 %
OVALOCYTES BLD QL SMEAR: ABNORMAL
PLATELET # BLD AUTO: 301 K/UL (ref 150–400)
PLATELET MORPHOLOGY: ABNORMAL
POLYCHROMASIA BLD QL SMEAR: ABNORMAL
POTASSIUM SERPL-SCNC: 3.5 MMOL/L (ref 3.5–5.1)
RBC # BLD AUTO: 2.72 M/UL (ref 4.2–5.4)
SODIUM SERPL-SCNC: 147 MMOL/L (ref 136–145)
WBC # BLD AUTO: 13.12 K/UL (ref 4.5–11)

## 2023-02-23 PROCEDURE — 97530 THERAPEUTIC ACTIVITIES: CPT | Mod: CQ

## 2023-02-23 PROCEDURE — 97110 THERAPEUTIC EXERCISES: CPT | Mod: CQ

## 2023-02-23 PROCEDURE — 99233 PR SUBSEQUENT HOSPITAL CARE,LEVL III: ICD-10-PCS | Mod: ,,, | Performed by: HOSPITALIST

## 2023-02-23 PROCEDURE — 99233 SBSQ HOSP IP/OBS HIGH 50: CPT | Mod: ,,, | Performed by: HOSPITALIST

## 2023-02-23 PROCEDURE — 82962 GLUCOSE BLOOD TEST: CPT

## 2023-02-23 PROCEDURE — 25000003 PHARM REV CODE 250: Performed by: HOSPITALIST

## 2023-02-23 PROCEDURE — 11000001 HC ACUTE MED/SURG PRIVATE ROOM

## 2023-02-23 PROCEDURE — 63600175 PHARM REV CODE 636 W HCPCS: Performed by: INTERNAL MEDICINE

## 2023-02-23 PROCEDURE — 97110 THERAPEUTIC EXERCISES: CPT | Mod: CO

## 2023-02-23 PROCEDURE — 25000003 PHARM REV CODE 250: Performed by: INTERNAL MEDICINE

## 2023-02-23 PROCEDURE — 80048 BASIC METABOLIC PNL TOTAL CA: CPT | Performed by: INTERNAL MEDICINE

## 2023-02-23 PROCEDURE — 85025 COMPLETE CBC W/AUTO DIFF WBC: CPT | Performed by: INTERNAL MEDICINE

## 2023-02-23 RX ADMIN — ALLOPURINOL 100 MG: 100 TABLET ORAL at 09:02

## 2023-02-23 RX ADMIN — INSULIN DETEMIR 25 UNITS: 100 INJECTION, SOLUTION SUBCUTANEOUS at 09:02

## 2023-02-23 RX ADMIN — TICAGRELOR 90 MG: 90 TABLET ORAL at 09:02

## 2023-02-23 RX ADMIN — QUETIAPINE FUMARATE 50 MG: 25 TABLET ORAL at 09:02

## 2023-02-23 RX ADMIN — MUPIROCIN: 20 OINTMENT TOPICAL at 09:02

## 2023-02-23 RX ADMIN — INSULIN ASPART 6 UNITS: 100 INJECTION, SOLUTION INTRAVENOUS; SUBCUTANEOUS at 05:02

## 2023-02-23 RX ADMIN — CARVEDILOL 6.25 MG: 6.25 TABLET, FILM COATED ORAL at 09:02

## 2023-02-23 RX ADMIN — ACETAMINOPHEN 650 MG: 325 TABLET ORAL at 09:02

## 2023-02-23 RX ADMIN — CEFTRIAXONE SODIUM 2 G: 2 INJECTION, POWDER, FOR SOLUTION INTRAMUSCULAR; INTRAVENOUS at 03:02

## 2023-02-23 RX ADMIN — TIZANIDINE 4 MG: 4 TABLET ORAL at 09:02

## 2023-02-23 RX ADMIN — ACETAMINOPHEN 650 MG: 325 TABLET ORAL at 03:02

## 2023-02-23 RX ADMIN — AMLODIPINE BESYLATE 5 MG: 5 TABLET ORAL at 09:02

## 2023-02-23 RX ADMIN — BENZONATATE 200 MG: 100 CAPSULE ORAL at 09:02

## 2023-02-23 RX ADMIN — BENZONATATE 200 MG: 100 CAPSULE ORAL at 03:02

## 2023-02-23 RX ADMIN — ENOXAPARIN SODIUM 30 MG: 30 INJECTION SUBCUTANEOUS at 05:02

## 2023-02-23 RX ADMIN — CEFTRIAXONE SODIUM 2 G: 2 INJECTION, POWDER, FOR SOLUTION INTRAMUSCULAR; INTRAVENOUS at 04:02

## 2023-02-23 RX ADMIN — PANTOPRAZOLE SODIUM 40 MG: 40 TABLET, DELAYED RELEASE ORAL at 09:02

## 2023-02-23 RX ADMIN — VANCOMYCIN HYDROCHLORIDE 2000 MG: 1 INJECTION, POWDER, LYOPHILIZED, FOR SOLUTION INTRAVENOUS at 05:02

## 2023-02-23 NOTE — PROGRESS NOTES
[facility] Hospital Medicine  Progress Note    Patient Name: Jessica Bay  MRN: [id number]  Patient Class: IP- Inpatient   Admission Date: 2/20/2023  Length of Stay: 3 days  Attending Physician: Josephine Collins MD  Primary Care Provider: Jag Lopez MD        Subjective:     Principal Problem:Bacterial meningitis    HPI:  67-year-old female patient with past medical history of diabetes, history of kidney cancer status post nephrectomy, has single kidney, dyslipidemia, hypertension, coronary artery disease status post NSTEMI November 2022 and stent placement to the RCA, history of fibromyalgia, and chronic kidney disease.  Admitted to the hospital 02/12/2023 with altered mental status and fever, patient has history of meningitis in 2015.    Patient admitted to the hospital underwent lumbar puncture which showed evidence of acute meningitis, but patient is CSF cultures remain negative, Gram stain showed no organisms.  Her urine culture grew E coli in her blood culture on February 12th grew Streptococcus pneumoniae sensitive to Rocephin.  Patient continued on Rocephin bacteremia dose.  Patient will need 2-4 weeks of IV antibiotics.    Patient is very hard of hearing, likely related to her previous meningitis.    Patient admitted to [facility] for IV antibiotics and rehab.      Overview/Hospital Course:  Patient admitted to [facility] for continued IV antibiotics.    Plan to also contact [facility] for evaluation by patient's neurologist as well as her infectious disease specialist.    2/22:  Discussed case with Neurology at [facility].  This was the preference of the patient.  Patient previously had meningitis in 2015 and neurologist at that time was Dr. Liriano.  Dr. Kim Liriano she recommended an MRI with and without contrast.  Number that she can be reached at [phone].    Of note, patient had glucose of less than 1 in CSF during this episode of  meningitis.  The organism was strep pneumo.  Dr. Adam stated that mortality is very high in meningitis cases this severe.  She also stated that patient will very likely regain her hearing over time.      2/23:  Patient states that she would need an open MRI in order to have an MRI.  She further states that her kidneys did not permit her to have contrast MRI.  All discuss this further with Dr. Liriano.  It is notable that the patient's son states that she had fluid in her ears and this contributed to her having difficulty caring in the past.      No new subjective & objective note has been filed under this hospital service since the last note was generated.      Assessment/Plan:      * Bacterial meningitis  Patient sees if cell count upon admission to [facility] was suggestive of bacterial meningitis, culture and Gram stain negative.    Has positive blood cultures with pneumococcus pneumonia sensitive to Rocephin.    Repeat blood cultures negative   We will need to continue antibiotics for 2-4 weeks of IV antibiotics.  Already received 1 week of IV antibiotic at [facility]     Antibiotics (From admission, onward)    Start     Stop Route Frequency Ordered    02/20/23 2100  mupirocin 2 % ointment         02/25 2059 Nasl 2 times daily 02/20/23 1506    02/20/23 1800  vancomycin (VANCOCIN) 2,000 mg in dextrose 5 % (D5W) 500 mL IVPB         -- IV Every 72 hours 02/20/23 1505    02/20/23 1606  vancomycin - pharmacy to dose         -- IV pharmacy to manage frequency 02/20/23 1506    02/20/23 1600  cefTRIAXone (ROCEPHIN) 2 g in dextrose 5 % in water (D5W) 5 % 50 mL IVPB (MB+)         02/27 0359 IV Every 12 hours (non-standard times) 02/20/23 1404        2/22  MRI with and without contrast per recommendation of neurology.   Consult ID at [facility].      2/23: no MRI.  Consult ID at [facility].       UTI (urinary tract infection)  Urine culture grew E coli UTI, sensitive to Rocephin.         CAD (coronary artery  disease)  Status post NSTEMI November 2022   Continue DA PT therapy   Continue beta-blockers   Continue on statins.      Type 2 diabetes mellitus with diabetic chronic kidney disease  Patient's FSGs are controlled on current medication regimen.  Last A1c reviewed-   Lab Results   Component Value Date    HGBA1C 6.9 (H) 11/22/2022     Most recent fingerstick glucose reviewed- No results for input(s): POCTGLUCOSE in the last 24 hours.  Current correctional scale  Low  Maintain anti-hyperglycemic dose as follows-   Antihyperglycemics (From admission, onward)    Start     Stop Route Frequency Ordered    02/20/23 2100  insulin detemir U-100 injection 25 Units         -- SubQ Nightly 02/20/23 1404    02/20/23 1404  insulin aspart U-100 injection 1-14 Units         -- SubQ Every 6 hours PRN 02/20/23 1404        Hold Oral hypoglycemics while patient is in the hospital.    CKD (chronic kidney disease), stage IV  Creatinine stable around 2.    Nephrologist consulted for evaluation.      Acquired absence of kidney  History of renal cell carcinoma status post nephrectomy 15 years ago.      Hypertension  Continue same antihypertensive medications   Hydralazine IV if needed.        VTE Risk Mitigation (From admission, onward)         Ordered     enoxaparin injection 30 mg  Daily         02/20/23 1404                Discharge Planning   RAMOS:      Code Status: Full Code   Is the patient medically ready for discharge?:     Reason for patient still in hospital (select all that apply): Treatment  Discharge Plan A: Home with family                  Josephine Collins MD  Department of Hospital Medicine   [facility]

## 2023-02-23 NOTE — ASSESSMENT & PLAN NOTE
Patient sees if cell count upon admission to [facility] was suggestive of bacterial meningitis, culture and Gram stain negative.    Has positive blood cultures with pneumococcus pneumonia sensitive to Rocephin.    Repeat blood cultures negative   We will need to continue antibiotics for 2-4 weeks of IV antibiotics.  Already received 1 week of IV antibiotic at [facility]     Antibiotics (From admission, onward)    Start     Stop Route Frequency Ordered    02/20/23 2100  mupirocin 2 % ointment         02/25 2059 Nasl 2 times daily 02/20/23 1506    02/20/23 1800  vancomycin (VANCOCIN) 2,000 mg in dextrose 5 % (D5W) 500 mL IVPB         -- IV Every 72 hours 02/20/23 1505    02/20/23 1606  vancomycin - pharmacy to dose         -- IV pharmacy to manage frequency 02/20/23 1506    02/20/23 1600  cefTRIAXone (ROCEPHIN) 2 g in dextrose 5 % in water (D5W) 5 % 50 mL IVPB (MB+)         02/27 0359 IV Every 12 hours (non-standard times) 02/20/23 1404        2/22  MRI with and without contrast per recommendation of neurology.   Consult ID at [facility].

## 2023-02-23 NOTE — PLAN OF CARE
Problem: Adult Inpatient Plan of Care  Goal: Plan of Care Review  Outcome: Ongoing, Progressing     Problem: Diabetes Comorbidity  Goal: Blood Glucose Level Within Targeted Range  Outcome: Ongoing, Progressing     Problem: Skin Injury Risk Increased  Goal: Skin Health and Integrity  Outcome: Ongoing, Progressing

## 2023-02-23 NOTE — PLAN OF CARE
Problem: Adult Inpatient Plan of Care  Goal: Plan of Care Review  Outcome: Ongoing, Progressing     Problem: Infection  Goal: Absence of Infection Signs and Symptoms  Outcome: Ongoing, Progressing     Problem: Skin Injury Risk Increased  Goal: Skin Health and Integrity  Outcome: Ongoing, Progressing

## 2023-02-23 NOTE — PROGRESS NOTES
[facility] Hospital Medicine  Progress Note    Patient Name: Jessica Bay  MRN: [id number]  Patient Class: IP- Inpatient   Admission Date: 2/20/2023  Length of Stay: 2 days  Attending Physician: Josephine Collins MD  Primary Care Provider: Jag Lopez MD        Subjective:     Principal Problem:Bacterial meningitis    HPI:  67-year-old female patient with past medical history of diabetes, history of kidney cancer status post nephrectomy, has single kidney, dyslipidemia, hypertension, coronary artery disease status post NSTEMI November 2022 and stent placement to the RCA, history of fibromyalgia, and chronic kidney disease.  Admitted to the hospital 02/12/2023 with altered mental status and fever, patient has history of meningitis in 2015.    Patient admitted to the hospital underwent lumbar puncture which showed evidence of acute meningitis, but patient is CSF cultures remain negative, Gram stain showed no organisms.  Her urine culture grew E coli in her blood culture on February 12th grew Streptococcus pneumoniae sensitive to Rocephin.  Patient continued on Rocephin bacteremia dose.  Patient will need 2-4 weeks of IV antibiotics.    Patient is very hard of hearing, likely related to her previous meningitis.    Patient admitted to [facility] for IV antibiotics and rehab.      Overview/Hospital Course:  Patient admitted to [facility] for continued IV antibiotics.    Plan to also contact [facility] for evaluation by patient's neurologist as well as her infectious disease specialist.    2/22:  Discussed case with Neurology at [facility].  This was the preference of the patient.  Patient previously had meningitis in 2015 and neurologist at that time was Dr. Liriano.  Dr. Kim Liriano she recommended an MRI with and without contrast.  Number that she can be reached at [phone].    Of note, patient had glucose of less than 1 in CSF during this episode of  meningitis.  The organism was strep pneumo.  Dr. Adam stated that mortality is very high in meningitis cases this severe.  She also stated that patient will very likely regain her hearing over time.        Interval History:     Review of Systems   Constitutional:  Negative for chills and fever.   HENT:  Positive for hearing loss. Negative for congestion.    Eyes:  Negative for pain and discharge.   Respiratory:  Negative for cough, chest tightness, shortness of breath, wheezing and stridor.    Cardiovascular:  Negative for chest pain, palpitations and leg swelling.   Gastrointestinal:  Negative for abdominal distention, abdominal pain, blood in stool, diarrhea, nausea and vomiting.   Endocrine: Negative for cold intolerance, polydipsia and polyuria.   Genitourinary:  Negative for difficulty urinating, dysuria, frequency, hematuria and urgency.   Musculoskeletal:  Negative for arthralgias, back pain and neck pain.   Neurological:  Negative for dizziness, seizures, syncope, weakness, numbness and headaches.   Hematological:  Negative for adenopathy.   Psychiatric/Behavioral:  Negative for behavioral problems.    All other systems reviewed and are negative.  Objective:     Vital Signs (Most Recent):  Temp: 99.1 °F (37.3 °C) (02/22/23 2000)  Pulse: 99 (02/22/23 2000)  Resp: 18 (02/22/23 2000)  BP: 133/75 (02/22/23 2000)  SpO2: 98 % (02/22/23 2000) Vital Signs (24h Range):  Temp:  [96.9 °F (36.1 °C)-99.1 °F (37.3 °C)] 99.1 °F (37.3 °C)  Pulse:  [64-99] 99  Resp:  [18-20] 18  SpO2:  [95 %-100 %] 98 %  BP: (112-159)/(65-77) 133/75     Weight: 112.7 kg (248 lb 7.3 oz)  Body mass index is 42.65 kg/m².    Intake/Output Summary (Last 24 hours) at 2/22/2023 2148  Last data filed at 2/22/2023 1837  Gross per 24 hour   Intake 290 ml   Output 1200 ml   Net -910 ml      Physical Exam  Vitals reviewed.   Constitutional:       Appearance: Normal appearance.      Interventions: She is not intubated.  HENT:      Head:  Normocephalic and atraumatic.      Ears:      Comments: Patient is very hard of hearing.     Nose: Nose normal.      Mouth/Throat:      Pharynx: Oropharynx is clear.   Eyes:      Extraocular Movements: Extraocular movements intact.      Conjunctiva/sclera: Conjunctivae normal.      Pupils: Pupils are equal, round, and reactive to light.   Cardiovascular:      Rate and Rhythm: Normal rate.      Heart sounds: Normal heart sounds. No murmur heard.  Pulmonary:      Effort: Pulmonary effort is normal. She is not intubated.      Breath sounds: Normal breath sounds.   Abdominal:      General: Abdomen is flat. Bowel sounds are normal.      Palpations: Abdomen is soft.   Musculoskeletal:         General: Normal range of motion.      Cervical back: Normal range of motion and neck supple.      Right lower leg: No edema.      Left lower leg: No edema.   Skin:     General: Skin is warm and dry.      Capillary Refill: Capillary refill takes less than 2 seconds.   Neurological:      General: No focal deficit present.      Mental Status: She is alert and oriented to person, place, and time.   Psychiatric:         Mood and Affect: Mood normal.         Behavior: Behavior normal.       Significant Labs: All pertinent labs within the past 24 hours have been reviewed.    Significant Imaging: I have reviewed all pertinent imaging results/findings within the past 24 hours.      Assessment/Plan:      * Bacterial meningitis  Patient sees if cell count upon admission to [facility] was suggestive of bacterial meningitis, culture and Gram stain negative.    Has positive blood cultures with pneumococcus pneumonia sensitive to Rocephin.    Repeat blood cultures negative   We will need to continue antibiotics for 2-4 weeks of IV antibiotics.  Already received 1 week of IV antibiotic at [facility]     Antibiotics (From admission, onward)    Start     Stop Route Frequency Ordered    02/20/23 2100  mupirocin 2 % ointment         02/25 2059  Nasl 2 times daily 02/20/23 1506    02/20/23 1800  vancomycin (VANCOCIN) 2,000 mg in dextrose 5 % (D5W) 500 mL IVPB         -- IV Every 72 hours 02/20/23 1505    02/20/23 1606  vancomycin - pharmacy to dose         -- IV pharmacy to manage frequency 02/20/23 1506    02/20/23 1600  cefTRIAXone (ROCEPHIN) 2 g in dextrose 5 % in water (D5W) 5 % 50 mL IVPB (MB+)         02/27 0359 IV Every 12 hours (non-standard times) 02/20/23 1404        2/22  MRI with and without contrast per recommendation of neurology.   Consult ID at [facility].       UTI (urinary tract infection)  Urine culture grew E coli UTI, sensitive to Rocephin.         CAD (coronary artery disease)  Status post NSTEMI November 2022   Continue DA PT therapy   Continue beta-blockers   Continue on statins.      Type 2 diabetes mellitus with diabetic chronic kidney disease  Patient's FSGs are controlled on current medication regimen.  Last A1c reviewed-   Lab Results   Component Value Date    HGBA1C 6.9 (H) 11/22/2022     Most recent fingerstick glucose reviewed- No results for input(s): POCTGLUCOSE in the last 24 hours.  Current correctional scale  Low  Maintain anti-hyperglycemic dose as follows-   Antihyperglycemics (From admission, onward)    Start     Stop Route Frequency Ordered    02/20/23 2100  insulin detemir U-100 injection 25 Units         -- SubQ Nightly 02/20/23 1404    02/20/23 1404  insulin aspart U-100 injection 1-14 Units         -- SubQ Every 6 hours PRN 02/20/23 1404        Hold Oral hypoglycemics while patient is in the hospital.    CKD (chronic kidney disease), stage IV  Creatinine stable around 2.    Nephrologist consulted for evaluation.      Acquired absence of kidney  History of renal cell carcinoma status post nephrectomy 15 years ago.      Hypertension  Continue same antihypertensive medications   Hydralazine IV if needed.        VTE Risk Mitigation (From admission, onward)         Ordered     enoxaparin injection 30 mg  Daily          02/20/23 1404                Discharge Planning   RAMOS:      Code Status: Full Code   Is the patient medically ready for discharge?:     Reason for patient still in hospital (select all that apply): Treatment  Discharge Plan A: Home with family                  Josephine Collins MD  Department of Hospital Medicine   [facility]

## 2023-02-23 NOTE — SUBJECTIVE & OBJECTIVE
Interval History:     Review of Systems   Constitutional:  Negative for chills and fever.   HENT:  Positive for hearing loss. Negative for congestion.    Eyes:  Negative for pain and discharge.   Respiratory:  Negative for cough, chest tightness, shortness of breath, wheezing and stridor.    Cardiovascular:  Negative for chest pain, palpitations and leg swelling.   Gastrointestinal:  Negative for abdominal distention, abdominal pain, blood in stool, diarrhea, nausea and vomiting.   Endocrine: Negative for cold intolerance, polydipsia and polyuria.   Genitourinary:  Negative for difficulty urinating, dysuria, frequency, hematuria and urgency.   Musculoskeletal:  Negative for arthralgias, back pain and neck pain.   Neurological:  Negative for dizziness, seizures, syncope, weakness, numbness and headaches.   Hematological:  Negative for adenopathy.   Psychiatric/Behavioral:  Negative for behavioral problems.    All other systems reviewed and are negative.  Objective:     Vital Signs (Most Recent):  Temp: 99.1 °F (37.3 °C) (02/22/23 2000)  Pulse: 99 (02/22/23 2000)  Resp: 18 (02/22/23 2000)  BP: 133/75 (02/22/23 2000)  SpO2: 98 % (02/22/23 2000) Vital Signs (24h Range):  Temp:  [96.9 °F (36.1 °C)-99.1 °F (37.3 °C)] 99.1 °F (37.3 °C)  Pulse:  [64-99] 99  Resp:  [18-20] 18  SpO2:  [95 %-100 %] 98 %  BP: (112-159)/(65-77) 133/75     Weight: 112.7 kg (248 lb 7.3 oz)  Body mass index is 42.65 kg/m².    Intake/Output Summary (Last 24 hours) at 2/22/2023 2148  Last data filed at 2/22/2023 1837  Gross per 24 hour   Intake 290 ml   Output 1200 ml   Net -910 ml      Physical Exam  Vitals reviewed.   Constitutional:       Appearance: Normal appearance.      Interventions: She is not intubated.  HENT:      Head: Normocephalic and atraumatic.      Ears:      Comments: Patient is very hard of hearing.     Nose: Nose normal.      Mouth/Throat:      Pharynx: Oropharynx is clear.   Eyes:      Extraocular Movements: Extraocular movements  intact.      Conjunctiva/sclera: Conjunctivae normal.      Pupils: Pupils are equal, round, and reactive to light.   Cardiovascular:      Rate and Rhythm: Normal rate.      Heart sounds: Normal heart sounds. No murmur heard.  Pulmonary:      Effort: Pulmonary effort is normal. She is not intubated.      Breath sounds: Normal breath sounds.   Abdominal:      General: Abdomen is flat. Bowel sounds are normal.      Palpations: Abdomen is soft.   Musculoskeletal:         General: Normal range of motion.      Cervical back: Normal range of motion and neck supple.      Right lower leg: No edema.      Left lower leg: No edema.   Skin:     General: Skin is warm and dry.      Capillary Refill: Capillary refill takes less than 2 seconds.   Neurological:      General: No focal deficit present.      Mental Status: She is alert and oriented to person, place, and time.   Psychiatric:         Mood and Affect: Mood normal.         Behavior: Behavior normal.       Significant Labs: All pertinent labs within the past 24 hours have been reviewed.    Significant Imaging: I have reviewed all pertinent imaging results/findings within the past 24 hours.

## 2023-02-23 NOTE — SUBJECTIVE & OBJECTIVE
Interval History:     Review of Systems   Constitutional:  Negative for chills and fever.   HENT:  Positive for hearing loss. Negative for congestion.    Eyes:  Negative for pain and discharge.   Respiratory:  Negative for cough, chest tightness, shortness of breath, wheezing and stridor.    Cardiovascular:  Negative for chest pain, palpitations and leg swelling.   Gastrointestinal:  Negative for abdominal distention, abdominal pain, blood in stool, diarrhea, nausea and vomiting.   Endocrine: Negative for cold intolerance, polydipsia and polyuria.   Genitourinary:  Negative for difficulty urinating, dysuria, frequency, hematuria and urgency.   Musculoskeletal:  Negative for arthralgias, back pain and neck pain.   Neurological:  Negative for dizziness, seizures, syncope, weakness, numbness and headaches.   Hematological:  Negative for adenopathy.   Psychiatric/Behavioral:  Negative for behavioral problems.    All other systems reviewed and are negative.  Objective:     Vital Signs (Most Recent):  Temp: 98.2 °F (36.8 °C) (02/23/23 0753)  Pulse: 72 (02/23/23 0753)  Resp: 20 (02/23/23 0753)  BP: 127/64 (02/23/23 0753)  SpO2: 97 % (02/23/23 0753) Vital Signs (24h Range):  Temp:  [98.2 °F (36.8 °C)-99.1 °F (37.3 °C)] 98.2 °F (36.8 °C)  Pulse:  [68-99] 72  Resp:  [18-20] 20  SpO2:  [97 %-98 %] 97 %  BP: (112-133)/(64-75) 127/64     Weight: 112.7 kg (248 lb 7.3 oz)  Body mass index is 42.65 kg/m².    Intake/Output Summary (Last 24 hours) at 2/23/2023 1213  Last data filed at 2/23/2023 0745  Gross per 24 hour   Intake 100 ml   Output 2100 ml   Net -2000 ml      Physical Exam  Vitals reviewed.   Constitutional:       Appearance: Normal appearance.      Interventions: She is not intubated.  HENT:      Head: Normocephalic and atraumatic.      Ears:      Comments: Patient is very hard of hearing.     Nose: Nose normal.      Mouth/Throat:      Pharynx: Oropharynx is clear.   Eyes:      Extraocular Movements: Extraocular movements  intact.      Conjunctiva/sclera: Conjunctivae normal.      Pupils: Pupils are equal, round, and reactive to light.   Cardiovascular:      Rate and Rhythm: Normal rate.      Heart sounds: Normal heart sounds. No murmur heard.  Pulmonary:      Effort: Pulmonary effort is normal. She is not intubated.      Breath sounds: Normal breath sounds.   Abdominal:      General: Abdomen is flat. Bowel sounds are normal.      Palpations: Abdomen is soft.   Musculoskeletal:         General: Normal range of motion.      Cervical back: Normal range of motion and neck supple.      Right lower leg: No edema.      Left lower leg: No edema.   Skin:     General: Skin is warm and dry.      Capillary Refill: Capillary refill takes less than 2 seconds.   Neurological:      General: No focal deficit present.      Mental Status: She is alert and oriented to person, place, and time.   Psychiatric:         Mood and Affect: Mood normal.         Behavior: Behavior normal.       Significant Labs: All pertinent labs within the past 24 hours have been reviewed.    Significant Imaging: I have reviewed all pertinent imaging results/findings within the past 24 hours.

## 2023-02-23 NOTE — PLAN OF CARE
Problem: Diabetes Comorbidity  Goal: Blood Glucose Level Within Targeted Range  Outcome: Ongoing, Progressing     Problem: Diabetes Comorbidity  Goal: Blood Glucose Level Within Targeted Range  Outcome: Ongoing, Progressing     Problem: Skin Injury Risk Increased  Goal: Skin Health and Integrity  Outcome: Ongoing, Progressing     Problem: Skin Injury Risk Increased  Goal: Skin Health and Integrity  Outcome: Ongoing, Progressing     Problem: Bariatric Environmental Safety  Goal: Safety Maintained with Care  Outcome: Ongoing, Progressing     Problem: Bariatric Environmental Safety  Goal: Safety Maintained with Care  Outcome: Ongoing, Progressing

## 2023-02-23 NOTE — ASSESSMENT & PLAN NOTE
Patient sees if cell count upon admission to [facility] was suggestive of bacterial meningitis, culture and Gram stain negative.    Has positive blood cultures with pneumococcus pneumonia sensitive to Rocephin.    Repeat blood cultures negative   We will need to continue antibiotics for 2-4 weeks of IV antibiotics.  Already received 1 week of IV antibiotic at [facility]     Antibiotics (From admission, onward)    Start     Stop Route Frequency Ordered    02/20/23 2100  mupirocin 2 % ointment         02/25 2059 Nasl 2 times daily 02/20/23 1506    02/20/23 1800  vancomycin (VANCOCIN) 2,000 mg in dextrose 5 % (D5W) 500 mL IVPB         -- IV Every 72 hours 02/20/23 1505    02/20/23 1606  vancomycin - pharmacy to dose         -- IV pharmacy to manage frequency 02/20/23 1506    02/20/23 1600  cefTRIAXone (ROCEPHIN) 2 g in dextrose 5 % in water (D5W) 5 % 50 mL IVPB (MB+)         02/27 0359 IV Every 12 hours (non-standard times) 02/20/23 1404        2/22  MRI with and without contrast per recommendation of neurology.   Consult ID at [facility].      2/23: no MRI.  Consult ID at [facility].

## 2023-02-23 NOTE — PROGRESS NOTES
[facility] Hospital Medicine  Progress Note    Patient Name: Jessica Bay  MRN: [id number]  Patient Class: IP- Inpatient   Admission Date: 2/20/2023  Length of Stay: 3 days  Attending Physician: Josephine Collins MD  Primary Care Provider: Jag Lopez MD        Subjective:     Principal Problem:Bacterial meningitis    HPI:  67-year-old female patient with past medical history of diabetes, history of kidney cancer status post nephrectomy, has single kidney, dyslipidemia, hypertension, coronary artery disease status post NSTEMI November 2022 and stent placement to the RCA, history of fibromyalgia, and chronic kidney disease.  Admitted to the hospital 02/12/2023 with altered mental status and fever, patient has history of meningitis in 2015.    Patient admitted to the hospital underwent lumbar puncture which showed evidence of acute meningitis, but patient is CSF cultures remain negative, Gram stain showed no organisms.  Her urine culture grew E coli in her blood culture on February 12th grew Streptococcus pneumoniae sensitive to Rocephin.  Patient continued on Rocephin bacteremia dose.  Patient will need 2-4 weeks of IV antibiotics.    Patient is very hard of hearing, likely related to her previous meningitis.    Patient admitted to [facility] for IV antibiotics and rehab.      Overview/Hospital Course:  Patient admitted to [facility] for continued IV antibiotics.    Plan to also contact [facility] for evaluation by patient's neurologist as well as her infectious disease specialist.    2/22:  Discussed case with Neurology at [facility].  This was the preference of the patient.  Patient previously had meningitis in 2015 and neurologist at that time was Dr. Liriano.  Dr. Kim Liriano she recommended an MRI with and without contrast.  Number that she can be reached at [phone].    Of note, patient had glucose of less than 1 in CSF during this episode of  meningitis.  The organism was strep pneumo.  Dr. Adam stated that mortality is very high in meningitis cases this severe.  She also stated that patient will very likely regain her hearing over time.      2/23:  Patient states that she would need an open MRI in order to have an MRI.  She further states that her kidneys did not permit her to have contrast MRI.  All discuss this further with Dr. Liriano.  It is notable that the patient's son states that she had fluid in her ears and this contributed to her having difficulty caring in the past.      Interval History:     Review of Systems   Constitutional:  Negative for chills and fever.   HENT:  Positive for hearing loss. Negative for congestion.    Eyes:  Negative for pain and discharge.   Respiratory:  Negative for cough, chest tightness, shortness of breath, wheezing and stridor.    Cardiovascular:  Negative for chest pain, palpitations and leg swelling.   Gastrointestinal:  Negative for abdominal distention, abdominal pain, blood in stool, diarrhea, nausea and vomiting.   Endocrine: Negative for cold intolerance, polydipsia and polyuria.   Genitourinary:  Negative for difficulty urinating, dysuria, frequency, hematuria and urgency.   Musculoskeletal:  Negative for arthralgias, back pain and neck pain.   Neurological:  Negative for dizziness, seizures, syncope, weakness, numbness and headaches.   Hematological:  Negative for adenopathy.   Psychiatric/Behavioral:  Negative for behavioral problems.    All other systems reviewed and are negative.  Objective:     Vital Signs (Most Recent):  Temp: 98.2 °F (36.8 °C) (02/23/23 0753)  Pulse: 72 (02/23/23 0753)  Resp: 20 (02/23/23 0753)  BP: 127/64 (02/23/23 0753)  SpO2: 97 % (02/23/23 0753) Vital Signs (24h Range):  Temp:  [98.2 °F (36.8 °C)-99.1 °F (37.3 °C)] 98.2 °F (36.8 °C)  Pulse:  [68-99] 72  Resp:  [18-20] 20  SpO2:  [97 %-98 %] 97 %  BP: (112-133)/(64-75) 127/64     Weight: 112.7 kg (248 lb 7.3 oz)  Body mass  index is 42.65 kg/m².    Intake/Output Summary (Last 24 hours) at 2/23/2023 1213  Last data filed at 2/23/2023 0745  Gross per 24 hour   Intake 100 ml   Output 2100 ml   Net -2000 ml      Physical Exam  Vitals reviewed.   Constitutional:       Appearance: Normal appearance.      Interventions: She is not intubated.  HENT:      Head: Normocephalic and atraumatic.      Ears:      Comments: Patient is very hard of hearing.     Nose: Nose normal.      Mouth/Throat:      Pharynx: Oropharynx is clear.   Eyes:      Extraocular Movements: Extraocular movements intact.      Conjunctiva/sclera: Conjunctivae normal.      Pupils: Pupils are equal, round, and reactive to light.   Cardiovascular:      Rate and Rhythm: Normal rate.      Heart sounds: Normal heart sounds. No murmur heard.  Pulmonary:      Effort: Pulmonary effort is normal. She is not intubated.      Breath sounds: Normal breath sounds.   Abdominal:      General: Abdomen is flat. Bowel sounds are normal.      Palpations: Abdomen is soft.   Musculoskeletal:         General: Normal range of motion.      Cervical back: Normal range of motion and neck supple.      Right lower leg: No edema.      Left lower leg: No edema.   Skin:     General: Skin is warm and dry.      Capillary Refill: Capillary refill takes less than 2 seconds.   Neurological:      General: No focal deficit present.      Mental Status: She is alert and oriented to person, place, and time.   Psychiatric:         Mood and Affect: Mood normal.         Behavior: Behavior normal.       Significant Labs: All pertinent labs within the past 24 hours have been reviewed.    Significant Imaging: I have reviewed all pertinent imaging results/findings within the past 24 hours.      Assessment/Plan:      * Bacterial meningitis  Patient sees if cell count upon admission to [facility] was suggestive of bacterial meningitis, culture and Gram stain negative.    Has positive blood cultures with pneumococcus pneumonia  sensitive to Rocephin.    Repeat blood cultures negative   We will need to continue antibiotics for 2-4 weeks of IV antibiotics.  Already received 1 week of IV antibiotic at [facility]     Antibiotics (From admission, onward)    Start     Stop Route Frequency Ordered    02/20/23 2100  mupirocin 2 % ointment         02/25 2059 Nasl 2 times daily 02/20/23 1506    02/20/23 1800  vancomycin (VANCOCIN) 2,000 mg in dextrose 5 % (D5W) 500 mL IVPB         -- IV Every 72 hours 02/20/23 1505    02/20/23 1606  vancomycin - pharmacy to dose         -- IV pharmacy to manage frequency 02/20/23 1506    02/20/23 1600  cefTRIAXone (ROCEPHIN) 2 g in dextrose 5 % in water (D5W) 5 % 50 mL IVPB (MB+)         02/27 0359 IV Every 12 hours (non-standard times) 02/20/23 1404        2/22  MRI with and without contrast per recommendation of neurology.   Consult ID at [facility].       UTI (urinary tract infection)  Urine culture grew E coli UTI, sensitive to Rocephin.         CAD (coronary artery disease)  Status post NSTEMI November 2022   Continue DA PT therapy   Continue beta-blockers   Continue on statins.      Type 2 diabetes mellitus with diabetic chronic kidney disease  Patient's FSGs are controlled on current medication regimen.  Last A1c reviewed-   Lab Results   Component Value Date    HGBA1C 6.9 (H) 11/22/2022     Most recent fingerstick glucose reviewed- No results for input(s): POCTGLUCOSE in the last 24 hours.  Current correctional scale  Low  Maintain anti-hyperglycemic dose as follows-   Antihyperglycemics (From admission, onward)    Start     Stop Route Frequency Ordered    02/20/23 2100  insulin detemir U-100 injection 25 Units         -- SubQ Nightly 02/20/23 1404    02/20/23 1404  insulin aspart U-100 injection 1-14 Units         -- SubQ Every 6 hours PRN 02/20/23 1404        Hold Oral hypoglycemics while patient is in the hospital.    CKD (chronic kidney disease), stage IV  Creatinine stable around 2.    Nephrologist  consulted for evaluation.      Acquired absence of kidney  History of renal cell carcinoma status post nephrectomy 15 years ago.      Hypertension  Continue same antihypertensive medications   Hydralazine IV if needed.        VTE Risk Mitigation (From admission, onward)         Ordered     enoxaparin injection 30 mg  Daily         02/20/23 1404                Discharge Planning   RAMOS:      Code Status: Full Code   Is the patient medically ready for discharge?:     Reason for patient still in hospital (select all that apply): Treatment  Discharge Plan A: Home with family          Josephine Collins MD  Department of Hospital Medicine   [facility]

## 2023-02-24 LAB
ANION GAP SERPL CALCULATED.3IONS-SCNC: 14 MMOL/L (ref 7–16)
BACTERIA BLD CULT: NORMAL
BUN SERPL-MCNC: 58 MG/DL (ref 7–18)
BUN/CREAT SERPL: 24 (ref 6–20)
CALCIUM SERPL-MCNC: 8.1 MG/DL (ref 8.5–10.1)
CHLORIDE SERPL-SCNC: 108 MMOL/L (ref 98–107)
CO2 SERPL-SCNC: 25 MMOL/L (ref 21–32)
CREAT SERPL-MCNC: 2.4 MG/DL (ref 0.55–1.02)
EGFR (NO RACE VARIABLE) (RUSH/TITUS): 22 ML/MIN/1.73M²
GLUCOSE SERPL-MCNC: 106 MG/DL (ref 70–105)
GLUCOSE SERPL-MCNC: 139 MG/DL (ref 70–105)
GLUCOSE SERPL-MCNC: 187 MG/DL (ref 70–105)
GLUCOSE SERPL-MCNC: 229 MG/DL (ref 74–106)
GLUCOSE SERPL-MCNC: 266 MG/DL (ref 70–105)
GLUCOSE SERPL-MCNC: 271 MG/DL (ref 70–105)
POTASSIUM SERPL-SCNC: 3.6 MMOL/L (ref 3.5–5.1)
SODIUM SERPL-SCNC: 143 MMOL/L (ref 136–145)

## 2023-02-24 PROCEDURE — 25000003 PHARM REV CODE 250: Performed by: HOSPITALIST

## 2023-02-24 PROCEDURE — 97535 SELF CARE MNGMENT TRAINING: CPT | Mod: CO

## 2023-02-24 PROCEDURE — 11000001 HC ACUTE MED/SURG PRIVATE ROOM

## 2023-02-24 PROCEDURE — 99233 SBSQ HOSP IP/OBS HIGH 50: CPT | Mod: ,,, | Performed by: INTERNAL MEDICINE

## 2023-02-24 PROCEDURE — 97110 THERAPEUTIC EXERCISES: CPT | Mod: CQ

## 2023-02-24 PROCEDURE — 99233 PR SUBSEQUENT HOSPITAL CARE,LEVL III: ICD-10-PCS | Mod: ,,, | Performed by: HOSPITALIST

## 2023-02-24 PROCEDURE — 82962 GLUCOSE BLOOD TEST: CPT

## 2023-02-24 PROCEDURE — 99233 PR SUBSEQUENT HOSPITAL CARE,LEVL III: ICD-10-PCS | Mod: ,,, | Performed by: INTERNAL MEDICINE

## 2023-02-24 PROCEDURE — 97110 THERAPEUTIC EXERCISES: CPT | Mod: CO

## 2023-02-24 PROCEDURE — 99233 SBSQ HOSP IP/OBS HIGH 50: CPT | Mod: ,,, | Performed by: HOSPITALIST

## 2023-02-24 PROCEDURE — 99900035 HC TECH TIME PER 15 MIN (STAT)

## 2023-02-24 PROCEDURE — 63600175 PHARM REV CODE 636 W HCPCS: Performed by: INTERNAL MEDICINE

## 2023-02-24 PROCEDURE — 25000003 PHARM REV CODE 250: Performed by: INTERNAL MEDICINE

## 2023-02-24 PROCEDURE — 80048 BASIC METABOLIC PNL TOTAL CA: CPT | Performed by: INTERNAL MEDICINE

## 2023-02-24 PROCEDURE — 94761 N-INVAS EAR/PLS OXIMETRY MLT: CPT

## 2023-02-24 RX ORDER — LACTOBACILLUS ACIDOPHILUS 500MM CELL
1 CAPSULE ORAL
Status: DISCONTINUED | OUTPATIENT
Start: 2023-02-24 | End: 2023-03-01

## 2023-02-24 RX ORDER — ACETAMINOPHEN 500 MG
1000 TABLET ORAL EVERY 6 HOURS PRN
Status: DISCONTINUED | OUTPATIENT
Start: 2023-02-24 | End: 2023-03-03 | Stop reason: HOSPADM

## 2023-02-24 RX ADMIN — CEFTRIAXONE SODIUM 2 G: 2 INJECTION, POWDER, FOR SOLUTION INTRAMUSCULAR; INTRAVENOUS at 04:02

## 2023-02-24 RX ADMIN — ENOXAPARIN SODIUM 30 MG: 30 INJECTION SUBCUTANEOUS at 05:02

## 2023-02-24 RX ADMIN — BENZONATATE 200 MG: 100 CAPSULE ORAL at 08:02

## 2023-02-24 RX ADMIN — AMLODIPINE BESYLATE 5 MG: 5 TABLET ORAL at 08:02

## 2023-02-24 RX ADMIN — QUETIAPINE FUMARATE 50 MG: 25 TABLET ORAL at 08:02

## 2023-02-24 RX ADMIN — PANTOPRAZOLE SODIUM 40 MG: 40 TABLET, DELAYED RELEASE ORAL at 08:02

## 2023-02-24 RX ADMIN — TICAGRELOR 90 MG: 90 TABLET ORAL at 08:02

## 2023-02-24 RX ADMIN — ACETAMINOPHEN 650 MG: 325 TABLET ORAL at 12:02

## 2023-02-24 RX ADMIN — Medication 1 CAPSULE: at 08:02

## 2023-02-24 RX ADMIN — Medication 1 CAPSULE: at 11:02

## 2023-02-24 RX ADMIN — ACETAMINOPHEN 1000 MG: 500 TABLET ORAL at 09:02

## 2023-02-24 RX ADMIN — CARVEDILOL 6.25 MG: 6.25 TABLET, FILM COATED ORAL at 08:02

## 2023-02-24 RX ADMIN — BENZONATATE 200 MG: 100 CAPSULE ORAL at 03:02

## 2023-02-24 RX ADMIN — CEFTRIAXONE SODIUM 2 G: 2 INJECTION, POWDER, FOR SOLUTION INTRAMUSCULAR; INTRAVENOUS at 03:02

## 2023-02-24 RX ADMIN — MUPIROCIN: 20 OINTMENT TOPICAL at 09:02

## 2023-02-24 RX ADMIN — ALLOPURINOL 100 MG: 100 TABLET ORAL at 08:02

## 2023-02-24 RX ADMIN — INSULIN DETEMIR 25 UNITS: 100 INJECTION, SOLUTION SUBCUTANEOUS at 08:02

## 2023-02-24 RX ADMIN — INSULIN ASPART 8 UNITS: 100 INJECTION, SOLUTION INTRAVENOUS; SUBCUTANEOUS at 11:02

## 2023-02-24 RX ADMIN — Medication 1 CAPSULE: at 05:02

## 2023-02-24 RX ADMIN — INSULIN ASPART 8 UNITS: 100 INJECTION, SOLUTION INTRAVENOUS; SUBCUTANEOUS at 05:02

## 2023-02-24 RX ADMIN — MUPIROCIN: 20 OINTMENT TOPICAL at 08:02

## 2023-02-24 RX ADMIN — TIZANIDINE 4 MG: 4 TABLET ORAL at 08:02

## 2023-02-24 NOTE — PT/OT/SLP PROGRESS
Occupational Therapy   Treatment    Name: Jessica Bay  MRN: [id number]  Admitting Diagnosis:  Bacterial meningitis       Recommendations:     Discharge Recommendations: nursing facility, skilled, rehabilitation facility  Discharge Equipment Recommendations:   (to be determined)  Barriers to discharge:       Assessment:     Jessica Bay is a 67 y.o. female with a medical diagnosis of Bacterial meningitis.Performance deficits affecting function are weakness, impaired endurance.     Rehab Prognosis:  Good; patient would benefit from acute skilled OT services to address these deficits and reach maximum level of function.       Plan:     Patient to be seen 5 x/week to address the above listed problems via self-care/home management, therapeutic activities, therapeutic exercises  Plan of Care Expires:    Plan of Care Reviewed with: patient    Subjective     Pain/Comfort:  Pain Rating 1: 0/10    Objective:     Communicated with: KASIA Silva prior to session.  Patient found supine with PureWick, peripheral IV upon OT entry to room.    General Precautions: Standard, fall    Orthopedic Precautions:N/A  Braces: N/A  Respiratory Status: Room air     Occupational Performance:     Bed Mobility:    Supine to sit min a     Functional Mobility/Transfers:  Sit to stand min a with rw  Bed to chair cga /min a with rw  Functional Mobility:     Activities of Daily Living:  I to comb her hair  I to apply lip balm  I to open lip balm  Dep to doff socks   I to sit eob 10 mins        AMPAC 6 Click ADL:      Treatment & Education:  Pt performed hand helper with 2 rubber band resistances 20 reps x 2, rom ex's with 1 lb wt 15 reps x 2 B elbow flex/ext,abd/add/shld flex,  Pt fatigued and drowsy and slow with performing her ex's but tolerated tx well.    Patient left up in chair with all lines intact, call button in reach, and chair alarm on    GOALS:   Multidisciplinary Problems       Occupational Therapy Goals          Problem:  Occupational Therapy    Goal Priority Disciplines Outcome Interventions   Occupational Therapy Goal     OT, PT/OT Ongoing, Progressing    Description: STG:  Pt will perform grooming with setup  Pt will bathe with min a  Pt will perform UE dressing with setup  Pt will perform LE dressing with min a with AD as needed  Pt will sit EOB x 15 min with (I)  Pt will transfer bed/chair/bsc with min a with AD as needed  Pt will tolerate 20 minutes of tx without fatigue      LT.Restore to max I with self care and mobility.                          Time Tracking:     OT Date of Treatment: 23  OT Start Time: 1321  OT Stop Time: 1403  OT Total Time (min): 42 min    Billable Minutes:Self Care/Home Management 15   Therapeutic exercises 15 mins   OT/GLADYS: GLADYS          2023

## 2023-02-24 NOTE — CONSULTS
"[facility]  Adult Nutrition  Consult Note         Reason for Assessment  Reason For Assessment: consult (nutritionaln recommendations)  Nutrition Risk Screen: no indicators present    RD consult received and appreciated. Recommend accommodate patient food preferences as medically appropriate to promote PO intakes.     Recommend continue current Consistent Carbohydrate 2000kcal Diet order and Glucerna (or Boost Glucose Control) TID to promote adequate oral intake. Encourage good PO intakes.    Per MD since last RD review:  "Overview/Hospital Course:  Patient admitted to [facility] for continued IV antibiotics.    Plan to also contact [facility] for evaluation by patient's neurologist as well as her infectious disease specialist.     2/22:  Discussed case with Neurology at [facility].  This was the preference of the patient.  Patient previously had meningitis in 2015 and neurologist at that time was Dr. Liriano.  Dr. Kim Liriano she recommended an MRI with and without contrast.  Number that she can be reached at [phone].    Of note, patient had glucose of less than 1 in CSF during this episode of meningitis.  The organism was strep pneumo.  Dr. Adam stated that mortality is very high in meningitis cases this severe.  She also stated that patient will very likely regain her hearing over time.       2/23:  Patient states that she would need an open MRI in order to have an MRI.  She further states that her kidneys did not permit her to have contrast MRI.  All discuss this further with Dr. Liriano.  It is notable that the patient's son states that she had fluid in her ears and this contributed to her having difficulty caring in the past."    Pt being followed by nephrology and wound care. Pt admitted to [facility] on 2/20.     Pt receiving Consistent Carbohydrate 2000kcal Diet with 50% recent PO intake documented. Encourage good PO intakes. Recommend " continue Glucerna (or Boost Glucose Control) TID to promote adequate oral intake. Glucerna provides 220kcal, 10g protein each.     Wt Readings from Last 3 Encounters:   02/23/23 1500 107.5 kg (236 lb 15.9 oz)   02/22/23 0532 112.7 kg (248 lb 7.3 oz)   02/20/23 1605 113.9 kg (251 lb)   02/17/23 0524 103.8 kg (228 lb 13.4 oz)   02/16/23 0308 96.4 kg (212 lb 8.4 oz)   02/15/23 0309 96.2 kg (212 lb 1.3 oz)   02/14/23 0301 111.5 kg (245 lb 13 oz)   02/13/23 0245 108.8 kg (239 lb 13.8 oz)   02/12/23 1245 109.7 kg (241 lb 13.5 oz)   02/12/23 0907 108 kg (238 lb)   02/12/23 0459 108 kg (238 lb)   Unsure etiology of weight discrepancies - suspect some inaccuracies. Will continue to monitor weight trend. CBW 2/23 reading 236lb 15.9oz is well above IBW range, BMI considered morbid obesity - nutrition needs adjusted. Pt with CKD stg 4 - protein needs adjusted.     Last BM 2/22 per flowsheets. Will continue to monitor PO intakes, meds, labs, weight trend, updates in patient condition. RD following.    Malnutrition  Is Patient Malnourished: No     Nutrition Diagnosis  Altered Nutrition Related Lab Values   related to Diabetes Mellitus as evidenced by elevated blood glucose levels     Nutrition Diagnosis Status: Chronic/ continues     Nutrition Risk  Level of Risk/Frequency of Follow-up: moderate   Chewing or Swallowing Difficulty?: No Chewing/Swallowing Difficulty    Estimated/Assessed Needs    Temp: 97.3 °F (36.3 °C)Oral  Weight Used For Calorie Calculations: 67.7 kg (149 lb 4 oz) (adjusted body weight)   Energy Need Method: Kcal/kg (25-30kcal/kg adj body weight) Energy Calorie Requirements (kcal): 1692-2031kcal  Weight Used For Protein Calculations: 107.5 kg (236 lb 15.9 oz)  Protein Requirements: 65-86g pro (0.6-0.8 g pro/kg)       RDA Method (mL): 1692     Nutrition Prescription / Recommendations  Recommendation/Intervention: Recommend continue current Consistent Carbohydrate 2000kcal Diet order and Glucerna (or Boost Glucose  Control) TID to promote adequate oral intake. Encourage good PO intakes.  Goals: po intakes adequate to meet needs, weight stability, lab stability/normalization  Nutrition Goal Status: new  Current Diet Order: Consistent Carbohydrate 2000kcal Diet  Nutrition Order Comments: Appropriate  Oral Nutrition Supplement: Glucerna (or Boost Glucose Control) TID  Recommended Diet: Consistent Carbohydrate 2000 (75g Carbs)  Recommended Oral Supplement: Glucerna Shake [220 kcals, 10g Protein, 26g Carbs(4g Fiber, 7g Sugar), 9g Fat] three times daily  Is Nutrition Support Recommended: No  Is Education Recommended: No    Monitor and Evaluation  % current Intake: Other: PO intake 50%  % intake to meet estimated needs: 75 - 100 %  Food and Nutrient Intake: energy intake, food and beverage intake  Food and Nutrient Adminstration: diet order  Anthropometric Measurements: weight, weight change, body mass index  Biochemical Data, Medical Tests and Procedures: electrolyte and renal panel, gastrointestinal profile, glucose/endocrine profile, inflammatory profile, lipid profile  Nutrition-Focused Physical Findings: overall appearance, extremities, muscles and bones, head and eyes, skin     Current Medical Diagnosis and Past Medical History  Diagnosis: other (see comments) (bacterial meningitis)  Past Medical History:   Diagnosis Date    Cancer     Diabetes mellitus, type 2     Fibromyalgia     History of kidney cancer 2018    Hyperlipidemia     Hypertension     Migraine headache     Renal cell carcinoma     Renal disorder      Nutrition/Diet History  Spiritual, Cultural Beliefs, Uatsdin Practices, Values that Affect Care: no  Food Allergies: NKFA    Lab/Procedures/Meds  Recent Labs   Lab 02/23/23  0531   *   K 3.5   BUN 65*   CREATININE 2.54*   GLU 65*   CALCIUM 7.9*   *     Last A1c:   Lab Results   Component Value Date    HGBA1C 6.9 (H) 11/22/2022     Lab Results   Component Value Date    RBC 2.72 (L) 02/23/2023    HGB 8.6  "(L) 02/23/2023    HCT 27.7 (L) 02/23/2023    .8 (H) 02/23/2023    MCH 31.6 (H) 02/23/2023    MCHC 31.0 (L) 02/23/2023     Pertinent Labs Reviewed: reviewed  Pertinent Labs Comments:   Na 147 (H), Cl 111 (H) - possibly r/t fluid status;  BUN 65 (H), creatinine 2.54 (H), BUN/CREAT RATIO 26 (H), Ca 7.9 (L), eGFR 20 (L), total PRO 5.9(L) albumin 1.8 (L) - pt with stg 4 CKD and bacterial meningitis so possible inflammatory response driving down protein and albumin; GLU 65 (H) - hx DM2; total globulin 4.1 (H) - unsure etiology, will continue to monitor altered labs  Pertinent Medications Reviewed: reviewed  Pertinent Medications Comments:   allopurinol, amlodipine, benzonatate, carvedilol, rocephin, enoxaparin, insulin, lactobacillus acidophilus, pantoprazole, quetiapine, ticagrelor, tizanidine, vancocin    Anthropometrics  Temp: 97.3 °F (36.3 °C)  Height: 5' 4" (162.6 cm)  Height (inches): 64 in  Weight Method: Stated  Weight: 107.5 kg (236 lb 15.9 oz)  Weight (lb): 237 lb  Ideal Body Weight (IBW), Female: 120 lb  % Ideal Body Weight, Female (lb): 209.17 %  BMI (Calculated): 40.7  BMI Grade: greater than 40 - morbid obesity     Nutrition by Nursing  Diet/Nutrition Received: consistent carb/diabetic diet  Intake (%): 50%  Last Bowel Movement: 02/22/23    Nutrition Follow-Up  RD Follow-up?: Yes  "

## 2023-02-24 NOTE — PROGRESS NOTES
"[facility] Consult Follow-Up Note     HPI:  Jessica Bay is a  68 yo female with medical history significant for chronic kidney disease, renal cell carcinoma (s/p right nephrectomy), HTN, DM who presents to the hospital on 2/12 with concern for fevers, headaches, and altered mental status. She has history of meningitis in the past. Patient had an LP performed in ED with elevated opening pressure of 40. She received Rocephin and Vancomycin on admission. She was admitted to [facility] for treatment of meningitis. She remains on Rocephin.      Regarding her CKD, she is followed in [place] by Dr. Raymond Wallace at [facility]. She is baseline CKD IV with sCr ~2.5. Nephrology consulted for CKD IV.      She is hard of hearing. You have to communicate with a white board. She has no complaints today. She is glad her renal function is stable.     Subjective/Interval History:  No acute events overnight   She reports having only carbs on her plate and she is diabetic    Objective     Medications:   allopurinoL  100 mg Oral QHS    amLODIPine  5 mg Oral Daily    benzonatate  200 mg Oral TID    carvediloL  6.25 mg Oral BID    cefTRIAXone (ROCEPHIN) IVPB  2 g Intravenous Q12H    enoxaparin  30 mg Subcutaneous Daily    insulin detemir U-100  25 Units Subcutaneous QHS    Lactobacillus acidophilus  1 capsule Oral TID WM    mupirocin   Nasal BID    pantoprazole  40 mg Oral Daily    QUEtiapine  50 mg Oral BID    ticagrelor  90 mg Oral BID    tiZANidine  4 mg Oral QHS    vancomycin (VANCOCIN) IVPB  2,000 mg Intravenous Q72H       Physical Exam:   BP (!) 140/63   Pulse 80   Temp 99.3 °F (37.4 °C)   Resp 14   Ht 5' 4" (1.626 m)   Wt 107.5 kg (236 lb 15.9 oz)   SpO2 100%   BMI 40.68 kg/m²   Constitutional: sitting up in chair, in NAD  Eyes: EOMI, white sclera  ENMT: moist mucus membranes, nares patent  Cardiovascular: normal rate, S1/S2 noted, no edema  Respiratory: symmetrical chest expansion, " CTA-B  Gastrointestinal: +BS, soft, NT/ND  Musculoskeletal: normal, no joint erythema/effusions  Skin: no rash, no purpura, warm extremities  Neurological: Alert and Oriented x 4, afocal    I/Os:   I/O last 3 completed shifts:  In: 700 [IV Piggyback:700]  Out: 3550 [Urine:3550]    Labs, micro, imaging reviewed.           Assessment and Plan:     Patient Active Problem List   Diagnosis    Hypertension    Body mass index (BMI) 40.0-44.9, adult    Chronic fatigue syndrome    Dyslipidemia    Fibromyalgia    Mitral valve disorder    Acquired absence of kidney    Neuropathy of both feet    Restless legs syndrome    CKD (chronic kidney disease), stage IV    Type 2 diabetes mellitus with diabetic chronic kidney disease    CAD (coronary artery disease)    Bacterial meningitis    UTI (urinary tract infection)     Jessica Bay is a  68 yo female with medical history significant for chronic kidney disease, renal cell carcinoma (s/p right nephrectomy), HTN, DM who presents to the hospital on 2/12 with concern for fevers, headaches, and altered mental status concerning for meningitis.      - Renal function stable. At baseline  - Will continue to monitor   - Please avoid nephrotoxic agents/NSAIDs  - Renally dose all medications   - Please obtain daily BMP, Mg, and Phos levels  - Please monitor strict UOP  - Daily weights    Thank you for this consult. [facility] will continue to follow from [place] while hospitalized. Will check back Monday. ICU NP available if needed. Please call with any questions.     Luba Ren, DO [facility]   02/24/2023

## 2023-02-24 NOTE — PHYSICIAN QUERY
PT Name: Jessica Bay  MR #: [id number]     DOCUMENTATION CLARIFICATION     CDS/: Mary Peter RN CDIS             Contact information: [email or web address]    This form is a permanent document in the medical record.     Query Date: February 24, 2023    By submitting this query, we are merely seeking further clarification of documentation.  Please utilize your independent clinical judgment when addressing the question(s) below.  The Medical Record contains the following:  Indicators Supporting Clinical Findings Location in Medical Record   x HR         RR          BP        Temp Vital Signs (24h Range):  Temp:  [98.7 °F (37.1 °C)-102.4 °F (39.1 °C)] 100 °F (37.8 °C)  Pulse:  [118-135] 135  Resp:  [20-36] 35  SpO2:  [91 %-96 %] 91 %  BP: (144-176)/(65-94) 176/68    Vital Signs (24h Range):  Temp:  [97.8 °F (36.6 °C)-99.9 °F (37.7 °C)] 97.8 °F (36.6 °C)  Pulse:  [121-142] 129  Resp:  [11-41] 32  SpO2:  [95 %-100 %] 95 %  BP: (139-192)/(50-96) 182/96 H&P               Pulm note 2/13    x Lactic Acid          Procalcitonin  Latest Reference Range & Units 02/12/23 04:34 02/12/23 06:23 02/12/23 09:18 02/12/23 11:25   Lactate, Ramu 0.4 - 2.0 mmol/L 3.4 (H) 2.4 (H) 2.3 (H) 1.6    Labs    x WBC           Bands          CRP  Latest Reference Range & Units 02/12/23 04:12 02/12/23 09:18 02/13/23 04:51 02/14/23 05:06 02/15/23 03:58 02/16/23 05:13 02/17/23 06:49 02/19/23 04:44 02/20/23 03:05   WBC 4.50 - 11.00 K/uL 20.81 (H) 34.85 (HH) 31.57 (HH) 24.97 (H) 14.93 (H) 11.53 (H) 12.32 (H) 10.15 17.19 (H)       Latest Reference Range & Units 02/12/23 09:18 02/13/23 04:51 02/14/23 05:06   Bands 1 - 5 % 20 (H) 9 (H) 2    Labs    x Culture(s) Urine Culture, Routine >100,000 Escherichia coli Abnormal          CULTURE, BLOOD Streptococcus pneumoniae Abnormal         Labs 2/13      Labs 2/12    x AMS, Confusion, LOC, etc. Neurological:      Comments: obtunded   Psychiatric:         Speech: She is noncommunicative.          Behavior: Behavior is uncooperative.      Comments: Moans but does not follow commands Pulm note 2/13     Organ Dysfunction/Failure     x Bacteremia or Sepsis / Septic SIRS (systemic inflammatory response syndrome)  No true source at this time - suspect meningitis   - BC x2  - CT abdomen, pelvis and chest xray without infectious process   - started on broad spectrum abx of vancomycin, rocephin, ampicillin and acyclovir  H&P    x Known or Suspected Source of Infection documented Bacterial meningitis  9800 WBC on CSF - 96% polys   -glucose 1  - rapid meningitis panel negative   - gram stain with no organisms   - culture pending   - cryptococcal antigen negative   -- she is on ampicillin, vancomycin, rocephin, acyclovir and steroids     UTI (urinary tract infection)  Urine culture >100,000 GNB   - she is on rocephin  Pulm note 2/13                     Pulm note 2/13    (Failed) Outpatient Treatment     x Medication ampicillin (OMNIPEN) 2 g in sodium chloride 0.9 % 100 mL IVPB (MB+)  Dose: 2 g  Freq: Every 8 hours (non-standard times) Route: IV  Indications of Use: meningitis  Start: 02/13/23 1900 End: 02/18/23 1220    cefTRIAXone (ROCEPHIN) 2 g in dextrose 5 % in water (D5W) 5 % 50 mL IVPB (MB+)  Dose: 2 g  Freq: Every 12 hours (non-standard times) Route: IV  Indications of Use: meningitis  Start: 02/13/23 0300 End: 02/20/23 1248 MAR             MAR     Treatment      Other          Provider, please specify diagnosis or diagnoses associated with above clinical findings.  [   ] Sepsis due to unknown organism   [  x ] Sepsis due to (suspected) organism (please specify): __Strep Pneumonia________   [   ] SIRS without infectious etiology   [   ] SIRS with infection but without Sepsis   [   ] Other Infectious Disease (please specify): __________   [  ] Clinically Undetermined         Please document in your progress notes daily for the duration of treatment until resolved and include in your discharge summary.

## 2023-02-24 NOTE — PT/OT/SLP PROGRESS
"Physical Therapy Treatment    Patient Name:  Jessica Bay   MRN:  [id number]    Recommendations:     Discharge Recommendations: rehabilitation facility, nursing facility, skilled  Discharge Equipment Recommendations: other (see comments) (to be determined)  Barriers to discharge:  ongoing medical treatment    Assessment:     Jessica Bay is a 67 y.o. female admitted with a medical diagnosis of Bacterial meningitis.  She presents with the following impairments/functional limitations: weakness, impaired endurance, impaired self care skills, decreased safety awareness.    Pt seated in recliner chair with SIMEON assist, agreeable to exercise and able to complete same without sig difficulties    Rehab Prognosis: Fair; patient would benefit from acute skilled PT services to address these deficits and reach maximum level of function.    Recent Surgery: * No surgery found *      Plan:     During this hospitalization, patient to be seen 5 x/week to address the identified rehab impairments via gait training, therapeutic activities, therapeutic exercises and progress toward the following goals:    Plan of Care Expires:  03/21/23    Subjective     Chief Complaint: bacterial meningitis  Patient/Family Comments/goals: "I've had a headache since I woke up this morning"  Pain/Comfort:         Objective:     Communicated with Rosemary Silva RN prior to session.  Patient found up in chair with PureWick, bed alarm, peripheral IV upon PT entry to room.     General Precautions: Standard, fall, hearing impaired, droplet  Orthopedic Precautions: N/A  Braces: N/A  Respiratory Status: Room air     Functional Mobility:        AM-PAC 6 CLICK MOBILITY          Treatment & Education:  Pt performed 30 reps (B) LE exercises: ap, quad set, glut set, straight leg raise, hip ab/adduction, long arc quad, heel slide with active assist     Patient left up in chair with all lines intact and call button in reach..    GOALS:   Multidisciplinary Problems  "      Physical Therapy Goals          Problem: Physical Therapy    Goal Priority Disciplines Outcome Goal Variances Interventions   Physical Therapy Goal     PT, PT/OT Ongoing, Progressing     Description: Short Term Goals to be met by: 3/7/23    Patient will increase functional independence with mobility by performin. Supine to sit with contact guard assist  2. Sit to stand transfer with contact guard assist using Rolling walker  3. Bed to chair transfer with contact guard assist using Rolling walker  4. Gait  x 100 feet with contact guard assist using Rolling walker  5. Lower extremity exercise program x30 reps per handout, with assistance as needed    Long Term Goals to be met by: 3/21/23    Pt will regain full independent functional mobility with Rolling walker to return to home situation and prior activities of daily living.                        Time Tracking:     PT Received On: 23  PT Start Time: 1410     PT Stop Time: 1435  PT Total Time (min): 25 min     Billable Minutes: Therapeutic Exercise 15    Treatment Type: Treatment  PT/PTA: PTA     PTA Visit Number: 3     2023

## 2023-02-24 NOTE — PLAN OF CARE
Cont with poc. Pt receiving iv abx, rocephin q12 and vancomycin q72. Pt working with therapy. Ss following for dc needs.

## 2023-02-24 NOTE — PLAN OF CARE
Problem: Skin Injury Risk Increased  Goal: Skin Health and Integrity  Outcome: Ongoing, Progressing     Problem: Skin Injury Risk Increased  Goal: Skin Health and Integrity  Outcome: Ongoing, Progressing     Problem: Bariatric Environmental Safety  Goal: Safety Maintained with Care  Outcome: Ongoing, Progressing     Problem: Bariatric Environmental Safety  Goal: Safety Maintained with Care  Outcome: Ongoing, Progressing

## 2023-02-25 LAB
GLUCOSE SERPL-MCNC: 124 MG/DL (ref 70–105)
GLUCOSE SERPL-MCNC: 155 MG/DL (ref 70–105)
GLUCOSE SERPL-MCNC: 169 MG/DL (ref 70–105)
GLUCOSE SERPL-MCNC: 213 MG/DL (ref 70–105)

## 2023-02-25 PROCEDURE — 99900035 HC TECH TIME PER 15 MIN (STAT)

## 2023-02-25 PROCEDURE — 99233 SBSQ HOSP IP/OBS HIGH 50: CPT | Mod: ,,, | Performed by: HOSPITALIST

## 2023-02-25 PROCEDURE — 94761 N-INVAS EAR/PLS OXIMETRY MLT: CPT

## 2023-02-25 PROCEDURE — 25000003 PHARM REV CODE 250: Performed by: HOSPITALIST

## 2023-02-25 PROCEDURE — 25000003 PHARM REV CODE 250: Performed by: INTERNAL MEDICINE

## 2023-02-25 PROCEDURE — 11000001 HC ACUTE MED/SURG PRIVATE ROOM

## 2023-02-25 PROCEDURE — 99233 PR SUBSEQUENT HOSPITAL CARE,LEVL III: ICD-10-PCS | Mod: ,,, | Performed by: HOSPITALIST

## 2023-02-25 PROCEDURE — 82962 GLUCOSE BLOOD TEST: CPT

## 2023-02-25 PROCEDURE — 63600175 PHARM REV CODE 636 W HCPCS: Performed by: INTERNAL MEDICINE

## 2023-02-25 PROCEDURE — 96372 THER/PROPH/DIAG INJ SC/IM: CPT

## 2023-02-25 RX ADMIN — QUETIAPINE FUMARATE 50 MG: 25 TABLET ORAL at 08:02

## 2023-02-25 RX ADMIN — BENZONATATE 200 MG: 100 CAPSULE ORAL at 08:02

## 2023-02-25 RX ADMIN — TIZANIDINE 4 MG: 4 TABLET ORAL at 08:02

## 2023-02-25 RX ADMIN — INSULIN DETEMIR 25 UNITS: 100 INJECTION, SOLUTION SUBCUTANEOUS at 08:02

## 2023-02-25 RX ADMIN — Medication 1 CAPSULE: at 12:02

## 2023-02-25 RX ADMIN — Medication 1 CAPSULE: at 08:02

## 2023-02-25 RX ADMIN — INSULIN ASPART 4 UNITS: 100 INJECTION, SOLUTION INTRAVENOUS; SUBCUTANEOUS at 12:02

## 2023-02-25 RX ADMIN — CEFTRIAXONE SODIUM 2 G: 2 INJECTION, POWDER, FOR SOLUTION INTRAMUSCULAR; INTRAVENOUS at 04:02

## 2023-02-25 RX ADMIN — TICAGRELOR 90 MG: 90 TABLET ORAL at 08:02

## 2023-02-25 RX ADMIN — CARVEDILOL 6.25 MG: 6.25 TABLET, FILM COATED ORAL at 09:02

## 2023-02-25 RX ADMIN — CEFTRIAXONE SODIUM 2 G: 2 INJECTION, POWDER, FOR SOLUTION INTRAMUSCULAR; INTRAVENOUS at 05:02

## 2023-02-25 RX ADMIN — PANTOPRAZOLE SODIUM 40 MG: 40 TABLET, DELAYED RELEASE ORAL at 08:02

## 2023-02-25 RX ADMIN — BENZONATATE 200 MG: 100 CAPSULE ORAL at 03:02

## 2023-02-25 RX ADMIN — INSULIN ASPART 4 UNITS: 100 INJECTION, SOLUTION INTRAVENOUS; SUBCUTANEOUS at 05:02

## 2023-02-25 RX ADMIN — CARVEDILOL 6.25 MG: 6.25 TABLET, FILM COATED ORAL at 08:02

## 2023-02-25 RX ADMIN — MUPIROCIN: 20 OINTMENT TOPICAL at 08:02

## 2023-02-25 RX ADMIN — ACETAMINOPHEN 1000 MG: 500 TABLET ORAL at 11:02

## 2023-02-25 RX ADMIN — ENOXAPARIN SODIUM 30 MG: 30 INJECTION SUBCUTANEOUS at 05:02

## 2023-02-25 RX ADMIN — Medication 1 CAPSULE: at 05:02

## 2023-02-25 RX ADMIN — ACETAMINOPHEN 1000 MG: 500 TABLET ORAL at 03:02

## 2023-02-25 RX ADMIN — ALLOPURINOL 100 MG: 100 TABLET ORAL at 08:02

## 2023-02-25 NOTE — PROGRESS NOTES
[facility] Hospital Medicine  Progress Note    Patient Name: Jessica Bay  MRN: [id number]  Patient Class: IP- Inpatient   Admission Date: 2/20/2023  Length of Stay: 4 days  Attending Physician: Josephine Collins MD  Primary Care Provider: Jag Lopez MD        Subjective:     Principal Problem:Bacterial meningitis    HPI:  67-year-old female patient with past medical history of diabetes, history of kidney cancer status post nephrectomy, has single kidney, dyslipidemia, hypertension, coronary artery disease status post NSTEMI November 2022 and stent placement to the RCA, history of fibromyalgia, and chronic kidney disease.  Admitted to the hospital 02/12/2023 with altered mental status and fever, patient has history of meningitis in 2015.    Patient admitted to the hospital underwent lumbar puncture which showed evidence of acute meningitis, but patient is CSF cultures remain negative, Gram stain showed no organisms.  Her urine culture grew E coli in her blood culture on February 12th grew Streptococcus pneumoniae sensitive to Rocephin.  Patient continued on Rocephin bacteremia dose.  Patient will need 2-4 weeks of IV antibiotics.    Patient is very hard of hearing, likely related to her previous meningitis.    Patient admitted to [facility] for IV antibiotics and rehab.      Overview/Hospital Course:  Patient admitted to [facility] for continued IV antibiotics.    Plan to also contact [facility] for evaluation by patient's neurologist as well as her infectious disease specialist.    2/22:  Discussed case with Neurology at [facility].  This was the preference of the patient.  Patient previously had meningitis in 2015 and neurologist at that time was Dr. Liriano.  Dr. Kim Liriano she recommended an MRI with and without contrast.  Number that she can be reached at [phone].    Of note, patient had glucose of less than 1 in CSF during this episode of  meningitis.  The organism was strep pneumo.  Dr. Adam stated that mortality is very high in meningitis cases this severe.  She also stated that patient will very likely regain her hearing over time.      2/23:  Patient states that she would need an open MRI in order to have an MRI.  She further states that her kidneys did not permit her to have contrast MRI.  All discuss this further with Dr. Liriano.  It is notable that the patient's son states that she had fluid in her ears and this contributed to her having difficulty caring in the past.    2/24:  Continue with current care for now.  Consult ID at [facility] on Monday.        Interval History:     Review of Systems   Constitutional:  Negative for chills and fever.   HENT:  Positive for hearing loss. Negative for congestion.    Eyes:  Negative for pain and discharge.   Respiratory:  Negative for cough, chest tightness, shortness of breath, wheezing and stridor.    Cardiovascular:  Negative for chest pain, palpitations and leg swelling.   Gastrointestinal:  Negative for abdominal distention, abdominal pain, blood in stool, diarrhea, nausea and vomiting.   Endocrine: Negative for cold intolerance, polydipsia and polyuria.   Genitourinary:  Negative for difficulty urinating, dysuria, frequency, hematuria and urgency.   Musculoskeletal:  Negative for arthralgias, back pain and neck pain.   Neurological:  Negative for dizziness, seizures, syncope, weakness, numbness and headaches.   Hematological:  Negative for adenopathy.   Psychiatric/Behavioral:  Negative for behavioral problems.    All other systems reviewed and are negative.  Objective:     Vital Signs (Most Recent):  Temp: 97.6 °F (36.4 °C) (02/24/23 1600)  Pulse: 81 (02/24/23 1600)  Resp: 18 (02/24/23 1600)  BP: 126/80 (02/24/23 1600)  SpO2: 98 % (02/24/23 1600) Vital Signs (24h Range):  Temp:  [97.3 °F (36.3 °C)-99.3 °F (37.4 °C)] 97.6 °F (36.4 °C)  Pulse:  [66-81] 81  Resp:  [14-18] 18  SpO2:  [95 %-100 %] 98  %  BP: ()/(45-80) 126/80     Weight: 107.5 kg (236 lb 15.9 oz)  Body mass index is 40.68 kg/m².    Intake/Output Summary (Last 24 hours) at 2/24/2023 2027  Last data filed at 2/24/2023 1800  Gross per 24 hour   Intake 460 ml   Output 2000 ml   Net -1540 ml      Physical Exam  Vitals reviewed.   Constitutional:       Appearance: Normal appearance.      Interventions: She is not intubated.  HENT:      Head: Normocephalic and atraumatic.      Ears:      Comments: Patient is very hard of hearing.     Nose: Nose normal.      Mouth/Throat:      Pharynx: Oropharynx is clear.   Eyes:      Extraocular Movements: Extraocular movements intact.      Conjunctiva/sclera: Conjunctivae normal.      Pupils: Pupils are equal, round, and reactive to light.   Cardiovascular:      Rate and Rhythm: Normal rate.      Heart sounds: Normal heart sounds. No murmur heard.  Pulmonary:      Effort: Pulmonary effort is normal. She is not intubated.      Breath sounds: Normal breath sounds.   Abdominal:      General: Abdomen is flat. Bowel sounds are normal.      Palpations: Abdomen is soft.   Musculoskeletal:         General: Normal range of motion.      Cervical back: Normal range of motion and neck supple.      Right lower leg: No edema.      Left lower leg: No edema.   Skin:     General: Skin is warm and dry.      Capillary Refill: Capillary refill takes less than 2 seconds.   Neurological:      General: No focal deficit present.      Mental Status: She is alert and oriented to person, place, and time.   Psychiatric:         Mood and Affect: Mood normal.         Behavior: Behavior normal.       Significant Labs: All pertinent labs within the past 24 hours have been reviewed.    Significant Imaging: I have reviewed all pertinent imaging results/findings within the past 24 hours.      Assessment/Plan:      * Bacterial meningitis  Patient sees if cell count upon admission to [facility] was suggestive of bacterial meningitis, culture and  Gram stain negative.    Has positive blood cultures with pneumococcus pneumonia sensitive to Rocephin.    Repeat blood cultures negative   We will need to continue antibiotics for 2-4 weeks of IV antibiotics.  Already received 1 week of IV antibiotic at [facility]     Antibiotics (From admission, onward)    Start     Stop Route Frequency Ordered    02/20/23 2100  mupirocin 2 % ointment         02/25 2059 Nasl 2 times daily 02/20/23 1506    02/20/23 1800  vancomycin (VANCOCIN) 2,000 mg in dextrose 5 % (D5W) 500 mL IVPB         -- IV Every 72 hours 02/20/23 1505    02/20/23 1606  vancomycin - pharmacy to dose         -- IV pharmacy to manage frequency 02/20/23 1506    02/20/23 1600  cefTRIAXone (ROCEPHIN) 2 g in dextrose 5 % in water (D5W) 5 % 50 mL IVPB (MB+)         02/27 0359 IV Every 12 hours (non-standard times) 02/20/23 1404        2/22  MRI with and without contrast per recommendation of neurology.   Consult ID at [facility].      2/23: no MRI.  Consult ID at [facility].       UTI (urinary tract infection)  Urine culture grew E coli UTI, sensitive to Rocephin.         CAD (coronary artery disease)  Status post NSTEMI November 2022   Continue DA PT therapy   Continue beta-blockers   Continue on statins.      Type 2 diabetes mellitus with diabetic chronic kidney disease  Patient's FSGs are controlled on current medication regimen.  Last A1c reviewed-   Lab Results   Component Value Date    HGBA1C 6.9 (H) 11/22/2022     Most recent fingerstick glucose reviewed- No results for input(s): POCTGLUCOSE in the last 24 hours.  Current correctional scale  Low  Maintain anti-hyperglycemic dose as follows-   Antihyperglycemics (From admission, onward)    Start     Stop Route Frequency Ordered    02/20/23 2100  insulin detemir U-100 injection 25 Units         -- SubQ Nightly 02/20/23 1404    02/20/23 1404  insulin aspart U-100 injection 1-14 Units         -- SubQ Every 6 hours PRN 02/20/23 1404        Hold Oral hypoglycemics while  patient is in the hospital.    CKD (chronic kidney disease), stage IV  Creatinine stable around 2.    Nephrologist consulted for evaluation.      Acquired absence of kidney  History of renal cell carcinoma status post nephrectomy 15 years ago.      Hypertension  Continue same antihypertensive medications   Hydralazine IV if needed.        VTE Risk Mitigation (From admission, onward)         Ordered     enoxaparin injection 30 mg  Daily         02/20/23 1408                Discharge Planning   RAMOS:      Code Status: Full Code   Is the patient medically ready for discharge?:     Reason for patient still in hospital (select all that apply): Treatment  Discharge Plan A: Home with family                  Josephine Collins MD  Department of Hospital Medicine   [facility]

## 2023-02-25 NOTE — SUBJECTIVE & OBJECTIVE
Interval History:     Review of Systems   Constitutional:  Negative for chills and fever.   HENT:  Positive for hearing loss. Negative for congestion.    Eyes:  Negative for pain and discharge.   Respiratory:  Negative for cough, chest tightness, shortness of breath, wheezing and stridor.    Cardiovascular:  Negative for chest pain, palpitations and leg swelling.   Gastrointestinal:  Negative for abdominal distention, abdominal pain, blood in stool, diarrhea, nausea and vomiting.   Endocrine: Negative for cold intolerance, polydipsia and polyuria.   Genitourinary:  Negative for difficulty urinating, dysuria, frequency, hematuria and urgency.   Musculoskeletal:  Negative for arthralgias, back pain and neck pain.   Neurological:  Negative for dizziness, seizures, syncope, weakness, numbness and headaches.   Hematological:  Negative for adenopathy.   Psychiatric/Behavioral:  Negative for behavioral problems.    All other systems reviewed and are negative.  Objective:     Vital Signs (Most Recent):  Temp: 97.6 °F (36.4 °C) (02/24/23 1600)  Pulse: 81 (02/24/23 1600)  Resp: 18 (02/24/23 1600)  BP: 126/80 (02/24/23 1600)  SpO2: 98 % (02/24/23 1600) Vital Signs (24h Range):  Temp:  [97.3 °F (36.3 °C)-99.3 °F (37.4 °C)] 97.6 °F (36.4 °C)  Pulse:  [66-81] 81  Resp:  [14-18] 18  SpO2:  [95 %-100 %] 98 %  BP: ()/(45-80) 126/80     Weight: 107.5 kg (236 lb 15.9 oz)  Body mass index is 40.68 kg/m².    Intake/Output Summary (Last 24 hours) at 2/24/2023 2027  Last data filed at 2/24/2023 1800  Gross per 24 hour   Intake 460 ml   Output 2000 ml   Net -1540 ml      Physical Exam  Vitals reviewed.   Constitutional:       Appearance: Normal appearance.      Interventions: She is not intubated.  HENT:      Head: Normocephalic and atraumatic.      Ears:      Comments: Patient is very hard of hearing.     Nose: Nose normal.      Mouth/Throat:      Pharynx: Oropharynx is clear.   Eyes:      Extraocular Movements: Extraocular  movements intact.      Conjunctiva/sclera: Conjunctivae normal.      Pupils: Pupils are equal, round, and reactive to light.   Cardiovascular:      Rate and Rhythm: Normal rate.      Heart sounds: Normal heart sounds. No murmur heard.  Pulmonary:      Effort: Pulmonary effort is normal. She is not intubated.      Breath sounds: Normal breath sounds.   Abdominal:      General: Abdomen is flat. Bowel sounds are normal.      Palpations: Abdomen is soft.   Musculoskeletal:         General: Normal range of motion.      Cervical back: Normal range of motion and neck supple.      Right lower leg: No edema.      Left lower leg: No edema.   Skin:     General: Skin is warm and dry.      Capillary Refill: Capillary refill takes less than 2 seconds.   Neurological:      General: No focal deficit present.      Mental Status: She is alert and oriented to person, place, and time.   Psychiatric:         Mood and Affect: Mood normal.         Behavior: Behavior normal.       Significant Labs: All pertinent labs within the past 24 hours have been reviewed.    Significant Imaging: I have reviewed all pertinent imaging results/findings within the past 24 hours.

## 2023-02-25 NOTE — PLAN OF CARE
Problem: Adult Inpatient Plan of Care  Goal: Plan of Care Review  Outcome: Ongoing, Progressing     Problem: Diabetes Comorbidity  Goal: Blood Glucose Level Within Targeted Range  Outcome: Ongoing, Progressing     Problem: Infection  Goal: Absence of Infection Signs and Symptoms  Outcome: Ongoing, Progressing     Problem: Infection  Goal: Absence of Infection Signs and Symptoms  Outcome: Ongoing, Progressing     Problem: Impaired Wound Healing  Goal: Optimal Wound Healing  Outcome: Ongoing, Progressing     Problem: Skin Injury Risk Increased  Goal: Skin Health and Integrity  Outcome: Ongoing, Progressing     Problem: Bariatric Environmental Safety  Goal: Safety Maintained with Care  Outcome: Ongoing, Progressing

## 2023-02-26 PROBLEM — E21.3 HYPERPARATHYROIDISM: Status: ACTIVE | Noted: 2023-02-26

## 2023-02-26 LAB
ALBUMIN SERPL BCP-MCNC: 2 G/DL (ref 3.5–5)
ANION GAP SERPL CALCULATED.3IONS-SCNC: 16 MMOL/L (ref 7–16)
BUN SERPL-MCNC: 59 MG/DL (ref 7–18)
BUN/CREAT SERPL: 22 (ref 6–20)
CALCIUM SERPL-MCNC: 8.9 MG/DL (ref 8.5–10.1)
CHLORIDE SERPL-SCNC: 109 MMOL/L (ref 98–107)
CO2 SERPL-SCNC: 22 MMOL/L (ref 21–32)
CREAT SERPL-MCNC: 2.74 MG/DL (ref 0.55–1.02)
EGFR (NO RACE VARIABLE) (RUSH/TITUS): 18 ML/MIN/1.73M²
GLUCOSE SERPL-MCNC: 110 MG/DL (ref 70–105)
GLUCOSE SERPL-MCNC: 135 MG/DL (ref 70–105)
GLUCOSE SERPL-MCNC: 194 MG/DL (ref 70–105)
GLUCOSE SERPL-MCNC: 255 MG/DL (ref 70–105)
GLUCOSE SERPL-MCNC: 259 MG/DL (ref 74–106)
PHOSPHATE SERPL-MCNC: 5.9 MG/DL (ref 2.5–4.5)
POTASSIUM SERPL-SCNC: 4.1 MMOL/L (ref 3.5–5.1)
PTH-INTACT SERPL-MCNC: 305.2 PG/ML (ref 18.4–80.1)
SODIUM SERPL-SCNC: 143 MMOL/L (ref 136–145)
VANCOMYCIN TROUGH SERPL-MCNC: 20.1 ΜG/ML (ref 10–20)

## 2023-02-26 PROCEDURE — 11000001 HC ACUTE MED/SURG PRIVATE ROOM

## 2023-02-26 PROCEDURE — 80202 ASSAY OF VANCOMYCIN: CPT | Performed by: INTERNAL MEDICINE

## 2023-02-26 PROCEDURE — 25000003 PHARM REV CODE 250: Performed by: INTERNAL MEDICINE

## 2023-02-26 PROCEDURE — 82962 GLUCOSE BLOOD TEST: CPT

## 2023-02-26 PROCEDURE — 99233 PR SUBSEQUENT HOSPITAL CARE,LEVL III: ICD-10-PCS | Mod: ,,, | Performed by: HOSPITALIST

## 2023-02-26 PROCEDURE — 25000003 PHARM REV CODE 250: Performed by: HOSPITALIST

## 2023-02-26 PROCEDURE — 63600175 PHARM REV CODE 636 W HCPCS: Performed by: INTERNAL MEDICINE

## 2023-02-26 PROCEDURE — 83970 ASSAY OF PARATHORMONE: CPT | Performed by: HOSPITALIST

## 2023-02-26 PROCEDURE — 99233 SBSQ HOSP IP/OBS HIGH 50: CPT | Mod: ,,, | Performed by: HOSPITALIST

## 2023-02-26 PROCEDURE — 80069 RENAL FUNCTION PANEL: CPT | Performed by: HOSPITALIST

## 2023-02-26 RX ADMIN — TICAGRELOR 90 MG: 90 TABLET ORAL at 08:02

## 2023-02-26 RX ADMIN — AMLODIPINE BESYLATE 5 MG: 5 TABLET ORAL at 08:02

## 2023-02-26 RX ADMIN — CARVEDILOL 6.25 MG: 6.25 TABLET, FILM COATED ORAL at 08:02

## 2023-02-26 RX ADMIN — TICAGRELOR 90 MG: 90 TABLET ORAL at 10:02

## 2023-02-26 RX ADMIN — INSULIN DETEMIR 25 UNITS: 100 INJECTION, SOLUTION SUBCUTANEOUS at 10:02

## 2023-02-26 RX ADMIN — QUETIAPINE FUMARATE 50 MG: 25 TABLET ORAL at 08:02

## 2023-02-26 RX ADMIN — QUETIAPINE FUMARATE 50 MG: 25 TABLET ORAL at 10:02

## 2023-02-26 RX ADMIN — ACETAMINOPHEN 1000 MG: 500 TABLET ORAL at 05:02

## 2023-02-26 RX ADMIN — CARVEDILOL 6.25 MG: 6.25 TABLET, FILM COATED ORAL at 10:02

## 2023-02-26 RX ADMIN — CEFTRIAXONE SODIUM 2 G: 2 INJECTION, POWDER, FOR SOLUTION INTRAMUSCULAR; INTRAVENOUS at 04:02

## 2023-02-26 RX ADMIN — PANTOPRAZOLE SODIUM 40 MG: 40 TABLET, DELAYED RELEASE ORAL at 08:02

## 2023-02-26 RX ADMIN — Medication 1 CAPSULE: at 04:02

## 2023-02-26 RX ADMIN — BENZONATATE 200 MG: 100 CAPSULE ORAL at 03:02

## 2023-02-26 RX ADMIN — VANCOMYCIN HYDROCHLORIDE 2000 MG: 1 INJECTION, POWDER, LYOPHILIZED, FOR SOLUTION INTRAVENOUS at 06:02

## 2023-02-26 RX ADMIN — TIZANIDINE 4 MG: 4 TABLET ORAL at 10:02

## 2023-02-26 RX ADMIN — ACETAMINOPHEN 1000 MG: 500 TABLET ORAL at 10:02

## 2023-02-26 RX ADMIN — ALLOPURINOL 100 MG: 100 TABLET ORAL at 10:02

## 2023-02-26 RX ADMIN — INSULIN ASPART 8 UNITS: 100 INJECTION, SOLUTION INTRAVENOUS; SUBCUTANEOUS at 10:02

## 2023-02-26 RX ADMIN — Medication 1 CAPSULE: at 01:02

## 2023-02-26 RX ADMIN — ENOXAPARIN SODIUM 30 MG: 30 INJECTION SUBCUTANEOUS at 04:02

## 2023-02-26 RX ADMIN — BENZONATATE 200 MG: 100 CAPSULE ORAL at 10:02

## 2023-02-26 RX ADMIN — Medication 1 CAPSULE: at 08:02

## 2023-02-26 RX ADMIN — BENZONATATE 200 MG: 100 CAPSULE ORAL at 08:02

## 2023-02-26 NOTE — PROGRESS NOTES
[facility] Hospital Medicine  Progress Note    Patient Name: Jessica Bay  MRN: [id number]  Patient Class: IP- Inpatient   Admission Date: 2/20/2023  Length of Stay: 6 days  Attending Physician: Josephine Collins MD  Primary Care Provider: Jag Lopez MD        Subjective:     Principal Problem:Bacterial meningitis    HPI:  67-year-old female patient with past medical history of diabetes, history of kidney cancer status post nephrectomy, has single kidney, dyslipidemia, hypertension, coronary artery disease status post NSTEMI November 2022 and stent placement to the RCA, history of fibromyalgia, and chronic kidney disease.  Admitted to the hospital 02/12/2023 with altered mental status and fever, patient has history of meningitis in 2015.    Patient admitted to the hospital underwent lumbar puncture which showed evidence of acute meningitis, but patient is CSF cultures remain negative, Gram stain showed no organisms.  Her urine culture grew E coli in her blood culture on February 12th grew Streptococcus pneumoniae sensitive to Rocephin.  Patient continued on Rocephin bacteremia dose.  Patient will need 2-4 weeks of IV antibiotics.    Patient is very hard of hearing, likely related to her previous meningitis.    Patient admitted to [facility] for IV antibiotics and rehab.      Overview/Hospital Course:  Patient admitted to [facility] for continued IV antibiotics.    Plan to also contact [facility] for evaluation by patient's neurologist as well as her infectious disease specialist.    2/22:  Discussed case with Neurology at [facility].  This was the preference of the patient.  Patient previously had meningitis in 2015 and neurologist at that time was Dr. Liriano.  Dr. Kim Liriano she recommended an MRI with and without contrast.  Number that she can be reached at [phone].    Of note, patient had glucose of less than 1 in CSF during this episode of  meningitis.  The organism was strep pneumo.  Dr. Adam stated that mortality is very high in meningitis cases this severe.  She also stated that patient will very likely regain her hearing over time.      2/23:  Patient states that she would need an open MRI in order to have an MRI.  She further states that her kidneys did not permit her to have contrast MRI.  All discuss this further with Dr. Liriano.  It is notable that the patient's son states that she had fluid in her ears and this contributed to her having difficulty caring in the past.    2/24:  Continue with current care for now.  Consult ID at [facility] on Monday.      2/25: Added PTH per patient request. Consult ID on Monday for duration and choice of antibiotics. F/u with neurology.    Discuss dispo with social work.  Patient requests swing-bed on discharge.        Interval History:     Review of Systems   Constitutional:  Negative for chills and fever.   HENT:  Positive for hearing loss. Negative for congestion.    Eyes:  Negative for pain and discharge.   Respiratory:  Negative for cough, chest tightness, shortness of breath, wheezing and stridor.    Cardiovascular:  Negative for chest pain, palpitations and leg swelling.   Gastrointestinal:  Negative for abdominal distention, abdominal pain, blood in stool, diarrhea, nausea and vomiting.   Endocrine: Negative for cold intolerance, polydipsia and polyuria.   Genitourinary:  Negative for difficulty urinating, dysuria, frequency, hematuria and urgency.   Musculoskeletal:  Negative for arthralgias, back pain and neck pain.   Neurological:  Negative for dizziness, seizures, syncope, weakness, numbness and headaches.   Hematological:  Negative for adenopathy.   Psychiatric/Behavioral:  Negative for behavioral problems.    All other systems reviewed and are negative.  Objective:     Vital Signs (Most Recent):  Temp: 98 °F (36.7 °C) (02/26/23 0000)  Pulse: 86 (02/26/23 0000)  Resp: 20 (02/26/23 0000)  BP: (!)  148/72 (02/26/23 0000)  SpO2: 98 % (02/26/23 0000)   Vital Signs (24h Range):  Temp:  [98 °F (36.7 °C)-98.7 °F (37.1 °C)] 98 °F (36.7 °C)  Pulse:  [82-87] 86  Resp:  [18-20] 20  SpO2:  [98 %-100 %] 98 %  BP: (142-162)/(52-75) 148/72     Weight: 114 kg (251 lb 6.4 oz)  Body mass index is 43.15 kg/m².    Intake/Output Summary (Last 24 hours) at 2/26/2023 0420  Last data filed at 2/25/2023 1845  Gross per 24 hour   Intake 580 ml   Output 3600 ml   Net -3020 ml      Physical Exam  Vitals reviewed.   Constitutional:       Appearance: Normal appearance.      Interventions: She is not intubated.  HENT:      Head: Normocephalic and atraumatic.      Ears:      Comments: Patient is very hard of hearing.     Nose: Nose normal.      Mouth/Throat:      Pharynx: Oropharynx is clear.   Eyes:      Extraocular Movements: Extraocular movements intact.      Conjunctiva/sclera: Conjunctivae normal.      Pupils: Pupils are equal, round, and reactive to light.   Cardiovascular:      Rate and Rhythm: Normal rate.      Heart sounds: Normal heart sounds. No murmur heard.  Pulmonary:      Effort: Pulmonary effort is normal. She is not intubated.      Breath sounds: Normal breath sounds.   Abdominal:      General: Abdomen is flat. Bowel sounds are normal.      Palpations: Abdomen is soft.   Musculoskeletal:         General: Normal range of motion.      Cervical back: Normal range of motion and neck supple.      Right lower leg: No edema.      Left lower leg: No edema.   Skin:     General: Skin is warm and dry.      Capillary Refill: Capillary refill takes less than 2 seconds.   Neurological:      General: No focal deficit present.      Mental Status: She is alert and oriented to person, place, and time.   Psychiatric:         Mood and Affect: Mood normal.         Behavior: Behavior normal.       Significant Labs: All pertinent labs within the past 24 hours have been reviewed.    Significant Imaging: I have reviewed all pertinent imaging  results/findings within the past 24 hours.      Assessment/Plan:      * Bacterial meningitis  Patient sees if cell count upon admission to [facility] was suggestive of bacterial meningitis, culture and Gram stain negative.    Has positive blood cultures with pneumococcus pneumonia sensitive to Rocephin.    Repeat blood cultures negative   We will need to continue antibiotics for 2-4 weeks of IV antibiotics.  Already received 1 week of IV antibiotic at [facility]     Antibiotics (From admission, onward)    Start     Stop Route Frequency Ordered    02/20/23 2100  mupirocin 2 % ointment         02/25 2059 Nasl 2 times daily 02/20/23 1506    02/20/23 1800  vancomycin (VANCOCIN) 2,000 mg in dextrose 5 % (D5W) 500 mL IVPB         -- IV Every 72 hours 02/20/23 1505    02/20/23 1606  vancomycin - pharmacy to dose         -- IV pharmacy to manage frequency 02/20/23 1506    02/20/23 1600  cefTRIAXone (ROCEPHIN) 2 g in dextrose 5 % in water (D5W) 5 % 50 mL IVPB (MB+)         02/27 0359 IV Every 12 hours (non-standard times) 02/20/23 1404        2/22  MRI with and without contrast per recommendation of neurology.   Consult ID at [facility].      2/23: no MRI.  Consult ID at [facility].       UTI (urinary tract infection)  Urine culture grew E coli UTI, sensitive to Rocephin.         CAD (coronary artery disease)  Status post NSTEMI November 2022   Continue DA PT therapy   Continue beta-blockers   Continue on statins.      Type 2 diabetes mellitus with diabetic chronic kidney disease  Patient's FSGs are controlled on current medication regimen.  Last A1c reviewed-   Lab Results   Component Value Date    HGBA1C 6.9 (H) 11/22/2022     Most recent fingerstick glucose reviewed- No results for input(s): POCTGLUCOSE in the last 24 hours.  Current correctional scale  Low  Maintain anti-hyperglycemic dose as follows-   Antihyperglycemics (From admission, onward)    Start     Stop Route Frequency Ordered    02/20/23 2100  insulin detemir U-100  injection 25 Units         -- SubQ Nightly 02/20/23 1404    02/20/23 1404  insulin aspart U-100 injection 1-14 Units         -- SubQ Every 6 hours PRN 02/20/23 1404        Hold Oral hypoglycemics while patient is in the hospital.    CKD (chronic kidney disease), stage IV  Creatinine stable around 2.    Nephrologist consulted for evaluation.      Acquired absence of kidney  History of renal cell carcinoma status post nephrectomy 15 years ago.      Hypertension  Continue same antihypertensive medications   Hydralazine IV if needed.        VTE Risk Mitigation (From admission, onward)         Ordered     enoxaparin injection 30 mg  Daily         02/20/23 1404                Discharge Planning   RAMOS:      Code Status: Full Code   Is the patient medically ready for discharge?:     Reason for patient still in hospital (select all that apply): Treatment  Discharge Plan A: Home with family                  Josephine Collins MD  Department of Hospital Medicine   [facility]

## 2023-02-26 NOTE — SUBJECTIVE & OBJECTIVE
Interval History:     Review of Systems   Constitutional:  Negative for chills and fever.   HENT:  Positive for hearing loss. Negative for congestion.    Eyes:  Negative for pain and discharge.   Respiratory:  Negative for cough, chest tightness, shortness of breath, wheezing and stridor.    Cardiovascular:  Negative for chest pain, palpitations and leg swelling.   Gastrointestinal:  Negative for abdominal distention, abdominal pain, blood in stool, diarrhea, nausea and vomiting.   Endocrine: Negative for cold intolerance, polydipsia and polyuria.   Genitourinary:  Negative for difficulty urinating, dysuria, frequency, hematuria and urgency.   Musculoskeletal:  Negative for arthralgias, back pain and neck pain.   Neurological:  Negative for dizziness, seizures, syncope, weakness, numbness and headaches.   Hematological:  Negative for adenopathy.   Psychiatric/Behavioral:  Negative for behavioral problems.    All other systems reviewed and are negative.  Objective:     Vital Signs (Most Recent):  Temp: 98 °F (36.7 °C) (02/26/23 0000)  Pulse: 86 (02/26/23 0000)  Resp: 20 (02/26/23 0000)  BP: (!) 148/72 (02/26/23 0000)  SpO2: 98 % (02/26/23 0000)   Vital Signs (24h Range):  Temp:  [98 °F (36.7 °C)-98.7 °F (37.1 °C)] 98 °F (36.7 °C)  Pulse:  [82-87] 86  Resp:  [18-20] 20  SpO2:  [98 %-100 %] 98 %  BP: (142-162)/(52-75) 148/72     Weight: 114 kg (251 lb 6.4 oz)  Body mass index is 43.15 kg/m².    Intake/Output Summary (Last 24 hours) at 2/26/2023 0420  Last data filed at 2/25/2023 1845  Gross per 24 hour   Intake 580 ml   Output 3600 ml   Net -3020 ml      Physical Exam  Vitals reviewed.   Constitutional:       Appearance: Normal appearance.      Interventions: She is not intubated.  HENT:      Head: Normocephalic and atraumatic.      Ears:      Comments: Patient is very hard of hearing.     Nose: Nose normal.      Mouth/Throat:      Pharynx: Oropharynx is clear.   Eyes:      Extraocular Movements: Extraocular movements  intact.      Conjunctiva/sclera: Conjunctivae normal.      Pupils: Pupils are equal, round, and reactive to light.   Cardiovascular:      Rate and Rhythm: Normal rate.      Heart sounds: Normal heart sounds. No murmur heard.  Pulmonary:      Effort: Pulmonary effort is normal. She is not intubated.      Breath sounds: Normal breath sounds.   Abdominal:      General: Abdomen is flat. Bowel sounds are normal.      Palpations: Abdomen is soft.   Musculoskeletal:         General: Normal range of motion.      Cervical back: Normal range of motion and neck supple.      Right lower leg: No edema.      Left lower leg: No edema.   Skin:     General: Skin is warm and dry.      Capillary Refill: Capillary refill takes less than 2 seconds.   Neurological:      General: No focal deficit present.      Mental Status: She is alert and oriented to person, place, and time.   Psychiatric:         Mood and Affect: Mood normal.         Behavior: Behavior normal.       Significant Labs: All pertinent labs within the past 24 hours have been reviewed.    Significant Imaging: I have reviewed all pertinent imaging results/findings within the past 24 hours.

## 2023-02-26 NOTE — SUBJECTIVE & OBJECTIVE
Interval History:     Review of Systems   Constitutional:  Negative for chills and fever.   HENT:  Positive for hearing loss. Negative for congestion.    Eyes:  Negative for pain and discharge.   Respiratory:  Negative for cough, chest tightness, shortness of breath, wheezing and stridor.    Cardiovascular:  Negative for chest pain, palpitations and leg swelling.   Gastrointestinal:  Negative for abdominal distention, abdominal pain, blood in stool, diarrhea, nausea and vomiting.   Endocrine: Negative for cold intolerance, polydipsia and polyuria.   Genitourinary:  Negative for difficulty urinating, dysuria, frequency, hematuria and urgency.   Musculoskeletal:  Negative for arthralgias, back pain and neck pain.   Neurological:  Negative for dizziness, seizures, syncope, weakness, numbness and headaches.   Hematological:  Negative for adenopathy.   Psychiatric/Behavioral:  Negative for behavioral problems.    All other systems reviewed and are negative.  Objective:     Vital Signs (Most Recent):  Temp: 98.1 °F (36.7 °C) (02/26/23 1600)  Pulse: 87 (02/26/23 1600)  Resp: 20 (02/26/23 1600)  BP: (!) 162/69 (Nurse aware) (02/26/23 1600)  SpO2: 99 % (02/26/23 1600)   Vital Signs (24h Range):  Temp:  [98 °F (36.7 °C)-98.9 °F (37.2 °C)] 98.1 °F (36.7 °C)  Pulse:  [75-88] 87  Resp:  [18-20] 20  SpO2:  [97 %-100 %] 99 %  BP: (130-162)/(69-86) 162/69     Weight: 114.4 kg (252 lb 1.6 oz)  Body mass index is 43.27 kg/m².    Intake/Output Summary (Last 24 hours) at 2/26/2023 1726  Last data filed at 2/26/2023 1650  Gross per 24 hour   Intake --   Output 3500 ml   Net -3500 ml      Physical Exam  Vitals reviewed.   Constitutional:       Appearance: Normal appearance.      Interventions: She is not intubated.  HENT:      Head: Normocephalic and atraumatic.      Ears:      Comments: Patient is very hard of hearing.     Nose: Nose normal.      Mouth/Throat:      Pharynx: Oropharynx is clear.   Eyes:      Extraocular Movements:  Extraocular movements intact.      Conjunctiva/sclera: Conjunctivae normal.      Pupils: Pupils are equal, round, and reactive to light.   Cardiovascular:      Rate and Rhythm: Normal rate.      Heart sounds: Normal heart sounds. No murmur heard.  Pulmonary:      Effort: Pulmonary effort is normal. She is not intubated.      Breath sounds: Normal breath sounds.   Abdominal:      General: Abdomen is flat. Bowel sounds are normal.      Palpations: Abdomen is soft.   Musculoskeletal:         General: Normal range of motion.      Cervical back: Normal range of motion and neck supple.      Right lower leg: No edema.      Left lower leg: No edema.   Skin:     General: Skin is warm and dry.      Capillary Refill: Capillary refill takes less than 2 seconds.   Neurological:      General: No focal deficit present.      Mental Status: She is alert and oriented to person, place, and time.   Psychiatric:         Mood and Affect: Mood normal.         Behavior: Behavior normal.       Significant Labs: All pertinent labs within the past 24 hours have been reviewed.    Significant Imaging: I have reviewed all pertinent imaging results/findings within the past 24 hours.

## 2023-02-26 NOTE — PROGRESS NOTES
[facility] Hospital Medicine  Progress Note    Patient Name: Jessica Bay  MRN: [id number]  Patient Class: IP- Inpatient   Admission Date: 2/20/2023  Length of Stay: 6 days  Attending Physician: Josephine Collins MD  Primary Care Provider: Jag Lopez MD        Subjective:     Principal Problem:Bacterial meningitis    HPI:  67-year-old female patient with past medical history of diabetes, history of kidney cancer status post nephrectomy, has single kidney, dyslipidemia, hypertension, coronary artery disease status post NSTEMI November 2022 and stent placement to the RCA, history of fibromyalgia, and chronic kidney disease.  Admitted to the hospital 02/12/2023 with altered mental status and fever, patient has history of meningitis in 2015.    Patient admitted to the hospital underwent lumbar puncture which showed evidence of acute meningitis, but patient is CSF cultures remain negative, Gram stain showed no organisms.  Her urine culture grew E coli in her blood culture on February 12th grew Streptococcus pneumoniae sensitive to Rocephin.  Patient continued on Rocephin bacteremia dose.  Patient will need 2-4 weeks of IV antibiotics.    Patient is very hard of hearing, likely related to her previous meningitis.    Patient admitted to [facility] for IV antibiotics and rehab.      Overview/Hospital Course:  Patient admitted to [facility] for continued IV antibiotics.    Plan to also contact [facility] for evaluation by patient's neurologist as well as her infectious disease specialist.    2/22:  Discussed case with Neurology at [facility].  This was the preference of the patient.  Patient previously had meningitis in 2015 and neurologist at that time was Dr. Liriano.  Dr. Kim Liriano she recommended an MRI with and without contrast.  Number that she can be reached at [phone].    Of note, patient had glucose of less than 1 in CSF during this episode of  meningitis.  The organism was strep pneumo.  Dr. Adam stated that mortality is very high in meningitis cases this severe.  She also stated that patient will very likely regain her hearing over time.      2/23:  Patient states that she would need an open MRI in order to have an MRI.  She further states that her kidneys did not permit her to have contrast MRI.  All discuss this further with Dr. Liriano.  It is notable that the patient's son states that she had fluid in her ears and this contributed to her having difficulty caring in the past.    2/24:  Continue with current care for now.  Consult ID at [facility] on Monday.      2/25: Added PTH per patient request. Consult ID on Monday for duration and choice of antibiotics. F/u with neurology.    Discuss dispo with social work.  Patient requests swing-bed on discharge.      2/26:  PTH was elevated at 306.  Renal panel ordered.  Likely related to renal disease versus immobilization.  Can discuss further with Nephrology.    Patient has several diet request and we are attempting took accommodate those.      Interval History:     Review of Systems   Constitutional:  Negative for chills and fever.   HENT:  Positive for hearing loss. Negative for congestion.    Eyes:  Negative for pain and discharge.   Respiratory:  Negative for cough, chest tightness, shortness of breath, wheezing and stridor.    Cardiovascular:  Negative for chest pain, palpitations and leg swelling.   Gastrointestinal:  Negative for abdominal distention, abdominal pain, blood in stool, diarrhea, nausea and vomiting.   Endocrine: Negative for cold intolerance, polydipsia and polyuria.   Genitourinary:  Negative for difficulty urinating, dysuria, frequency, hematuria and urgency.   Musculoskeletal:  Negative for arthralgias, back pain and neck pain.   Neurological:  Negative for dizziness, seizures, syncope, weakness, numbness and headaches.   Hematological:  Negative for adenopathy.    Psychiatric/Behavioral:  Negative for behavioral problems.    All other systems reviewed and are negative.  Objective:     Vital Signs (Most Recent):  Temp: 98.1 °F (36.7 °C) (02/26/23 1600)  Pulse: 87 (02/26/23 1600)  Resp: 20 (02/26/23 1600)  BP: (!) 162/69 (Nurse aware) (02/26/23 1600)  SpO2: 99 % (02/26/23 1600)   Vital Signs (24h Range):  Temp:  [98 °F (36.7 °C)-98.9 °F (37.2 °C)] 98.1 °F (36.7 °C)  Pulse:  [75-88] 87  Resp:  [18-20] 20  SpO2:  [97 %-100 %] 99 %  BP: (130-162)/(69-86) 162/69     Weight: 114.4 kg (252 lb 1.6 oz)  Body mass index is 43.27 kg/m².    Intake/Output Summary (Last 24 hours) at 2/26/2023 1726  Last data filed at 2/26/2023 1650  Gross per 24 hour   Intake --   Output 3500 ml   Net -3500 ml      Physical Exam  Vitals reviewed.   Constitutional:       Appearance: Normal appearance.      Interventions: She is not intubated.  HENT:      Head: Normocephalic and atraumatic.      Ears:      Comments: Patient is very hard of hearing.     Nose: Nose normal.      Mouth/Throat:      Pharynx: Oropharynx is clear.   Eyes:      Extraocular Movements: Extraocular movements intact.      Conjunctiva/sclera: Conjunctivae normal.      Pupils: Pupils are equal, round, and reactive to light.   Cardiovascular:      Rate and Rhythm: Normal rate.      Heart sounds: Normal heart sounds. No murmur heard.  Pulmonary:      Effort: Pulmonary effort is normal. She is not intubated.      Breath sounds: Normal breath sounds.   Abdominal:      General: Abdomen is flat. Bowel sounds are normal.      Palpations: Abdomen is soft.   Musculoskeletal:         General: Normal range of motion.      Cervical back: Normal range of motion and neck supple.      Right lower leg: No edema.      Left lower leg: No edema.   Skin:     General: Skin is warm and dry.      Capillary Refill: Capillary refill takes less than 2 seconds.   Neurological:      General: No focal deficit present.      Mental Status: She is alert and oriented to  person, place, and time.   Psychiatric:         Mood and Affect: Mood normal.         Behavior: Behavior normal.       Significant Labs: All pertinent labs within the past 24 hours have been reviewed.    Significant Imaging: I have reviewed all pertinent imaging results/findings within the past 24 hours.      Assessment/Plan:      * Bacterial meningitis  Patient sees if cell count upon admission to [facility] was suggestive of bacterial meningitis, culture and Gram stain negative.    Has positive blood cultures with pneumococcus pneumonia sensitive to Rocephin.    Repeat blood cultures negative   We will need to continue antibiotics for 2-4 weeks of IV antibiotics.  Already received 1 week of IV antibiotic at [facility]     Antibiotics (From admission, onward)    Start     Stop Route Frequency Ordered    02/20/23 2100  mupirocin 2 % ointment         02/25 2059 Nasl 2 times daily 02/20/23 1506    02/20/23 1800  vancomycin (VANCOCIN) 2,000 mg in dextrose 5 % (D5W) 500 mL IVPB         -- IV Every 72 hours 02/20/23 1505    02/20/23 1606  vancomycin - pharmacy to dose         -- IV pharmacy to manage frequency 02/20/23 1506    02/20/23 1600  cefTRIAXone (ROCEPHIN) 2 g in dextrose 5 % in water (D5W) 5 % 50 mL IVPB (MB+)         02/27 0359 IV Every 12 hours (non-standard times) 02/20/23 1404        2/22  MRI with and without contrast per recommendation of neurology.   Consult ID at [facility].      2/23: no MRI.  Consult ID at [facility].       Hyperparathyroidism  PTH was 3 0 6.  This is likely secondary to renal disease and immobilization which patient mobilize for several beats and she does walk wheelchair.  Per request of patient, additional workup was initiated including vitamin-D and renal panel.  May discuss further with Nephrology.      UTI (urinary tract infection)  Urine culture grew E coli UTI, sensitive to Rocephin.         CAD (coronary artery disease)  Status post NSTEMI November 2022   Continue DA PT therapy    Continue beta-blockers   Continue on statins.      Type 2 diabetes mellitus with diabetic chronic kidney disease  Patient's FSGs are controlled on current medication regimen.  Last A1c reviewed-   Lab Results   Component Value Date    HGBA1C 6.9 (H) 11/22/2022     Most recent fingerstick glucose reviewed- No results for input(s): POCTGLUCOSE in the last 24 hours.  Current correctional scale  Low  Maintain anti-hyperglycemic dose as follows-   Antihyperglycemics (From admission, onward)    Start     Stop Route Frequency Ordered    02/20/23 2100  insulin detemir U-100 injection 25 Units         -- SubQ Nightly 02/20/23 1404    02/20/23 1404  insulin aspart U-100 injection 1-14 Units         -- SubQ Every 6 hours PRN 02/20/23 1404        Hold Oral hypoglycemics while patient is in the hospital.    CKD (chronic kidney disease), stage IV  Creatinine stable around 2.    Nephrologist consulted for evaluation.      Acquired absence of kidney  History of renal cell carcinoma status post nephrectomy 15 years ago.      Hypertension  Continue same antihypertensive medications   Hydralazine IV if needed.        VTE Risk Mitigation (From admission, onward)         Ordered     enoxaparin injection 30 mg  Daily         02/20/23 1404                Discharge Planning   RAMOS:      Code Status: Full Code   Is the patient medically ready for discharge?:     Reason for patient still in hospital (select all that apply): Treatment  Discharge Plan A: Home with family                  Josephine Collins MD  Department of Hospital Medicine   [facility]

## 2023-02-26 NOTE — ASSESSMENT & PLAN NOTE
PTH was 3 0 6.  This is likely secondary to renal disease and immobilization which patient mobilize for several beats and she does walk wheelchair.  Per request of patient, additional workup was initiated including vitamin-D and renal panel.  May discuss further with Nephrology.

## 2023-02-27 LAB
25(OH)D3 SERPL-MCNC: 48.3 NG/ML
CA-I SERPL-MCNC: 1.24 MMOL/L (ref 1.15–1.35)
GLUCOSE SERPL-MCNC: 138 MG/DL (ref 70–105)
GLUCOSE SERPL-MCNC: 199 MG/DL (ref 70–105)
GLUCOSE SERPL-MCNC: 253 MG/DL (ref 70–105)
GLUCOSE SERPL-MCNC: 297 MG/DL (ref 70–105)

## 2023-02-27 PROCEDURE — 63600175 PHARM REV CODE 636 W HCPCS: Performed by: HOSPITALIST

## 2023-02-27 PROCEDURE — 99233 PR SUBSEQUENT HOSPITAL CARE,LEVL III: ICD-10-PCS | Mod: ,,, | Performed by: INTERNAL MEDICINE

## 2023-02-27 PROCEDURE — 99233 SBSQ HOSP IP/OBS HIGH 50: CPT | Mod: ,,, | Performed by: HOSPITALIST

## 2023-02-27 PROCEDURE — 11000001 HC ACUTE MED/SURG PRIVATE ROOM

## 2023-02-27 PROCEDURE — 25000003 PHARM REV CODE 250: Performed by: HOSPITALIST

## 2023-02-27 PROCEDURE — 96372 THER/PROPH/DIAG INJ SC/IM: CPT

## 2023-02-27 PROCEDURE — 25000003 PHARM REV CODE 250: Performed by: INTERNAL MEDICINE

## 2023-02-27 PROCEDURE — 99233 SBSQ HOSP IP/OBS HIGH 50: CPT | Mod: ,,, | Performed by: INTERNAL MEDICINE

## 2023-02-27 PROCEDURE — 97530 THERAPEUTIC ACTIVITIES: CPT | Mod: CO

## 2023-02-27 PROCEDURE — 99233 PR SUBSEQUENT HOSPITAL CARE,LEVL III: ICD-10-PCS | Mod: ,,, | Performed by: HOSPITALIST

## 2023-02-27 PROCEDURE — 82962 GLUCOSE BLOOD TEST: CPT

## 2023-02-27 PROCEDURE — 82306 VITAMIN D 25 HYDROXY: CPT | Performed by: HOSPITALIST

## 2023-02-27 PROCEDURE — 97110 THERAPEUTIC EXERCISES: CPT | Mod: CQ

## 2023-02-27 PROCEDURE — 82330 ASSAY OF CALCIUM: CPT | Performed by: HOSPITALIST

## 2023-02-27 PROCEDURE — 97530 THERAPEUTIC ACTIVITIES: CPT | Mod: CQ

## 2023-02-27 PROCEDURE — 63600175 PHARM REV CODE 636 W HCPCS: Performed by: INTERNAL MEDICINE

## 2023-02-27 RX ORDER — CALCITRIOL 0.25 UG/1
0.25 CAPSULE ORAL DAILY
Status: DISCONTINUED | OUTPATIENT
Start: 2023-02-27 | End: 2023-03-03 | Stop reason: HOSPADM

## 2023-02-27 RX ORDER — CYCLOBENZAPRINE HCL 5 MG
5 TABLET ORAL DAILY
Status: DISCONTINUED | OUTPATIENT
Start: 2023-02-27 | End: 2023-03-02

## 2023-02-27 RX ADMIN — INSULIN DETEMIR 25 UNITS: 100 INJECTION, SOLUTION SUBCUTANEOUS at 08:02

## 2023-02-27 RX ADMIN — CALCITRIOL CAPSULES 0.25 MCG 0.25 MCG: 0.25 CAPSULE ORAL at 11:02

## 2023-02-27 RX ADMIN — TIZANIDINE 4 MG: 4 TABLET ORAL at 08:02

## 2023-02-27 RX ADMIN — QUETIAPINE FUMARATE 50 MG: 25 TABLET ORAL at 08:02

## 2023-02-27 RX ADMIN — BENZONATATE 200 MG: 100 CAPSULE ORAL at 03:02

## 2023-02-27 RX ADMIN — CARVEDILOL 6.25 MG: 6.25 TABLET, FILM COATED ORAL at 08:02

## 2023-02-27 RX ADMIN — TICAGRELOR 90 MG: 90 TABLET ORAL at 08:02

## 2023-02-27 RX ADMIN — PANTOPRAZOLE SODIUM 40 MG: 40 TABLET, DELAYED RELEASE ORAL at 08:02

## 2023-02-27 RX ADMIN — BENZONATATE 200 MG: 100 CAPSULE ORAL at 08:02

## 2023-02-27 RX ADMIN — Medication 1 CAPSULE: at 11:02

## 2023-02-27 RX ADMIN — INSULIN ASPART 4 UNITS: 100 INJECTION, SOLUTION INTRAVENOUS; SUBCUTANEOUS at 11:02

## 2023-02-27 RX ADMIN — Medication 1 CAPSULE: at 08:02

## 2023-02-27 RX ADMIN — CEFTRIAXONE SODIUM 2 G: 2 INJECTION, POWDER, FOR SOLUTION INTRAMUSCULAR; INTRAVENOUS at 08:02

## 2023-02-27 RX ADMIN — Medication 1 CAPSULE: at 05:02

## 2023-02-27 RX ADMIN — ENOXAPARIN SODIUM 30 MG: 30 INJECTION SUBCUTANEOUS at 05:02

## 2023-02-27 RX ADMIN — INSULIN ASPART 8 UNITS: 100 INJECTION, SOLUTION INTRAVENOUS; SUBCUTANEOUS at 07:02

## 2023-02-27 RX ADMIN — ALLOPURINOL 100 MG: 100 TABLET ORAL at 08:02

## 2023-02-27 RX ADMIN — AMLODIPINE BESYLATE 5 MG: 5 TABLET ORAL at 08:02

## 2023-02-27 RX ADMIN — CYCLOBENZAPRINE HYDROCHLORIDE 5 MG: 5 TABLET, FILM COATED ORAL at 11:02

## 2023-02-27 NOTE — SUBJECTIVE & OBJECTIVE
Interval History:     Review of Systems   Constitutional:  Negative for chills and fever.   HENT:  Positive for hearing loss. Negative for congestion.    Eyes:  Negative for pain and discharge.   Respiratory:  Negative for cough, chest tightness, shortness of breath, wheezing and stridor.    Cardiovascular:  Negative for chest pain, palpitations and leg swelling.   Gastrointestinal:  Negative for abdominal distention, abdominal pain, blood in stool, diarrhea, nausea and vomiting.   Endocrine: Negative for cold intolerance, polydipsia and polyuria.   Genitourinary:  Negative for difficulty urinating, dysuria, frequency, hematuria and urgency.   Musculoskeletal:  Negative for arthralgias, back pain and neck pain.   Neurological:  Negative for dizziness, seizures, syncope, weakness, numbness and headaches.   Hematological:  Negative for adenopathy.   Psychiatric/Behavioral:  Negative for behavioral problems.    All other systems reviewed and are negative.  Objective:     Vital Signs (Most Recent):  Temp: (!) 49.1 °F (9.5 °C) (02/27/23 1200)  Pulse: 78 (02/27/23 1200)  Resp: 18 (02/27/23 1200)  BP: (!) 146/68 (02/27/23 1200)  SpO2: 96 % (02/27/23 1200)   Vital Signs (24h Range):  Temp:  [49.1 °F (9.5 °C)-98.6 °F (37 °C)] 49.1 °F (9.5 °C)  Pulse:  [71-82] 78  Resp:  [18-20] 18  SpO2:  [96 %-99 %] 96 %  BP: (130-154)/(63-76) 146/68     Weight: 107.7 kg (237 lb 6.4 oz)  Body mass index is 40.75 kg/m².    Intake/Output Summary (Last 24 hours) at 2/27/2023 1603  Last data filed at 2/27/2023 1215  Gross per 24 hour   Intake 480 ml   Output 3900 ml   Net -3420 ml      Physical Exam  Vitals reviewed.   Constitutional:       Appearance: Normal appearance.      Interventions: She is not intubated.  HENT:      Head: Normocephalic and atraumatic.      Ears:      Comments: Patient is very hard of hearing.     Nose: Nose normal.      Mouth/Throat:      Pharynx: Oropharynx is clear.   Eyes:      Extraocular Movements: Extraocular  movements intact.      Conjunctiva/sclera: Conjunctivae normal.      Pupils: Pupils are equal, round, and reactive to light.   Cardiovascular:      Rate and Rhythm: Normal rate.      Heart sounds: Normal heart sounds. No murmur heard.  Pulmonary:      Effort: Pulmonary effort is normal. She is not intubated.      Breath sounds: Normal breath sounds.   Abdominal:      General: Abdomen is flat. Bowel sounds are normal.      Palpations: Abdomen is soft.   Musculoskeletal:         General: Normal range of motion.      Cervical back: Normal range of motion and neck supple.      Right lower leg: No edema.      Left lower leg: No edema.   Skin:     General: Skin is warm and dry.      Capillary Refill: Capillary refill takes less than 2 seconds.   Neurological:      General: No focal deficit present.      Mental Status: She is alert and oriented to person, place, and time.   Psychiatric:         Mood and Affect: Mood normal.         Behavior: Behavior normal.       Significant Labs: All pertinent labs within the past 24 hours have been reviewed.    Significant Imaging: I have reviewed all pertinent imaging results/findings within the past 24 hours.

## 2023-02-27 NOTE — PLAN OF CARE
Problem: Adult Inpatient Plan of Care  Goal: Plan of Care Review  2/26/2023 1844 by Rosemary Silva RN  Outcome: Ongoing, Progressing  2/26/2023 1740 by Rosemary Silva RN  Outcome: Ongoing, Progressing  Goal: Patient-Specific Goal (Individualized)  2/26/2023 1844 by Rosemary Silva RN  Outcome: Ongoing, Progressing  2/26/2023 1740 by Rosemary Silva RN  Outcome: Ongoing, Progressing  Goal: Absence of Hospital-Acquired Illness or Injury  2/26/2023 1844 by Rosemary Silva RN  Outcome: Ongoing, Progressing  2/26/2023 1740 by Rosemary Silva RN  Outcome: Ongoing, Progressing  Goal: Optimal Comfort and Wellbeing  2/26/2023 1844 by Rosemary Silva RN  Outcome: Ongoing, Progressing  2/26/2023 1740 by Rosemary Silva RN  Outcome: Ongoing, Progressing  Goal: Readiness for Transition of Care  2/26/2023 1844 by Rosemary Silva RN  Outcome: Ongoing, Progressing  2/26/2023 1740 by Rosemary Silva RN  Outcome: Ongoing, Progressing     Problem: Diabetes Comorbidity  Goal: Blood Glucose Level Within Targeted Range  2/26/2023 1844 by Rosemary Silva RN  Outcome: Ongoing, Progressing  2/26/2023 1740 by Rosemary Silva RN  Outcome: Ongoing, Progressing     Problem: Infection  Goal: Absence of Infection Signs and Symptoms  2/26/2023 1844 by Rosemary Silva RN  Outcome: Ongoing, Progressing  2/26/2023 1740 by Rosemary Silva RN  Outcome: Ongoing, Progressing     Problem: Impaired Wound Healing  Goal: Optimal Wound Healing  2/26/2023 1844 by Rosemary Silva RN  Outcome: Ongoing, Progressing  2/26/2023 1740 by Rosemary Silva RN  Outcome: Ongoing, Progressing     Problem: Skin Injury Risk Increased  Goal: Skin Health and Integrity  2/26/2023 1844 by Rosemary Silva RN  Outcome: Ongoing, Progressing  2/26/2023 1740 by Rosemary Silva RN  Outcome: Ongoing, Progressing     Problem: Bariatric Environmental Safety  Goal: Safety Maintained with Care  2/26/2023 1844 by Rosemary Silva RN  Outcome: Ongoing, Progressing  2/26/2023 1740 by Rosemary Silva,  RN  Outcome: Ongoing, Progressing

## 2023-02-27 NOTE — PT/OT/SLP PROGRESS
Occupational Therapy   Treatment    Name: Jessica Bay  MRN: [id number]  Admitting Diagnosis:  Bacterial meningitis       Recommendations:     Discharge Recommendations: nursing facility, skilled, rehabilitation facility  Discharge Equipment Recommendations:   (to be determined)  Barriers to discharge:       Assessment:     Jessica Bay is a 67 y.o. female with a medical diagnosis of Bacterial meningitis.  . Performance deficits affecting function are weakness, impaired endurance, impaired self care skills.     Rehab Prognosis:  Good; patient would benefit from acute skilled OT services to address these deficits and reach maximum level of function.       Plan:     Patient to be seen 5 x/week to address the above listed problems via self-care/home management, therapeutic activities, therapeutic exercises  Plan of Care Expires:    Plan of Care Reviewed with: patient    Subjective     Pain/Comfort:  Pain Rating 1: 0/10    Objective:     Communicated with: KASIA Vizcaino  prior to session.  Patient found up in chair with peripheral IV, PureWick upon OT entry to room.    General Precautions: Standard, fall    Orthopedic Precautions:N/A  Braces: N/A  Respiratory Status: Room air     Occupational Performance:     Bed Mobility:    Sit to supine min a     Functional Mobility/Transfers:  Sit to stand min a x 2  Chair to bed min a x 2  Functional Mobility:     Activities of Daily Living:        [facility] 6 Click ADL:      Treatment & Education:  Pt performed hand helper with 2 rubber band resistances 20 reps x 2, red t ball 20 reps x 2, rom ex's with 1 lb 15 reps x 2 B elbow flex/ext,R abd/add,10 reps B shld flex, red t band 15 reps x 2 B elbow flex    Patient left HOB elevated with all lines intact, call button in reach, and bed alarm on    GOALS:   Multidisciplinary Problems       Occupational Therapy Goals          Problem: Occupational Therapy    Goal Priority Disciplines Outcome Interventions   Occupational Therapy Goal      OT, PT/OT Ongoing, Progressing    Description: STG:  Pt will perform grooming with setup  Pt will bathe with min a  Pt will perform UE dressing with setup  Pt will perform LE dressing with min a with AD as needed  Pt will sit EOB x 15 min with (I)  Pt will transfer bed/chair/bsc with min a with AD as needed  Pt will tolerate 20 minutes of tx without fatigue      LT.Restore to max I with self care and mobility.                          Time Tracking:     OT Date of Treatment: 23  OT Start Time:   OT Stop Time: 1446  OT Total Time (min): 28 min    Billable Minutes:Therapeutic Activity 25    OT/GLADYS: GLADYS          2023

## 2023-02-27 NOTE — PROGRESS NOTES
[facility] Hospital Medicine  Progress Note    Patient Name: Jessica Bay  MRN: [id number]  Patient Class: IP- Inpatient   Admission Date: 2/20/2023  Length of Stay: 7 days  Attending Physician: Josephine Collins MD  Primary Care Provider: Jag Lopez MD        Subjective:     Principal Problem:Bacterial meningitis    HPI:  67-year-old female patient with past medical history of diabetes, history of kidney cancer status post nephrectomy, has single kidney, dyslipidemia, hypertension, coronary artery disease status post NSTEMI November 2022 and stent placement to the RCA, history of fibromyalgia, and chronic kidney disease.  Admitted to the hospital 02/12/2023 with altered mental status and fever, patient has history of meningitis in 2015.    Patient admitted to the hospital underwent lumbar puncture which showed evidence of acute meningitis, but patient is CSF cultures remain negative, Gram stain showed no organisms.  Her urine culture grew E coli in her blood culture on February 12th grew Streptococcus pneumoniae sensitive to Rocephin.  Patient continued on Rocephin bacteremia dose.  Patient will need 2-4 weeks of IV antibiotics.    Patient is very hard of hearing, likely related to her previous meningitis.    Patient admitted to [facility] for IV antibiotics and rehab.      Overview/Hospital Course:  Patient admitted to [facility] for continued IV antibiotics.    Plan to also contact [facility] for evaluation by patient's neurologist as well as her infectious disease specialist.    2/22:  Discussed case with Neurology at [facility].  This was the preference of the patient.  Patient previously had meningitis in 2015 and neurologist at that time was Dr. Liriano.  Dr. Kim Liriano she recommended an MRI with and without contrast.  Number that she can be reached at [phone].    Of note, patient had glucose of less than 1 in CSF during this episode of  meningitis.  The organism was strep pneumo.  Dr. Adam stated that mortality is very high in meningitis cases this severe.  She also stated that patient will very likely regain her hearing over time.      2/23:  Patient states that she would need an open MRI in order to have an MRI.  She further states that her kidneys did not permit her to have contrast MRI.  All discuss this further with Dr. Liriano.  It is notable that the patient's son states that she had fluid in her ears and this contributed to her having difficulty caring in the past.    2/24:  Continue with current care for now.  Consult ID at [facility] on Monday.      2/25: Added PTH per patient request. Consult ID on Monday for duration and choice of antibiotics. F/u with neurology.    Discuss dispo with social work.  Patient requests swing-bed on discharge.      2/26:  PTH was elevated at 306.  Renal panel ordered.  Likely related to renal disease versus immobilization.  Can discuss further with Nephrology.    Patient has several diet request and we are attempting took accommodate those.    2/27:  Reports headache today.  Will attempt to have a repeat LP.  Patient was on Brilinta unable to have the repeat LP for the next 5 days.  Contacted [facility] Infectious disease and [facility] Infectious Disease.      Interval History:     Review of Systems   Constitutional:  Negative for chills and fever.   HENT:  Positive for hearing loss. Negative for congestion.    Eyes:  Negative for pain and discharge.   Respiratory:  Negative for cough, chest tightness, shortness of breath, wheezing and stridor.    Cardiovascular:  Negative for chest pain, palpitations and leg swelling.   Gastrointestinal:  Negative for abdominal distention, abdominal pain, blood in stool, diarrhea, nausea and vomiting.   Endocrine: Negative for cold intolerance, polydipsia and polyuria.   Genitourinary:  Negative for difficulty urinating, dysuria, frequency, hematuria and urgency.    Musculoskeletal:  Negative for arthralgias, back pain and neck pain.   Neurological:  Negative for dizziness, seizures, syncope, weakness, numbness and headaches.   Hematological:  Negative for adenopathy.   Psychiatric/Behavioral:  Negative for behavioral problems.    All other systems reviewed and are negative.  Objective:     Vital Signs (Most Recent):  Temp: (!) 49.1 °F (9.5 °C) (02/27/23 1200)  Pulse: 78 (02/27/23 1200)  Resp: 18 (02/27/23 1200)  BP: (!) 146/68 (02/27/23 1200)  SpO2: 96 % (02/27/23 1200)   Vital Signs (24h Range):  Temp:  [49.1 °F (9.5 °C)-98.6 °F (37 °C)] 49.1 °F (9.5 °C)  Pulse:  [71-82] 78  Resp:  [18-20] 18  SpO2:  [96 %-99 %] 96 %  BP: (130-154)/(63-76) 146/68     Weight: 107.7 kg (237 lb 6.4 oz)  Body mass index is 40.75 kg/m².    Intake/Output Summary (Last 24 hours) at 2/27/2023 1603  Last data filed at 2/27/2023 1215  Gross per 24 hour   Intake 480 ml   Output 3900 ml   Net -3420 ml      Physical Exam  Vitals reviewed.   Constitutional:       Appearance: Normal appearance.      Interventions: She is not intubated.  HENT:      Head: Normocephalic and atraumatic.      Ears:      Comments: Patient is very hard of hearing.     Nose: Nose normal.      Mouth/Throat:      Pharynx: Oropharynx is clear.   Eyes:      Extraocular Movements: Extraocular movements intact.      Conjunctiva/sclera: Conjunctivae normal.      Pupils: Pupils are equal, round, and reactive to light.   Cardiovascular:      Rate and Rhythm: Normal rate.      Heart sounds: Normal heart sounds. No murmur heard.  Pulmonary:      Effort: Pulmonary effort is normal. She is not intubated.      Breath sounds: Normal breath sounds.   Abdominal:      General: Abdomen is flat. Bowel sounds are normal.      Palpations: Abdomen is soft.   Musculoskeletal:         General: Normal range of motion.      Cervical back: Normal range of motion and neck supple.      Right lower leg: No edema.      Left lower leg: No edema.   Skin:      General: Skin is warm and dry.      Capillary Refill: Capillary refill takes less than 2 seconds.   Neurological:      General: No focal deficit present.      Mental Status: She is alert and oriented to person, place, and time.   Psychiatric:         Mood and Affect: Mood normal.         Behavior: Behavior normal.       Significant Labs: All pertinent labs within the past 24 hours have been reviewed.    Significant Imaging: I have reviewed all pertinent imaging results/findings within the past 24 hours.      Assessment/Plan:      * Bacterial meningitis  Patient sees if cell count upon admission to [facility] was suggestive of bacterial meningitis, culture and Gram stain negative.    Has positive blood cultures with pneumococcus pneumonia sensitive to Rocephin.    Repeat blood cultures negative   We will need to continue antibiotics for 2-4 weeks of IV antibiotics.  Already received 1 week of IV antibiotic at [facility]     Antibiotics (From admission, onward)    Start     Stop Route Frequency Ordered    02/20/23 2100  mupirocin 2 % ointment         02/25 2059 Nasl 2 times daily 02/20/23 1506    02/20/23 1800  vancomycin (VANCOCIN) 2,000 mg in dextrose 5 % (D5W) 500 mL IVPB         -- IV Every 72 hours 02/20/23 1505    02/20/23 1606  vancomycin - pharmacy to dose         -- IV pharmacy to manage frequency 02/20/23 1506    02/20/23 1600  cefTRIAXone (ROCEPHIN) 2 g in dextrose 5 % in water (D5W) 5 % 50 mL IVPB (MB+)         02/27 0359 IV Every 12 hours (non-standard times) 02/20/23 1404        2/22  MRI with and without contrast per recommendation of neurology.   Consult ID at [facility].      2/23: no MRI.  Consult ID at [facility].       Hyperparathyroidism  PTH was 3 0 6.  This is likely secondary to renal disease and immobilization which patient mobilize for several beats and she does walk wheelchair.  Per request of patient, additional workup was initiated including vitamin-D and renal panel.  May discuss further with  Nephrology.      UTI (urinary tract infection)  Urine culture grew E coli UTI, sensitive to Rocephin.         CAD (coronary artery disease)  Status post NSTEMI November 2022   Continue DA PT therapy   Continue beta-blockers   Continue on statins.      Type 2 diabetes mellitus with diabetic chronic kidney disease  Patient's FSGs are controlled on current medication regimen.  Last A1c reviewed-   Lab Results   Component Value Date    HGBA1C 6.9 (H) 11/22/2022     Most recent fingerstick glucose reviewed- No results for input(s): POCTGLUCOSE in the last 24 hours.  Current correctional scale  Low  Maintain anti-hyperglycemic dose as follows-   Antihyperglycemics (From admission, onward)    Start     Stop Route Frequency Ordered    02/20/23 2100  insulin detemir U-100 injection 25 Units         -- SubQ Nightly 02/20/23 1404    02/20/23 1404  insulin aspart U-100 injection 1-14 Units         -- SubQ Every 6 hours PRN 02/20/23 1404        Hold Oral hypoglycemics while patient is in the hospital.    CKD (chronic kidney disease), stage IV  Creatinine stable around 2.    Nephrologist consulted for evaluation.      Acquired absence of kidney  History of renal cell carcinoma status post nephrectomy 15 years ago.      Hypertension  Continue same antihypertensive medications   Hydralazine IV if needed.        VTE Risk Mitigation (From admission, onward)         Ordered     enoxaparin injection 30 mg  Daily         02/20/23 1404                Discharge Planning   RMAOS:      Code Status: Full Code   Is the patient medically ready for discharge?:     Reason for patient still in hospital (select all that apply): Treatment  Discharge Plan A: Home with family                  Josephine Collins MD  Department of Hospital Medicine   [facility]

## 2023-02-27 NOTE — PT/OT/SLP PROGRESS
"Physical Therapy Treatment    Patient Name:  Jessica Bay   MRN:  [id number]    Recommendations:     Discharge Recommendations: nursing facility, skilled, rehabilitation facility  Discharge Equipment Recommendations: other (see comments) (to be determined)  Barriers to discharge:  ongoing medical treatment    Assessment:     Jessica Bay is a 67 y.o. female admitted with a medical diagnosis of Bacterial meningitis.  She presents with the following impairments/functional limitations: weakness, impaired endurance, impaired self care skills, impaired functional mobility, edema.    Pt cont to present with increased anxiety during transitional movements, however, able to perform all much better than she thinks she can    Rehab Prognosis: Fair; patient would benefit from acute skilled PT services to address these deficits and reach maximum level of function.    Recent Surgery: * No surgery found *      Plan:     During this hospitalization, patient to be seen 5 x/week to address the identified rehab impairments via gait training, therapeutic activities, therapeutic exercises and progress toward the following goals:    Plan of Care Expires:  03/21/23    Subjective     Chief Complaint: bacterial meningitis  Patient/Family Comments/goals: "I can't do that today", "I wasn't walking before I got sick"  Pain/Comfort:         Objective:     Communicated with Lorraine Vizcaino RN prior to session.  Patient found HOB elevated with peripheral IV, PureWick upon PT entry to room.     General Precautions: Standard, fall  Orthopedic Precautions: N/A  Braces: N/A  Respiratory Status: Room air     Functional Mobility:  Bed Mobility:     Rolling Left:  contact guard assistance  Rolling Right: contact guard assistance  Supine to Sit: modified independence and supervision  Transfers:     Sit to Stand:  contact guard assistance with rolling walker  Bed to Chair: contact guard assistance and minimum assistance with  rolling walker  using  Step " "Transfer  Gait: 4 steps around to chair with RW and contact guard to minimum assist      AM-PAC 6 CLICK MOBILITY  Turning over in bed (including adjusting bedclothes, sheets and blankets)?: 3  Sitting down on and standing up from a chair with arms (e.g., wheelchair, bedside commode, etc.): 4  Moving from lying on back to sitting on the side of the bed?: 4  Moving to and from a bed to a chair (including a wheelchair)?: 3  Need to walk in hospital room?: 1  Climbing 3-5 steps with a railing?: 1  Basic Mobility Total Score: 16       Treatment & Education:  Pt performed 2 x 15 reps (B) LE exercises: ap, quad set, glut set, straight leg raise, hip ab/adduction, long arc quad, heel slide with active assist    Patient left up in chair with all lines intact and call button in reach. Pt reports being "claustrophobic" and not wanting chair alarm on attached to gown     GOALS:   Multidisciplinary Problems       Physical Therapy Goals          Problem: Physical Therapy    Goal Priority Disciplines Outcome Goal Variances Interventions   Physical Therapy Goal     PT, PT/OT Ongoing, Progressing     Description: Short Term Goals to be met by: 3/7/23    Patient will increase functional independence with mobility by performin. Supine to sit with contact guard assist  2. Sit to stand transfer with contact guard assist using Rolling walker  3. Bed to chair transfer with contact guard assist using Rolling walker  4. Gait  x 100 feet with contact guard assist using Rolling walker  5. Lower extremity exercise program x30 reps per handout, with assistance as needed    Long Term Goals to be met by: 3/21/23    Pt will regain full independent functional mobility with Rolling walker to return to home situation and prior activities of daily living.                        Time Tracking:     PT Received On: 23  PT Start Time: 1322     PT Stop Time: 1350  PT Total Time (min): 28 min     Billable Minutes: Therapeutic Activity 10 and " Therapeutic Exercise 15    Treatment Type: Treatment  PT/PTA: PTA     PTA Visit Number: 4     02/27/2023

## 2023-02-27 NOTE — PLAN OF CARE
SW spoke with pt's son Dean, who has spoken with pt's . Choice obtained for [facility] upon dc. Ss following for further dc needs.

## 2023-02-27 NOTE — PROGRESS NOTES
Pharmacokinetic Assessment Follow Up: IV Vancomycin    Vancomycin serum concentration assessment(s):    The trough level was drawn correctly and can be used to guide therapy at this time. The measurement is slightly above the desired definitive target range of 15 to 20 mcg/mL.    Vancomycin Regimen Plan:    Pharmacy will continue vanc for now pending the results of the [facility] ID consult.  Once the consult has been performed, pharmacy will re-evalulate vanc dosing.  A vanc trough has been ordered for 3/1 at 1730.    Drug levels (last 3 results):  Recent Labs   Lab Result Units 02/26/23  1755   Vancomycin, Trough µg/mL 20.1*       Pharmacy will continue to follow and monitor vancomycin.    Please contact pharmacy at extension [phone] for questions regarding this assessment.    Patient brief summary:  Jessica Bay is a 67 y.o. female initiated on antimicrobial therapy with IV Vancomycin for treatment of meningitis    The patient's current regimen is vanc 2000 mg IV q72h    Drug Allergies:   Review of patient's allergies indicates:   Allergen Reactions    Bactrim [sulfamethoxazole-trimethoprim] Blisters    Benadryl [diphenhydramine hcl]     Daypro [oxaprozin]        Actual Body Weight:   107.7 kg    Renal Function:   Estimated Creatinine Clearance: 23.9 mL/min (A) (based on SCr of 2.74 mg/dL (H)).,     Dialysis Method (if applicable):  N/A    CBC (last 72 hours):  No results for input(s): WHITE BLOOD CELL COUNT, HEMOGLOBIN, HEMATOCRIT, PLATELETS, GRAN%, LYMPH%, MONO%, EOSINOPHIL%, BASOPHIL%, DIFFERENTIAL METHOD in the last 72 hours.    Metabolic Panel (last 72 hours):  Recent Labs   Lab Result Units 02/26/23  1755   Sodium mmol/L 143   Potassium mmol/L 4.1   Chloride mmol/L 109*   CO2 mmol/L 22   Glucose mg/dL 259*   BUN mg/dL 59*   Creatinine mg/dL 2.74*   Albumin g/dL 2.0*   Phosphorus mg/dL 5.9*       Vancomycin Administrations:  vancomycin given in the last 96 hours                     vancomycin (VANCOCIN) 2,000 mg in  dextrose 5 % (D5W) 500 mL IVPB (mg) 2,000 mg New Bag 02/26/23 1827     2,000 mg New Bag 02/23/23 1748                    Microbiologic Results:  Microbiology Results (last 7 days)       ** No results found for the last 168 hours. **

## 2023-02-28 LAB
ALBUMIN SERPL BCP-MCNC: 2.2 G/DL (ref 3.5–5)
ANION GAP SERPL CALCULATED.3IONS-SCNC: 16 MMOL/L (ref 7–16)
BASOPHILS # BLD AUTO: 0.03 K/UL (ref 0–0.2)
BASOPHILS NFR BLD AUTO: 0.4 % (ref 0–1)
BUN SERPL-MCNC: 69 MG/DL (ref 7–18)
BUN/CREAT SERPL: 24 (ref 6–20)
CALCIUM SERPL-MCNC: 9 MG/DL (ref 8.5–10.1)
CHLORIDE SERPL-SCNC: 109 MMOL/L (ref 98–107)
CO2 SERPL-SCNC: 23 MMOL/L (ref 21–32)
CREAT SERPL-MCNC: 2.87 MG/DL (ref 0.55–1.02)
DIFFERENTIAL METHOD BLD: ABNORMAL
EGFR (NO RACE VARIABLE) (RUSH/TITUS): 17 ML/MIN/1.73M²
EOSINOPHIL # BLD AUTO: 0.18 K/UL (ref 0–0.5)
EOSINOPHIL NFR BLD AUTO: 2.2 % (ref 1–4)
ERYTHROCYTE [DISTWIDTH] IN BLOOD BY AUTOMATED COUNT: 17 % (ref 11.5–14.5)
GLUCOSE SERPL-MCNC: 139 MG/DL (ref 70–105)
GLUCOSE SERPL-MCNC: 159 MG/DL (ref 70–105)
GLUCOSE SERPL-MCNC: 199 MG/DL (ref 70–105)
GLUCOSE SERPL-MCNC: 231 MG/DL (ref 74–106)
GLUCOSE SERPL-MCNC: 251 MG/DL (ref 70–105)
GLUCOSE SERPL-MCNC: 253 MG/DL (ref 70–105)
HCT VFR BLD AUTO: 28 % (ref 38–47)
HGB BLD-MCNC: 8.3 G/DL (ref 12–16)
IMM GRANULOCYTES # BLD AUTO: 0.13 K/UL (ref 0–0.04)
IMM GRANULOCYTES NFR BLD: 1.6 % (ref 0–0.4)
LYMPHOCYTES # BLD AUTO: 1.11 K/UL (ref 1–4.8)
LYMPHOCYTES NFR BLD AUTO: 13.3 % (ref 27–41)
MCH RBC QN AUTO: 31 PG (ref 27–31)
MCHC RBC AUTO-ENTMCNC: 29.6 G/DL (ref 32–36)
MCV RBC AUTO: 104.5 FL (ref 80–96)
MONOCYTES # BLD AUTO: 0.69 K/UL (ref 0–0.8)
MONOCYTES NFR BLD AUTO: 8.2 % (ref 2–6)
MPC BLD CALC-MCNC: 9.8 FL (ref 9.4–12.4)
NEUTROPHILS # BLD AUTO: 6.23 K/UL (ref 1.8–7.7)
NEUTROPHILS NFR BLD AUTO: 74.3 % (ref 53–65)
NRBC # BLD AUTO: 0 X10E3/UL
NRBC, AUTO (.00): 0 %
PHOSPHATE SERPL-MCNC: 6.3 MG/DL (ref 2.5–4.5)
PLATELET # BLD AUTO: 225 K/UL (ref 150–400)
POTASSIUM SERPL-SCNC: 4.5 MMOL/L (ref 3.5–5.1)
RBC # BLD AUTO: 2.68 M/UL (ref 4.2–5.4)
SODIUM SERPL-SCNC: 143 MMOL/L (ref 136–145)
WBC # BLD AUTO: 8.37 K/UL (ref 4.5–11)

## 2023-02-28 PROCEDURE — 25000003 PHARM REV CODE 250: Performed by: INTERNAL MEDICINE

## 2023-02-28 PROCEDURE — 97110 THERAPEUTIC EXERCISES: CPT | Mod: CO

## 2023-02-28 PROCEDURE — 63600175 PHARM REV CODE 636 W HCPCS: Performed by: HOSPITALIST

## 2023-02-28 PROCEDURE — 99233 PR SUBSEQUENT HOSPITAL CARE,LEVL III: ICD-10-PCS | Mod: ,,, | Performed by: HOSPITALIST

## 2023-02-28 PROCEDURE — 96372 THER/PROPH/DIAG INJ SC/IM: CPT

## 2023-02-28 PROCEDURE — 94761 N-INVAS EAR/PLS OXIMETRY MLT: CPT

## 2023-02-28 PROCEDURE — 97110 THERAPEUTIC EXERCISES: CPT | Mod: CQ

## 2023-02-28 PROCEDURE — 99233 SBSQ HOSP IP/OBS HIGH 50: CPT | Mod: ,,, | Performed by: HOSPITALIST

## 2023-02-28 PROCEDURE — 25000003 PHARM REV CODE 250: Performed by: HOSPITALIST

## 2023-02-28 PROCEDURE — 82962 GLUCOSE BLOOD TEST: CPT

## 2023-02-28 PROCEDURE — 63600175 PHARM REV CODE 636 W HCPCS: Performed by: INTERNAL MEDICINE

## 2023-02-28 PROCEDURE — 80069 RENAL FUNCTION PANEL: CPT | Performed by: HOSPITALIST

## 2023-02-28 PROCEDURE — 11000001 HC ACUTE MED/SURG PRIVATE ROOM

## 2023-02-28 PROCEDURE — 85025 COMPLETE CBC W/AUTO DIFF WBC: CPT | Performed by: HOSPITALIST

## 2023-02-28 PROCEDURE — 97530 THERAPEUTIC ACTIVITIES: CPT | Mod: CQ

## 2023-02-28 RX ORDER — CARVEDILOL 12.5 MG/1
12.5 TABLET ORAL 2 TIMES DAILY
COMMUNITY

## 2023-02-28 RX ORDER — DULOXETIN HYDROCHLORIDE 30 MG/1
30 CAPSULE, DELAYED RELEASE ORAL DAILY
COMMUNITY

## 2023-02-28 RX ORDER — INSULIN DEGLUDEC 200 U/ML
50 INJECTION, SOLUTION SUBCUTANEOUS DAILY
COMMUNITY

## 2023-02-28 RX ORDER — ROSUVASTATIN CALCIUM 40 MG/1
40 TABLET, COATED ORAL DAILY
COMMUNITY

## 2023-02-28 RX ORDER — ASPIRIN 81 MG/1
81 TABLET ORAL DAILY
COMMUNITY

## 2023-02-28 RX ORDER — CALCITRIOL 0.25 UG/1
0.25 CAPSULE ORAL DAILY
COMMUNITY

## 2023-02-28 RX ORDER — LIRAGLUTIDE 6 MG/ML
0.6 INJECTION SUBCUTANEOUS DAILY
COMMUNITY

## 2023-02-28 RX ORDER — SODIUM BICARBONATE 650 MG/1
650 TABLET ORAL 3 TIMES DAILY
COMMUNITY
End: 2023-06-06 | Stop reason: SDUPTHER

## 2023-02-28 RX ORDER — INSULIN ASPART 100 [IU]/ML
1-14 INJECTION, SOLUTION INTRAVENOUS; SUBCUTANEOUS
Status: DISCONTINUED | OUTPATIENT
Start: 2023-02-28 | End: 2023-03-03 | Stop reason: HOSPADM

## 2023-02-28 RX ADMIN — BENZONATATE 200 MG: 100 CAPSULE ORAL at 09:02

## 2023-02-28 RX ADMIN — CEFTRIAXONE SODIUM 2 G: 2 INJECTION, POWDER, FOR SOLUTION INTRAMUSCULAR; INTRAVENOUS at 06:02

## 2023-02-28 RX ADMIN — QUETIAPINE FUMARATE 50 MG: 25 TABLET ORAL at 09:02

## 2023-02-28 RX ADMIN — INSULIN ASPART 4 UNITS: 100 INJECTION, SOLUTION INTRAVENOUS; SUBCUTANEOUS at 12:02

## 2023-02-28 RX ADMIN — TIZANIDINE 4 MG: 4 TABLET ORAL at 09:02

## 2023-02-28 RX ADMIN — ALLOPURINOL 100 MG: 100 TABLET ORAL at 09:02

## 2023-02-28 RX ADMIN — CALCITRIOL CAPSULES 0.25 MCG 0.25 MCG: 0.25 CAPSULE ORAL at 09:02

## 2023-02-28 RX ADMIN — INSULIN ASPART 8 UNITS: 100 INJECTION, SOLUTION INTRAVENOUS; SUBCUTANEOUS at 05:02

## 2023-02-28 RX ADMIN — INSULIN ASPART 2 UNITS: 100 INJECTION, SOLUTION INTRAVENOUS; SUBCUTANEOUS at 06:02

## 2023-02-28 RX ADMIN — CYCLOBENZAPRINE HYDROCHLORIDE 5 MG: 5 TABLET, FILM COATED ORAL at 09:02

## 2023-02-28 RX ADMIN — AMLODIPINE BESYLATE 5 MG: 5 TABLET ORAL at 09:02

## 2023-02-28 RX ADMIN — ENOXAPARIN SODIUM 30 MG: 30 INJECTION SUBCUTANEOUS at 05:02

## 2023-02-28 RX ADMIN — BENZONATATE 200 MG: 100 CAPSULE ORAL at 02:02

## 2023-02-28 RX ADMIN — INSULIN DETEMIR 25 UNITS: 100 INJECTION, SOLUTION SUBCUTANEOUS at 09:02

## 2023-02-28 RX ADMIN — CARVEDILOL 6.25 MG: 6.25 TABLET, FILM COATED ORAL at 09:02

## 2023-02-28 RX ADMIN — ACETAMINOPHEN 1000 MG: 500 TABLET ORAL at 06:02

## 2023-02-28 RX ADMIN — PANTOPRAZOLE SODIUM 40 MG: 40 TABLET, DELAYED RELEASE ORAL at 09:02

## 2023-02-28 NOTE — PROGRESS NOTES
"[facility]  Adult Nutrition  Follow-up Note         Reason for Assessment  Reason For Assessment: RD follow-up  Nutrition Risk Screen: no indicators present    Pt seen at this time for nutrition follow up.    Recommend continue current Consistent Carbohydrate 2000kcal Diet order and Glucerna (or Boost Glucose Control) TID to promote adequate oral intake. Encourage good PO intakes.    Per MD since last RD review:  "Overview/Hospital Course:  Patient admitted to [facility] for continued IV antibiotics.    Plan to also contact [facility] for evaluation by patient's neurologist as well as her infectious disease specialist.    2/24:  Continue with current care for now.  Consult ID at [facility] on Monday.    2/25: Added PTH per patient request. Consult ID on Monday for duration and choice of antibiotics. F/u with neurology.    Discuss dispo with social work.  Patient requests swing-bed on discharge.    2/26:  PTH was elevated at 306.  Renal panel ordered.  Likely related to renal disease versus immobilization.  Can discuss further with Nephrology.  (as of 2/27 Nephrology is resuming calcitriol for secondary hyperparathyroidism)  Patient has several diet request and we are attempting took accommodate those.  2/27:  Reports headache today.  Will attempt to have a repeat LP.  Patient was on Brilinta unable to have the repeat LP for the next 5 days.  Contacted [facility] Infectious disease and [facility] Infectious Disease.    Pt receiving Consistent Carbohydrate 2000kcal Diet. Oral intakes have improved and pt is now typically eating % of meals. Encourage good PO intakes. Recommend continue Glucerna (or Boost Glucose Control) TID to promote adequate oral intake. Glucerna provides 220kcal, 10g protein each.     Wt Readings from Last 3 Encounters:   02/27/23 0549 107.7 kg (237 lb 6.4 oz)   02/26/23 0600 114.4 kg (252 lb 1.6 oz)   02/25/23 0600 114 kg (251 lb 6.4 oz)   02/23/23 1500 " 107.5 kg (236 lb 15.9 oz)   02/22/23 0532 112.7 kg (248 lb 7.3 oz)   02/20/23 1605 113.9 kg (251 lb)   02/17/23 0524 103.8 kg (228 lb 13.4 oz)   02/16/23 0308 96.4 kg (212 lb 8.4 oz)   02/15/23 0309 96.2 kg (212 lb 1.3 oz)   02/14/23 0301 111.5 kg (245 lb 13 oz)   02/13/23 0245 108.8 kg (239 lb 13.8 oz)   02/12/23 1245 109.7 kg (241 lb 13.5 oz)   02/12/23 0907 108 kg (238 lb)   02/12/23 0459 108 kg (238 lb)   Unsure etiology of weight discrepancies - suspect some inaccuracies. Will continue to monitor weight trend. CBW 2/27 reading 237lb 6.4oz is well above IBW range, BMI considered morbid obesity - nutrition needs adjusted. Pt with CKD stg 4 - protein needs adjusted.     Last BM 2/27 per flowsheets. Will continue to monitor PO intakes, meds, labs, weight trend, updates in patient condition. RD following.    Malnutrition  Is Patient Malnourished: No     Nutrition Diagnosis  Altered Nutrition Related Lab Values   related to Diabetes Mellitus as evidenced by elevated blood glucose levels     Nutrition Diagnosis Status: Chronic/ continues     Nutrition Risk  Level of Risk/Frequency of Follow-up: moderate   Chewing or Swallowing Difficulty?: No Chewing/Swallowing Difficulty    Estimated/Assessed Needs    Temp: 98.3 °F (36.8 °C)Oral  Weight Used For Calorie Calculations: 67.7 kg (149 lb 4 oz) (adjusted body weight)   Energy Need Method: Kcal/kg (25-30kcal/kg adj body weight) Energy Calorie Requirements (kcal): 1692-2031kcal  Weight Used For Protein Calculations: 107.5 kg (236 lb 15.9 oz)  Protein Requirements: 65-86g pro (0.6-0.8 g pro/kg)       RDA Method (mL): 1692     Nutrition Prescription / Recommendations  Recommendation/Intervention: Recommend continue current Consistent Carbohydrate 2000kcal Diet order. Given oral intakes improved, recommend decrease Glucerna to daily instead of TID and continue to monitor intakes. Can increase back to TID or up to BID if needed. Given elevated phos, recommend consider discussing  with nephrology possibly adding a phosphate binding medication to promote normalization of phosphorus especially following the resumption of calcitriol which can cause increased phosphorus levels. Will monitor for medication changes, lab trends. If renal function labs worsen, recommend consider adjustment to renal diet. Encourage good PO intakes.  Goals: po intakes adequate to meet needs, weight stability, lab stability/normalization  Nutrition Goal Status: new  Current Diet Order: Consistent Carbohydrate 2000kcal Diet  Nutrition Order Comments: Appropriate  Oral Nutrition Supplement: Glucerna (or Boost Glucose Control) TID  Recommended Diet: Consistent Carbohydrate 2000 (75g Carbs)  Recommended Oral Supplement: Glucerna Shake [220 kcals, 10g Protein, 26g Carbs(4g Fiber, 7g Sugar), 9g Fat] daily  Is Nutrition Support Recommended: No  Is Education Recommended: No    Monitor and Evaluation  % current Intake: P.O. intake of 75 - 100 %  % intake to meet estimated needs: 75 - 100 %  Food and Nutrient Intake: energy intake, food and beverage intake  Food and Nutrient Adminstration: diet order  Anthropometric Measurements: weight, weight change, body mass index  Biochemical Data, Medical Tests and Procedures: electrolyte and renal panel, gastrointestinal profile, glucose/endocrine profile, inflammatory profile, lipid profile  Nutrition-Focused Physical Findings: overall appearance, extremities, muscles and bones, head and eyes, skin     Current Medical Diagnosis and Past Medical History  Diagnosis: other (see comments) (bacterial meningitis)  Past Medical History:   Diagnosis Date    Cancer     Diabetes mellitus, type 2     Fibromyalgia     History of kidney cancer 2018    Hyperlipidemia     Hypertension     Migraine headache     Renal cell carcinoma     Renal disorder      Nutrition/Diet History  Spiritual, Cultural Beliefs, [employer] Practices, Values that Affect Care: no  Food Allergies:  "[facility]    Lab/Procedures/Meds  Recent Labs   Lab 02/26/23  1755      K 4.1   BUN 59*   CREATININE 2.74*   *   CALCIUM 8.9   ALBUMIN 2.0*   *   PHOS 5.9*     Last A1c:   Lab Results   Component Value Date    HGBA1C 6.9 (H) 11/22/2022     Lab Results   Component Value Date    RBC 2.68 (L) 02/28/2023    HGB 8.3 (L) 02/28/2023    HCT 28.0 (L) 02/28/2023    .5 (H) 02/28/2023    MCH 31.0 02/28/2023    MCHC 29.6 (L) 02/28/2023     Pertinent Labs Reviewed: reviewed  Pertinent Labs Comments:   Most recent labs from 2/26: Cl 109 (H) - possibly r/t fluid status;  BUN 59 (H), creatinine 2.74 (H), BUN/CREAT RATIO 22 (H), Ca 7.9 (L), eGFR 18 (L), albumin 2.0 (L), Phos 5.9(H) - pt with stg 4 CKD and bacterial meningitis so possible inflammatory response driving down protein and albumin; GLU 65 (H) - hx DM2; total globulin 4.1 (H) - unsure etiology, will continue to monitor altered labs  Pertinent Medications Reviewed: reviewed  Pertinent Medications Comments:   allopurinol, amlodipine, benzonatate, calcitriol, carvedilol, rocephin, cyclobenzaprine, enoxaparin, insulin, lactobacillus acidophilus, pantoprazole, quetiapine, tizanidine    Anthropometrics  Temp: 98.3 °F (36.8 °C)  Height: 5' 4" (162.6 cm)  Height (inches): 64 in  Weight Method: Bed Scale  Weight: 107.7 kg (237 lb 6.4 oz)  Weight (lb): 237.4 lb  Ideal Body Weight (IBW), Female: 120 lb  % Ideal Body Weight, Female (lb): 209.17 %  BMI (Calculated): 40.7  BMI Grade: greater than 40 - morbid obesity     Nutrition by Nursing  Diet/Nutrition Received: consistent carb/diabetic diet  Intake (%): 75%  Last Bowel Movement: 02/27/23    Nutrition Follow-Up  RD Follow-up?: Yes  "

## 2023-02-28 NOTE — PT/OT/SLP PROGRESS
"Physical Therapy Treatment    Patient Name:  Jessica Bay   MRN:  [id number]    Recommendations:     Discharge Recommendations: nursing facility, skilled, rehabilitation facility  Discharge Equipment Recommendations: other (see comments) (to be determined)  Barriers to discharge:  ongoing medical treatment    Assessment:     Jessica Bay is a 67 y.o. female admitted with a medical diagnosis of Bacterial meningitis.  She presents with the following impairments/functional limitations: weakness, impaired endurance, impaired self care skills, impaired functional mobility, edema.    Pt completed all activites without sig difficulties, however, declined up to recliner chair due to same being uncomfortable    Rehab Prognosis: Good; patient would benefit from acute skilled PT services to address these deficits and reach maximum level of function.    Recent Surgery: * No surgery found *      Plan:     During this hospitalization, patient to be seen 5 x/week to address the identified rehab impairments via gait training, therapeutic activities, therapeutic exercises and progress toward the following goals:    Plan of Care Expires:  03/21/23    Subjective     Chief Complaint: bacterial meningitis  Patient/Family Comments/goals: "I wanna sit on the side of the bed and use my dry shampoo", "I don't want to sit in that chair"  Pain/Comfort:         Objective:     Communicated with Lorraine Vizcaino RN prior to session.  Patient found HOB elevated with peripheral IV, PureWick upon PT entry to room.     General Precautions: Standard, fall  Orthopedic Precautions: N/A  Braces: N/A  Respiratory Status: Room air     Functional Mobility:  Bed Mobility:     Rolling Left:  contact guard assistance and minimum assistance for hygiene and change pads  Rolling Right: contact guard assistance and minimum assistance  Supine to Sit: minimum assistance  Sit to Supine: minimum assistance      AM-PAC 6 CLICK MOBILITY          Treatment & " Education:  Pt performed 30 reps (B) LE exercises: ap, quad set, glut set, straight leg raise, hip ab/adduction, long arc quad, heel slide with active assist     Independent sitting EOB x 15 minutes - using dry shampoo, combing hair, and performing exercise    Patient left HOB elevated with all lines intact, call button in reach, bed alarm on, and Lorraine Vizcaino RN present..    GOALS:   Multidisciplinary Problems       Physical Therapy Goals          Problem: Physical Therapy    Goal Priority Disciplines Outcome Goal Variances Interventions   Physical Therapy Goal     PT, PT/OT Ongoing, Progressing     Description: Short Term Goals to be met by: 3/7/23    Patient will increase functional independence with mobility by performin. Supine to sit with contact guard assist  2. Sit to stand transfer with contact guard assist using Rolling walker  3. Bed to chair transfer with contact guard assist using Rolling walker  4. Gait  x 100 feet with contact guard assist using Rolling walker  5. Lower extremity exercise program x30 reps per handout, with assistance as needed    Long Term Goals to be met by: 3/21/23    Pt will regain full independent functional mobility with Rolling walker to return to home situation and prior activities of daily living.                        Time Tracking:     PT Received On: 23  PT Start Time: 1458     PT Stop Time: 1540  PT Total Time (min): 42 min     Billable Minutes: Therapeutic Activity 15 and Therapeutic Exercise 15    Treatment Type: Treatment  PT/PTA: PTA     PTA Visit Number: 2023

## 2023-02-28 NOTE — PLAN OF CARE
Problem: Physical Therapy  Goal: Physical Therapy Goal  Description: Short Term Goals to be met by: 3/7/23    Patient will increase functional independence with mobility by performin. Supine to sit with contact guard assist  2. Sit to stand transfer with contact guard assist using Rolling walker  3. Bed to chair transfer with contact guard assist using Rolling walker  4. Gait  x 100 feet with contact guard assist using Rolling walker  5. Lower extremity exercise program x30 reps per handout, with assistance as needed    Long Term Goals to be met by: 3/21/23    Pt will regain full independent functional mobility with Rolling walker to return to home situation and prior activities of daily living.   2023 1551 by Sharon Knight, PTA  Outcome: Ongoing, Not Progressing STG# 4  2023 1551 by Sharon Knight, PTA  Outcome: Ongoing, Progressing       Rolling Left:  contact guard assistance to minimum assistance  Rolling Right: contact guard assistance to minimum assistance  Supine to Sit: modified independent to supervision to minimum assistance  Sitting EOB: independent     Sit to Stand:  contact guard assistance to minimum assistance with rolling walker  Bed to Chair: contact guard assistance to minimum assistance with  rolling walker  using  Step Transfer  Gait: 4 steps around to chair with RW and contact guard to minimum assist    Pt slowly improving with all functional mobility, however, cont to become anxious during sit to stand and gait distance remains limited.  Pt will benefit from swing bed at discharge to maximize rehab potential

## 2023-02-28 NOTE — CARE UPDATE
Spoke with ID at [facility].     Jessica Buckley, FEMI Stark.     Typically duration of IV antibiotics is 14 days.  If symptoms persist, they extend for 21 days.      Concerning the hearing loss, they will contact me again to discuss if the loss of hearing will affect the duration of antibiotics.      Neurology is Dr. Kim Bond.    [phone]    Patient states she needs an open MRI.    They recommended given anxiety medication prior to MRI and order an MRI without contrast.

## 2023-02-28 NOTE — PLAN OF CARE
TRACY spoke with Dennis at [facility]. Referral received and pt has been accepted for SWB placement upon dc from [facility]. [facility] following. SS following for further dc needs.

## 2023-02-28 NOTE — PROGRESS NOTES
Pharmacokinetic Assessment Follow Up: IV Vancomycin    Vancomycin serum concentration assessment(s):    The trough level was drawn correctly and can be used to guide therapy at this time. The measurement is above the desired definitive target range of 15 to 20 mcg/mL.    Vancomycin Regimen Plan:    Pharmacy will hold vanc for now pending a trough level on 3/1 at 1730.  Pharmacy will redose vanc based on this level.    Drug levels (last 3 results):  Recent Labs   Lab Result Units 02/26/23  1755   Vancomycin, Trough µg/mL 20.1*       Pharmacy will continue to follow and monitor vancomycin.    Please contact pharmacy at extension [phone] for questions regarding this assessment.    Patient brief summary:  Jessica Bay is a 67 y.o. female initiated on antimicrobial therapy with IV Vancomycin for treatment of meningitis    The patient's current regimen is on hold    Drug Allergies:   Review of patient's allergies indicates:   Allergen Reactions    Bactrim [sulfamethoxazole-trimethoprim] Blisters    Benadryl [diphenhydramine hcl]     Daypro [oxaprozin]        Actual Body Weight:   107.7 kg    Renal Function:   Estimated Creatinine Clearance: 23.9 mL/min (A) (based on SCr of 2.74 mg/dL (H)).,     Dialysis Method (if applicable):  N/A    CBC (last 72 hours):  No results for input(s): WHITE BLOOD CELL COUNT, HEMOGLOBIN, HEMATOCRIT, PLATELETS, GRAN%, LYMPH%, MONO%, EOSINOPHIL%, BASOPHIL%, DIFFERENTIAL METHOD in the last 72 hours.    Metabolic Panel (last 72 hours):  Recent Labs   Lab Result Units 02/26/23  1755   Sodium mmol/L 143   Potassium mmol/L 4.1   Chloride mmol/L 109*   CO2 mmol/L 22   Glucose mg/dL 259*   BUN mg/dL 59*   Creatinine mg/dL 2.74*   Albumin g/dL 2.0*   Phosphorus mg/dL 5.9*       Vancomycin Administrations:  vancomycin given in the last 96 hours                     vancomycin (VANCOCIN) 2,000 mg in dextrose 5 % (D5W) 500 mL IVPB (mg) 2,000 mg New Bag 02/26/23 9864                    Microbiologic  Results:  Microbiology Results (last 7 days)       ** No results found for the last 168 hours. **

## 2023-02-28 NOTE — PT/OT/SLP PROGRESS
Occupational Therapy   Treatment    Name: Jessica Bay  MRN: [id number]  Admitting Diagnosis:  Bacterial meningitis       Recommendations:     Discharge Recommendations: nursing facility, skilled, rehabilitation facility  Discharge Equipment Recommendations:   (to be determined)  Barriers to discharge:       Assessment:     Jessica Bay is a 67 y.o. female with a medical diagnosis of Bacterial meningitis.  . Performance deficits affecting function are weakness, impaired endurance, impaired self care skills.     Rehab Prognosis:  Good; patient would benefit from acute skilled OT services to address these deficits and reach maximum level of function.       Plan:     Patient to be seen 5 x/week to address the above listed problems via self-care/home management, therapeutic activities, therapeutic exercises  Plan of Care Expires:    Plan of Care Reviewed with: patient    Subjective     Pain/Comfort:  Pain Rating 1: 0/10    Objective:     Communicated with: KASIA Vizcaino prior to session.  Patient found supine with peripheral IV, PureWick upon OT entry to room.    General Precautions: Standard, fall    Orthopedic Precautions:N/A  Braces: N/A  Respiratory Status: Room air     Occupational Performance:     Bed Mobility:         Functional Mobility/Transfers:    Functional Mobility:     Activities of Daily Living:        [facility] 6 Click ADL:      Treatment & Education:  Pt performed hand helper with 2 rubber band resistances 20 reps x 2, rom ex's with 1 lb wt 15 reps x 2 B elbow flex/ext,abd/add, 10 reps x 2 B shld flex  Pt drowsy today but tolerated tx well     Patient left supine with all lines intact and call button in reach    GOALS:   Multidisciplinary Problems       Occupational Therapy Goals          Problem: Occupational Therapy    Goal Priority Disciplines Outcome Interventions   Occupational Therapy Goal     OT, PT/OT Ongoing, Progressing    Description: STG:  Pt will perform grooming with setup  Pt will bathe  with min a  Pt will perform UE dressing with setup  Pt will perform LE dressing with min a with AD as needed  Pt will sit EOB x 15 min with (I)  Pt will transfer bed/chair/bsc with min a with AD as needed  Pt will tolerate 20 minutes of tx without fatigue      LT.Restore to max I with self care and mobility.                          Time Tracking:     OT Date of Treatment: 23  OT Start Time: 1418  OT Stop Time: 1446  OT Total Time (min): 28 min    Billable Minutes:Therapeutic Exercise 20    OT/GLADYS: GLADYS          2023

## 2023-02-28 NOTE — PLAN OF CARE
Problem: Occupational Therapy  Goal: Occupational Therapy Goal  Description: STG:  Pt will perform grooming with setup met   Pt will bathe with min a nt   Pt will perform UE dressing with setup nt   Pt will perform LE dressing with min a with AD as needed nt   Pt will sit EOB x 15 min with (I) progressing   Pt will transfer bed/chair/bsc with min a with AD as needed progressing   Pt will tolerate 20 minutes of tx without fatigue progressing       LT.Restore to max I with self care and mobility.     Outcome: Ongoing, Progressing   Grooming: I with combing hair and washing face and hands   I to comb her hair  I to apply lip balm  I to open lip balm  Dep to doff socks   I to sit eob 10 mins  Sit to supine min a     Pt making progress with OT. Plan is to cont with OT poc

## 2023-02-28 NOTE — CARE UPDATE
[facility] called back:     Extend ceftriaxone for 4 weeks if patient still has hearing difficulties after 21 days of therapy.

## 2023-02-28 NOTE — ASSESSMENT & PLAN NOTE
Patient sees if cell count upon admission to [facility] was suggestive of bacterial meningitis, culture and Gram stain negative.    Has positive blood cultures with pneumococcus pneumonia sensitive to Rocephin.    Repeat blood cultures negative   We will need to continue antibiotics for 2-4 weeks of IV antibiotics.  Already received 1 week of IV antibiotic at [facility]     Antibiotics (From admission, onward)    Start     Stop Route Frequency Ordered    02/20/23 2100  mupirocin 2 % ointment         02/25 2059 Nasl 2 times daily 02/20/23 1506    02/20/23 1800  vancomycin (VANCOCIN) 2,000 mg in dextrose 5 % (D5W) 500 mL IVPB         -- IV Every 72 hours 02/20/23 1505    02/20/23 1606  vancomycin - pharmacy to dose         -- IV pharmacy to manage frequency 02/20/23 1506        2/22  MRI with and without contrast per recommendation of neurology.   Consult ID at [facility].      2/23: no MRI.  Consult ID at [facility].     2/27:   Spoke with ID at [facility]:     Jessica Buckley, FEMI Stark.     Typically duration of IV antibiotics is 14 days.  If symptoms persist, they extend for 21 days.    Concerning the hearing loss, they will contact me again to discuss if the loss of hearing will affect the duration of antibiotics.    Neurology is Dr. Kim Bond.    [phone]  Patient states she needs an open MRI.    They recommended given anxiety medication prior to MRI and order an MRI without contrast.    2/28: 2/28: complete 14 additional days of Ceftriaxone since patient has lost her hearing.  Contact neurology if needed.    Dr. Kim Liriano she recommended an MRI with and without contrast.  Patient cannot have contrast.  Give anxiety medication and she may be willing to do MRI without contrast.  Number for Dr. Liriano: [phone].    Lumbar puncture in 3-4 days (had to hold brilinta for 5 days).  Patient reports headache so we may monitor CSF.

## 2023-03-01 LAB
GLUCOSE SERPL-MCNC: 151 MG/DL (ref 70–105)
GLUCOSE SERPL-MCNC: 179 MG/DL (ref 70–105)
GLUCOSE SERPL-MCNC: 221 MG/DL (ref 70–105)
GLUCOSE SERPL-MCNC: 238 MG/DL (ref 70–105)
VANCOMYCIN TROUGH SERPL-MCNC: 20 ΜG/ML (ref 10–20)

## 2023-03-01 PROCEDURE — 63600175 PHARM REV CODE 636 W HCPCS: Performed by: INTERNAL MEDICINE

## 2023-03-01 PROCEDURE — 82962 GLUCOSE BLOOD TEST: CPT

## 2023-03-01 PROCEDURE — 99233 SBSQ HOSP IP/OBS HIGH 50: CPT | Mod: ,,, | Performed by: STUDENT IN AN ORGANIZED HEALTH CARE EDUCATION/TRAINING PROGRAM

## 2023-03-01 PROCEDURE — 99233 PR SUBSEQUENT HOSPITAL CARE,LEVL III: ICD-10-PCS | Mod: ,,, | Performed by: STUDENT IN AN ORGANIZED HEALTH CARE EDUCATION/TRAINING PROGRAM

## 2023-03-01 PROCEDURE — 11000001 HC ACUTE MED/SURG PRIVATE ROOM

## 2023-03-01 PROCEDURE — 97530 THERAPEUTIC ACTIVITIES: CPT

## 2023-03-01 PROCEDURE — 96372 THER/PROPH/DIAG INJ SC/IM: CPT

## 2023-03-01 PROCEDURE — 80202 ASSAY OF VANCOMYCIN: CPT | Performed by: HOSPITALIST

## 2023-03-01 PROCEDURE — 97116 GAIT TRAINING THERAPY: CPT

## 2023-03-01 PROCEDURE — 97110 THERAPEUTIC EXERCISES: CPT

## 2023-03-01 PROCEDURE — 25000003 PHARM REV CODE 250: Performed by: HOSPITALIST

## 2023-03-01 PROCEDURE — 25000003 PHARM REV CODE 250: Performed by: INTERNAL MEDICINE

## 2023-03-01 PROCEDURE — 63600175 PHARM REV CODE 636 W HCPCS: Performed by: HOSPITALIST

## 2023-03-01 RX ORDER — ALPRAZOLAM 0.25 MG/1
0.5 TABLET ORAL
Status: DISCONTINUED | OUTPATIENT
Start: 2023-03-01 | End: 2023-03-02

## 2023-03-01 RX ADMIN — INSULIN DETEMIR 25 UNITS: 100 INJECTION, SOLUTION SUBCUTANEOUS at 08:03

## 2023-03-01 RX ADMIN — INSULIN ASPART 6 UNITS: 100 INJECTION, SOLUTION INTRAVENOUS; SUBCUTANEOUS at 05:03

## 2023-03-01 RX ADMIN — AMLODIPINE BESYLATE 5 MG: 5 TABLET ORAL at 08:03

## 2023-03-01 RX ADMIN — BENZONATATE 200 MG: 100 CAPSULE ORAL at 03:03

## 2023-03-01 RX ADMIN — QUETIAPINE FUMARATE 50 MG: 25 TABLET ORAL at 08:03

## 2023-03-01 RX ADMIN — ENOXAPARIN SODIUM 30 MG: 30 INJECTION SUBCUTANEOUS at 05:03

## 2023-03-01 RX ADMIN — CALCITRIOL CAPSULES 0.25 MCG 0.25 MCG: 0.25 CAPSULE ORAL at 08:03

## 2023-03-01 RX ADMIN — CEFTRIAXONE SODIUM 2 G: 2 INJECTION, POWDER, FOR SOLUTION INTRAMUSCULAR; INTRAVENOUS at 05:03

## 2023-03-01 RX ADMIN — BENZONATATE 200 MG: 100 CAPSULE ORAL at 08:03

## 2023-03-01 RX ADMIN — TIZANIDINE 4 MG: 4 TABLET ORAL at 08:03

## 2023-03-01 RX ADMIN — INSULIN ASPART 4 UNITS: 100 INJECTION, SOLUTION INTRAVENOUS; SUBCUTANEOUS at 06:03

## 2023-03-01 RX ADMIN — ACETAMINOPHEN 1000 MG: 500 TABLET ORAL at 12:03

## 2023-03-01 RX ADMIN — INSULIN ASPART 4 UNITS: 100 INJECTION, SOLUTION INTRAVENOUS; SUBCUTANEOUS at 11:03

## 2023-03-01 RX ADMIN — CYCLOBENZAPRINE HYDROCHLORIDE 5 MG: 5 TABLET, FILM COATED ORAL at 08:03

## 2023-03-01 RX ADMIN — ACETAMINOPHEN 1000 MG: 500 TABLET ORAL at 03:03

## 2023-03-01 RX ADMIN — CARVEDILOL 6.25 MG: 6.25 TABLET, FILM COATED ORAL at 08:03

## 2023-03-01 RX ADMIN — PANTOPRAZOLE SODIUM 40 MG: 40 TABLET, DELAYED RELEASE ORAL at 08:03

## 2023-03-01 RX ADMIN — ALLOPURINOL 100 MG: 100 TABLET ORAL at 08:03

## 2023-03-01 NOTE — SUBJECTIVE & OBJECTIVE
Interval History:    Review of Systems   Constitutional:  Negative for chills and fever.   HENT:  Positive for hearing loss. Negative for congestion.    Eyes:  Negative for pain and discharge.   Respiratory:  Negative for cough, chest tightness, shortness of breath, wheezing and stridor.    Cardiovascular:  Negative for chest pain, palpitations and leg swelling.   Gastrointestinal:  Negative for abdominal distention, abdominal pain, blood in stool, diarrhea, nausea and vomiting.   Endocrine: Negative for cold intolerance, polydipsia and polyuria.   Genitourinary:  Negative for difficulty urinating, dysuria, frequency, hematuria and urgency.   Musculoskeletal:  Negative for arthralgias, back pain and neck pain.   Neurological:  Negative for dizziness, seizures, syncope, weakness, numbness and headaches.   Hematological:  Negative for adenopathy.   Psychiatric/Behavioral:  Negative for behavioral problems.    All other systems reviewed and are negative.  Objective:     Vital Signs (Most Recent):  Temp: 97.7 °F (36.5 °C) (02/28/23 2000)  Pulse: 91 (02/28/23 2000)  Resp: 18 (02/28/23 2000)  BP: 119/68 (02/28/23 2000)  SpO2: 98 % (02/28/23 2000) Vital Signs (24h Range):  Temp:  [97.7 °F (36.5 °C)-98.7 °F (37.1 °C)] 97.7 °F (36.5 °C)  Pulse:  [75-94] 91  Resp:  [18-19] 18  SpO2:  [96 %-98 %] 98 %  BP: ()/(54-68) 119/68     Weight: 107.7 kg (237 lb 6.4 oz)  Body mass index is 40.75 kg/m².    Intake/Output Summary (Last 24 hours) at 2/28/2023 2143  Last data filed at 2/28/2023 1735  Gross per 24 hour   Intake 1580 ml   Output 1800 ml   Net -220 ml      Physical Exam  Vitals reviewed.   Constitutional:       Appearance: Normal appearance.      Interventions: She is not intubated.  HENT:      Head: Normocephalic and atraumatic.      Ears:      Comments: Patient is very hard of hearing.     Nose: Nose normal.      Mouth/Throat:      Pharynx: Oropharynx is clear.   Eyes:      Extraocular Movements: Extraocular movements  intact.      Conjunctiva/sclera: Conjunctivae normal.      Pupils: Pupils are equal, round, and reactive to light.   Cardiovascular:      Rate and Rhythm: Normal rate.      Heart sounds: Normal heart sounds. No murmur heard.  Pulmonary:      Effort: Pulmonary effort is normal. She is not intubated.      Breath sounds: Normal breath sounds.   Abdominal:      General: Abdomen is flat. Bowel sounds are normal.      Palpations: Abdomen is soft.   Musculoskeletal:         General: Normal range of motion.      Cervical back: Normal range of motion and neck supple.      Right lower leg: No edema.      Left lower leg: No edema.   Skin:     General: Skin is warm and dry.      Capillary Refill: Capillary refill takes less than 2 seconds.   Neurological:      General: No focal deficit present.      Mental Status: She is alert and oriented to person, place, and time.   Psychiatric:         Mood and Affect: Mood normal.         Behavior: Behavior normal.       Significant Labs: All pertinent labs within the past 24 hours have been reviewed.    Significant Imaging: I have reviewed all pertinent imaging results/findings within the past 24 hours.

## 2023-03-01 NOTE — PROGRESS NOTES
[facility] Hospital Medicine  Progress Note    Patient Name: Jessica Bay  MRN: [id number]  Patient Class: IP- Inpatient   Admission Date: 2/20/2023  Length of Stay: 9 days  Attending Physician: Vimal Ren DO  Primary Care Provider: Jag Lopez MD        Subjective:     Principal Problem:Bacterial meningitis        HPI:  67-year-old female patient with past medical history of diabetes, history of kidney cancer status post nephrectomy, has single kidney, dyslipidemia, hypertension, coronary artery disease status post NSTEMI November 2022 and stent placement to the RCA, history of fibromyalgia, and chronic kidney disease.  Admitted to the hospital 02/12/2023 with altered mental status and fever, patient has history of meningitis in 2015.    Patient admitted to the hospital underwent lumbar puncture which showed evidence of acute meningitis, but patient is CSF cultures remain negative, Gram stain showed no organisms.  Her urine culture grew E coli in her blood culture on February 12th grew Streptococcus pneumoniae sensitive to Rocephin.  Patient continued on Rocephin bacteremia dose.  Patient will need 2-4 weeks of IV antibiotics.    Patient is very hard of hearing, likely related to her previous meningitis.    Patient admitted to specialty [facility] for IV antibiotics and rehab.      Overview/Hospital Course:  Patient admitted to [facility] for continued IV antibiotics.    Plan to also contact [facility] for evaluation by patient's neurologist as well as her infectious disease specialist.    2/22:  Discussed case with Neurology at [facility].  This was the preference of the patient.  Patient previously had meningitis in 2015 and neurologist at that time was Dr. Liriano.  Dr. Kim Liriano she recommended an MRI with and without contrast.  Number that she can be reached at [phone].    Of note, patient had glucose of less than 1 in CSF during this episode of  meningitis.  The organism was strep pneumo.  Dr. Adam stated that mortality is very high in meningitis cases this severe.  She also stated that patient will very likely regain her hearing over time.      2/23:  Patient states that she would need an open MRI in order to have an MRI.  She further states that her kidneys did not permit her to have contrast MRI.  All discuss this further with Dr. Liriano.  It is notable that the patient's son states that she had fluid in her ears and this contributed to her having difficulty caring in the past.    2/24:  Continue with current care for now.  Consult ID at [facility] on Monday.      2/25: Added PTH per patient request. Consult ID on Monday for duration and choice of antibiotics. F/u with neurology.    Discuss dispo with social work.  Patient requests swing-bed on discharge.      2/26:  PTH was elevated at 306.  Renal panel ordered.  Likely related to renal disease versus immobilization.  Can discuss further with Nephrology.    Patient has several diet request and we are attempting took accommodate those.    2/27:  Reports headache today.  Will attempt to have a repeat LP.  Patient was on Brilinta unable to have the repeat LP for the next 5 days.  Contacted [facility] Infectious disease and [facility].    2/28: complete 14 additional days of Ceftriaxone since patient has lost her hearing.  Contact neurology if needed.    Dr. Kim Liriano she recommended an MRI with and without contrast.  Patient cannot have contrast.  Give anxiety medication and she may be willing to do MRI without contrast.  Number for Dr. Liriano: [phone].    Lumbar puncture in 3-4 days (had to hold brilinta for 5 days).  Patient reports headache so we may monitor CSF.     3/1- will repeat lumbar puncture on 3/3- last dose brillenta 2/27.       Interval History: naeo    Review of Systems   Constitutional:  Negative for chills and fever.   HENT:  Positive for hearing loss.  Negative for congestion.    Eyes:  Negative for pain and discharge.   Respiratory:  Negative for cough, chest tightness, shortness of breath, wheezing and stridor.    Cardiovascular:  Negative for chest pain, palpitations and leg swelling.   Gastrointestinal:  Negative for abdominal distention, abdominal pain, blood in stool, diarrhea, nausea and vomiting.   Endocrine: Negative for cold intolerance, polydipsia and polyuria.   Genitourinary:  Negative for difficulty urinating, dysuria, frequency, hematuria and urgency.   Musculoskeletal:  Negative for arthralgias, back pain and neck pain.   Neurological:  Negative for dizziness, seizures, syncope, weakness, numbness and headaches.   Hematological:  Negative for adenopathy.   Psychiatric/Behavioral:  Negative for behavioral problems.    All other systems reviewed and are negative.  Objective:     Vital Signs (Most Recent):  Temp: 99 °F (37.2 °C) (03/01/23 0746)  Pulse: 84 (03/01/23 0746)  Resp: 18 (03/01/23 0746)  BP: (!) 152/64 (03/01/23 0746)  SpO2: 99 % (03/01/23 0746)   Vital Signs (24h Range):  Temp:  [97.7 °F (36.5 °C)-99 °F (37.2 °C)] 99 °F (37.2 °C)  Pulse:  [80-94] 84  Resp:  [18] 18  SpO2:  [96 %-99 %] 99 %  BP: (119-152)/(54-68) 152/64     Weight: 108.2 kg (238 lb 8.6 oz)  Body mass index is 40.94 kg/m².    Intake/Output Summary (Last 24 hours) at 3/1/2023 1309  Last data filed at 3/1/2023 0549  Gross per 24 hour   Intake 600 ml   Output 1900 ml   Net -1300 ml      Physical Exam  Vitals reviewed.   Constitutional:       Appearance: Normal appearance.      Interventions: She is not intubated.  HENT:      Head: Normocephalic and atraumatic.      Ears:      Comments: Patient is very hard of hearing.     Nose: Nose normal.      Mouth/Throat:      Pharynx: Oropharynx is clear.   Eyes:      Extraocular Movements: Extraocular movements intact.      Conjunctiva/sclera: Conjunctivae normal.      Pupils: Pupils are equal, round, and reactive to light.    Cardiovascular:      Rate and Rhythm: Normal rate.      Heart sounds: Normal heart sounds. No murmur heard.  Pulmonary:      Effort: Pulmonary effort is normal. She is not intubated.      Breath sounds: Normal breath sounds.   Abdominal:      General: Abdomen is flat. Bowel sounds are normal.      Palpations: Abdomen is soft.   Musculoskeletal:         General: Normal range of motion.      Cervical back: Normal range of motion and neck supple.      Right lower leg: No edema.      Left lower leg: No edema.   Skin:     General: Skin is warm and dry.      Capillary Refill: Capillary refill takes less than 2 seconds.   Neurological:      General: No focal deficit present.      Mental Status: She is alert and oriented to person, place, and time.   Psychiatric:         Mood and Affect: Mood normal.         Behavior: Behavior normal.       Significant Labs: All pertinent labs within the past 24 hours have been reviewed.    Significant Imaging: I have reviewed all pertinent imaging results/findings within the past 24 hours.      Assessment/Plan:      * Bacterial meningitis  Patient sees if cell count upon admission to [facility] was suggestive of bacterial meningitis, culture and Gram stain negative.    Has positive blood cultures with pneumococcus pneumonia sensitive to Rocephin.    Repeat blood cultures negative   We will need to continue antibiotics for 2-4 weeks of IV antibiotics.  Already received 1 week of IV antibiotic at [facility]     Antibiotics (From admission, onward)    Start     Stop Route Frequency Ordered    02/28/23 1800  cefTRIAXone (ROCEPHIN) 2 g in dextrose 5 % in water (D5W) 5 % 50 mL IVPB (MB+)         -- IV Every 12 hours (non-standard times) 02/28/23 1349    02/20/23 2100  mupirocin 2 % ointment         02/25 2059 Nasl 2 times daily 02/20/23 1506    02/20/23 1606  vancomycin - pharmacy to dose         -- IV pharmacy to manage frequency 02/20/23 1506        2/22  MRI with and without contrast  per recommendation of neurology.   Consult ID at [facility].      2/23: no MRI.  Consult ID at [facility].     2/27:   Spoke with ID at [facility]:     FEMI [address]     Typically duration of IV antibiotics is 14 days.  If symptoms persist, they extend for 21 days.    Concerning the hearing loss, they will contact me again to discuss if the loss of hearing will affect the duration of antibiotics.    Neurology is Dr. Kim Bond.    [phone]  Patient states she needs an open MRI.    They recommended given anxiety medication prior to MRI and order an MRI without contrast.    2/28: 2/28: complete 14 additional days of Ceftriaxone since patient has lost her hearing.  Contact neurology if needed.    Dr. Kim Liriano she recommended an MRI with and without contrast.  Patient cannot have contrast.  Give anxiety medication and she may be willing to do MRI without contrast.  Number for Dr. Liriano: [phone].    Lumbar puncture in 3-4 days (had to hold brilinta for 5 days).  Patient reports headache so we may monitor CSF.     3/1-MRI without contrast today    Hyperparathyroidism    This is likely secondary to renal disease and immobilization which patient mobilize for several beats and she does walk wheelchair.  Per request of patient, additional workup was initiated including vitamin-D and renal panel.  May discuss further with Nephrology.      UTI (urinary tract infection)  Urine culture grew E coli UTI, sensitive to Rocephin.         CAD (coronary artery disease)  Status post NSTEMI November 2022   Continue DA PT therapy   Continue beta-blockers   Continue on statins.      Type 2 diabetes mellitus with diabetic chronic kidney disease  Patient's FSGs are controlled on current medication regimen.  Last A1c reviewed-   Lab Results   Component Value Date    HGBA1C 6.9 (H) 11/22/2022     Most recent fingerstick glucose reviewed- No results for input(s): POCTGLUCOSE in the last 24 hours.  Current correctional  scale  Low  Maintain anti-hyperglycemic dose as follows-   Antihyperglycemics (From admission, onward)    Start     Stop Route Frequency Ordered    02/28/23 2115  insulin aspart U-100 injection 1-14 Units         -- SubQ Before meals & nightly PRN 02/28/23 2014 02/20/23 2100  insulin detemir U-100 injection 25 Units         -- SubQ Nightly 02/20/23 1404        Hold Oral hypoglycemics while patient is in the hospital.    CKD (chronic kidney disease), stage IV  Creatinine stable around 2.    Nephrologist consulted for evaluation.      Acquired absence of kidney  History of renal cell carcinoma status post nephrectomy 15 years ago.      Hypertension  Adjust medications as needed        VTE Risk Mitigation (From admission, onward)         Ordered     enoxaparin injection 30 mg  Daily         02/20/23 1404                Discharge Planning   RAMOS:      Code Status: Full Code   Is the patient medically ready for discharge?:     Reason for patient still in hospital (select all that apply): Treatment  Discharge Plan A: Home with family                  Vimal Ren DO  Department of Hospital Medicine   [facility]

## 2023-03-01 NOTE — ASSESSMENT & PLAN NOTE
Patient sees if cell count upon admission to [facility] was suggestive of bacterial meningitis, culture and Gram stain negative.    Has positive blood cultures with pneumococcus pneumonia sensitive to Rocephin.    Repeat blood cultures negative   We will need to continue antibiotics for 2-4 weeks of IV antibiotics.  Already received 1 week of IV antibiotic at [facility]     Antibiotics (From admission, onward)    Start     Stop Route Frequency Ordered    02/28/23 1800  cefTRIAXone (ROCEPHIN) 2 g in dextrose 5 % in water (D5W) 5 % 50 mL IVPB (MB+)         -- IV Every 12 hours (non-standard times) 02/28/23 1349    02/20/23 2100  mupirocin 2 % ointment         02/25 2059 Nasl 2 times daily 02/20/23 1506    02/20/23 1606  vancomycin - pharmacy to dose         -- IV pharmacy to manage frequency 02/20/23 1506        2/22  MRI with and without contrast per recommendation of neurology.   Consult ID at [facility].      2/23: no MRI.  Consult ID at [facility].     2/27:   Spoke with ID at [facility]:     Jessica Buckley, FEMI Stark.     Typically duration of IV antibiotics is 14 days.  If symptoms persist, they extend for 21 days.    Concerning the hearing loss, they will contact me again to discuss if the loss of hearing will affect the duration of antibiotics.    Neurology is Dr. Kim Bond.    [phone]  Patient states she needs an open MRI.    They recommended given anxiety medication prior to MRI and order an MRI without contrast.    2/28: 2/28: complete 14 additional days of Ceftriaxone since patient has lost her hearing.  Contact neurology if needed.    Dr. Kim Liriano she recommended an MRI with and without contrast.  Patient cannot have contrast.  Give anxiety medication and she may be willing to do MRI without contrast.  Number for Dr. Liriano: [phone].    Lumbar puncture in 3-4 days (had to hold brilinta for 5 days).  Patient reports headache so we may monitor CSF.     3/1-MRI without contrast today

## 2023-03-01 NOTE — ASSESSMENT & PLAN NOTE
This is likely secondary to renal disease and immobilization which patient mobilize for several beats and she does walk wheelchair.  Per request of patient, additional workup was initiated including vitamin-D and renal panel.  May discuss further with Nephrology.

## 2023-03-01 NOTE — SUBJECTIVE & OBJECTIVE
Interval History: naeo    Review of Systems   Constitutional:  Negative for chills and fever.   HENT:  Positive for hearing loss. Negative for congestion.    Eyes:  Negative for pain and discharge.   Respiratory:  Negative for cough, chest tightness, shortness of breath, wheezing and stridor.    Cardiovascular:  Negative for chest pain, palpitations and leg swelling.   Gastrointestinal:  Negative for abdominal distention, abdominal pain, blood in stool, diarrhea, nausea and vomiting.   Endocrine: Negative for cold intolerance, polydipsia and polyuria.   Genitourinary:  Negative for difficulty urinating, dysuria, frequency, hematuria and urgency.   Musculoskeletal:  Negative for arthralgias, back pain and neck pain.   Neurological:  Negative for dizziness, seizures, syncope, weakness, numbness and headaches.   Hematological:  Negative for adenopathy.   Psychiatric/Behavioral:  Negative for behavioral problems.    All other systems reviewed and are negative.  Objective:     Vital Signs (Most Recent):  Temp: 99 °F (37.2 °C) (03/01/23 0746)  Pulse: 84 (03/01/23 0746)  Resp: 18 (03/01/23 0746)  BP: (!) 152/64 (03/01/23 0746)  SpO2: 99 % (03/01/23 0746)   Vital Signs (24h Range):  Temp:  [97.7 °F (36.5 °C)-99 °F (37.2 °C)] 99 °F (37.2 °C)  Pulse:  [80-94] 84  Resp:  [18] 18  SpO2:  [96 %-99 %] 99 %  BP: (119-152)/(54-68) 152/64     Weight: 108.2 kg (238 lb 8.6 oz)  Body mass index is 40.94 kg/m².    Intake/Output Summary (Last 24 hours) at 3/1/2023 1309  Last data filed at 3/1/2023 0549  Gross per 24 hour   Intake 600 ml   Output 1900 ml   Net -1300 ml      Physical Exam  Vitals reviewed.   Constitutional:       Appearance: Normal appearance.      Interventions: She is not intubated.  HENT:      Head: Normocephalic and atraumatic.      Ears:      Comments: Patient is very hard of hearing.     Nose: Nose normal.      Mouth/Throat:      Pharynx: Oropharynx is clear.   Eyes:      Extraocular Movements: Extraocular movements  intact.      Conjunctiva/sclera: Conjunctivae normal.      Pupils: Pupils are equal, round, and reactive to light.   Cardiovascular:      Rate and Rhythm: Normal rate.      Heart sounds: Normal heart sounds. No murmur heard.  Pulmonary:      Effort: Pulmonary effort is normal. She is not intubated.      Breath sounds: Normal breath sounds.   Abdominal:      General: Abdomen is flat. Bowel sounds are normal.      Palpations: Abdomen is soft.   Musculoskeletal:         General: Normal range of motion.      Cervical back: Normal range of motion and neck supple.      Right lower leg: No edema.      Left lower leg: No edema.   Skin:     General: Skin is warm and dry.      Capillary Refill: Capillary refill takes less than 2 seconds.   Neurological:      General: No focal deficit present.      Mental Status: She is alert and oriented to person, place, and time.   Psychiatric:         Mood and Affect: Mood normal.         Behavior: Behavior normal.       Significant Labs: All pertinent labs within the past 24 hours have been reviewed.    Significant Imaging: I have reviewed all pertinent imaging results/findings within the past 24 hours.

## 2023-03-01 NOTE — PROGRESS NOTES
[facility] Hospital Medicine  Progress Note    Patient Name: Jessica Bay  MRN: [id number]  Patient Class: IP- Inpatient   Admission Date: 2/20/2023  Length of Stay: 8 days  Attending Physician: Josephine Collins MD  Primary Care Provider: Jag Lopez MD        Subjective:     Principal Problem:Bacterial meningitis    HPI:  67-year-old female patient with past medical history of diabetes, history of kidney cancer status post nephrectomy, has single kidney, dyslipidemia, hypertension, coronary artery disease status post NSTEMI November 2022 and stent placement to the RCA, history of fibromyalgia, and chronic kidney disease.  Admitted to the hospital 02/12/2023 with altered mental status and fever, patient has history of meningitis in 2015.    Patient admitted to the hospital underwent lumbar puncture which showed evidence of acute meningitis, but patient is CSF cultures remain negative, Gram stain showed no organisms.  Her urine culture grew E coli in her blood culture on February 12th grew Streptococcus pneumoniae sensitive to Rocephin.  Patient continued on Rocephin bacteremia dose.  Patient will need 2-4 weeks of IV antibiotics.    Patient is very hard of hearing, likely related to her previous meningitis.    Patient admitted to [facility] for IV antibiotics and rehab.      Overview/Hospital Course:  Patient admitted to [facility] for continued IV antibiotics.    Plan to also contact [facility] for evaluation by patient's neurologist as well as her infectious disease specialist.    2/22:  Discussed case with Neurology at [facility].  This was the preference of the patient.  Patient previously had meningitis in 2015 and neurologist at that time was Dr. Liriano.  Dr. Kim Liriano she recommended an MRI with and without contrast.  Number that she can be reached at [phone].    Of note, patient had glucose of less than 1 in CSF during this episode of  meningitis.  The organism was strep pneumo.  Dr. Adam stated that mortality is very high in meningitis cases this severe.  She also stated that patient will very likely regain her hearing over time.      2/23:  Patient states that she would need an open MRI in order to have an MRI.  She further states that her kidneys did not permit her to have contrast MRI.  All discuss this further with Dr. Liriano.  It is notable that the patient's son states that she had fluid in her ears and this contributed to her having difficulty caring in the past.    2/24:  Continue with current care for now.  Consult ID at [facility] on Monday.      2/25: Added PTH per patient request. Consult ID on Monday for duration and choice of antibiotics. F/u with neurology.    Discuss dispo with social work.  Patient requests swing-bed on discharge.      2/26:  PTH was elevated at 306.  Renal panel ordered.  Likely related to renal disease versus immobilization.  Can discuss further with Nephrology.    Patient has several diet request and we are attempting took accommodate those.    2/27:  Reports headache today.  Will attempt to have a repeat LP.  Patient was on Brilinta unable to have the repeat LP for the next 5 days.  Contacted [facility] Infectious disease and [facility] Infectious Disease.    2/28: complete 14 additional days of Ceftriaxone since patient has lost her hearing.  Contact neurology if needed.    Dr. Kim Liriano she recommended an MRI with and without contrast.  Patient cannot have contrast.  Give anxiety medication and she may be willing to do MRI without contrast.  Number for Dr. Liriano: [phone].    Lumbar puncture in 3-4 days (had to hold brilinta for 5 days).  Patient reports headache so we may monitor CSF.       Interval History:    Review of Systems   Constitutional:  Negative for chills and fever.   HENT:  Positive for hearing loss. Negative for congestion.    Eyes:  Negative for pain and discharge.   Respiratory:   Negative for cough, chest tightness, shortness of breath, wheezing and stridor.    Cardiovascular:  Negative for chest pain, palpitations and leg swelling.   Gastrointestinal:  Negative for abdominal distention, abdominal pain, blood in stool, diarrhea, nausea and vomiting.   Endocrine: Negative for cold intolerance, polydipsia and polyuria.   Genitourinary:  Negative for difficulty urinating, dysuria, frequency, hematuria and urgency.   Musculoskeletal:  Negative for arthralgias, back pain and neck pain.   Neurological:  Negative for dizziness, seizures, syncope, weakness, numbness and headaches.   Hematological:  Negative for adenopathy.   Psychiatric/Behavioral:  Negative for behavioral problems.    All other systems reviewed and are negative.  Objective:     Vital Signs (Most Recent):  Temp: 97.7 °F (36.5 °C) (02/28/23 2000)  Pulse: 91 (02/28/23 2000)  Resp: 18 (02/28/23 2000)  BP: 119/68 (02/28/23 2000)  SpO2: 98 % (02/28/23 2000) Vital Signs (24h Range):  Temp:  [97.7 °F (36.5 °C)-98.7 °F (37.1 °C)] 97.7 °F (36.5 °C)  Pulse:  [75-94] 91  Resp:  [18-19] 18  SpO2:  [96 %-98 %] 98 %  BP: ()/(54-68) 119/68     Weight: 107.7 kg (237 lb 6.4 oz)  Body mass index is 40.75 kg/m².    Intake/Output Summary (Last 24 hours) at 2/28/2023 2143  Last data filed at 2/28/2023 1735  Gross per 24 hour   Intake 1580 ml   Output 1800 ml   Net -220 ml      Physical Exam  Vitals reviewed.   Constitutional:       Appearance: Normal appearance.      Interventions: She is not intubated.  HENT:      Head: Normocephalic and atraumatic.      Ears:      Comments: Patient is very hard of hearing.     Nose: Nose normal.      Mouth/Throat:      Pharynx: Oropharynx is clear.   Eyes:      Extraocular Movements: Extraocular movements intact.      Conjunctiva/sclera: Conjunctivae normal.      Pupils: Pupils are equal, round, and reactive to light.   Cardiovascular:      Rate and Rhythm: Normal rate.      Heart sounds: Normal heart sounds. No  murmur heard.  Pulmonary:      Effort: Pulmonary effort is normal. She is not intubated.      Breath sounds: Normal breath sounds.   Abdominal:      General: Abdomen is flat. Bowel sounds are normal.      Palpations: Abdomen is soft.   Musculoskeletal:         General: Normal range of motion.      Cervical back: Normal range of motion and neck supple.      Right lower leg: No edema.      Left lower leg: No edema.   Skin:     General: Skin is warm and dry.      Capillary Refill: Capillary refill takes less than 2 seconds.   Neurological:      General: No focal deficit present.      Mental Status: She is alert and oriented to person, place, and time.   Psychiatric:         Mood and Affect: Mood normal.         Behavior: Behavior normal.       Significant Labs: All pertinent labs within the past 24 hours have been reviewed.    Significant Imaging: I have reviewed all pertinent imaging results/findings within the past 24 hours.      Assessment/Plan:      * Bacterial meningitis  Patient sees if cell count upon admission to [facility] was suggestive of bacterial meningitis, culture and Gram stain negative.    Has positive blood cultures with pneumococcus pneumonia sensitive to Rocephin.    Repeat blood cultures negative   We will need to continue antibiotics for 2-4 weeks of IV antibiotics.  Already received 1 week of IV antibiotic at [facility]     Antibiotics (From admission, onward)    Start     Stop Route Frequency Ordered    02/20/23 2100  mupirocin 2 % ointment         02/25 2059 Nasl 2 times daily 02/20/23 1506    02/20/23 1800  vancomycin (VANCOCIN) 2,000 mg in dextrose 5 % (D5W) 500 mL IVPB         -- IV Every 72 hours 02/20/23 1505    02/20/23 1606  vancomycin - pharmacy to dose         -- IV pharmacy to manage frequency 02/20/23 1506        2/22  MRI with and without contrast per recommendation of neurology.   Consult ID at [facility].      2/23: no MRI.  Consult ID at [facility].     2/27:   Spoke with ID at [facility]:     Jessica Buckley, FEMI Stark.     Typically duration of IV antibiotics is 14 days.  If symptoms persist, they extend for 21 days.    Concerning the hearing loss, they will contact me again to discuss if the loss of hearing will affect the duration of antibiotics.    Neurology is Dr. Kim Bond.    [phone]  Patient states she needs an open MRI.    They recommended given anxiety medication prior to MRI and order an MRI without contrast.    2/28: 2/28: complete 14 additional days of Ceftriaxone since patient has lost her hearing.  Contact neurology if needed.    Dr. Kim Liriano she recommended an MRI with and without contrast.  Patient cannot have contrast.  Give anxiety medication and she may be willing to do MRI without contrast.  Number for Dr. Liriano: [phone].    Lumbar puncture in 3-4 days (had to hold brilinta for 5 days).  Patient reports headache so we may monitor CSF.     Hyperparathyroidism  PTH was 3 0 6.  This is likely secondary to renal disease and immobilization which patient mobilize for several beats and she does walk wheelchair.  Per request of patient, additional workup was initiated including vitamin-D and renal panel.  May discuss further with Nephrology.      UTI (urinary tract infection)  Urine culture grew E coli UTI, sensitive to Rocephin.         CAD (coronary artery disease)  Status post NSTEMI November 2022   Continue DA PT therapy   Continue beta-blockers   Continue on statins.      Type 2 diabetes mellitus with diabetic chronic kidney disease  Patient's FSGs are controlled on current medication regimen.  Last A1c reviewed-   Lab Results   Component Value Date    HGBA1C 6.9 (H) 11/22/2022     Most recent fingerstick glucose reviewed- No results for input(s): POCTGLUCOSE in the last 24 hours.  Current correctional scale  Low  Maintain anti-hyperglycemic dose as follows-   Antihyperglycemics (From admission, onward)    Start     Stop Route Frequency Ordered     02/20/23 2100  insulin detemir U-100 injection 25 Units         -- SubQ Nightly 02/20/23 1404    02/20/23 1404  insulin aspart U-100 injection 1-14 Units         -- SubQ Every 6 hours PRN 02/20/23 1404        Hold Oral hypoglycemics while patient is in the hospital.    CKD (chronic kidney disease), stage IV  Creatinine stable around 2.    Nephrologist consulted for evaluation.      Acquired absence of kidney  History of renal cell carcinoma status post nephrectomy 15 years ago.      Hypertension  Continue same antihypertensive medications   Hydralazine IV if needed.        VTE Risk Mitigation (From admission, onward)         Ordered     enoxaparin injection 30 mg  Daily         02/20/23 1404                Discharge Planning   RAMOS:      Code Status: Full Code   Is the patient medically ready for discharge?:     Reason for patient still in hospital (select all that apply): Treatment  Discharge Plan A: Home with family                  Josephine Collins MD  Department of Hospital Medicine   [facility]

## 2023-03-01 NOTE — PT/OT/SLP PROGRESS
Physical Therapy Treatment    Patient Name:  Jessica Bay   MRN:  [id number]    Recommendations:     Discharge Recommendations: nursing facility, skilled, rehabilitation facility  Discharge Equipment Recommendations: other (see comments) (to be determined)  Barriers to discharge:  ongoing medical care    Assessment:     Jessica Bay is a 67 y.o. female admitted with a medical diagnosis of Bacterial meningitis.  She presents with the following impairments/functional limitations: impaired endurance, impaired functional mobility, gait instability, impaired balance, decreased lower extremity function.    Rehab Prognosis: Good; patient would benefit from acute skilled PT services to address these deficits and reach maximum level of function.    Recent Surgery: * No surgery found *      Plan:     During this hospitalization, patient to be seen 5 x/week to address the identified rehab impairments via gait training, therapeutic activities, therapeutic exercises and progress toward the following goals:    Plan of Care Expires:  03/21/23    Subjective     Chief Complaint: bacterial meningitis  Patient/Family Comments/goals: agreeable to PT  Pain/Comfort:  Pain Rating 1: 0/10      Objective:     Communicated with SILVESTRE Vizcaino RN prior to session.  Patient found supine with PICC line, PureWick upon PT entry to room.     General Precautions: Standard, fall, hearing impaired  Orthopedic Precautions: N/A  Braces: N/A  Respiratory Status: Room air     Functional Mobility:  Bed Mobility:     Rolling Left:  stand by assistance  Supine to Sit: minimum assistance  Transfers:     Sit to Stand:  minimum assistance and of 2 persons with manual assist using gait belt  Gait: 15ft with bilat HHA, using gait belt, unsteady  Balance: sitting EOB Fair+, standing Poor+      AM-PAC 6 CLICK MOBILITY  Turning over in bed (including adjusting bedclothes, sheets and blankets)?: 4  Sitting down on and standing up from a chair with arms (e.g.,  wheelchair, bedside commode, etc.): 3  Moving from lying on back to sitting on the side of the bed?: 3  Moving to and from a bed to a chair (including a wheelchair)?: 3  Need to walk in hospital room?: 3  Climbing 3-5 steps with a railing?: 1  Basic Mobility Total Score: 17       Treatment & Education:  Bilateral lower extremity exercise 2 x 20 reps: ankle pumps, hip abduction/adduction, Long arc quads, and Marching with Stand by assist and verbal cues for sequencing and safety     Patient left sitting edge of bed with all lines intact, call button in reach, and OTR present..    GOALS:   Multidisciplinary Problems       Physical Therapy Goals          Problem: Physical Therapy    Goal Priority Disciplines Outcome Goal Variances Interventions   Physical Therapy Goal     PT, PT/OT Ongoing, Not Progressing     Description: Short Term Goals to be met by: 3/7/23    Patient will increase functional independence with mobility by performin. Supine to sit with contact guard assist  2. Sit to stand transfer with contact guard assist using Rolling walker  3. Bed to chair transfer with contact guard assist using Rolling walker  4. Gait  x 100 feet with contact guard assist using Rolling walker  5. Lower extremity exercise program x30 reps per handout, with assistance as needed    Long Term Goals to be met by: 3/21/23    Pt will regain full independent functional mobility with Rolling walker to return to home situation and prior activities of daily living.                        Time Tracking:     PT Received On: 23  PT Start Time: 1416     PT Stop Time: 1441  PT Total Time (min): 25 min     Billable Minutes: Gait Training 8 and Therapeutic Activity 15    Treatment Type: Treatment  PT/PTA: PT     PTA Visit Number: 0     2023

## 2023-03-01 NOTE — ASSESSMENT & PLAN NOTE
Patient's FSGs are controlled on current medication regimen.  Last A1c reviewed-   Lab Results   Component Value Date    HGBA1C 6.9 (H) 11/22/2022     Most recent fingerstick glucose reviewed- No results for input(s): POCTGLUCOSE in the last 24 hours.  Current correctional scale  Low  Maintain anti-hyperglycemic dose as follows-   Antihyperglycemics (From admission, onward)    Start     Stop Route Frequency Ordered    02/28/23 2115  insulin aspart U-100 injection 1-14 Units         -- SubQ Before meals & nightly PRN 02/28/23 2014 02/20/23 2100  insulin detemir U-100 injection 25 Units         -- SubQ Nightly 02/20/23 1404        Hold Oral hypoglycemics while patient is in the hospital.

## 2023-03-02 LAB
GLUCOSE SERPL-MCNC: 145 MG/DL (ref 70–105)
GLUCOSE SERPL-MCNC: 161 MG/DL (ref 70–105)
GLUCOSE SERPL-MCNC: 308 MG/DL (ref 70–105)
GLUCOSE SERPL-MCNC: 318 MG/DL (ref 70–105)

## 2023-03-02 PROCEDURE — 25000003 PHARM REV CODE 250: Performed by: INTERNAL MEDICINE

## 2023-03-02 PROCEDURE — 97110 THERAPEUTIC EXERCISES: CPT | Mod: CO

## 2023-03-02 PROCEDURE — 63600175 PHARM REV CODE 636 W HCPCS: Performed by: HOSPITALIST

## 2023-03-02 PROCEDURE — 99233 PR SUBSEQUENT HOSPITAL CARE,LEVL III: ICD-10-PCS | Mod: ,,, | Performed by: STUDENT IN AN ORGANIZED HEALTH CARE EDUCATION/TRAINING PROGRAM

## 2023-03-02 PROCEDURE — 82962 GLUCOSE BLOOD TEST: CPT

## 2023-03-02 PROCEDURE — 11000001 HC ACUTE MED/SURG PRIVATE ROOM

## 2023-03-02 PROCEDURE — 97116 GAIT TRAINING THERAPY: CPT | Mod: CQ

## 2023-03-02 PROCEDURE — 25000003 PHARM REV CODE 250: Performed by: HOSPITALIST

## 2023-03-02 PROCEDURE — 25000003 PHARM REV CODE 250: Performed by: STUDENT IN AN ORGANIZED HEALTH CARE EDUCATION/TRAINING PROGRAM

## 2023-03-02 PROCEDURE — 99233 SBSQ HOSP IP/OBS HIGH 50: CPT | Mod: ,,, | Performed by: STUDENT IN AN ORGANIZED HEALTH CARE EDUCATION/TRAINING PROGRAM

## 2023-03-02 PROCEDURE — 63600175 PHARM REV CODE 636 W HCPCS: Performed by: INTERNAL MEDICINE

## 2023-03-02 PROCEDURE — 97110 THERAPEUTIC EXERCISES: CPT | Mod: CQ

## 2023-03-02 PROCEDURE — 63600175 PHARM REV CODE 636 W HCPCS: Performed by: STUDENT IN AN ORGANIZED HEALTH CARE EDUCATION/TRAINING PROGRAM

## 2023-03-02 RX ORDER — CYCLOBENZAPRINE HCL 5 MG
5 TABLET ORAL 3 TIMES DAILY PRN
Status: DISCONTINUED | OUTPATIENT
Start: 2023-03-02 | End: 2023-03-03 | Stop reason: HOSPADM

## 2023-03-02 RX ADMIN — CYCLOBENZAPRINE HYDROCHLORIDE 5 MG: 5 TABLET, FILM COATED ORAL at 01:03

## 2023-03-02 RX ADMIN — BENZONATATE 200 MG: 100 CAPSULE ORAL at 09:03

## 2023-03-02 RX ADMIN — VANCOMYCIN HYDROCHLORIDE 2250 MG: 1 INJECTION, POWDER, LYOPHILIZED, FOR SOLUTION INTRAVENOUS at 01:03

## 2023-03-02 RX ADMIN — PANTOPRAZOLE SODIUM 40 MG: 40 TABLET, DELAYED RELEASE ORAL at 09:03

## 2023-03-02 RX ADMIN — CYCLOBENZAPRINE HYDROCHLORIDE 5 MG: 5 TABLET, FILM COATED ORAL at 09:03

## 2023-03-02 RX ADMIN — BENZONATATE 200 MG: 100 CAPSULE ORAL at 08:03

## 2023-03-02 RX ADMIN — CALCITRIOL CAPSULES 0.25 MCG 0.25 MCG: 0.25 CAPSULE ORAL at 09:03

## 2023-03-02 RX ADMIN — INSULIN ASPART 10 UNITS: 100 INJECTION, SOLUTION INTRAVENOUS; SUBCUTANEOUS at 02:03

## 2023-03-02 RX ADMIN — ALLOPURINOL 100 MG: 100 TABLET ORAL at 08:03

## 2023-03-02 RX ADMIN — CEFTRIAXONE SODIUM 2 G: 2 INJECTION, POWDER, FOR SOLUTION INTRAMUSCULAR; INTRAVENOUS at 05:03

## 2023-03-02 RX ADMIN — QUETIAPINE FUMARATE 50 MG: 25 TABLET ORAL at 08:03

## 2023-03-02 RX ADMIN — CARVEDILOL 6.25 MG: 6.25 TABLET, FILM COATED ORAL at 09:03

## 2023-03-02 RX ADMIN — AMLODIPINE BESYLATE 5 MG: 5 TABLET ORAL at 09:03

## 2023-03-02 RX ADMIN — INSULIN ASPART 10 UNITS: 100 INJECTION, SOLUTION INTRAVENOUS; SUBCUTANEOUS at 09:03

## 2023-03-02 RX ADMIN — INSULIN DETEMIR 30 UNITS: 100 INJECTION, SOLUTION SUBCUTANEOUS at 08:03

## 2023-03-02 RX ADMIN — QUETIAPINE FUMARATE 50 MG: 25 TABLET ORAL at 09:03

## 2023-03-02 RX ADMIN — ENOXAPARIN SODIUM 30 MG: 30 INJECTION SUBCUTANEOUS at 05:03

## 2023-03-02 RX ADMIN — CARVEDILOL 6.25 MG: 6.25 TABLET, FILM COATED ORAL at 08:03

## 2023-03-02 RX ADMIN — BENZONATATE 200 MG: 100 CAPSULE ORAL at 02:03

## 2023-03-02 RX ADMIN — TIZANIDINE 4 MG: 4 TABLET ORAL at 08:03

## 2023-03-02 NOTE — PT/OT/SLP PROGRESS
Occupational Therapy   Treatment    Name: Jessica Bay  MRN: [id number]  Admitting Diagnosis:  Bacterial meningitis       Recommendations:     Discharge Recommendations: rehabilitation facility, nursing facility, skilled  Discharge Equipment Recommendations:   (to be determined)  Barriers to discharge:  None    Assessment:     Jessica Bay is a 67 y.o. female with a medical diagnosis of Bacterial meningitis.  She presents with no complaints. Pt agreed to OT treatment.. Performance deficits affecting function are weakness, impaired endurance, impaired functional mobility, decreased lower extremity function.     Rehab Prognosis:  Good; patient would benefit from acute skilled OT services to address these deficits and reach maximum level of function.       Plan:     Patient to be seen 5 x/week to address the above listed problems via self-care/home management, therapeutic activities, therapeutic exercises  Plan of Care Expires:    Plan of Care Reviewed with: patient    Subjective     Pain/Comfort:  Pain Rating 1: 0/10  Pain Rating Post-Intervention 1: 0/10    Objective:     Communicated with: KASIA Vizcaino prior to session.  Patient found HOB elevated with PureWick, PICC line upon OT entry to room.    General Precautions: Standard, hearing impaired, fall    Orthopedic Precautions:N/A  Braces: N/A  Respiratory Status: Room air     Occupational Performance:     Bed Mobility:    Patient completed Rolling/Turning to Left with  stand by assistance  Patient completed Supine to Sit with moderate assistance  Patient completed Sit to Supine with minimum assistance     Functional Mobility/Transfers:  Patient completed Sit <> Stand Transfer with minimum assistance and of x 2 persons  with  gait belt to stand to RW   Functional Mobility: Min a x 2 with RW and gait belt  Pt sat EOB x approx 30 min with SBA     Activities of Daily Living:  Grooming: independence combing hair      [facility] 6 Click ADL:      Treatment &  Education:  Pt performed UE strengthening exercises. 1 lb hand wt x 2 sets of 15 reps (B) shoulder flexion/extension and adduction/abduction. 2 lb hand wt x 2 sets of 15 reps (B) elbow and wrist flexion/extension. Red theraband x 2 sets of 15 reps (B) elbow flexion and extension.    Pt participated well with exercises and mobility. Plan is to continue tx addressing goals.    Patient left HOB elevated with all lines intact and call button in reach    GOALS:   Multidisciplinary Problems       Occupational Therapy Goals          Problem: Occupational Therapy    Goal Priority Disciplines Outcome Interventions   Occupational Therapy Goal     OT, PT/OT Ongoing, Progressing    Description: STG:    Pt will bathe with min a  Pt will perform UE dressing with setup  Pt will perform LE dressing with min a with AD as needed  Pt will sit EOB x 15 min with (I)  Pt will transfer bed/chair/bsc with min a with AD as needed  Pt will tolerate 20 minutes of tx without fatigue      LT.Restore to max I with self care and mobility.                          Time Tracking:     OT Date of Treatment: 23  OT Start Time: 1415  OT Stop Time: 1459  OT Total Time (min): 44 min    Billable Minutes:Therapeutic Activity 15  Therapeutic Exercise 20    OT/GLADYS: OT          3/1/2023

## 2023-03-02 NOTE — PROGRESS NOTES
[facility] Hospital Medicine  Progress Note    Patient Name: Jessica Bay  MRN: [id number]  Patient Class: IP- Inpatient   Admission Date: 2/20/2023  Length of Stay: 10 days  Attending Physician: Vimal Ren DO  Primary Care Provider: Jag Lopez MD        Subjective:     Principal Problem:Bacterial meningitis        HPI:  67-year-old female patient with past medical history of diabetes, history of kidney cancer status post nephrectomy, has single kidney, dyslipidemia, hypertension, coronary artery disease status post NSTEMI November 2022 and stent placement to the RCA, history of fibromyalgia, and chronic kidney disease.  Admitted to the hospital 02/12/2023 with altered mental status and fever, patient has history of meningitis in 2015.    Patient admitted to the hospital underwent lumbar puncture which showed evidence of acute meningitis, but patient is CSF cultures remain negative, Gram stain showed no organisms.  Her urine culture grew E coli in her blood culture on February 12th grew Streptococcus pneumoniae sensitive to Rocephin.  Patient continued on Rocephin bacteremia dose.  Patient will need 2-4 weeks of IV antibiotics.    Patient is very hard of hearing, likely related to her previous meningitis.    Patient admitted to specialty [facility] for IV antibiotics and rehab.      Overview/Hospital Course:  Patient admitted to [facility] for continued IV antibiotics.    Plan to also contact [facility] for evaluation by patient's neurologist as well as her infectious disease specialist.    2/22:  Discussed case with Neurology at [facility].  This was the preference of the patient.  Patient previously had meningitis in 2015 and neurologist at that time was Dr. Liriano.  Dr. Kim Liriano she recommended an MRI with and without contrast.  Number that she can be reached at [phone].    Of note, patient had glucose of less than 1 in CSF during this episode of  meningitis.  The organism was strep pneumo.  Dr. Adam stated that mortality is very high in meningitis cases this severe.  She also stated that patient will very likely regain her hearing over time.      2/23:  Patient states that she would need an open MRI in order to have an MRI.  She further states that her kidneys did not permit her to have contrast MRI.  All discuss this further with Dr. Liriano.  It is notable that the patient's son states that she had fluid in her ears and this contributed to her having difficulty caring in the past.    2/24:  Continue with current care for now.  Consult ID at [facility] on Monday.      2/25: Added PTH per patient request. Consult ID on Monday for duration and choice of antibiotics. F/u with neurology.    Discuss dispo with social work.  Patient requests swing-bed on discharge.      2/26:  PTH was elevated at 306.  Renal panel ordered.  Likely related to renal disease versus immobilization.  Can discuss further with Nephrology.    Patient has several diet request and we are attempting took accommodate those.    2/27:  Reports headache today.  Will attempt to have a repeat LP.  Patient was on Brilinta unable to have the repeat LP for the next 5 days.  Contacted [facility] Infectious disease and [facility] Infectious Disease.    2/28: complete 14 additional days of Ceftriaxone since patient has lost her hearing.  Contact neurology if needed.    Dr. Kim Liriano she recommended an MRI with and without contrast.  Patient cannot have contrast.  Give anxiety medication and she may be willing to do MRI without contrast.  Number for Dr. Liriano: [phone].    Lumbar puncture in 3-4 days (had to hold brilinta for 5 days).  Patient reports headache so we may monitor CSF.     3/1- will repeat lumbar puncture on 3/3- last dose brillenta 2/27.   3/2-LP tomorrow      Interval History: naeo    Review of Systems   Constitutional:  Negative for chills and fever.   HENT:  Positive for  hearing loss. Negative for congestion.    Eyes:  Negative for pain and discharge.   Respiratory:  Negative for cough, chest tightness, shortness of breath, wheezing and stridor.    Cardiovascular:  Negative for chest pain, palpitations and leg swelling.   Gastrointestinal:  Negative for abdominal distention, abdominal pain, blood in stool, diarrhea, nausea and vomiting.   Endocrine: Negative for cold intolerance, polydipsia and polyuria.   Genitourinary:  Negative for difficulty urinating, dysuria, frequency, hematuria and urgency.   Musculoskeletal:  Negative for arthralgias, back pain and neck pain.   Neurological:  Negative for dizziness, seizures, syncope, weakness, numbness and headaches.   Hematological:  Negative for adenopathy.   Psychiatric/Behavioral:  Negative for behavioral problems.    All other systems reviewed and are negative.  Objective:     Vital Signs (Most Recent):  Temp: 97.6 °F (36.4 °C) (03/02/23 0915)  Pulse: 100 (03/02/23 0915)  Resp: 20 (03/02/23 0915)  BP: 116/61 (03/02/23 0915)  SpO2: 96 % (03/02/23 0915)   Vital Signs (24h Range):  Temp:  [97.5 °F (36.4 °C)-98.7 °F (37.1 °C)] 97.6 °F (36.4 °C)  Pulse:  [] 100  Resp:  [16-20] 20  SpO2:  [96 %-100 %] 96 %  BP: (116-144)/(61-83) 116/61     Weight: 110.5 kg (243 lb 9.7 oz)  Body mass index is 41.82 kg/m².    Intake/Output Summary (Last 24 hours) at 3/2/2023 1319  Last data filed at 3/1/2023 1753  Gross per 24 hour   Intake 790 ml   Output 1300 ml   Net -510 ml        Physical Exam  Vitals reviewed.   Constitutional:       Appearance: Normal appearance.      Interventions: She is not intubated.  HENT:      Head: Normocephalic and atraumatic.      Ears:      Comments: Patient is very hard of hearing.     Nose: Nose normal.      Mouth/Throat:      Pharynx: Oropharynx is clear.   Eyes:      Extraocular Movements: Extraocular movements intact.      Conjunctiva/sclera: Conjunctivae normal.      Pupils: Pupils are equal, round, and reactive  to light.   Cardiovascular:      Rate and Rhythm: Normal rate.      Heart sounds: Normal heart sounds. No murmur heard.  Pulmonary:      Effort: Pulmonary effort is normal. She is not intubated.      Breath sounds: Normal breath sounds.   Abdominal:      General: Abdomen is flat. Bowel sounds are normal.      Palpations: Abdomen is soft.   Musculoskeletal:         General: Normal range of motion.      Cervical back: Normal range of motion and neck supple.      Right lower leg: No edema.      Left lower leg: No edema.   Skin:     General: Skin is warm and dry.      Capillary Refill: Capillary refill takes less than 2 seconds.   Neurological:      General: No focal deficit present.      Mental Status: She is alert and oriented to person, place, and time.   Psychiatric:         Mood and Affect: Mood normal.         Behavior: Behavior normal.       Significant Labs: All pertinent labs within the past 24 hours have been reviewed.    Significant Imaging: I have reviewed all pertinent imaging results/findings within the past 24 hours.      Assessment/Plan:      * Bacterial meningitis  Patient sees if cell count upon admission to [facility] was suggestive of bacterial meningitis, culture and Gram stain negative.    Has positive blood cultures with pneumococcus pneumonia sensitive to Rocephin.    Repeat blood cultures negative   We will need to continue antibiotics for 2-4 weeks of IV antibiotics.  Already received 1 week of IV antibiotic at [facility]     Antibiotics (From admission, onward)      Start     Stop Route Frequency Ordered    02/28/23 1800  cefTRIAXone (ROCEPHIN) 2 g in dextrose 5 % in water (D5W) 5 % 50 mL IVPB (MB+)         -- IV Every 12 hours (non-standard times) 02/28/23 1349    02/20/23 2100  mupirocin 2 % ointment         02/25 2059 Nasl 2 times daily 02/20/23 1506    02/20/23 1606  vancomycin - pharmacy to dose         -- IV pharmacy to manage frequency 02/20/23 1506          2/22  MRI with and  without contrast per recommendation of neurology.   Consult ID at [facility].      2/23: no MRI.  Consult ID at [facility].     2/27:   Spoke with ID at [facility]:     [address]     Typically duration of IV antibiotics is 14 days.  If symptoms persist, they extend for 21 days.    Concerning the hearing loss, they will contact me again to discuss if the loss of hearing will affect the duration of antibiotics.    Neurology is Dr. Kim Bond.    [phone]  Patient states she needs an open MRI.    They recommended given anxiety medication prior to MRI and order an MRI without contrast.    2/28: 2/28: complete 14 additional days of Ceftriaxone since patient has lost her hearing.  Contact neurology if needed.    Dr. Kim Liriano she recommended an MRI with and without contrast.  Patient cannot have contrast.  Give anxiety medication and she may be willing to do MRI without contrast.  Number for Dr. Liriano: [phone].    Lumbar puncture in 3-4 days (had to hold brilinta for 5 days).  Patient reports headache so we may monitor CSF.     3/1-MRI without contrast today    Hyperparathyroidism    This is likely secondary to renal disease and immobilization which patient mobilize for several beats and she does walk wheelchair.  Per request of patient, additional workup was initiated including vitamin-D and renal panel.  May discuss further with Nephrology.      UTI (urinary tract infection)  Urine culture grew E coli UTI, sensitive to Rocephin.         CAD (coronary artery disease)  Status post NSTEMI November 2022   Continue DA PT therapy   Continue beta-blockers   Continue on statins.      Type 2 diabetes mellitus with diabetic chronic kidney disease  Patient's FSGs are controlled on current medication regimen.  Last A1c reviewed-   Lab Results   Component Value Date    HGBA1C 6.9 (H) 11/22/2022     Most recent fingerstick glucose reviewed- No results for input(s): POCTGLUCOSE in the last 24  hours.  Current correctional scale  Low  Maintain anti-hyperglycemic dose as follows-   Antihyperglycemics (From admission, onward)      Start     Stop Route Frequency Ordered    02/28/23 2115  insulin aspart U-100 injection 1-14 Units         -- SubQ Before meals & nightly PRN 02/28/23 2014 02/20/23 2100  insulin detemir U-100 injection 25 Units         -- SubQ Nightly 02/20/23 1404          Hold Oral hypoglycemics while patient is in the hospital.    CKD (chronic kidney disease), stage IV  Creatinine stable around 2.    Nephrologist consulted for evaluation.      Acquired absence of kidney  History of renal cell carcinoma status post nephrectomy 15 years ago.      Hypertension  Adjust medications as needed        VTE Risk Mitigation (From admission, onward)           Ordered     enoxaparin injection 30 mg  Daily         02/20/23 1404                    Discharge Planning   RAMOS:      Code Status: Full Code   Is the patient medically ready for discharge?:     Reason for patient still in hospital (select all that apply): Treatment  Discharge Plan A: Home with family        Patient refuses MRI          Vimal Ren DO  Department of Hospital Medicine   [facility]

## 2023-03-02 NOTE — PLAN OF CARE
Problem: Adult Inpatient Plan of Care  Goal: Absence of Hospital-Acquired Illness or Injury  Outcome: Ongoing, Progressing     Problem: Diabetes Comorbidity  Goal: Blood Glucose Level Within Targeted Range  Outcome: Ongoing, Progressing     Problem: Skin Injury Risk Increased  Goal: Skin Health and Integrity  Outcome: Ongoing, Progressing     Problem: Fall Injury Risk  Goal: Absence of Fall and Fall-Related Injury  Outcome: Ongoing, Progressing

## 2023-03-02 NOTE — SUBJECTIVE & OBJECTIVE
Interval History: naeo    Review of Systems   Constitutional:  Negative for chills and fever.   HENT:  Positive for hearing loss. Negative for congestion.    Eyes:  Negative for pain and discharge.   Respiratory:  Negative for cough, chest tightness, shortness of breath, wheezing and stridor.    Cardiovascular:  Negative for chest pain, palpitations and leg swelling.   Gastrointestinal:  Negative for abdominal distention, abdominal pain, blood in stool, diarrhea, nausea and vomiting.   Endocrine: Negative for cold intolerance, polydipsia and polyuria.   Genitourinary:  Negative for difficulty urinating, dysuria, frequency, hematuria and urgency.   Musculoskeletal:  Negative for arthralgias, back pain and neck pain.   Neurological:  Negative for dizziness, seizures, syncope, weakness, numbness and headaches.   Hematological:  Negative for adenopathy.   Psychiatric/Behavioral:  Negative for behavioral problems.    All other systems reviewed and are negative.  Objective:     Vital Signs (Most Recent):  Temp: 97.6 °F (36.4 °C) (03/02/23 0915)  Pulse: 100 (03/02/23 0915)  Resp: 20 (03/02/23 0915)  BP: 116/61 (03/02/23 0915)  SpO2: 96 % (03/02/23 0915)   Vital Signs (24h Range):  Temp:  [97.5 °F (36.4 °C)-98.7 °F (37.1 °C)] 97.6 °F (36.4 °C)  Pulse:  [] 100  Resp:  [16-20] 20  SpO2:  [96 %-100 %] 96 %  BP: (116-144)/(61-83) 116/61     Weight: 110.5 kg (243 lb 9.7 oz)  Body mass index is 41.82 kg/m².    Intake/Output Summary (Last 24 hours) at 3/2/2023 1319  Last data filed at 3/1/2023 1753  Gross per 24 hour   Intake 790 ml   Output 1300 ml   Net -510 ml        Physical Exam  Vitals reviewed.   Constitutional:       Appearance: Normal appearance.      Interventions: She is not intubated.  HENT:      Head: Normocephalic and atraumatic.      Ears:      Comments: Patient is very hard of hearing.     Nose: Nose normal.      Mouth/Throat:      Pharynx: Oropharynx is clear.   Eyes:      Extraocular Movements: Extraocular  movements intact.      Conjunctiva/sclera: Conjunctivae normal.      Pupils: Pupils are equal, round, and reactive to light.   Cardiovascular:      Rate and Rhythm: Normal rate.      Heart sounds: Normal heart sounds. No murmur heard.  Pulmonary:      Effort: Pulmonary effort is normal. She is not intubated.      Breath sounds: Normal breath sounds.   Abdominal:      General: Abdomen is flat. Bowel sounds are normal.      Palpations: Abdomen is soft.   Musculoskeletal:         General: Normal range of motion.      Cervical back: Normal range of motion and neck supple.      Right lower leg: No edema.      Left lower leg: No edema.   Skin:     General: Skin is warm and dry.      Capillary Refill: Capillary refill takes less than 2 seconds.   Neurological:      General: No focal deficit present.      Mental Status: She is alert and oriented to person, place, and time.   Psychiatric:         Mood and Affect: Mood normal.         Behavior: Behavior normal.       Significant Labs: All pertinent labs within the past 24 hours have been reviewed.    Significant Imaging: I have reviewed all pertinent imaging results/findings within the past 24 hours.

## 2023-03-02 NOTE — PT/OT/SLP PROGRESS
Physical Therapy Treatment    Patient Name:  Jessica Bay   MRN:  [id number]    Recommendations:     Discharge Recommendations: nursing facility, skilled, rehabilitation facility  Discharge Equipment Recommendations: other (see comments) (to be determined)  Barriers to discharge:  ongoing medical treatment    Assessment:     Jessica Bay is a 67 y.o. female admitted with a medical diagnosis of Bacterial meningitis.  She presents with the following impairments/functional limitations: weakness, impaired endurance, impaired self care skills, impaired functional mobility.    Pt able to ambulate short distance, however, presents with increased anxiety and unable/unwilling to duplicate gait distance of previous treatment session    Rehab Prognosis: Good; patient would benefit from acute skilled PT services to address these deficits and reach maximum level of function.    Recent Surgery: * No surgery found *      Plan:     During this hospitalization, patient to be seen 5 x/week to address the identified rehab impairments via gait training, therapeutic activities, therapeutic exercises and progress toward the following goals:    Plan of Care Expires:  03/21/23    Subjective     Chief Complaint: bacterial meningitis  Patient/Family Comments/goals: pt roused to tactile stimuli, agreeable  Pain/Comfort:         Objective:     Communicated with Heide Dallas RN prior to session.  Patient found HOB elevated with PureWick, PICC line upon PT entry to room.     General Precautions: Standard, fall, hearing impaired  Orthopedic Precautions: N/A  Braces: N/A  Respiratory Status: Room air     Functional Mobility:  Bed Mobility:     Supine to Sit: contact guard assistance  Transfers:     Sit to Stand:  minimum assistance with hand-held assist  Gait: 8' minimum assist and HHA x 2 persons; increased anxiety, short step length, slow sana      AM-PAC 6 CLICK MOBILITY  Turning over in bed (including adjusting bedclothes, sheets and  blankets)?: 4  Sitting down on and standing up from a chair with arms (e.g., wheelchair, bedside commode, etc.): 3  Moving from lying on back to sitting on the side of the bed?: 3  Moving to and from a bed to a chair (including a wheelchair)?: 3  Need to walk in hospital room?: 3  Climbing 3-5 steps with a railing?: 1  Basic Mobility Total Score: 17       Treatment & Education:  Pt performed 30 reps (B) LE exercises: ap, quad set, glut set, straight leg raise, hip ab/adduction, long arc quad, heel slide with active assist range of motion     Patient left up in chair with all lines intact, call button in reach, and Leilani Hargrove agreeing to help pt back to bed prior to lunch ..    GOALS:   Multidisciplinary Problems       Physical Therapy Goals          Problem: Physical Therapy    Goal Priority Disciplines Outcome Goal Variances Interventions   Physical Therapy Goal     PT, PT/OT Ongoing, Not Progressing     Description: Short Term Goals to be met by: 3/7/23    Patient will increase functional independence with mobility by performin. Supine to sit with contact guard assist  2. Sit to stand transfer with contact guard assist using Rolling walker  3. Bed to chair transfer with contact guard assist using Rolling walker  4. Gait  x 100 feet with contact guard assist using Rolling walker  5. Lower extremity exercise program x30 reps per handout, with assistance as needed    Long Term Goals to be met by: 3/21/23    Pt will regain full independent functional mobility with Rolling walker to return to home situation and prior activities of daily living.                        Time Tracking:     PT Received On: 23  PT Start Time: 1017     PT Stop Time: 1046  PT Total Time (min): 29 min     Billable Minutes: Gait Training 8 and Therapeutic Exercise 15    Treatment Type: Treatment  PT/PTA: PTA     PTA Visit Number: 1     2023

## 2023-03-02 NOTE — PLAN OF CARE
Chart reviewed and discussed with Dr. Ren. Patient will have repeat lumbar puncture on 3/3. Continue IV abx. Patient has been accepted to [facility] for swingbed upon discharge. SS following.

## 2023-03-02 NOTE — PROGRESS NOTES
Pharmacokinetic Assessment Follow Up: IV Vancomycin    Vancomycin serum concentration assessment(s):    The trough level was drawn correctly and can be used to guide therapy at this time. The measurement is within the desired definitive target range of 15 to 20 mcg/mL.    Vancomycin Regimen Plan:    Vanc 2250 mg IV x 1 today with a random vanc ordered for 3/5 at 1500    Drug levels (last 3 results):  Recent Labs   Lab Result Units 03/01/23  1725   Vancomycin, Trough µg/mL 20.0       Pharmacy will continue to follow and monitor vancomycin.    Please contact pharmacy at extension [phone] for questions regarding this assessment.    Patient brief summary:  Jessica Bay is a 67 y.o. female initiated on antimicrobial therapy with IV Vancomycin for treatment of meningitis    Drug Allergies:   Review of patient's allergies indicates:   Allergen Reactions    Bactrim [sulfamethoxazole-trimethoprim] Blisters    Benadryl [diphenhydramine hcl]     Daypro [oxaprozin]        Actual Body Weight:   110.5 kg    Renal Function:   Estimated Creatinine Clearance: 23.1 mL/min (A) (based on SCr of 2.87 mg/dL (H)).,     Dialysis Method (if applicable):  N/A    CBC (last 72 hours):  Recent Labs   Lab Result Units 02/28/23  1439   WBC K/uL 8.37   Hemoglobin g/dL 8.3*   Hematocrit % 28.0*   Platelet Count K/uL 225   Lymphocytes % % 13.3*   Monocytes % % 8.2*   Eosinophils % % 2.2   Basophils % % 0.4       Metabolic Panel (last 72 hours):  Recent Labs   Lab Result Units 02/28/23  1439   Sodium mmol/L 143   Potassium mmol/L 4.5   Chloride mmol/L 109*   CO2 mmol/L 23   Glucose mg/dL 231*   BUN mg/dL 69*   Creatinine mg/dL 2.87*   Albumin g/dL 2.2*   Phosphorus mg/dL 6.3*       Vancomycin Administrations:  vancomycin given in the last 96 hours                     vancomycin (VANCOCIN) 2,250 mg in dextrose 5 % (D5W) 500 mL IVPB (mg) 2,250 mg New Bag 03/02/23 1308    vancomycin (VANCOCIN) 2,000 mg in dextrose 5 % (D5W) 500 mL IVPB (mg) 2,000 mg New  Bag 02/26/23 1827                    Microbiologic Results:  Microbiology Results (last 7 days)       ** No results found for the last 168 hours. **

## 2023-03-02 NOTE — PT/OT/SLP PROGRESS
Occupational Therapy   Treatment    Name: Jessica Bay  MRN: [id number]  Admitting Diagnosis:  Bacterial meningitis       Recommendations:     Discharge Recommendations: nursing facility, skilled, rehabilitation facility  Discharge Equipment Recommendations:   (to be determined)  Barriers to discharge:       Assessment:     Jessica Bay is a 67 y.o. female with a medical diagnosis of Bacterial meningitis.  . Performance deficits affecting function are weakness, impaired endurance, impaired self care skills.     Rehab Prognosis:  Good; patient would benefit from acute skilled OT services to address these deficits and reach maximum level of function.       Plan:     Patient to be seen 5 x/week to address the above listed problems via self-care/home management, therapeutic activities, therapeutic exercises  Plan of Care Expires:    Plan of Care Reviewed with: patient    Subjective     Pain/Comfort:  Pain Rating 1: 0/10    Objective:     Communicated with: KASIA Dallas prior to session.  Patient found supine with PureWick, PICC line upon OT entry to room.    General Precautions: Standard, hearing impaired, fall    Orthopedic Precautions:N/A  Braces: N/A  Respiratory Status: Room air     Occupational Performance:     Bed Mobility:         Functional Mobility/Transfers:    Functional Mobility:     Activities of Daily Living:  I to apply lip balm      AMPAC 6 Click ADL:      Treatment & Education:  Pt performed hand helper with 3 rubber band resistances on R 20 reps x 2, rom ex's with 1 lb 15 reps x 2 B shld flex,abd/add,elbow flex/ext,    Patient left supine with all lines intact and call button in reach    GOALS:   Multidisciplinary Problems       Occupational Therapy Goals          Problem: Occupational Therapy    Goal Priority Disciplines Outcome Interventions   Occupational Therapy Goal     OT, PT/OT Ongoing, Progressing    Description: STG:    Pt will bathe with min a  Pt will perform UE dressing with  setup  Pt will perform LE dressing with min a with AD as needed  Pt will sit EOB x 15 min with (I)  Pt will transfer bed/chair/bsc with min a with AD as needed  Pt will tolerate 20 minutes of tx without fatigue      LT.Restore to max I with self care and mobility.                          Time Tracking:     OT Date of Treatment: 23  OT Start Time: 1456  OT Stop Time: 1515  OT Total Time (min): 19 min    Billable Minutes:Therapeutic Exercise 15    OT/GLADYS: GLADYS          3/2/2023

## 2023-03-03 ENCOUNTER — HOSPITAL ENCOUNTER (INPATIENT)
Facility: HOSPITAL | Age: 68
LOS: 13 days | Discharge: HOME HEALTH | DRG: 095 | End: 2023-03-16
Attending: HOSPITALIST | Admitting: HOSPITALIST
Payer: MEDICARE

## 2023-03-03 ENCOUNTER — APPOINTMENT (OUTPATIENT)
Dept: RADIOLOGY | Facility: HOSPITAL | Age: 68
End: 2023-03-03
Attending: STUDENT IN AN ORGANIZED HEALTH CARE EDUCATION/TRAINING PROGRAM
Payer: MEDICARE

## 2023-03-03 VITALS
SYSTOLIC BLOOD PRESSURE: 149 MMHG | HEART RATE: 94 BPM | OXYGEN SATURATION: 99 % | DIASTOLIC BLOOD PRESSURE: 62 MMHG | BODY MASS INDEX: 41.48 KG/M2 | HEIGHT: 64 IN | WEIGHT: 243 LBS | RESPIRATION RATE: 20 BRPM | TEMPERATURE: 99 F

## 2023-03-03 DIAGNOSIS — G00.9 BACTERIAL MENINGITIS: ICD-10-CM

## 2023-03-03 PROBLEM — N39.0 UTI (URINARY TRACT INFECTION): Status: RESOLVED | Noted: 2023-02-13 | Resolved: 2023-03-03

## 2023-03-03 LAB
ANION GAP SERPL CALCULATED.3IONS-SCNC: 16 MMOL/L (ref 7–16)
APTT PPP: <20 SECONDS (ref 25.2–37.3)
BASOPHILS # BLD AUTO: 0.04 K/UL (ref 0–0.2)
BASOPHILS NFR BLD AUTO: 0.6 % (ref 0–1)
BUN SERPL-MCNC: 69 MG/DL (ref 7–18)
BUN/CREAT SERPL: 23 (ref 6–20)
CALCIUM SERPL-MCNC: 9.2 MG/DL (ref 8.5–10.1)
CHLORIDE SERPL-SCNC: 108 MMOL/L (ref 98–107)
CLARITY CSF: CLEAR
CO2 SERPL-SCNC: 23 MMOL/L (ref 21–32)
COLOR CSF: COLORLESS
CREAT SERPL-MCNC: 3 MG/DL (ref 0.55–1.02)
DIFFERENTIAL METHOD BLD: ABNORMAL
EGFR (NO RACE VARIABLE) (RUSH/TITUS): 17 ML/MIN/1.73M²
EOSINOPHIL # BLD AUTO: 0.27 K/UL (ref 0–0.5)
EOSINOPHIL NFR BLD AUTO: 3.7 % (ref 1–4)
ERYTHROCYTE [DISTWIDTH] IN BLOOD BY AUTOMATED COUNT: 17.1 % (ref 11.5–14.5)
GLUCOSE CSF-MCNC: 65 MG/DL (ref 40–70)
GLUCOSE SERPL-MCNC: 132 MG/DL (ref 70–105)
GLUCOSE SERPL-MCNC: 143 MG/DL (ref 74–106)
GLUCOSE SERPL-MCNC: 253 MG/DL (ref 70–105)
GLUCOSE SERPL-MCNC: 268 MG/DL (ref 70–105)
GRAM STN SPEC: NORMAL
GRANULOCYTES NFR CSF MANUAL: 1 % (ref 0–25)
HCT VFR BLD AUTO: 27.5 % (ref 38–47)
HGB BLD-MCNC: 8.4 G/DL (ref 12–16)
IMM GRANULOCYTES # BLD AUTO: 0.08 K/UL (ref 0–0.04)
IMM GRANULOCYTES NFR BLD: 1.1 % (ref 0–0.4)
INR BLD: 1.03
LYMPHOCYTES # BLD AUTO: 1.54 K/UL (ref 1–4.8)
LYMPHOCYTES NFR BLD AUTO: 21.2 % (ref 27–41)
LYMPHOCYTES NFR CSF MANUAL: 97 % (ref 40–80)
MCH RBC QN AUTO: 31.9 PG (ref 27–31)
MCHC RBC AUTO-ENTMCNC: 30.5 G/DL (ref 32–36)
MCV RBC AUTO: 104.6 FL (ref 80–96)
MONOCYTES # BLD AUTO: 0.54 K/UL (ref 0–0.8)
MONOCYTES NFR BLD AUTO: 7.4 % (ref 2–6)
MONOCYTES NFR CSF MANUAL: 2 %
MPC BLD CALC-MCNC: 10.2 FL (ref 9.4–12.4)
NEUTROPHILS # BLD AUTO: 4.8 K/UL (ref 1.8–7.7)
NEUTROPHILS NFR BLD AUTO: 66 % (ref 53–65)
NRBC # BLD AUTO: 0 X10E3/UL
NRBC, AUTO (.00): 0 %
PLATELET # BLD AUTO: 208 K/UL (ref 150–400)
POTASSIUM SERPL-SCNC: 4.4 MMOL/L (ref 3.5–5.1)
PROT CSF-MCNC: 55 MG/DL (ref 15–45)
PROTHROMBIN TIME: 13.1 SECONDS (ref 11.7–14.7)
RBC # BLD AUTO: 2.63 M/UL (ref 4.2–5.4)
RBC # CSF MANUAL: 22 /CUMM
SODIUM SERPL-SCNC: 143 MMOL/L (ref 136–145)
TUBE # CSF: 3
WBC # BLD AUTO: 7.27 K/UL (ref 4.5–11)
WBC # CSF MANUAL: 76 /CUMM (ref 0–5)
XANTHOCHROMIA CSF QL: NEGATIVE

## 2023-03-03 PROCEDURE — 63600175 PHARM REV CODE 636 W HCPCS

## 2023-03-03 PROCEDURE — 11000004 HC SNF PRIVATE

## 2023-03-03 PROCEDURE — 89051 BODY FLUID CELL COUNT: CPT | Performed by: STUDENT IN AN ORGANIZED HEALTH CARE EDUCATION/TRAINING PROGRAM

## 2023-03-03 PROCEDURE — 85025 COMPLETE CBC W/AUTO DIFF WBC: CPT | Performed by: STUDENT IN AN ORGANIZED HEALTH CARE EDUCATION/TRAINING PROGRAM

## 2023-03-03 PROCEDURE — 99239 HOSP IP/OBS DSCHRG MGMT >30: CPT | Mod: ,,, | Performed by: STUDENT IN AN ORGANIZED HEALTH CARE EDUCATION/TRAINING PROGRAM

## 2023-03-03 PROCEDURE — 25000003 PHARM REV CODE 250

## 2023-03-03 PROCEDURE — 62328 DX LMBR SPI PNXR W/FLUOR/CT: CPT

## 2023-03-03 PROCEDURE — 63600175 PHARM REV CODE 636 W HCPCS: Performed by: INTERNAL MEDICINE

## 2023-03-03 PROCEDURE — 27000958

## 2023-03-03 PROCEDURE — 25000003 PHARM REV CODE 250: Performed by: INTERNAL MEDICINE

## 2023-03-03 PROCEDURE — 25000003 PHARM REV CODE 250: Performed by: HOSPITALIST

## 2023-03-03 PROCEDURE — 94761 N-INVAS EAR/PLS OXIMETRY MLT: CPT

## 2023-03-03 PROCEDURE — 25000003 PHARM REV CODE 250: Performed by: STUDENT IN AN ORGANIZED HEALTH CARE EDUCATION/TRAINING PROGRAM

## 2023-03-03 PROCEDURE — 99900035 HC TECH TIME PER 15 MIN (STAT)

## 2023-03-03 PROCEDURE — 97110 THERAPEUTIC EXERCISES: CPT | Mod: CO

## 2023-03-03 PROCEDURE — 82962 GLUCOSE BLOOD TEST: CPT

## 2023-03-03 PROCEDURE — 84157 ASSAY OF PROTEIN OTHER: CPT | Performed by: STUDENT IN AN ORGANIZED HEALTH CARE EDUCATION/TRAINING PROGRAM

## 2023-03-03 PROCEDURE — 99239 PR HOSPITAL DISCHARGE DAY,>30 MIN: ICD-10-PCS | Mod: ,,, | Performed by: STUDENT IN AN ORGANIZED HEALTH CARE EDUCATION/TRAINING PROGRAM

## 2023-03-03 PROCEDURE — 87070 CULTURE OTHR SPECIMN AEROBIC: CPT | Performed by: STUDENT IN AN ORGANIZED HEALTH CARE EDUCATION/TRAINING PROGRAM

## 2023-03-03 PROCEDURE — 80048 BASIC METABOLIC PNL TOTAL CA: CPT | Performed by: STUDENT IN AN ORGANIZED HEALTH CARE EDUCATION/TRAINING PROGRAM

## 2023-03-03 PROCEDURE — 27201369

## 2023-03-03 PROCEDURE — 11000001 HC ACUTE MED/SURG PRIVATE ROOM

## 2023-03-03 PROCEDURE — 27201369 FL LUMBAR PUNCTURE DIAGNOSTIC WITH IMAGING

## 2023-03-03 PROCEDURE — 63600175 PHARM REV CODE 636 W HCPCS: Performed by: HOSPITALIST

## 2023-03-03 PROCEDURE — 82945 GLUCOSE OTHER FLUID: CPT | Performed by: HOSPITALIST

## 2023-03-03 PROCEDURE — 87205 SMEAR GRAM STAIN: CPT | Performed by: STUDENT IN AN ORGANIZED HEALTH CARE EDUCATION/TRAINING PROGRAM

## 2023-03-03 PROCEDURE — 96372 THER/PROPH/DIAG INJ SC/IM: CPT

## 2023-03-03 PROCEDURE — 85610 PROTHROMBIN TIME: CPT | Performed by: STUDENT IN AN ORGANIZED HEALTH CARE EDUCATION/TRAINING PROGRAM

## 2023-03-03 RX ORDER — INSULIN DEGLUDEC 200 U/ML
50 INJECTION, SOLUTION SUBCUTANEOUS DAILY
Status: DISCONTINUED | OUTPATIENT
Start: 2023-03-04 | End: 2023-03-04

## 2023-03-03 RX ORDER — IBUPROFEN 200 MG
16 TABLET ORAL
Status: DISCONTINUED | OUTPATIENT
Start: 2023-03-04 | End: 2023-03-16 | Stop reason: HOSPADM

## 2023-03-03 RX ORDER — AMOXICILLIN 250 MG
1 CAPSULE ORAL 2 TIMES DAILY PRN
Status: DISCONTINUED | OUTPATIENT
Start: 2023-03-03 | End: 2023-03-16 | Stop reason: HOSPADM

## 2023-03-03 RX ORDER — ATORVASTATIN CALCIUM 80 MG/1
80 TABLET, FILM COATED ORAL DAILY
Status: DISCONTINUED | OUTPATIENT
Start: 2023-03-04 | End: 2023-03-16 | Stop reason: HOSPADM

## 2023-03-03 RX ORDER — AMLODIPINE BESYLATE 5 MG/1
5 TABLET ORAL DAILY
Status: DISCONTINUED | OUTPATIENT
Start: 2023-03-04 | End: 2023-03-16 | Stop reason: HOSPADM

## 2023-03-03 RX ORDER — NAPROXEN SODIUM 220 MG/1
81 TABLET, FILM COATED ORAL DAILY
Status: DISCONTINUED | OUTPATIENT
Start: 2023-03-04 | End: 2023-03-16 | Stop reason: HOSPADM

## 2023-03-03 RX ORDER — CYCLOBENZAPRINE HCL 5 MG
5 TABLET ORAL 3 TIMES DAILY PRN
Status: DISCONTINUED | OUTPATIENT
Start: 2023-03-03 | End: 2023-03-16 | Stop reason: HOSPADM

## 2023-03-03 RX ORDER — SODIUM CHLORIDE 0.9 % (FLUSH) 0.9 %
10 SYRINGE (ML) INJECTION 2 TIMES DAILY
Status: DISCONTINUED | OUTPATIENT
Start: 2023-03-04 | End: 2023-03-06

## 2023-03-03 RX ORDER — TALC
6 POWDER (GRAM) TOPICAL NIGHTLY PRN
Status: DISCONTINUED | OUTPATIENT
Start: 2023-03-03 | End: 2023-03-16 | Stop reason: HOSPADM

## 2023-03-03 RX ORDER — IBUPROFEN 200 MG
24 TABLET ORAL
Status: DISCONTINUED | OUTPATIENT
Start: 2023-03-04 | End: 2023-03-16 | Stop reason: HOSPADM

## 2023-03-03 RX ORDER — CYCLOBENZAPRINE HCL 5 MG
5 TABLET ORAL 3 TIMES DAILY PRN
Start: 2023-03-03 | End: 2023-03-13

## 2023-03-03 RX ORDER — MUPIROCIN 20 MG/G
OINTMENT TOPICAL 2 TIMES DAILY
Status: COMPLETED | OUTPATIENT
Start: 2023-03-04 | End: 2023-03-08

## 2023-03-03 RX ORDER — ONDANSETRON 4 MG/1
4 TABLET, ORALLY DISINTEGRATING ORAL EVERY 8 HOURS PRN
Status: DISCONTINUED | OUTPATIENT
Start: 2023-03-03 | End: 2023-03-16 | Stop reason: HOSPADM

## 2023-03-03 RX ORDER — CALCIUM CARBONATE 200(500)MG
500 TABLET,CHEWABLE ORAL 2 TIMES DAILY PRN
Status: DISCONTINUED | OUTPATIENT
Start: 2023-03-03 | End: 2023-03-16 | Stop reason: HOSPADM

## 2023-03-03 RX ORDER — ALLOPURINOL 100 MG/1
100 TABLET ORAL DAILY
Status: DISCONTINUED | OUTPATIENT
Start: 2023-03-04 | End: 2023-03-16 | Stop reason: HOSPADM

## 2023-03-03 RX ORDER — AMLODIPINE BESYLATE 5 MG/1
5 TABLET ORAL DAILY
Qty: 30 TABLET | Refills: 11 | Status: SHIPPED | OUTPATIENT
Start: 2023-03-04 | End: 2024-03-03

## 2023-03-03 RX ORDER — QUETIAPINE FUMARATE 25 MG/1
50 TABLET, FILM COATED ORAL 2 TIMES DAILY
Status: DISCONTINUED | OUTPATIENT
Start: 2023-03-04 | End: 2023-03-16 | Stop reason: HOSPADM

## 2023-03-03 RX ORDER — INSULIN ASPART 100 [IU]/ML
0-5 INJECTION, SOLUTION INTRAVENOUS; SUBCUTANEOUS
Status: DISCONTINUED | OUTPATIENT
Start: 2023-03-04 | End: 2023-03-16 | Stop reason: HOSPADM

## 2023-03-03 RX ORDER — QUETIAPINE FUMARATE 50 MG/1
50 TABLET, FILM COATED ORAL 2 TIMES DAILY
Qty: 60 TABLET | Refills: 11 | Status: SHIPPED | OUTPATIENT
Start: 2023-03-03 | End: 2023-03-28 | Stop reason: SDUPTHER

## 2023-03-03 RX ORDER — CARVEDILOL 6.25 MG/1
12.5 TABLET ORAL 2 TIMES DAILY
Status: DISCONTINUED | OUTPATIENT
Start: 2023-03-03 | End: 2023-03-16 | Stop reason: HOSPADM

## 2023-03-03 RX ORDER — PANTOPRAZOLE SODIUM 40 MG/1
40 TABLET, DELAYED RELEASE ORAL DAILY
Qty: 30 TABLET | Refills: 11 | Status: SHIPPED | OUTPATIENT
Start: 2023-03-04 | End: 2023-05-09

## 2023-03-03 RX ORDER — CALCITRIOL 0.25 UG/1
0.25 CAPSULE ORAL DAILY
Status: DISCONTINUED | OUTPATIENT
Start: 2023-03-04 | End: 2023-03-16 | Stop reason: HOSPADM

## 2023-03-03 RX ORDER — ENOXAPARIN SODIUM 100 MG/ML
30 INJECTION SUBCUTANEOUS EVERY 24 HOURS
Status: DISCONTINUED | OUTPATIENT
Start: 2023-03-03 | End: 2023-03-16 | Stop reason: HOSPADM

## 2023-03-03 RX ORDER — ACETAMINOPHEN 325 MG/1
650 TABLET ORAL EVERY 6 HOURS PRN
Status: DISCONTINUED | OUTPATIENT
Start: 2023-03-03 | End: 2023-03-16 | Stop reason: HOSPADM

## 2023-03-03 RX ORDER — DULOXETIN HYDROCHLORIDE 30 MG/1
30 CAPSULE, DELAYED RELEASE ORAL 2 TIMES DAILY
Status: DISCONTINUED | OUTPATIENT
Start: 2023-03-03 | End: 2023-03-06

## 2023-03-03 RX ORDER — PANTOPRAZOLE SODIUM 40 MG/1
40 TABLET, DELAYED RELEASE ORAL DAILY
Status: DISCONTINUED | OUTPATIENT
Start: 2023-03-04 | End: 2023-03-16 | Stop reason: HOSPADM

## 2023-03-03 RX ORDER — GLUCAGON 1 MG
1 KIT INJECTION
Status: DISCONTINUED | OUTPATIENT
Start: 2023-03-04 | End: 2023-03-16 | Stop reason: HOSPADM

## 2023-03-03 RX ADMIN — BENZONATATE 200 MG: 100 CAPSULE ORAL at 03:03

## 2023-03-03 RX ADMIN — CARVEDILOL 12.5 MG: 6.25 TABLET, FILM COATED ORAL at 11:03

## 2023-03-03 RX ADMIN — CEFTRIAXONE SODIUM 2 G: 2 INJECTION, POWDER, FOR SOLUTION INTRAMUSCULAR; INTRAVENOUS at 06:03

## 2023-03-03 RX ADMIN — DULOXETINE 30 MG: 30 CAPSULE, DELAYED RELEASE ORAL at 11:03

## 2023-03-03 RX ADMIN — ENOXAPARIN SODIUM 30 MG: 100 INJECTION SUBCUTANEOUS at 11:03

## 2023-03-03 RX ADMIN — AMLODIPINE BESYLATE 5 MG: 5 TABLET ORAL at 09:03

## 2023-03-03 RX ADMIN — CALCITRIOL CAPSULES 0.25 MCG 0.25 MCG: 0.25 CAPSULE ORAL at 09:03

## 2023-03-03 RX ADMIN — CARVEDILOL 6.25 MG: 6.25 TABLET, FILM COATED ORAL at 09:03

## 2023-03-03 RX ADMIN — PANTOPRAZOLE SODIUM 40 MG: 40 TABLET, DELAYED RELEASE ORAL at 09:03

## 2023-03-03 RX ADMIN — BENZONATATE 200 MG: 100 CAPSULE ORAL at 09:03

## 2023-03-03 RX ADMIN — SODIUM CHLORIDE 1000 ML: 9 INJECTION, SOLUTION INTRAVENOUS at 12:03

## 2023-03-03 RX ADMIN — CEFTRIAXONE SODIUM 2 G: 2 INJECTION, POWDER, FOR SOLUTION INTRAMUSCULAR; INTRAVENOUS at 07:03

## 2023-03-03 RX ADMIN — ENOXAPARIN SODIUM 30 MG: 30 INJECTION SUBCUTANEOUS at 06:03

## 2023-03-03 RX ADMIN — QUETIAPINE FUMARATE 50 MG: 25 TABLET ORAL at 09:03

## 2023-03-03 RX ADMIN — CYCLOBENZAPRINE HYDROCHLORIDE 5 MG: 5 TABLET, FILM COATED ORAL at 12:03

## 2023-03-03 RX ADMIN — ACETAMINOPHEN 1000 MG: 500 TABLET ORAL at 11:03

## 2023-03-03 NOTE — DISCHARGE INSTR - SUPPLEMENTARY INSTRUCTIONS
Physical Therapy Summary:    Bed Mobility:     Supine to Sit: contact guard assistance  Transfers:     Sit to Stand:  minimum assistance with hand-held assist  Gait: up to 15' minimum assist and HHA x 2 persons    LIDIA MOREIRA PTA 3/3/2023 9:18 AM    Cimzia Counseling:  I discussed with the patient the risks of Cimzia including but not limited to immunosuppression, allergic reactions and infections.  The patient understands that monitoring is required including a PPD at baseline and must alert us or the primary physician if symptoms of infection or other concerning signs are noted.

## 2023-03-03 NOTE — PLAN OF CARE
Problem: Adult Inpatient Plan of Care  Goal: Plan of Care Review  Outcome: Ongoing, Progressing  Goal: Patient-Specific Goal (Individualized)  Outcome: Ongoing, Progressing  Goal: Absence of Hospital-Acquired Illness or Injury  Outcome: Ongoing, Progressing  Goal: Optimal Comfort and Wellbeing  Outcome: Ongoing, Progressing  Goal: Readiness for Transition of Care  Outcome: Ongoing, Progressing     Problem: Diabetes Comorbidity  Goal: Blood Glucose Level Within Targeted Range  Outcome: Ongoing, Progressing     Problem: Infection  Goal: Absence of Infection Signs and Symptoms  Outcome: Ongoing, Progressing     Problem: Impaired Wound Healing  Goal: Optimal Wound Healing  Outcome: Ongoing, Progressing     Problem: Skin Injury Risk Increased  Goal: Skin Health and Integrity  Outcome: Ongoing, Progressing     Problem: Bariatric Environmental Safety  Goal: Safety Maintained with Care  Outcome: Ongoing, Progressing     Problem: Fall Injury Risk  Goal: Absence of Fall and Fall-Related Injury  Outcome: Ongoing, Progressing

## 2023-03-03 NOTE — PROGRESS NOTES
"[facility]  Adult Nutrition  Follow-up Note         Reason for Assessment  Reason For Assessment: RD follow-up  Nutrition Risk Screen: no indicators present    RD follow up.    Recommend continue current Consistent Carbohydrate 2000kcal Diet order. Given oral intakes improved, recommend continue Glucerna at daily frequency instead of TID and continue to monitor intakes. Can increase back to TID or up to BID if needed.     Given elevated phos, recommend consider discussing with nephrology possibly adding a phosphate binding medication to promote normalization of phosphorus especially following the resumption of calcitriol which can cause increased phosphorus levels. Will monitor for medication changes, lab trends. If renal function labs worsen, recommend consider adjustment to renal diet. Encourage good PO intakes.    Per MD since last RD review,  "Overview/Hospital Course:  2/28: complete 14 additional days of Ceftriaxone since patient has lost her hearing.  Contact neurology if needed.    Dr. Kim Liriano she recommended an MRI with and without contrast.  Patient cannot have contrast.  Give anxiety medication and she may be willing to do MRI without contrast.  Number for Dr. Liriano: [phone].    Lumbar puncture in 3-4 days (had to hold brilinta for 5 days).  Patient reports headache so we may monitor CSF.      3/1- will repeat lumbar puncture on 3/3- last dose brillenta 2/27.   3/2-LP tomorrow"    Pt receiving Consistent Carbohydrate 2000kcal Diet. Oral intakes have improved and pt is now typically eating % of meals. Encourage good PO intakes. Recommend continue Glucerna (or Boost Glucose Control) daily to promote adequate oral intake. Glucerna provides 220kcal, 10g protein each.     Wt Readings from Last 3 Encounters:   03/03/23 0600 110.2 kg (243 lb)   03/02/23 0557 110.5 kg (243 lb 9.7 oz)   03/01/23 0400 108.2 kg (238 lb 8.6 oz)   02/27/23 0549 107.7 kg (237 lb 6.4 oz) "   02/26/23 0600 114.4 kg (252 lb 1.6 oz)   02/25/23 0600 114 kg (251 lb 6.4 oz)   02/23/23 1500 107.5 kg (236 lb 15.9 oz)   02/22/23 0532 112.7 kg (248 lb 7.3 oz)   02/20/23 1605 113.9 kg (251 lb)   02/17/23 0524 103.8 kg (228 lb 13.4 oz)   02/16/23 0308 96.4 kg (212 lb 8.4 oz)   02/15/23 0309 96.2 kg (212 lb 1.3 oz)   02/14/23 0301 111.5 kg (245 lb 13 oz)   02/13/23 0245 108.8 kg (239 lb 13.8 oz)   02/12/23 1245 109.7 kg (241 lb 13.5 oz)   02/12/23 0907 108 kg (238 lb)   02/12/23 0459 108 kg (238 lb)   Unsure etiology of weight discrepancies - suspect some inaccuracies. Will continue to monitor weight trend. CBW 3/3 reading 243lb is well above IBW range, BMI considered morbid obesity - nutrition needs adjusted. Pt with CKD stg 4 - protein needs adjusted.      Last BM 3/2 per flowsheets. Will continue to monitor PO intakes, meds, labs, weight trend, updates in patient condition. RD following.     Malnutrition  Is Patient Malnourished: No     Nutrition Diagnosis  Altered Nutrition Related Lab Values   related to Diabetes Mellitus as evidenced by elevated blood glucose levels     Nutrition Diagnosis Status: Chronic/ continues     Nutrition Risk  Level of Risk/Frequency of Follow-up: moderate   Chewing or Swallowing Difficulty?: No Chewing/Swallowing Difficulty    Estimated/Assessed Needs    Temp: 98.6 °F (37 °C)Oral  Weight Used For Calorie Calculations: 67.7 kg (149 lb 4 oz) (adjusted body weight)   Energy Need Method: Kcal/kg (25-30kcal/kg adj body weight) Energy Calorie Requirements (kcal): 1692-2031kcal  Weight Used For Protein Calculations: 107.5 kg (236 lb 15.9 oz)  Protein Requirements: 65-86g pro (0.6-0.8 g pro/kg)       RDA Method (mL): 1692     Nutrition Prescription / Recommendations  Recommendation/Intervention: Recommend continue current Consistent Carbohydrate 2000kcal Diet order. Given oral intakes improved, recommend continue Glucerna at daily frequency instead of TID and continue to monitor intakes.  Can increase back to TID or up to BID if needed. Given elevated phos, recommend consider discussing with nephrology possibly adding a phosphate binding medication to promote normalization of phosphorus especially following the resumption of calcitriol which can cause increased phosphorus levels. Will monitor for medication changes, lab trends. If renal function labs worsen, recommend consider adjustment to renal diet. Encourage good PO intakes.  Goals: po intakes adequate to meet needs, weight stability, lab stability/normalization  Nutrition Goal Status: progressing towards goal  Current Diet Order: Consistent Carbohydrate 2000kcal Diet  Nutrition Order Comments: Appropriate  Oral Nutrition Supplement: Glucerna (or Boost Control) once daily  Recommended Diet: Consistent Carbohydrate 2000 (75g Carbs)  Recommended Oral Supplement: Glucerna Shake [220 kcals, 10g Protein, 26g Carbs(4g Fiber, 7g Sugar), 9g Fat] daily  Is Nutrition Support Recommended: No  Is Education Recommended: No    Monitor and Evaluation  % current Intake: Other: PO intake %  % intake to meet estimated needs: 75 - 100 %  Food and Nutrient Intake: energy intake, food and beverage intake  Food and Nutrient Adminstration: diet order  Anthropometric Measurements: weight, weight change, body mass index  Biochemical Data, Medical Tests and Procedures: electrolyte and renal panel, gastrointestinal profile, glucose/endocrine profile, inflammatory profile, lipid profile  Nutrition-Focused Physical Findings: overall appearance, extremities, muscles and bones, head and eyes, skin     Current Medical Diagnosis and Past Medical History  Diagnosis: other (see comments) (bacterial meningitis)  Past Medical History:   Diagnosis Date    Cancer     Diabetes mellitus, type 2     Fibromyalgia     History of kidney cancer 2018    Hyperlipidemia     Hypertension     Migraine headache     Renal cell carcinoma     Renal disorder      Nutrition/Diet  "History  Spiritual, Cultural Beliefs, Voodoo Practices, Values that Affect Care: no  Food Allergies: NKFA    Lab/Procedures/Meds  Recent Labs   Lab 02/28/23  1439      K 4.5   BUN 69*   CREATININE 2.87*   *   CALCIUM 9.0   ALBUMIN 2.2*   *   PHOS 6.3*     Last A1c:   Lab Results   Component Value Date    HGBA1C 6.9 (H) 11/22/2022     Lab Results   Component Value Date    RBC 2.68 (L) 02/28/2023    HGB 8.3 (L) 02/28/2023    HCT 28.0 (L) 02/28/2023    .5 (H) 02/28/2023    MCH 31.0 02/28/2023    MCHC 29.6 (L) 02/28/2023     Pertinent Labs Reviewed: reviewed  Cl 109 (H) - possibly r/t fluid status; BUN 69 (H), creatinine 2.87 (H), BUN/CREAT RATIO 24 (H), eGFR 17 (L), albumin 2.2 (L), Phos 6.3 (H) - pt with stg 4 CKD and bacterial meningitis so possible inflammatory response driving down protein and albumin;  (H) - hx DM2; total globulin 4.1 (H) - unsure etiology, will continue to monitor altered labs  Pertinent Medications Reviewed: reviewed  allopurinol, amlodipine, benzonatate, calcitriol, carvedilol, enoxaparin, insulin, pantoprazole, quetiapine, tizanidine    Anthropometrics  Temp: 98.6 °F (37 °C)  Height: 5' 4" (162.6 cm)  Height (inches): 64 in  Weight Method: Bed Scale  Weight: 110.2 kg (243 lb)  Weight (lb): 243 lb  Ideal Body Weight (IBW), Female: 120 lb  % Ideal Body Weight, Female (lb): 209.17 %  BMI (Calculated): 41.7  BMI Grade: greater than 40 - morbid obesity     Nutrition by Nursing  Diet/Nutrition Received: consistent carb/diabetic diet  Intake (%): 50%     Diet/Feeding Tolerance: good  Last Bowel Movement: 03/02/23    Nutrition Follow-Up  RD Follow-up?: Yes  "

## 2023-03-03 NOTE — PLAN OF CARE
SS discussed with Dr. Ren, plan is for patient to discharge today after her lumbar puncture. SS spoke with Dennis at [facility], patient has been accepted and can be admitted today. Dr. Ren notified. Packet made.

## 2023-03-03 NOTE — PT/OT/SLP PROGRESS
Occupational Therapy   Treatment    Name: Jessica Bay  MRN: [id number]  Admitting Diagnosis:  Bacterial meningitis       Recommendations:     Discharge Recommendations: nursing facility, skilled, rehabilitation facility  Discharge Equipment Recommendations:   (to be determined)  Barriers to discharge:       Assessment:     Jessica Bay is a 67 y.o. female with a medical diagnosis of Bacterial meningitis.Performance deficits affecting function are weakness, impaired endurance, impaired self care skills.     Rehab Prognosis:  Good; patient would benefit from acute skilled OT services to address these deficits and reach maximum level of function.       Plan:     Patient to be seen 5 x/week to address the above listed problems via self-care/home management, therapeutic activities, therapeutic exercises  Plan of Care Expires:    Plan of Care Reviewed with: patient    Subjective     Pain/Comfort:  Pain Rating 1: 0/10    Objective:     Communicated with: KASIA Carmona prior to session.  Patient found HOB elevated with PureWick, PICC line upon OT entry to room.    General Precautions: Standard, hearing impaired, fall    Orthopedic Precautions:N/A  Braces: N/A  Respiratory Status: Room air     Occupational Performance:     Bed Mobility:         Functional Mobility/Transfers:    Functional Mobility:     Activities of Daily Living:        [facility] 6 Click ADL:      Treatment & Education:  Pt performed hand helper with 3 rubber band resistances on R 20 reps x 2, red t ball ex's B hands 20 reps x 2, rom ex's with 1 lb wt 15 reps x 2 B elbow flex/ext,abd/add, 15 reps shld flex.      Patient left HOB elevated with all lines intact and nurse  present to take pt for her lumbar puncture    GOALS:   Multidisciplinary Problems       Occupational Therapy Goals          Problem: Occupational Therapy    Goal Priority Disciplines Outcome Interventions   Occupational Therapy Goal     OT, PT/OT Ongoing, Progressing    Description:  STG:    Pt will bathe with min a  Pt will perform UE dressing with setup  Pt will perform LE dressing with min a with AD as needed  Pt will sit EOB x 15 min with (I)  Pt will transfer bed/chair/bsc with min a with AD as needed  Pt will tolerate 20 minutes of tx without fatigue      LT.Restore to max I with self care and mobility.                          Time Tracking:     OT Date of Treatment: 23  OT Start Time: 1015  OT Stop Time: 1037  OT Total Time (min): 22 min    Billable Minutes:Therapeutic Exercise 20    OT/GLADYS: GLADYS          3/3/2023

## 2023-03-03 NOTE — PLAN OF CARE
[facility]  Discharge Final Note    Primary Care Provider: Jag Lopez MD    Expected Discharge Date: 3/3/2023    Final Discharge Note (most recent)       Final Note - 03/03/23 1347          Final Note    Assessment Type Final Discharge Note     Anticipated Discharge Disposition Swing Bed        Post-Acute Status    Post-Acute Authorization Placement     Post-Acute Placement Status Set-up Complete/Auth obtained     Patient choice form signed by patient/caregiver List with quality metrics by geographic area provided;List from CMS Compare;List from System Post-Acute Care     Discharge Delays None known at this time                   Patient discharging to [facility]. Packet placed at nurse's station. Patient's son, Dean, notified of patient's discharge. No further discharge needs noted at this time.     Important Message from Medicare  Important Message from Medicare regarding Discharge Appeal Rights: Explained to patient/caregiver     Date IMM was signed: 03/03/23  Time IMM was signed: 1345     Follow-up providers       Jag Lopez MD   Specialty: Geriatric Medicine   Relationship: PCP - General    [address] [facility] [place] [address]   Phone: [phone]       Next Steps: Schedule an appointment as soon as possible for a visit in 2 week(s)              After-discharge care                Destination       [facility]   Service: Skilled Nursing    [address]  [place] [address]   Phone: [phone]

## 2023-03-03 NOTE — DISCHARGE INSTRUCTIONS
Occupational Therapy  Summary:   Grooming: I with combing hair and washing face and hands   I to comb her hair  I to apply lip balm  I to open lip balm  Dep to doff socks   I to sit eob 10 mins  Sit to supine min a

## 2023-03-03 NOTE — PROGRESS NOTES
Lumbar puncture   Performed by A Milling RRA  Consent obtained for lumbar puncture.  A formal timeout was called all staff present agree to patient and procedure.  The lower back was prepped with Betadine and sterile field was established.  1% lidocaine was used as local anesthetic.  Under fluoroscopic guidance a 22 gauge spinal needle was placed at the L3-L4 interspace.  An opening pressure 16 cm H2O was recorded.  Nine cc of clear cerebrospinal fluid was removed.  The needle was removed and the puncture site was cleaned and bandaged.  The patient tolerated the procedure well there were no immediate postprocedure complications.

## 2023-03-03 NOTE — DISCHARGE SUMMARY
[facility] Hospital Medicine  Discharge Summary      Patient Name: Jessica Bay  MRN: [id number]  KANDIS: [id number]  Patient Class: IP- Inpatient  Admission Date: 2/20/2023  Hospital Length of Stay: 11 days  Discharge Date and Time:  03/03/2023 11:35 AM  Attending Physician: Vimal Ren DO   Discharging Provider: Vimal Ren DO  Primary Care Provider: Jag Lopez MD    Primary Care Team: Networked reference to record PCT     HPI:   67-year-old female patient with past medical history of diabetes, history of kidney cancer status post nephrectomy, has single kidney, dyslipidemia, hypertension, coronary artery disease status post NSTEMI November 2022 and stent placement to the RCA, history of fibromyalgia, and chronic kidney disease.  Admitted to the hospital 02/12/2023 with altered mental status and fever, patient has history of meningitis in 2015.    Patient admitted to the hospital underwent lumbar puncture which showed evidence of acute meningitis, but patient is CSF cultures remain negative, Gram stain showed no organisms.  Her urine culture grew E coli in her blood culture on February 12th grew Streptococcus pneumoniae sensitive to Rocephin.  Patient continued on Rocephin bacteremia dose.  Patient will need 2-4 weeks of IV antibiotics.    Patient is very hard of hearing, likely related to her previous meningitis.    Patient admitted to specialty [facility] for IV antibiotics and rehab.      * No surgery found *      Hospital Course:   Patient admitted to [facility] for continued IV antibiotics.    Plan to also contact [facility] for evaluation by patient's neurologist as well as her infectious disease specialist.    2/22:  Discussed case with Neurology at [facility].  This was the preference of the patient.  Patient previously had meningitis in 2015 and neurologist at that time was Dr. Liriano.  Dr. Kim Liriano she recommended an MRI with and without  contrast.  Number that she can be reached at [phone].    Of note, patient had glucose of less than 1 in CSF during this episode of meningitis.  The organism was strep pneumo.  Dr. Adam stated that mortality is very high in meningitis cases this severe.  She also stated that patient will very likely regain her hearing over time.      2/23:  Patient states that she would need an open MRI in order to have an MRI.  She further states that her kidneys did not permit her to have contrast MRI.  All discuss this further with Dr. Liriano.  It is notable that the patient's son states that she had fluid in her ears and this contributed to her having difficulty caring in the past.    2/24:  Continue with current care for now.  Consult ID at [facility] on Monday.      2/25: Added PTH per patient request. Consult ID on Monday for duration and choice of antibiotics. F/u with neurology.    Discuss dispo with social work.  Patient requests swing-bed on discharge.      2/26:  PTH was elevated at 306.  Renal panel ordered.  Likely related to renal disease versus immobilization.  Can discuss further with Nephrology.    Patient has several diet request and we are attempting took accommodate those.    2/27:  Reports headache today.  Will attempt to have a repeat LP.  Patient was on Brilinta unable to have the repeat LP for the next 5 days.  Contacted [facility] Infectious disease and [facility].    2/28: complete 14 additional days of Ceftriaxone since patient has lost her hearing.  Contact neurology if needed.    Dr. Kim Liriano she recommended an MRI with and without contrast.  Patient cannot have contrast.  Give anxiety medication and she may be willing to do MRI without contrast.  Number for Dr. Liriano: [phone].    Lumbar puncture in 3-4 days (had to hold brilinta for 5 days).  Patient reports headache so we may monitor CSF.     3/1- will repeat lumbar puncture on 3/3- last dose brillenta 2/27.    3/2-LP tomorrow  3/3-Stable for discharge- refusing MRI.  Will follow up repeat LP results.  Will continue IV Rocephin with end date of 3/12/23.  Stable for DC to swingbed.  Needs to follow up with PCP.       Goals of Care Treatment Preferences:  Code Status: Full Code      Consults:   Consults (From admission, onward)        Status Ordering Provider     Inpatient consult to Registered Dietitian/Nutritionist  Once        Provider:  (Not yet assigned)    Completed MARCOS QUEZADA     Pharmacy to dose Vancomycin consult  Once        Provider:  (Not yet assigned)    Acknowledged MELOELAIN, JENIFER A     Pharmacy to renally dose medication  Once        Provider:  (Not yet assigned)    Acknowledged MELOELAIN, JENIFER A          Cardiac/Vascular  CAD (coronary artery disease)  Status post NSTEMI November 2022   Continue DA PT therapy   Continue beta-blockers   Continue on statins.      Hypertension  Adjust medications as needed      Renal/  CKD (chronic kidney disease), stage IV  Creatinine stable around 2.    Nephrologist consulted for evaluation.      Acquired absence of kidney  History of renal cell carcinoma status post nephrectomy 15 years ago.      UTI (urinary tract infection)-resolved as of 3/3/2023  Urine culture grew E coli UTI, sensitive to Rocephin.         ID  * Bacterial meningitis  Patient sees if cell count upon admission to [facility] was suggestive of bacterial meningitis, culture and Gram stain negative.    Has positive blood cultures with pneumococcus pneumonia sensitive to Rocephin.    Repeat blood cultures negative   We will need to continue antibiotics for 2-4 weeks of IV antibiotics.  Already received 1 week of IV antibiotic at [facility]     Antibiotics (From admission, onward)    Start     Stop Route Frequency Ordered    02/28/23 1800  cefTRIAXone (ROCEPHIN) 2 g in dextrose 5 % in water (D5W) 5 % 50 mL IVPB (MB+)         -- IV Every 12 hours (non-standard times) 02/28/23 6152     02/20/23 2100  mupirocin 2 % ointment         02/25 2059 Nasl 2 times daily 02/20/23 1506    02/20/23 1606  vancomycin - pharmacy to dose         -- IV pharmacy to manage frequency 02/20/23 1506        2/22  MRI with and without contrast per recommendation of neurology.   Consult ID at [facility].      2/23: no MRI.  Consult ID at [facility].     2/27:   Spoke with ID at [facility]:     Jessica Buckley, FEMI Stark.     Typically duration of IV antibiotics is 14 days.  If symptoms persist, they extend for 21 days.    Concerning the hearing loss, they will contact me again to discuss if the loss of hearing will affect the duration of antibiotics.    Neurology is Dr. Kim Bond.    [phone]  Patient states she needs an open MRI.    They recommended given anxiety medication prior to MRI and order an MRI without contrast.    2/28: 2/28: complete 14 additional days of Ceftriaxone since patient has lost her hearing.  Contact neurology if needed.    Dr. Kim Liriano she recommended an MRI with and without contrast.  Patient cannot have contrast.  Give anxiety medication and she may be willing to do MRI without contrast.  Number for Dr. Liriano: [phone].    Lumbar puncture in 3-4 days (had to hold brilinta for 5 days).  Patient reports headache so we may monitor CSF.     3/1-MRI without contrast today    Continue rocephin through 3/12/23.      Endocrine  Hyperparathyroidism    This is likely secondary to renal disease and immobilization which patient mobilize for several beats and she does walk wheelchair.  Per request of patient, additional workup was initiated including vitamin-D and renal panel.  May discuss further with Nephrology.      Type 2 diabetes mellitus with diabetic chronic kidney disease  Patient's FSGs are controlled on current medication regimen.  Last A1c reviewed-   Lab Results   Component Value Date    HGBA1C 6.9 (H) 11/22/2022     Most recent fingerstick glucose reviewed- No results for input(s):  POCTGLUCOSE in the last 24 hours.  Current correctional scale  Low  Maintain anti-hyperglycemic dose as follows-   Antihyperglycemics (From admission, onward)    Start     Stop Route Frequency Ordered    02/28/23 2115  insulin aspart U-100 injection 1-14 Units         -- SubQ Before meals & nightly PRN 02/28/23 2014 02/20/23 2100  insulin detemir U-100 injection 25 Units         -- SubQ Nightly 02/20/23 1404        Hold Oral hypoglycemics while patient is in the hospital.      Final Active Diagnoses:    Diagnosis Date Noted POA    PRINCIPAL PROBLEM:  Bacterial meningitis [G00.9] 02/13/2023 Yes    Hyperparathyroidism [E21.3] 02/26/2023 Yes    Hypertension [I10] 10/17/2022 Yes    CKD (chronic kidney disease), stage IV [N18.4] 09/20/2021 Yes    Acquired absence of kidney [Z90.5] 09/20/2021 Not Applicable    Type 2 diabetes mellitus with diabetic chronic kidney disease [E11.22] 09/20/2021 Yes    CAD (coronary artery disease) [I25.10] 03/05/2020 Yes      Problems Resolved During this Admission:    Diagnosis Date Noted Date Resolved POA    UTI (urinary tract infection) [N39.0] 02/13/2023 03/03/2023 Yes       Discharged Condition: good    Disposition: Another Health Care Inst*    Follow Up:   Follow-up Information     Jag Lopez MD. Schedule an appointment as soon as possible for a visit in 2 week(s).    Specialty: Geriatric Medicine  Contact information:  [address] [place] [id number] [phone]                       Patient Instructions:      Diet Cardiac     Activity as tolerated       Significant Diagnostic Studies: Labs: All labs within the past 24 hours have been reviewed    Pending Diagnostic Studies:     Procedure Component Value Units Date/Time    Cell Count w/ Diff, CSF [[id number]] Collected: 03/03/23 1110    Order Status: Sent Lab Status: In process Updated: 03/03/23 1126    Specimen: CSF (Spinal Fluid) from Cerebrospinal Fluid     Narrative:      The following orders were created for panel  order Cell Count w/ Diff, CSF.  Procedure                               Abnormality         Status                     ---------                               -----------         ------                     Cell Count, CSF[[id number]]                                  In process                   Please view results for these tests on the individual orders.    Cell Count, CSF [[id number]] Collected: 03/03/23 1110    Order Status: Sent Lab Status: In process Updated: 03/03/23 1126    Specimen: CSF (Spinal Fluid) from Cerebrospinal Fluid     EXTRA TUBES [[id number]] Collected: 02/27/23 0629    Order Status: Sent Lab Status: In process Updated: 02/27/23 0724    Specimen: Blood, Venous     Narrative:      The following orders were created for panel order EXTRA TUBES.  Procedure                               Abnormality         Status                     ---------                               -----------         ------                     Lavender Top Hold[[id number]]                                In process                   Please view results for these tests on the individual orders.    FL Lumbar Puncture (xpd) [[id number]] Resulted: 03/03/23 1042    Order Status: Sent Lab Status: In process Updated: 03/03/23 1119    Glucose, Body Fluid [[id number]]     Order Status: Sent Lab Status: No result     Specimen: Body Fluid     Protein, CSF [[id number]] Collected: 03/03/23 1110    Order Status: Sent Lab Status: In process Updated: 03/03/23 1126    Specimen: CSF (Spinal Fluid) from Cerebrospinal Fluid          Medications:  Reconciled Home Medications:      Medication List      START taking these medications    amLODIPine 5 MG tablet  Commonly known as: NORVASC  Take 1 tablet (5 mg total) by mouth once daily.  Start taking on: March 4, 2023     cyclobenzaprine 5 MG tablet  Commonly known as: FLEXERIL  Take 1 tablet (5 mg total) by mouth 3 (three) times daily as needed for Muscle spasms.     dextrose 5 % in water (D5W) 5 % PgBk 50  mL with cefTRIAXone 2 gram SolR 2 g  Inject 2 g into the vein every 12 (twelve) hours.     pantoprazole 40 MG tablet  Commonly known as: PROTONIX  Take 1 tablet (40 mg total) by mouth once daily.  Start taking on: March 4, 2023     QUEtiapine 50 MG tablet  Commonly known as: SEROQUEL  Take 1 tablet (50 mg total) by mouth 2 (two) times daily.        CONTINUE taking these medications    allopurinoL 100 MG tablet  Commonly known as: ZYLOPRIM  Take 100 mg by mouth once daily.     APPLE CIDER VINEGAR COMPLEX ORAL  Take 1 capsule by mouth once daily.     aspirin 81 MG EC tablet  Commonly known as: ECOTRIN  Take 81 mg by mouth once daily.     calcitRIOL 0.25 MCG Cap  Commonly known as: ROCALTROL  Take 0.25 mcg by mouth once daily.     carvediloL 12.5 MG tablet  Commonly known as: COREG  Take 12.5 mg by mouth 2 (two) times daily.     DULoxetine 30 MG capsule  Commonly known as: CYMBALTA  Take 30 mg by mouth once daily.     rosuvastatin 40 MG Tab  Commonly known as: CRESTOR  Take 40 mg by mouth once daily.     sodium bicarbonate 650 MG tablet  Take 650 mg by mouth 3 (three) times daily.     ticagrelor 90 mg tablet  Commonly known as: BRILINTA  Take 1 tablet (90 mg total) by mouth 2 (two) times a day.     tiZANidine 4 MG tablet  Commonly known as: ZANAFLEX  Take 4 mg by mouth 2 (two) times daily as needed.     TRESIBA FLEXTOUCH U-200 200 unit/mL (3 mL) insulin pen  Generic drug: insulin degludec  Inject 50 Units into the skin once daily.     VICTOZA 2-TONI 0.6 mg/0.1 mL (18 mg/3 mL) Pnij pen  Generic drug: liraglutide 0.6 mg/0.1 mL (18 mg/3 mL) subq PNIJ  Inject 0.6 mg into the skin once daily.            Indwelling Lines/Drains at time of discharge:   Lines/Drains/Airways     Peripherally Inserted Central Catheter Line  Duration           PICC Double Lumen 02/20/23 1417 left cephalic 10 days          Drain  Duration           Female External Urinary Catheter 02/28/23 2150 2 days                Time spent on the discharge of  patient: >30 minutes         Vimal Ren DO  Department of Hospital Medicine  [facility]

## 2023-03-03 NOTE — ASSESSMENT & PLAN NOTE
Patient sees if cell count upon admission to [facility] was suggestive of bacterial meningitis, culture and Gram stain negative.    Has positive blood cultures with pneumococcus pneumonia sensitive to Rocephin.    Repeat blood cultures negative   We will need to continue antibiotics for 2-4 weeks of IV antibiotics.  Already received 1 week of IV antibiotic at [facility]     Antibiotics (From admission, onward)    Start     Stop Route Frequency Ordered    02/28/23 1800  cefTRIAXone (ROCEPHIN) 2 g in dextrose 5 % in water (D5W) 5 % 50 mL IVPB (MB+)         -- IV Every 12 hours (non-standard times) 02/28/23 1349    02/20/23 2100  mupirocin 2 % ointment         02/25 2059 Nasl 2 times daily 02/20/23 1506    02/20/23 1606  vancomycin - pharmacy to dose         -- IV pharmacy to manage frequency 02/20/23 1506        2/22  MRI with and without contrast per recommendation of neurology.   Consult ID at [facility].      2/23: no MRI.  Consult ID at [facility].     2/27:   Spoke with ID at [facility]:     Jessica Buckley, FEMI Stark.     Typically duration of IV antibiotics is 14 days.  If symptoms persist, they extend for 21 days.    Concerning the hearing loss, they will contact me again to discuss if the loss of hearing will affect the duration of antibiotics.    Neurology is Dr. Kim Bond.    [phone]  Patient states she needs an open MRI.    They recommended given anxiety medication prior to MRI and order an MRI without contrast.    2/28: 2/28: complete 14 additional days of Ceftriaxone since patient has lost her hearing.  Contact neurology if needed.    Dr. Kim Liriano she recommended an MRI with and without contrast.  Patient cannot have contrast.  Give anxiety medication and she may be willing to do MRI without contrast.  Number for Dr. Liriano: [phone].    Lumbar puncture in 3-4 days (had to hold brilinta for 5 days).  Patient reports headache so we may monitor CSF.     3/1-MRI without contrast  today    Continue rocephin through 3/12/23.

## 2023-03-04 LAB
ANION GAP SERPL CALCULATED.3IONS-SCNC: 15 MMOL/L (ref 7–16)
BASOPHILS # BLD AUTO: 0.03 K/UL (ref 0–0.2)
BASOPHILS NFR BLD AUTO: 0.5 % (ref 0–1)
BUN SERPL-MCNC: 57 MG/DL (ref 7–18)
BUN/CREAT SERPL: 22 (ref 6–20)
CALCIUM SERPL-MCNC: 8.9 MG/DL (ref 8.5–10.1)
CHLORIDE SERPL-SCNC: 109 MMOL/L (ref 98–107)
CO2 SERPL-SCNC: 23 MMOL/L (ref 21–32)
CREAT SERPL-MCNC: 2.63 MG/DL (ref 0.55–1.02)
DIFFERENTIAL METHOD BLD: ABNORMAL
EGFR (NO RACE VARIABLE) (RUSH/TITUS): 19 ML/MIN/1.73M²
EOSINOPHIL # BLD AUTO: 0.21 K/UL (ref 0–0.5)
EOSINOPHIL NFR BLD AUTO: 3.7 % (ref 1–4)
ERYTHROCYTE [DISTWIDTH] IN BLOOD BY AUTOMATED COUNT: 16.5 % (ref 11.5–14.5)
EST. AVERAGE GLUCOSE BLD GHB EST-MCNC: 190 MG/DL
GLUCOSE SERPL-MCNC: 190 MG/DL (ref 70–105)
GLUCOSE SERPL-MCNC: 194 MG/DL (ref 70–105)
GLUCOSE SERPL-MCNC: 199 MG/DL (ref 70–105)
GLUCOSE SERPL-MCNC: 199 MG/DL (ref 74–106)
GLUCOSE SERPL-MCNC: 257 MG/DL (ref 70–105)
HBA1C MFR BLD HPLC: 8.3 % (ref 4.5–6.6)
HCT VFR BLD AUTO: 26.3 % (ref 38–47)
HGB BLD-MCNC: 8.1 G/DL (ref 12–16)
LYMPHOCYTES # BLD AUTO: 1.23 K/UL (ref 1–4.8)
LYMPHOCYTES NFR BLD AUTO: 21.9 % (ref 27–41)
MCH RBC QN AUTO: 32.7 PG (ref 27–31)
MCHC RBC AUTO-ENTMCNC: 30.8 G/DL (ref 32–36)
MCV RBC AUTO: 106 FL (ref 80–96)
MONOCYTES # BLD AUTO: 0.43 K/UL (ref 0–0.8)
MONOCYTES NFR BLD AUTO: 7.7 % (ref 2–6)
MPC BLD CALC-MCNC: 9.7 FL (ref 9.4–12.4)
NEUTROPHILS # BLD AUTO: 3.71 K/UL (ref 1.8–7.7)
NEUTROPHILS NFR BLD AUTO: 66.2 % (ref 53–65)
PLATELET # BLD AUTO: 205 K/UL (ref 150–400)
POTASSIUM SERPL-SCNC: 4.3 MMOL/L (ref 3.5–5.1)
RBC # BLD AUTO: 2.48 M/UL (ref 4.2–5.4)
SODIUM SERPL-SCNC: 143 MMOL/L (ref 136–145)
WBC # BLD AUTO: 5.61 K/UL (ref 4.5–11)

## 2023-03-04 PROCEDURE — 83036 HEMOGLOBIN GLYCOSYLATED A1C: CPT

## 2023-03-04 PROCEDURE — 63600175 PHARM REV CODE 636 W HCPCS

## 2023-03-04 PROCEDURE — 25000003 PHARM REV CODE 250

## 2023-03-04 PROCEDURE — 85025 COMPLETE CBC W/AUTO DIFF WBC: CPT

## 2023-03-04 PROCEDURE — 82962 GLUCOSE BLOOD TEST: CPT

## 2023-03-04 PROCEDURE — 63600175 PHARM REV CODE 636 W HCPCS: Performed by: NURSE PRACTITIONER

## 2023-03-04 PROCEDURE — A4216 STERILE WATER/SALINE, 10 ML: HCPCS

## 2023-03-04 PROCEDURE — 27000958

## 2023-03-04 PROCEDURE — 11000001 HC ACUTE MED/SURG PRIVATE ROOM

## 2023-03-04 PROCEDURE — 11000004 HC SNF PRIVATE

## 2023-03-04 PROCEDURE — 80048 BASIC METABOLIC PNL TOTAL CA: CPT

## 2023-03-04 RX ADMIN — ACETAMINOPHEN 650 MG: 325 TABLET ORAL at 11:03

## 2023-03-04 RX ADMIN — TICAGRELOR 90 MG: 90 TABLET ORAL at 09:03

## 2023-03-04 RX ADMIN — MUPIROCIN: 20 OINTMENT TOPICAL at 09:03

## 2023-03-04 RX ADMIN — DULOXETINE 30 MG: 30 CAPSULE, DELAYED RELEASE ORAL at 09:03

## 2023-03-04 RX ADMIN — INSULIN DETEMIR 40 UNITS: 100 INJECTION, SOLUTION SUBCUTANEOUS at 09:03

## 2023-03-04 RX ADMIN — CARVEDILOL 12.5 MG: 6.25 TABLET, FILM COATED ORAL at 09:03

## 2023-03-04 RX ADMIN — ATORVASTATIN CALCIUM 80 MG: 80 TABLET, FILM COATED ORAL at 09:03

## 2023-03-04 RX ADMIN — CEFTRIAXONE SODIUM 2 G: 1 INJECTION, POWDER, FOR SOLUTION INTRAMUSCULAR; INTRAVENOUS at 05:03

## 2023-03-04 RX ADMIN — QUETIAPINE 50 MG: 25 TABLET ORAL at 09:03

## 2023-03-04 RX ADMIN — ENOXAPARIN SODIUM 30 MG: 100 INJECTION SUBCUTANEOUS at 05:03

## 2023-03-04 RX ADMIN — ALLOPURINOL 100 MG: 100 TABLET ORAL at 09:03

## 2023-03-04 RX ADMIN — Medication 10 ML: at 09:03

## 2023-03-04 RX ADMIN — ASPIRIN 81 MG CHEWABLE TABLET 81 MG: 81 TABLET CHEWABLE at 09:03

## 2023-03-04 RX ADMIN — PANTOPRAZOLE SODIUM 40 MG: 40 TABLET, DELAYED RELEASE ORAL at 09:03

## 2023-03-04 RX ADMIN — AMLODIPINE BESYLATE 5 MG: 5 TABLET ORAL at 09:03

## 2023-03-04 NOTE — PLAN OF CARE
Problem: Adult Inpatient Plan of Care  Goal: Plan of Care Review  Outcome: Ongoing, Progressing     Problem: Bariatric Environmental Safety  Goal: Safety Maintained with Care  Outcome: Ongoing, Progressing     Problem: Infection  Goal: Absence of Infection Signs and Symptoms  Outcome: Ongoing, Progressing

## 2023-03-04 NOTE — PROGRESS NOTES
[facility]  [facility] Medicine  Progress Note    Patient Name: Jessica Bay  MRN: [id number]  Patient Class: IP- Inpatient   Admission Date: 3/3/2023  Length of Stay: 0 days  Attending Physician: Clyde Flood DO  Primary Care Provider: Jag Lopez MD        Subjective:     Principal Problem:Bacterial meningitis    Interval History: Patient is a 67-year-old female with a past medical history of diabetes, history of kidney cancer status post nephrectomy, has single kidney, dyslipidemia, hypertension, coronary artery disease status post NSTEMI November 2022 and stent placement to the RCA, history of fibromyalgia, and chronic kidney disease.  Patient was admitted to patient was transferred from [facility] to [facility] on 02/12/2023 with altered mental status and fever, patient has a history of meningitis in 2015.  Patient was admitted to the hospital underwent lumbar puncture which showed evidence of acute meningitis, but patient see an F cultures remain negative, g stain showed no organisms.  Her urine grew out E coli and  her blood culture on February 12th grew out Staphylococcus pneumonia since his to Rocephin.  Patient was continued on Rocephin bacteremia dose and will continue to need 2-4 weeks of IV Rocephin 2 g daily.    Review of Systems   Constitutional:  Positive for activity change.   HENT: Negative.     Eyes: Negative.    Respiratory: Negative.     Cardiovascular: Negative.    Gastrointestinal: Negative.    Endocrine: Negative.    Genitourinary: Negative.    Musculoskeletal: Negative.    Skin: Negative.    Allergic/Immunologic: Negative.    Neurological:  Positive for weakness.   Hematological: Negative.    Psychiatric/Behavioral: Negative.     Objective:     Vital Signs (Most Recent):  Temp: 98.2 °F (36.8 °C) (03/03/23 2100)  Pulse: 91 (03/03/23 2200)  Resp: 20 (03/03/23 2200)  BP: (!) 173/80 (03/03/23 2100)  SpO2:  96 % (03/03/23 2200)   Vital Signs (24h Range):  Temp:  [97.7 °F (36.5 °C)-98.8 °F (37.1 °C)] 98.2 °F (36.8 °C)  Pulse:  [82-98] 91  Resp:  [16-20] 20  SpO2:  [96 %-99 %] 96 %  BP: ()/(40-80) 173/80     Weight: 110.2 kg (242 lb 15.2 oz)  Body mass index is 43.04 kg/m².  No intake or output data in the 24 hours ending 03/03/23 2484   Physical Exam  Vitals and nursing note reviewed.   Constitutional:       General: She is not in acute distress.     Appearance: Normal appearance. She is obese. She is ill-appearing. She is not toxic-appearing or diaphoretic.   HENT:      Head: Normocephalic and atraumatic.      Right Ear: Tympanic membrane, ear canal and external ear normal. There is no impacted cerumen.      Left Ear: Tympanic membrane, ear canal and external ear normal. There is no impacted cerumen.      Nose: Nose normal. No congestion or rhinorrhea.      Mouth/Throat:      Mouth: Mucous membranes are moist.      Pharynx: Oropharynx is clear. No oropharyngeal exudate or posterior oropharyngeal erythema.   Eyes:      General: No scleral icterus.        Right eye: No discharge.         Left eye: No discharge.      Extraocular Movements: Extraocular movements intact.      Conjunctiva/sclera: Conjunctivae normal.      Pupils: Pupils are equal, round, and reactive to light.   Neck:      Vascular: No carotid bruit.   Cardiovascular:      Rate and Rhythm: Normal rate and regular rhythm.      Pulses: Normal pulses.      Heart sounds: Normal heart sounds. No murmur heard.    No friction rub. No gallop.   Pulmonary:      Effort: Pulmonary effort is normal. No respiratory distress.      Breath sounds: Normal breath sounds. No stridor. No wheezing, rhonchi or rales.   Chest:      Chest wall: No tenderness.   Abdominal:      General: Abdomen is flat. Bowel sounds are normal. There is no distension.      Palpations: Abdomen is soft. There is no mass.      Tenderness: There is no abdominal tenderness. There is no right CVA  tenderness, left CVA tenderness, guarding or rebound.      Hernia: No hernia is present.   Musculoskeletal:         General: No swelling, tenderness, deformity or signs of injury. Normal range of motion.      Cervical back: Normal range of motion and neck supple. No rigidity or tenderness.      Right lower leg: No edema.      Left lower leg: No edema.   Lymphadenopathy:      Cervical: No cervical adenopathy.   Skin:     General: Skin is warm and dry.      Capillary Refill: Capillary refill takes less than 2 seconds.      Coloration: Skin is not jaundiced or pale.      Findings: No bruising, erythema, lesion or rash.   Neurological:      General: No focal deficit present.      Mental Status: She is alert and oriented to person, place, and time. Mental status is at baseline.      Cranial Nerves: No cranial nerve deficit.      Sensory: No sensory deficit.      Motor: No weakness.      Coordination: Coordination normal.      Gait: Gait normal.      Deep Tendon Reflexes: Reflexes normal.   Psychiatric:         Mood and Affect: Mood normal.         Behavior: Behavior normal.         Thought Content: Thought content normal.         Judgment: Judgment normal.         Overview/Hospital Course: Patient admitted for continues IV antibiotics and therapy    Significant Labs: All pertinent labs within the past 24 hours have been reviewed.    Significant Imaging: I have reviewed all pertinent imaging results/findings within the past 24 hours.    Assessment/Plan: Rocephin 2 grams daily, PT/OT       Active Diagnoses:    Diagnosis Date Noted POA    PRINCIPAL PROBLEM:  Bacterial meningitis [G00.9] 02/13/2023 Yes    Hypertension [I10] 10/17/2022 Yes    Dyslipidemia [E78.5] 09/20/2021 Yes      Problems Resolved During this Admission:     VTE Risk Mitigation (From admission, onward)           Ordered     enoxaparin injection 30 mg  Daily         03/03/23 2237     IP VTE HIGH RISK PATIENT  Once         03/03/23 2237                        Jean Carlos Lyon, CHASEP  Department of Hospital Medicine   [facility]

## 2023-03-04 NOTE — NURSING
Called Jeff Rader to make sure it was okay for patient to come this late after completion of post LP vital signs. KASIA Diego stated it was fine but to call and give a heads up when patient is on the way.     Enrique Bigherminia- spouse notified of pt transfer

## 2023-03-04 NOTE — NURSING
AAOx4 in bed denies pain. Lung sounds clear Bowel sounds present all 4 quads. VS stable. Transferred to [facility] via [facility] with 2 attendants.

## 2023-03-04 NOTE — SUBJECTIVE & OBJECTIVE
Past Medical History:   Diagnosis Date    Cancer     Diabetes mellitus, type 2     Fibromyalgia     History of kidney cancer 2018    Hyperlipidemia     Hypertension     Migraine headache     Renal cell carcinoma     Renal disorder        Past Surgical History:   Procedure Laterality Date    NEPHRECTOMY         Review of patient's allergies indicates:   Allergen Reactions    Bactrim [sulfamethoxazole-trimethoprim] Blisters    Benadryl [diphenhydramine hcl]     Daypro [oxaprozin]        Current Facility-Administered Medications on File Prior to Encounter   Medication    [COMPLETED] sodium chloride 0.9% bolus 1,000 mL 1,000 mL    [DISCONTINUED] acetaminophen tablet 1,000 mg    [DISCONTINUED] allopurinoL tablet 100 mg    [DISCONTINUED] amLODIPine tablet 5 mg    [DISCONTINUED] benzonatate capsule 200 mg    [DISCONTINUED] calcitRIOL capsule 0.25 mcg    [DISCONTINUED] carvediloL tablet 6.25 mg    [DISCONTINUED] cefTRIAXone (ROCEPHIN) 2 g in dextrose 5 % in water (D5W) 5 % 50 mL IVPB (MB+)    [DISCONTINUED] cyclobenzaprine tablet 5 mg    [DISCONTINUED] dextrose 50% injection 12.5 g    [DISCONTINUED] dextrose 50% injection 25 g    [DISCONTINUED] enoxaparin injection 30 mg    [DISCONTINUED] glucagon (human recombinant) injection 1 mg    [DISCONTINUED] insulin aspart U-100 injection 1-14 Units    [DISCONTINUED] insulin detemir U-100 injection 30 Units    [DISCONTINUED] labetaloL injection 20 mg    [DISCONTINUED] nitroGLYCERIN SL tablet 0.4 mg    [DISCONTINUED] pantoprazole EC tablet 40 mg    [DISCONTINUED] QUEtiapine tablet 50 mg    [DISCONTINUED] tiZANidine tablet 4 mg    [DISCONTINUED] vancomycin - pharmacy to dose     Current Outpatient Medications on File Prior to Encounter   Medication Sig    allopurinoL (ZYLOPRIM) 100 MG tablet Take 100 mg by mouth once daily.    [START ON 3/4/2023] amLODIPine (NORVASC) 5 MG tablet Take 1 tablet (5 mg total) by mouth once daily.    aspirin (ECOTRIN) 81 MG EC tablet Take 81 mg by mouth  once daily.    calcitRIOL (ROCALTROL) 0.25 MCG Cap Take 0.25 mcg by mouth once daily.    carvediloL (COREG) 12.5 MG tablet Take 12.5 mg by mouth 2 (two) times daily.    cyclobenzaprine (FLEXERIL) 5 MG tablet Take 1 tablet (5 mg total) by mouth 3 (three) times daily as needed for Muscle spasms.    dextrose 5 % in water (D5W) 5 % PgBk 50 mL with cefTRIAXone 2 gram SolR 2 g Inject 2 g into the vein every 12 (twelve) hours.    DULoxetine (CYMBALTA) 30 MG capsule Take 30 mg by mouth once daily.    insulin degludec (TRESIBA FLEXTOUCH U-200) 200 unit/mL (3 mL) insulin pen Inject 50 Units into the skin once daily.    liraglutide 0.6 mg/0.1 mL, 18 mg/3 mL, subq PNIJ (VICTOZA 2-TONI) 0.6 mg/0.1 mL (18 mg/3 mL) PnIj pen Inject 0.6 mg into the skin once daily.    [START ON 3/4/2023] pantoprazole (PROTONIX) 40 MG tablet Take 1 tablet (40 mg total) by mouth once daily.    pectin/vit B6/mins 4/c.vinegar (APPLE CIDER VINEGAR COMPLEX ORAL) Take 1 capsule by mouth once daily.    QUEtiapine (SEROQUEL) 50 MG tablet Take 1 tablet (50 mg total) by mouth 2 (two) times daily.    rosuvastatin (CRESTOR) 40 MG Tab Take 40 mg by mouth once daily.    sodium bicarbonate 650 MG tablet Take 650 mg by mouth 3 (three) times daily.    ticagrelor (BRILINTA) 90 mg tablet Take 1 tablet (90 mg total) by mouth 2 (two) times a day.    tiZANidine (ZANAFLEX) 4 MG tablet Take 4 mg by mouth 2 (two) times daily as needed.     Family History    None       Tobacco Use    Smoking status: Unknown    Smokeless tobacco: Not on file   Substance and Sexual Activity    Alcohol use: Not Currently    Drug use: Never    Sexual activity: Not Currently     Review of Systems   Constitutional:  Positive for activity change. Negative for appetite change, chills and fever.   HENT:  Positive for hearing loss.    Eyes: Negative.    Respiratory: Negative.  Negative for cough, shortness of breath and wheezing.    Cardiovascular: Negative.  Negative for chest pain, palpitations and  leg swelling.   Gastrointestinal: Negative.  Negative for abdominal distention, diarrhea, nausea and vomiting.   Endocrine: Negative.    Genitourinary: Negative.  Negative for dysuria.   Musculoskeletal: Negative.    Skin: Negative.  Negative for rash.   Allergic/Immunologic: Negative.    Neurological:  Positive for weakness. Negative for dizziness, seizures and syncope.   Hematological: Negative.    Psychiatric/Behavioral: Negative.  Negative for agitation, behavioral problems and confusion.    All other systems reviewed and are negative.  Objective:     Vital Signs (Most Recent):  Temp: 98.2 °F (36.8 °C) (03/03/23 2100)  Pulse: 91 (03/03/23 2200)  Resp: 20 (03/03/23 2200)  BP: (!) 173/80 (03/03/23 2100)  SpO2: 96 % (03/03/23 2200)   Vital Signs (24h Range):  Temp:  [97.7 °F (36.5 °C)-98.8 °F (37.1 °C)] 98.2 °F (36.8 °C)  Pulse:  [82-98] 91  Resp:  [16-20] 20  SpO2:  [96 %-99 %] 96 %  BP: ()/(40-80) 173/80     Weight: 110.2 kg (242 lb 15.2 oz)  Body mass index is 43.04 kg/m².    Physical Exam  Vitals and nursing note reviewed.   Constitutional:       General: She is not in acute distress.     Appearance: Normal appearance. She is normal weight. She is not ill-appearing, toxic-appearing or diaphoretic.   HENT:      Head: Normocephalic and atraumatic.      Right Ear: There is no impacted cerumen.      Left Ear: There is no impacted cerumen.      Nose: Nose normal. No congestion or rhinorrhea.      Mouth/Throat:      Mouth: Mucous membranes are moist.      Pharynx: Oropharynx is clear. No oropharyngeal exudate or posterior oropharyngeal erythema.   Eyes:      General: No scleral icterus.        Right eye: No discharge.         Left eye: No discharge.      Extraocular Movements: Extraocular movements intact.      Conjunctiva/sclera: Conjunctivae normal.   Cardiovascular:      Rate and Rhythm: Normal rate and regular rhythm.      Pulses: Normal pulses.      Heart sounds: Normal heart sounds. No murmur heard.    No  friction rub. No gallop.   Pulmonary:      Effort: Pulmonary effort is normal. No respiratory distress.      Breath sounds: Normal breath sounds. No stridor. No wheezing or rhonchi.   Chest:      Chest wall: No tenderness.   Abdominal:      General: Abdomen is flat. Bowel sounds are normal. There is no distension.      Palpations: Abdomen is soft.      Tenderness: There is no abdominal tenderness. There is no guarding.   Musculoskeletal:         General: No swelling or tenderness. Normal range of motion.      Cervical back: Normal range of motion.   Skin:     General: Skin is warm and dry.      Capillary Refill: Capillary refill takes less than 2 seconds.      Coloration: Skin is not jaundiced or pale.   Neurological:      General: No focal deficit present.      Mental Status: She is alert and oriented to person, place, and time.      Cranial Nerves: No cranial nerve deficit.      Sensory: No sensory deficit.      Motor: Weakness present.   Psychiatric:         Mood and Affect: Mood normal.         Behavior: Behavior normal.         Thought Content: Thought content normal.         Judgment: Judgment normal.           Significant Labs: All pertinent labs within the past 24 hours have been reviewed.  Recent Lab Results         03/06/23  0517   03/05/23  2043   03/05/23  1717   03/05/23  1120        POC Glucose 114   263   161   201               Significant Imaging: I have reviewed all pertinent imaging results/findings within the past 24 hours.

## 2023-03-04 NOTE — HPI
Patient is a 67-year-old female with a past medical history of diabetes, history of kidney cancer status post nephrectomy, has single kidney, dyslipidemia, hypertension, coronary artery disease status post NSTEMI November 2022 and stent placement to the RCA, history of fibromyalgia, and chronic kidney disease.  Patient was admitted to patient was transferred from [facility] to [facility] on 02/12/2023 with altered mental status and fever, patient has a history of meningitis in 2015.  Patient was admitted to the hospital underwent lumbar puncture which showed evidence of acute meningitis, but patient see an F cultures remain negative, g stain showed no organisms.  Her urine grew out E coli and  her blood culture on February 12th grew out Staphylococcus pneumonia since his to Rocephin.  Patient was continued on Rocephin bacteremia dose and will continue to need 2-4 weeks of IV Rocephin 2 g daily.

## 2023-03-04 NOTE — NURSING
Nurses Note -- 4 Eyes      3/4/2023   2:08 PM      Skin assessed during: Daily Assessment      [] No Pressure Injuries Present    []Prevention Measures Documented      [x] Yes- Altered Skin Integrity Present or Discovered   [] LDA Added if Not in Epic (Describe Wound)   [x] New Altered Skin Integrity was Present on Admit and Documented in LDA   [x] Wound Image Taken    Wound Care Consulted? Yes    Attending Nurse:  FARHANA PRATHER RN     Second RN/Staff Member:  YAN Andrade RN

## 2023-03-05 LAB
GLUCOSE SERPL-MCNC: 153 MG/DL (ref 70–105)
GLUCOSE SERPL-MCNC: 161 MG/DL (ref 70–105)
GLUCOSE SERPL-MCNC: 201 MG/DL (ref 70–105)
GLUCOSE SERPL-MCNC: 263 MG/DL (ref 70–105)

## 2023-03-05 PROCEDURE — 63600175 PHARM REV CODE 636 W HCPCS: Performed by: NURSE PRACTITIONER

## 2023-03-05 PROCEDURE — 82962 GLUCOSE BLOOD TEST: CPT

## 2023-03-05 PROCEDURE — A4216 STERILE WATER/SALINE, 10 ML: HCPCS

## 2023-03-05 PROCEDURE — 25000003 PHARM REV CODE 250

## 2023-03-05 PROCEDURE — 27000958

## 2023-03-05 PROCEDURE — 11000004 HC SNF PRIVATE

## 2023-03-05 PROCEDURE — 63600175 PHARM REV CODE 636 W HCPCS

## 2023-03-05 PROCEDURE — 11000001 HC ACUTE MED/SURG PRIVATE ROOM

## 2023-03-05 RX ADMIN — CARVEDILOL 12.5 MG: 6.25 TABLET, FILM COATED ORAL at 08:03

## 2023-03-05 RX ADMIN — CEFTRIAXONE SODIUM 2 G: 1 INJECTION, POWDER, FOR SOLUTION INTRAMUSCULAR; INTRAVENOUS at 06:03

## 2023-03-05 RX ADMIN — TICAGRELOR 90 MG: 90 TABLET ORAL at 08:03

## 2023-03-05 RX ADMIN — ASPIRIN 81 MG CHEWABLE TABLET 81 MG: 81 TABLET CHEWABLE at 08:03

## 2023-03-05 RX ADMIN — CEFTRIAXONE SODIUM 2 G: 1 INJECTION, POWDER, FOR SOLUTION INTRAMUSCULAR; INTRAVENOUS at 05:03

## 2023-03-05 RX ADMIN — MUPIROCIN: 20 OINTMENT TOPICAL at 09:03

## 2023-03-05 RX ADMIN — PANTOPRAZOLE SODIUM 40 MG: 40 TABLET, DELAYED RELEASE ORAL at 08:03

## 2023-03-05 RX ADMIN — Medication 10 ML: at 09:03

## 2023-03-05 RX ADMIN — ENOXAPARIN SODIUM 30 MG: 100 INJECTION SUBCUTANEOUS at 05:03

## 2023-03-05 RX ADMIN — INSULIN DETEMIR 40 UNITS: 100 INJECTION, SOLUTION SUBCUTANEOUS at 09:03

## 2023-03-05 RX ADMIN — CARVEDILOL 12.5 MG: 6.25 TABLET, FILM COATED ORAL at 09:03

## 2023-03-05 RX ADMIN — MUPIROCIN: 20 OINTMENT TOPICAL at 08:03

## 2023-03-05 RX ADMIN — INSULIN ASPART 2 UNITS: 100 INJECTION, SOLUTION INTRAVENOUS; SUBCUTANEOUS at 11:03

## 2023-03-05 RX ADMIN — QUETIAPINE 50 MG: 25 TABLET ORAL at 08:03

## 2023-03-05 RX ADMIN — DULOXETINE 30 MG: 30 CAPSULE, DELAYED RELEASE ORAL at 08:03

## 2023-03-05 RX ADMIN — TICAGRELOR 90 MG: 90 TABLET ORAL at 09:03

## 2023-03-05 RX ADMIN — ALLOPURINOL 100 MG: 100 TABLET ORAL at 08:03

## 2023-03-05 RX ADMIN — AMLODIPINE BESYLATE 5 MG: 5 TABLET ORAL at 08:03

## 2023-03-05 RX ADMIN — ATORVASTATIN CALCIUM 80 MG: 80 TABLET, FILM COATED ORAL at 08:03

## 2023-03-05 RX ADMIN — QUETIAPINE 50 MG: 25 TABLET ORAL at 09:03

## 2023-03-05 RX ADMIN — Medication 10 ML: at 11:03

## 2023-03-05 RX ADMIN — INSULIN ASPART 1 UNITS: 100 INJECTION, SOLUTION INTRAVENOUS; SUBCUTANEOUS at 09:03

## 2023-03-05 RX ADMIN — DULOXETINE 30 MG: 30 CAPSULE, DELAYED RELEASE ORAL at 09:03

## 2023-03-05 NOTE — NURSING
Pt stated that she feels as though she is about to have an outbreak and requested Acyclovir. States that she has been taking Acyclovir at home for a long time off and on as needed. Upon assessments pt's noted to have small red rash like area noted to her Posterior Right Hip. Denies Pain. SULMA Mcmahan made aware of pt's concerns. Will Follow- up

## 2023-03-05 NOTE — PLAN OF CARE
Problem: Adult Inpatient Plan of Care  Goal: Plan of Care Review  Outcome: Ongoing, Progressing  Goal: Patient-Specific Goal (Individualized)  Outcome: Ongoing, Progressing  Goal: Absence of Hospital-Acquired Illness or Injury  Outcome: Ongoing, Progressing  Goal: Optimal Comfort and Wellbeing  Outcome: Ongoing, Progressing  Goal: Readiness for Transition of Care  Outcome: Ongoing, Progressing     Problem: Diabetes Comorbidity  Goal: Blood Glucose Level Within Targeted Range  Outcome: Ongoing, Progressing     Problem: Bariatric Environmental Safety  Goal: Safety Maintained with Care  Outcome: Ongoing, Progressing     Problem: Infection  Goal: Absence of Infection Signs and Symptoms  Outcome: Ongoing, Progressing     Problem: Impaired Wound Healing  Goal: Optimal Wound Healing  Outcome: Ongoing, Progressing     Problem: Skin Injury Risk Increased  Goal: Skin Health and Integrity  Outcome: Ongoing, Progressing     Problem: Fall Injury Risk  Goal: Absence of Fall and Fall-Related Injury  Outcome: Ongoing, Progressing      75

## 2023-03-05 NOTE — NURSING
Nurses Note -- 4 Eyes      3/5/2023   1:34 PM      Skin assessed during: Daily Assessment      [x] No Pressure Injuries Present    []Prevention Measures Documented      [] Yes- Altered Skin Integrity Present or Discovered   [] LDA Added if Not in Epic (Describe Wound)   [] New Altered Skin Integrity was Present on Admit and Documented in LDA   [] Wound Image Taken    Wound Care Consulted? No    Attending Nurse:  Za Adler LPN     Second RN/Staff Member:  YNA Andrade RN

## 2023-03-06 PROBLEM — H91.90 HEARING LOSS: Status: ACTIVE | Noted: 2023-03-06

## 2023-03-06 LAB
GLUCOSE SERPL-MCNC: 114 MG/DL (ref 70–105)
GLUCOSE SERPL-MCNC: 163 MG/DL (ref 70–105)
GLUCOSE SERPL-MCNC: 181 MG/DL (ref 70–105)
GLUCOSE SERPL-MCNC: 203 MG/DL (ref 70–105)

## 2023-03-06 PROCEDURE — 99305 PR NURSING FACILITY CARE, INIT, MOD SEVERITY: ICD-10-PCS | Mod: AI,,, | Performed by: HOSPITALIST

## 2023-03-06 PROCEDURE — 25000003 PHARM REV CODE 250: Performed by: HOSPITALIST

## 2023-03-06 PROCEDURE — 99305 1ST NF CARE MODERATE MDM 35: CPT | Mod: AI,,, | Performed by: HOSPITALIST

## 2023-03-06 PROCEDURE — 25000003 PHARM REV CODE 250

## 2023-03-06 PROCEDURE — 82962 GLUCOSE BLOOD TEST: CPT

## 2023-03-06 PROCEDURE — 63600175 PHARM REV CODE 636 W HCPCS: Performed by: NURSE PRACTITIONER

## 2023-03-06 PROCEDURE — 27000958

## 2023-03-06 PROCEDURE — A4216 STERILE WATER/SALINE, 10 ML: HCPCS

## 2023-03-06 PROCEDURE — 99900039 HC SLP GENERIC THERAPY SCREENING (STAT)

## 2023-03-06 PROCEDURE — A4216 STERILE WATER/SALINE, 10 ML: HCPCS | Performed by: HOSPITALIST

## 2023-03-06 PROCEDURE — 97167 OT EVAL HIGH COMPLEX 60 MIN: CPT

## 2023-03-06 PROCEDURE — 63600175 PHARM REV CODE 636 W HCPCS: Performed by: HOSPITALIST

## 2023-03-06 PROCEDURE — 11000004 HC SNF PRIVATE

## 2023-03-06 PROCEDURE — 63600175 PHARM REV CODE 636 W HCPCS

## 2023-03-06 RX ORDER — SODIUM BICARBONATE 650 MG/1
650 TABLET ORAL 3 TIMES DAILY
Status: DISCONTINUED | OUTPATIENT
Start: 2023-03-06 | End: 2023-03-16 | Stop reason: HOSPADM

## 2023-03-06 RX ORDER — SODIUM CHLORIDE 0.9 % (FLUSH) 0.9 %
10 SYRINGE (ML) INJECTION EVERY 6 HOURS
Status: DISCONTINUED | OUTPATIENT
Start: 2023-03-06 | End: 2023-03-08

## 2023-03-06 RX ORDER — SODIUM CHLORIDE 0.9 % (FLUSH) 0.9 %
10 SYRINGE (ML) INJECTION
Status: DISCONTINUED | OUTPATIENT
Start: 2023-03-06 | End: 2023-03-08

## 2023-03-06 RX ORDER — DULOXETIN HYDROCHLORIDE 30 MG/1
30 CAPSULE, DELAYED RELEASE ORAL DAILY
Status: DISCONTINUED | OUTPATIENT
Start: 2023-03-07 | End: 2023-03-16 | Stop reason: HOSPADM

## 2023-03-06 RX ADMIN — CARVEDILOL 12.5 MG: 6.25 TABLET, FILM COATED ORAL at 09:03

## 2023-03-06 RX ADMIN — SODIUM BICARBONATE 650 MG: 650 TABLET ORAL at 03:03

## 2023-03-06 RX ADMIN — DULOXETINE 30 MG: 30 CAPSULE, DELAYED RELEASE ORAL at 08:03

## 2023-03-06 RX ADMIN — Medication 10 ML: at 09:03

## 2023-03-06 RX ADMIN — ATORVASTATIN CALCIUM 80 MG: 80 TABLET, FILM COATED ORAL at 08:03

## 2023-03-06 RX ADMIN — TICAGRELOR 90 MG: 90 TABLET ORAL at 08:03

## 2023-03-06 RX ADMIN — QUETIAPINE 50 MG: 25 TABLET ORAL at 09:03

## 2023-03-06 RX ADMIN — SODIUM BICARBONATE 650 MG: 650 TABLET ORAL at 09:03

## 2023-03-06 RX ADMIN — CEFTRIAXONE SODIUM 2 G: 1 INJECTION, POWDER, FOR SOLUTION INTRAMUSCULAR; INTRAVENOUS at 05:03

## 2023-03-06 RX ADMIN — AMLODIPINE BESYLATE 5 MG: 5 TABLET ORAL at 08:03

## 2023-03-06 RX ADMIN — ASPIRIN 81 MG CHEWABLE TABLET 81 MG: 81 TABLET CHEWABLE at 08:03

## 2023-03-06 RX ADMIN — ALLOPURINOL 100 MG: 100 TABLET ORAL at 08:03

## 2023-03-06 RX ADMIN — PANTOPRAZOLE SODIUM 40 MG: 40 TABLET, DELAYED RELEASE ORAL at 08:03

## 2023-03-06 RX ADMIN — TICAGRELOR 90 MG: 90 TABLET ORAL at 09:03

## 2023-03-06 RX ADMIN — MUPIROCIN: 20 OINTMENT TOPICAL at 09:03

## 2023-03-06 RX ADMIN — ENOXAPARIN SODIUM 30 MG: 100 INJECTION SUBCUTANEOUS at 05:03

## 2023-03-06 RX ADMIN — CARVEDILOL 12.5 MG: 6.25 TABLET, FILM COATED ORAL at 08:03

## 2023-03-06 RX ADMIN — QUETIAPINE 50 MG: 25 TABLET ORAL at 08:03

## 2023-03-06 RX ADMIN — INSULIN ASPART 1 UNITS: 100 INJECTION, SOLUTION INTRAVENOUS; SUBCUTANEOUS at 09:03

## 2023-03-06 RX ADMIN — Medication 10 ML: at 05:03

## 2023-03-06 RX ADMIN — INSULIN DETEMIR 40 UNITS: 100 INJECTION, SOLUTION SUBCUTANEOUS at 09:03

## 2023-03-06 NOTE — PT/OT/SLP EVAL
SLP Screen      ST screen only. NO skilled ST services warranted at this time. Reconsult upon change in status.    Brook Magana M.S. [facility]-SLP

## 2023-03-06 NOTE — PLAN OF CARE
Problem: Occupational Therapy  Goal: Occupational Therapy Goal  Description: STG: within 2 weeks  Pt will perform grooming with setup at sinkside from w/c  Pt will bathe with min a at bedside  Pt will perform UE dressing with svn  Pt will perform LE dressing with min a equipment as needed  Pt will sit EOB x 30 min with svn assistance  Pt will transfer bed/chair/bsc with mod a with AE as needed  Pt will perform standing task x 1 min with mod assistance  Pt will tolerate 45 minutes of tx without fatigue      LT.Restore to max I with self care and mobility.     Outcome: Ongoing, Progressing

## 2023-03-06 NOTE — NURSING
Pt's  reported that she's had a past med hx of ear problems in which she had spinal fluid in bilateral ears and saw a specialist at [facility] for it and wanted to make sure we were aware and asked if there was anything we could do for it.

## 2023-03-06 NOTE — ASSESSMENT & PLAN NOTE
Patient's FSGs are controlled on current medication regimen.  Last A1c reviewed-   Lab Results   Component Value Date    HGBA1C 8.3 (H) 03/04/2023     Most recent fingerstick glucose reviewed- No results for input(s): POCTGLUCOSE in the last 24 hours.  Current correctional scale  Low  Maintain anti-hyperglycemic dose as follows-   Antihyperglycemics (From admission, onward)    Start     Stop Route Frequency Ordered    03/04/23 2100  insulin detemir U-100 injection 40 Units         -- SubQ Nightly 03/04/23 1841    03/04/23 0009  insulin aspart U-100 injection 0-5 Units         -- SubQ Before meals & nightly PRN 03/03/23 2309        Hold Oral hypoglycemics while patient is in the hospital.

## 2023-03-06 NOTE — CONSULTS
"[facility]    Clinical Nutrition Assessment          Consult received re: Assess/educate regarding nutritional needs    Date: 3/6/2023 2:08 PM    Jessica Bay is a 67 y.o. female with   Active Hospital Problems    Diagnosis  POA    *Bacterial meningitis [G00.9]  Yes    Hearing loss [H91.90]  Yes    Hypertension [I10]  Yes    Dyslipidemia [E78.5]  Yes    CKD (chronic kidney disease), stage IV [N18.4]  Yes    Type 2 diabetes mellitus with diabetic chronic kidney disease [E11.22]  Yes      Resolved Hospital Problems   No resolved problems to display.       PMH:  has a past medical history of Cancer, Diabetes mellitus, type 2, Fibromyalgia, History of kidney cancer (2018), Hyperlipidemia, Hypertension, Migraine headache, Renal cell carcinoma, and Renal disorder.    Diet Order: Diet diabetic  Oral Supplements: None    3/6: Tolerating diet. Adequate po intake/ good appetite.    Anthropometrics:   Height: 5' 3" (160 cm)  Weight: 110.2 kg (242 lb 15.2 oz)  IBW: 115 lbs  BMI (Calculated): 43  BMI Classification: Obesity Class III    Labs:   Recent Labs   Lab 03/04/23  0544      K 4.3   BUN 57*   CREATININE 2.63*   *   CALCIUM 8.9   *     Last A1c:   Lab Results   Component Value Date    HGBA1C 8.3 (H) 03/04/2023    HGBA1C 6.9 (H) 11/22/2022     Lab Results   Component Value Date    RBC 2.48 (L) 03/04/2023    HGB 8.1 (L) 03/04/2023    HCT 26.3 (L) 03/04/2023    .0 (H) 03/04/2023    MCH 32.7 (H) 03/04/2023    MCHC 30.8 (L) 03/04/2023       Meds:   Scheduled Meds:   allopurinoL  100 mg Oral Daily    amLODIPine  5 mg Oral Daily    aspirin  81 mg Oral Daily    atorvastatin  80 mg Oral Daily    calcitRIOL  0.25 mcg Oral Daily    carvediloL  12.5 mg Oral BID    cefTRIAXone (ROCEPHIN) IVPB  2 g Intravenous Q12H    [START ON 3/7/2023] DULoxetine  30 mg Oral Daily    enoxaparin  30 mg Subcutaneous Daily    insulin detemir U-100  40 Units Subcutaneous QHS    mupirocin   " Nasal BID    pantoprazole  40 mg Oral Daily    QUEtiapine  50 mg Oral BID    sodium bicarbonate  650 mg Oral TID    sodium chloride 0.9%  10 mL Intravenous Q6H    ticagrelor  90 mg Oral BID     PRN Meds:.acetaminophen, calcium carbonate, cyclobenzaprine, dextrose 50%, dextrose 50%, glucagon (human recombinant), glucose, glucose, insulin aspart U-100, melatonin, ondansetron, senna-docusate 8.6-50 mg, Flushing PICC Protocol **AND** sodium chloride 0.9% **AND** sodium chloride 0.9%    Physical Review:  General: Hearing difficulty  Abd/GI: soft/ non-tender; +BSx4Q's  Last Bowel Movement: 23  Ext: +1 trace generalized edema  Skin: skin intact, no wounds noted    Parminder Score:   Parminder Risk Assessment  Sensory Perception: 3-->slightly limited  Moisture: 3-->occasionally moist  Activity: 2-->chairfast  Mobility: 3-->slightly limited  Nutrition: 3-->adequate  Friction and Shear: 2-->potential problem  Parminder Score: 16    Malnutrition Screening Tool  Have you recently lost weight without trying?: No  Weight loss score: 0  Have you been eating poorly because of a decreased appetite?: No  Appetite score: 0  MST Score: 0    Estimated Nutrition Needs:   18-20 kcal/[id number] kcal/day  1.2-1.6 g pro/kg IBW: 62-83 g protein/day   ~2000 ml fluid/day    Nutrition Diagnosis:  Altered nutrition-related labs values RT Dx of T2DM and CKD Stage IV AEB elevated A1C, glucose, BUN, and creatinine    Recommendations/ Intervention:  Continue diabetic diet as tolerated    Encourage po intake    Monitor labs - switch to renal diet if renal lytes elevated    RD to monitor po intake, wt, nutrition-related labs, and meds     Nutrition Risk: moderate    Signed  Ale Rolle, MS, RD, LD  Available via [email or web address]

## 2023-03-06 NOTE — H&P
[facility]  [facility] Medicine  History & Physical    Patient Name: Jessica Bay  MRN: [id number]  Patient Class: IP- Swing  Admission Date: 3/3/2023  Attending Physician: Clyde Flood DO   Primary Care Provider: Jag Lopez MD         Patient information was obtained from patient, caregiver / friend and ER records.     Subjective:     Principal Problem:Bacterial meningitis    Chief Complaint:   Chief Complaint   Patient presents with    Bacterial Meningitis         HPI: Patient is a 67-year-old female with a past medical history of diabetes, history of kidney cancer status post nephrectomy, has single kidney, dyslipidemia, hypertension, coronary artery disease status post NSTEMI November 2022 and stent placement to the RCA, history of fibromyalgia, and chronic kidney disease.  Patient was admitted to patient was transferred from [facility] to [facility] on 02/12/2023 with altered mental status and fever, patient has a history of meningitis in 2015.  Patient was admitted to the hospital underwent lumbar puncture which showed evidence of acute meningitis, but patient see an F cultures remain negative, g stain showed no organisms.  Her urine grew out E coli and  her blood culture on February 12th grew out Staphylococcus pneumonia since his to Rocephin.  Patient was continued on Rocephin bacteremia dose and will continue to need 2-4 weeks of IV Rocephin 2 g daily.      Past Medical History:   Diagnosis Date    Cancer     Diabetes mellitus, type 2     Fibromyalgia     History of kidney cancer 2018    Hyperlipidemia     Hypertension     Migraine headache     Renal cell carcinoma     Renal disorder        Past Surgical History:   Procedure Laterality Date    NEPHRECTOMY         Review of patient's allergies indicates:   Allergen Reactions    Bactrim [sulfamethoxazole-trimethoprim] Blisters    Benadryl [diphenhydramine hcl]      Daypro [oxaprozin]        Current Facility-Administered Medications on File Prior to Encounter   Medication    [COMPLETED] sodium chloride 0.9% bolus 1,000 mL 1,000 mL    [DISCONTINUED] acetaminophen tablet 1,000 mg    [DISCONTINUED] allopurinoL tablet 100 mg    [DISCONTINUED] amLODIPine tablet 5 mg    [DISCONTINUED] benzonatate capsule 200 mg    [DISCONTINUED] calcitRIOL capsule 0.25 mcg    [DISCONTINUED] carvediloL tablet 6.25 mg    [DISCONTINUED] cefTRIAXone (ROCEPHIN) 2 g in dextrose 5 % in water (D5W) 5 % 50 mL IVPB (MB+)    [DISCONTINUED] cyclobenzaprine tablet 5 mg    [DISCONTINUED] dextrose 50% injection 12.5 g    [DISCONTINUED] dextrose 50% injection 25 g    [DISCONTINUED] enoxaparin injection 30 mg    [DISCONTINUED] glucagon (human recombinant) injection 1 mg    [DISCONTINUED] insulin aspart U-100 injection 1-14 Units    [DISCONTINUED] insulin detemir U-100 injection 30 Units    [DISCONTINUED] labetaloL injection 20 mg    [DISCONTINUED] nitroGLYCERIN SL tablet 0.4 mg    [DISCONTINUED] pantoprazole EC tablet 40 mg    [DISCONTINUED] QUEtiapine tablet 50 mg    [DISCONTINUED] tiZANidine tablet 4 mg    [DISCONTINUED] vancomycin - pharmacy to dose     Current Outpatient Medications on File Prior to Encounter   Medication Sig    allopurinoL (ZYLOPRIM) 100 MG tablet Take 100 mg by mouth once daily.    [START ON 3/4/2023] amLODIPine (NORVASC) 5 MG tablet Take 1 tablet (5 mg total) by mouth once daily.    aspirin (ECOTRIN) 81 MG EC tablet Take 81 mg by mouth once daily.    calcitRIOL (ROCALTROL) 0.25 MCG Cap Take 0.25 mcg by mouth once daily.    carvediloL (COREG) 12.5 MG tablet Take 12.5 mg by mouth 2 (two) times daily.    cyclobenzaprine (FLEXERIL) 5 MG tablet Take 1 tablet (5 mg total) by mouth 3 (three) times daily as needed for Muscle spasms.    dextrose 5 % in water (D5W) 5 % PgBk 50 mL with cefTRIAXone 2 gram SolR 2 g Inject 2 g into the vein every 12 (twelve) hours.     DULoxetine (CYMBALTA) 30 MG capsule Take 30 mg by mouth once daily.    insulin degludec (TRESIBA FLEXTOUCH U-200) 200 unit/mL (3 mL) insulin pen Inject 50 Units into the skin once daily.    liraglutide 0.6 mg/0.1 mL, 18 mg/3 mL, subq PNIJ (VICTOZA 2-TONI) 0.6 mg/0.1 mL (18 mg/3 mL) PnIj pen Inject 0.6 mg into the skin once daily.    [START ON 3/4/2023] pantoprazole (PROTONIX) 40 MG tablet Take 1 tablet (40 mg total) by mouth once daily.    pectin/vit B6/mins 4/c.vinegar (APPLE CIDER VINEGAR COMPLEX ORAL) Take 1 capsule by mouth once daily.    QUEtiapine (SEROQUEL) 50 MG tablet Take 1 tablet (50 mg total) by mouth 2 (two) times daily.    rosuvastatin (CRESTOR) 40 MG Tab Take 40 mg by mouth once daily.    sodium bicarbonate 650 MG tablet Take 650 mg by mouth 3 (three) times daily.    ticagrelor (BRILINTA) 90 mg tablet Take 1 tablet (90 mg total) by mouth 2 (two) times a day.    tiZANidine (ZANAFLEX) 4 MG tablet Take 4 mg by mouth 2 (two) times daily as needed.     Family History    None       Tobacco Use    Smoking status: Unknown    Smokeless tobacco: Not on file   Substance and Sexual Activity    Alcohol use: Not Currently    Drug use: Never    Sexual activity: Not Currently     Review of Systems   Constitutional:  Positive for activity change. Negative for appetite change, chills and fever.   HENT:  Positive for hearing loss.    Eyes: Negative.    Respiratory: Negative.  Negative for cough, shortness of breath and wheezing.    Cardiovascular: Negative.  Negative for chest pain, palpitations and leg swelling.   Gastrointestinal: Negative.  Negative for abdominal distention, diarrhea, nausea and vomiting.   Endocrine: Negative.    Genitourinary: Negative.  Negative for dysuria.   Musculoskeletal: Negative.    Skin: Negative.  Negative for rash.   Allergic/Immunologic: Negative.    Neurological:  Positive for weakness. Negative for dizziness, seizures and syncope.   Hematological: Negative.     Psychiatric/Behavioral: Negative.  Negative for agitation, behavioral problems and confusion.    All other systems reviewed and are negative.  Objective:     Vital Signs (Most Recent):  Temp: 98.2 °F (36.8 °C) (03/03/23 2100)  Pulse: 91 (03/03/23 2200)  Resp: 20 (03/03/23 2200)  BP: (!) 173/80 (03/03/23 2100)  SpO2: 96 % (03/03/23 2200)   Vital Signs (24h Range):  Temp:  [97.7 °F (36.5 °C)-98.8 °F (37.1 °C)] 98.2 °F (36.8 °C)  Pulse:  [82-98] 91  Resp:  [16-20] 20  SpO2:  [96 %-99 %] 96 %  BP: ()/(40-80) 173/80     Weight: 110.2 kg (242 lb 15.2 oz)  Body mass index is 43.04 kg/m².    Physical Exam  Vitals and nursing note reviewed.   Constitutional:       General: She is not in acute distress.     Appearance: Normal appearance. She is normal weight. She is not ill-appearing, toxic-appearing or diaphoretic.   HENT:      Head: Normocephalic and atraumatic.      Right Ear: There is no impacted cerumen.      Left Ear: There is no impacted cerumen.      Nose: Nose normal. No congestion or rhinorrhea.      Mouth/Throat:      Mouth: Mucous membranes are moist.      Pharynx: Oropharynx is clear. No oropharyngeal exudate or posterior oropharyngeal erythema.   Eyes:      General: No scleral icterus.        Right eye: No discharge.         Left eye: No discharge.      Extraocular Movements: Extraocular movements intact.      Conjunctiva/sclera: Conjunctivae normal.   Cardiovascular:      Rate and Rhythm: Normal rate and regular rhythm.      Pulses: Normal pulses.      Heart sounds: Normal heart sounds. No murmur heard.    No friction rub. No gallop.   Pulmonary:      Effort: Pulmonary effort is normal. No respiratory distress.      Breath sounds: Normal breath sounds. No stridor. No wheezing or rhonchi.   Chest:      Chest wall: No tenderness.   Abdominal:      General: Abdomen is flat. Bowel sounds are normal. There is no distension.      Palpations: Abdomen is soft.      Tenderness: There is no abdominal tenderness.  There is no guarding.   Musculoskeletal:         General: No swelling or tenderness. Normal range of motion.      Cervical back: Normal range of motion.   Skin:     General: Skin is warm and dry.      Capillary Refill: Capillary refill takes less than 2 seconds.      Coloration: Skin is not jaundiced or pale.   Neurological:      General: No focal deficit present.      Mental Status: She is alert and oriented to person, place, and time.      Cranial Nerves: No cranial nerve deficit.      Sensory: No sensory deficit.      Motor: Weakness present.   Psychiatric:         Mood and Affect: Mood normal.         Behavior: Behavior normal.         Thought Content: Thought content normal.         Judgment: Judgment normal.           Significant Labs: All pertinent labs within the past 24 hours have been reviewed.  Recent Lab Results         03/06/23  0517   03/05/23  2043   03/05/23  1717   03/05/23  1120        POC Glucose 114   263   161   201               Significant Imaging: I have reviewed all pertinent imaging results/findings within the past 24 hours.    Assessment/Plan:     * Bacterial meningitis  Continue with ABX.  Monitor symptoms.       Hearing loss    Unsure if related to meningitis or not. Follow     Type 2 diabetes mellitus with diabetic chronic kidney disease  Patient's FSGs are controlled on current medication regimen.  Last A1c reviewed-   Lab Results   Component Value Date    HGBA1C 8.3 (H) 03/04/2023     Most recent fingerstick glucose reviewed- No results for input(s): POCTGLUCOSE in the last 24 hours.  Current correctional scale  Low  Maintain anti-hyperglycemic dose as follows-   Antihyperglycemics (From admission, onward)    Start     Stop Route Frequency Ordered    03/04/23 2100  insulin detemir U-100 injection 40 Units         -- SubQ Nightly 03/04/23 1841    03/04/23 0009  insulin aspart U-100 injection 0-5 Units         -- SubQ Before meals & nightly PRN 03/03/23 2309        Hold Oral  hypoglycemics while patient is in the hospital.    CKD (chronic kidney disease), stage IV  Avoid nephrotoxic drugs.  Monitor labs.        Dyslipidemia  Continue home meds      Hypertension  Continue home meds        VTE Risk Mitigation (From admission, onward)         Ordered     enoxaparin injection 30 mg  Daily         03/03/23 2237     IP VTE HIGH RISK PATIENT  Once         03/03/23 2237                   Clyde Flood DO  Department of Hospital Medicine   [facility]

## 2023-03-06 NOTE — PLAN OF CARE
03/06/23 1350   Discharge Assessment   Assessment Type Discharge Planning Assessment   Confirmed/corrected address, phone number and insurance Yes   Source of Information patient   Contact Name/Number spouse, [phone]   Communicated RAMOS with patient/caregiver Date not available/Unable to determine   Reason For Admission IV abx, PT and OT   People in Home spouse   Facility Arrived From: [facility]   Do you expect to return to your current living situation? Yes   Do you have help at home or someone to help you manage your care at home? Yes   Prior to hospitilization cognitive status: Alert/Oriented   Current cognitive status: Alert/Oriented   Walking or Climbing Stairs ambulation difficulty, requires equipment   Dressing/Bathing bathing difficulty, assistance 1 person   Home Accessibility wheelchair accessible   Home Layout Able to live on 1st floor   Equipment Currently Used at Home power chair   Patient currently being followed by outpatient case management? No   Do you currently have service(s) that help you manage your care at home? No   Do you take prescription medications? Yes   Do you have prescription coverage? Yes   Do you have any problems affording any of your prescribed medications? No   Is the patient taking medications as prescribed? yes   Who is going to help you get home at discharge? spouse   How do you get to doctors appointments? family or friend will provide   Are you on dialysis? No   Do you take coumadin? No   Discharge Plan A Home with family;Home Health   DME Needed Upon Discharge    (TBD)   Discharge Plan discussed with: Spouse/sig other   Physical Activity   On average, how many days per week do you engage in moderate to strenuous exercise (like a brisk walk)? 0 days   On average, how many minutes do you engage in exercise at this level? 0 min   Financial Resource Strain   How hard is it for you to pay for the very basics like food, housing, medical care, and heating? Not  hard   Housing Stability   In the last 12 months, was there a time when you were not able to pay the mortgage or rent on time? N   In the last 12 months, how many places have you lived? 1   Transportation Needs   In the past 12 months, has lack of transportation kept you from medical appointments or from getting medications? no   In the past 12 months, has lack of transportation kept you from meetings, work, or from getting things needed for daily living? No   Food Insecurity   Within the past 12 months, you worried that your food would run out before you got the money to buy more. Never true   Within the past 12 months, the food you bought just didn't last and you didn't have money to get more. Never true   Stress   Do you feel stress - tense, restless, nervous, or anxious, or unable to sleep at night because your mind is troubled all the time - these days? Not at all   Social Connections   In a typical week, how many times do you talk on the phone with family, friends, or neighbors? More than 3   How often do you get together with friends or relatives? More than 3   Do you belong to any clubs or organizations such as Baptism groups, unions, fraternal or athletic groups, or school groups? Yes   How often do you attend meetings of the clubs or organizations you belong to? Never   Are you , , , , never , or living with a partner?    Alcohol Use   Q1: How often do you have a drink containing alcohol? Never   Q2: How many drinks containing alcohol do you have on a typical day when you are drinking? None   Q3: How often do you have six or more drinks on one occasion? Never     Patient admitted to [facility] to continue IV abx and PT/OT/ST. Patient is unable to hear so assessment is done with dry erase board. Patient states she was independent prior to hospitalization, uses a power chair. Will continue to monitor discharge needs.

## 2023-03-06 NOTE — PLAN OF CARE
Problem: Adult Inpatient Plan of Care  Goal: Plan of Care Review  Outcome: Ongoing, Progressing  Goal: Patient-Specific Goal (Individualized)  Outcome: Ongoing, Progressing  Goal: Absence of Hospital-Acquired Illness or Injury  Outcome: Ongoing, Progressing  Goal: Optimal Comfort and Wellbeing  Outcome: Ongoing, Progressing  Goal: Readiness for Transition of Care  Outcome: Ongoing, Progressing     Problem: Diabetes Comorbidity  Goal: Blood Glucose Level Within Targeted Range  Outcome: Ongoing, Progressing     Problem: Bariatric Environmental Safety  Goal: Safety Maintained with Care  Outcome: Ongoing, Progressing     Problem: Infection  Goal: Absence of Infection Signs and Symptoms  Outcome: Ongoing, Progressing     Problem: Skin Injury Risk Increased  Goal: Skin Health and Integrity  Outcome: Ongoing, Progressing     Problem: Fall Injury Risk  Goal: Absence of Fall and Fall-Related Injury  Outcome: Ongoing, Progressing

## 2023-03-06 NOTE — NURSING
Nurses Note -- 4 Eyes      3/5/2023   7:33 PM      Skin assessed during: Q Shift Change      [] No Pressure Injuries Present    [x]Prevention Measures Documented      [] Yes- Altered Skin Integrity Present or Discovered   [] LDA Added if Not in Epic (Describe Wound)   [] New Altered Skin Integrity was Present on Admit and Documented in LDA   [] Wound Image Taken    Wound Care Consulted? No    Attending Nurse:  Curtis Lanier LPN     Second RN/Staff Member:  Suzanne Camargo RN

## 2023-03-06 NOTE — PT/OT/SLP EVAL
"Occupational Therapy   Evaluation    Name: Jessica Bay  MRN: [id number]  Admitting Diagnosis: Bacterial meningitis  Recent Surgery: * No surgery found *      Recommendations:     Discharge Recommendations: home health OT, home health PT, home with home health  Discharge Equipment Recommendations:  3-in-1 commode, hip kit, hospital bed  Barriers to discharge:  None    Assessment:     Jessica Bay is a 67 y.o. female with a medical diagnosis of Bacterial meningitis.  She presents with no significant complaints of pain, but primarily, she is hearing impaired from her bout with bacterial meningitis. Performance deficits affecting function: weakness, impaired endurance, impaired self care skills, impaired functional mobility, gait instability, impaired balance, decreased lower extremity function.      Rehab Prognosis: Good; patient would benefit from acute skilled OT services to address these deficits and reach maximum level of function.       Plan:     Patient to be seen 5 x/week to address the above listed problems via self-care/home management, therapeutic activities, therapeutic exercises, wheelchair management/training  Plan of Care Expires: 04/07/23  Plan of Care Reviewed with: patient, spouse    Subjective     Chief Complaint: weakness and hearing impaired from bacterial meningitis  Patient/Family Comments/goals: go back home and be able to get in the garden; "I'm not going back to work, I've decided, enough is enough."    Occupational Profile:  Living Environment: single story home, lives with her , ramps to get in the house as needed with her power w/c  Previous level of function: drove self to work, light cooking/cleaning, used power w/c but did not use any other equipment for walking as "I hate walkers."  Showering self I'ly with shower chair in walkin shower, has grab bar and hand held shower nozzle.   Roles and Routines: light house work and cleaning/cooking, worked,   Equipment Used at Home: " "shower chair, power chair  Assistance upon Discharge: would like to be back at mod I at home able to "garden"    Pain/Comfort:  Pain Rating 1: 0/10    Patients cultural, spiritual, Yazidism conflicts given the current situation:      Objective:     Communicated with: pt and her  prior to session.  Patient found HOB elevated with PICC line upon OT entry to room.    General Precautions: Standard, fall, hearing impaired  Orthopedic Precautions: N/A  Braces: N/A  Respiratory Status: Room air    Occupational Performance:    Bed Mobility:    Patient completed Rolling/Turning to Left with  modified independence  Patient completed Rolling/Turning to Right with modified independence  Patient completed Scooting/Bridging with modified independence  Patient completed Supine to Sit with modified independence  Patient completed Sit to Supine with modified independence    Functional Mobility/Transfers:  Na today    Activities of Daily Living:  Feeding:  independence bedside  Grooming: stand by assistance bedside  Bathing: moderate assistance overall for LB especially  Upper Body Dressing: minimum assistance bedside  Lower Body Dressing: moderate assistance bedside  Toileting: na today with OT    Cognitive/Visual Perceptual:  Cognitive/Psychosocial Skills:     -       Oriented to: Person, Place, Time, and Situation   -       Follows Commands/attention:Follows multistep  commands  -       Communication: clear/fluent and [place]  -       Memory: No Deficits noted  -       Safety awareness/insight to disability: intact   -       Mood/Affect/Coping skills/emotional control: Appropriate to situation, Cooperative, and blunt    Physical Exam:  Balance:    -       sitting EOB soft surface unsupported=good, did not attempt standing today  Dominant hand:    -       right  Upper Extremity Range of Motion:     -       Right Upper Extremity: wfl  -       Left Upper Extremity: WFL  Upper Extremity Strength:    -       Right Upper Extremity: " "3+/5  -       Left Upper Extremity: 3+/5   Strength:    -       Right Upper Extremity: 3+/5  -       Left Upper Extremity: 3+/5  Fine Motor Coordination:    -       Intact  Gross motor coordination:   WFL    AMPAC 6 Click ADL:  AMPAC Total Score: 15    Treatment & Education:  Eval completed today    Patient left sitting edge of bed with call button in reach and pt stating, "I can get back in bed by myself."    GOALS:   Multidisciplinary Problems       Occupational Therapy Goals          Problem: Occupational Therapy    Goal Priority Disciplines Outcome Interventions   Occupational Therapy Goal     OT, PT/OT Ongoing, Progressing    Description: STG: within 2 weeks  Pt will perform grooming with setup at sinkside from w/c  Pt will bathe with min a at bedside  Pt will perform UE dressing with svn  Pt will perform LE dressing with min a equipment as needed  Pt will sit EOB x 30 min with svn assistance  Pt will transfer bed/chair/bsc with mod a with AE as needed  Pt will perform standing task x 1 min with mod assistance  Pt will tolerate 45 minutes of tx without fatigue      LT.Restore to max I with self care and mobility.                          History:     Past Medical History:   Diagnosis Date    Cancer     Diabetes mellitus, type 2     Fibromyalgia     History of kidney cancer 2018    Hyperlipidemia     Hypertension     Migraine headache     Renal cell carcinoma     Renal disorder          Past Surgical History:   Procedure Laterality Date    NEPHRECTOMY         Time Tracking:     OT Date of Treatment: 23  OT Start Time: 1430  OT Stop Time: 1445  OT Total Time (min): 15 min    Billable Minutes:Evaluation 15    3/6/2023  "

## 2023-03-07 LAB
GLUCOSE SERPL-MCNC: 134 MG/DL (ref 70–105)
GLUCOSE SERPL-MCNC: 194 MG/DL (ref 70–105)
GLUCOSE SERPL-MCNC: 227 MG/DL (ref 70–105)
GLUCOSE SERPL-MCNC: 228 MG/DL (ref 70–105)

## 2023-03-07 PROCEDURE — 25000003 PHARM REV CODE 250: Performed by: HOSPITALIST

## 2023-03-07 PROCEDURE — 63600175 PHARM REV CODE 636 W HCPCS

## 2023-03-07 PROCEDURE — 11000004 HC SNF PRIVATE

## 2023-03-07 PROCEDURE — A4216 STERILE WATER/SALINE, 10 ML: HCPCS | Performed by: HOSPITALIST

## 2023-03-07 PROCEDURE — 97163 PT EVAL HIGH COMPLEX 45 MIN: CPT

## 2023-03-07 PROCEDURE — 25000003 PHARM REV CODE 250

## 2023-03-07 PROCEDURE — 63600175 PHARM REV CODE 636 W HCPCS: Performed by: NURSE PRACTITIONER

## 2023-03-07 PROCEDURE — 82962 GLUCOSE BLOOD TEST: CPT

## 2023-03-07 PROCEDURE — 63600175 PHARM REV CODE 636 W HCPCS: Performed by: HOSPITALIST

## 2023-03-07 PROCEDURE — 27000958

## 2023-03-07 PROCEDURE — 97530 THERAPEUTIC ACTIVITIES: CPT

## 2023-03-07 PROCEDURE — 63600175 PHARM REV CODE 636 W HCPCS: Mod: JZ,JG | Performed by: HOSPITALIST

## 2023-03-07 PROCEDURE — 97110 THERAPEUTIC EXERCISES: CPT

## 2023-03-07 RX ADMIN — Medication 10 ML: at 11:03

## 2023-03-07 RX ADMIN — Medication 10 ML: at 05:03

## 2023-03-07 RX ADMIN — INSULIN ASPART 1 UNITS: 100 INJECTION, SOLUTION INTRAVENOUS; SUBCUTANEOUS at 08:03

## 2023-03-07 RX ADMIN — AMLODIPINE BESYLATE 5 MG: 5 TABLET ORAL at 08:03

## 2023-03-07 RX ADMIN — ENOXAPARIN SODIUM 30 MG: 100 INJECTION SUBCUTANEOUS at 05:03

## 2023-03-07 RX ADMIN — TICAGRELOR 90 MG: 90 TABLET ORAL at 08:03

## 2023-03-07 RX ADMIN — CEFTRIAXONE SODIUM 2 G: 1 INJECTION, POWDER, FOR SOLUTION INTRAMUSCULAR; INTRAVENOUS at 05:03

## 2023-03-07 RX ADMIN — PANTOPRAZOLE SODIUM 40 MG: 40 TABLET, DELAYED RELEASE ORAL at 08:03

## 2023-03-07 RX ADMIN — DULOXETINE 30 MG: 30 CAPSULE, DELAYED RELEASE ORAL at 08:03

## 2023-03-07 RX ADMIN — MUPIROCIN: 20 OINTMENT TOPICAL at 08:03

## 2023-03-07 RX ADMIN — QUETIAPINE 50 MG: 25 TABLET ORAL at 08:03

## 2023-03-07 RX ADMIN — ASPIRIN 81 MG CHEWABLE TABLET 81 MG: 81 TABLET CHEWABLE at 08:03

## 2023-03-07 RX ADMIN — INSULIN ASPART 2 UNITS: 100 INJECTION, SOLUTION INTRAVENOUS; SUBCUTANEOUS at 11:03

## 2023-03-07 RX ADMIN — CARVEDILOL 12.5 MG: 6.25 TABLET, FILM COATED ORAL at 08:03

## 2023-03-07 RX ADMIN — INSULIN DETEMIR 40 UNITS: 100 INJECTION, SOLUTION SUBCUTANEOUS at 08:03

## 2023-03-07 RX ADMIN — SODIUM BICARBONATE 650 MG: 650 TABLET ORAL at 08:03

## 2023-03-07 RX ADMIN — MUPIROCIN: 20 OINTMENT TOPICAL at 09:03

## 2023-03-07 RX ADMIN — CALCITRIOL CAPSULES 0.25 MCG 0.25 MCG: 0.25 CAPSULE ORAL at 09:03

## 2023-03-07 RX ADMIN — ALTEPLASE 2 MG: 2.2 INJECTION, POWDER, LYOPHILIZED, FOR SOLUTION INTRAVENOUS at 03:03

## 2023-03-07 RX ADMIN — ZINC OXIDE TOPICAL OINT.: at 10:03

## 2023-03-07 RX ADMIN — SENNOSIDES AND DOCUSATE SODIUM 1 TABLET: 8.6; 5 TABLET ORAL at 08:03

## 2023-03-07 RX ADMIN — ALLOPURINOL 100 MG: 100 TABLET ORAL at 08:03

## 2023-03-07 RX ADMIN — ATORVASTATIN CALCIUM 80 MG: 80 TABLET, FILM COATED ORAL at 08:03

## 2023-03-07 RX ADMIN — SODIUM BICARBONATE 650 MG: 650 TABLET ORAL at 03:03

## 2023-03-07 NOTE — PT/OT/SLP PROGRESS
"Occupational Therapy   Treatment    Name: Jessica Bay  MRN: [id number]  Admitting Diagnosis:  Bacterial meningitis       Recommendations:     Discharge Recommendations: home health OT, home health PT, home with home health  Discharge Equipment Recommendations:  3-in-1 commode, hip kit, hospital bed  Barriers to discharge:  None    Assessment:     Jessica Bay is a 67 y.o. female with a medical diagnosis of Bacterial meningitis.  She presents with continued complaints of "not being able to hear anybody, it sounds like a metallic noise, like on [place]." Pt appears to be able to read lips well or spontaneously "hears" some of OT directives without extra effort.. Performance deficits affecting function are weakness, impaired endurance, impaired self care skills, impaired functional mobility, gait instability, impaired balance, decreased lower extremity function.     Rehab Prognosis:  Good; patient would benefit from acute skilled OT services to address these deficits and reach maximum level of function.       Plan:     Patient to be seen 5 x/week to address the above listed problems via self-care/home management, therapeutic activities, therapeutic exercises, wheelchair management/training  Plan of Care Expires: 04/07/23  Plan of Care Reviewed with: patient, spouse    Subjective     Pain/Comfort:  Pain Rating 1: 3/10  Location - Side 1: Left  Location - Orientation 1: upper  Location 1: arm  Pain Addressed 1: Other (see comments) (biofreeze applied to pt L shoulder)    Objective:     Communicated with: pt, , and CNA prior to session.  CNA reports, "you need to talk to her and tell her she has to try to do her bathing and dressing and getting onto the toilet."  Patient found up in chair with PICC line upon OT entry to room.    General Precautions: Standard, fall, hearing impaired    Orthopedic Precautions:N/A  Braces: N/A  Respiratory Status: Room air     Occupational Performance:     Bed Mobility:  " "  Pt transferred back to the EOB with only SBA after w/c setup close to bedside for stand and pivot transfer    Functional Mobility/Transfers:  Patient completed Sit <> Stand Transfer with stand by assistance  with  grab bars(s)   Patient completed Bed <> Chair Transfer using Step Transfer technique with stand by assistance with grab bars(s)  Functional Mobility: na with OT Today    Activities of Daily Living:  Na with OT, but CNA reports pt resistant to performing ADL's more I'ly.  Unclear why this is due to pt appears to have moderately good strength for all UB self care and grooming at sinkside.  Transfers to BSC should be very easy at this time as well.  Will cont encouraging pt to participate more effectively.      [facility] 6 Click ADL: 20    Treatment & Education:  OT engaged pt in 5 min UBE.  She stopped OT from continuing initiation of exercise to tell OT "you see these thumbs.  Show me yours.  See, mine aren't like yours.  It's going to be hard for me to do this.  I'm not saying I won't, but I might not be able to."  OT encouraged pt to "try."  She had no difficulty whatsoever completing 5 min at low level resist for improving endurance.  After this, OT setup pt at tabletop to complete red theraputty manipulation for improving  and pinch.  Pt verbally expressing how "this is harder that it looked when you were doing it."  But pt had no difficulty once again completing per OT directives.  After this, OT instructed and demonstrated pt with tabletop towel scrub with 3# weight to promote AROM and strength of UB overall shoulder girdle as well as to engage pt core in forward flexion.  She was able to complete with 3 min of this before complaining of L UE shoulder pain.  OT applied biofreeze, and pt made no more complaint after task discontinued.  Finally, pt able to complete card match task on vertical board to promote AROM, reach, and core forward leaning and engagement.  Pt completed in good time and without " "difficulty.     Upon getting back to room, pt states, "help me to bed in case they can't come when I want them to..." so OT placed w/c adjacent to bedside, and pt was able to transfer with basically SBA back to bed.      Patient left sitting edge of bed with call button in reach and request of the newspaper to read    GOALS:   Multidisciplinary Problems       Occupational Therapy Goals          Problem: Occupational Therapy    Goal Priority Disciplines Outcome Interventions   Occupational Therapy Goal     OT, PT/OT Ongoing, Progressing    Description: STG: within 2 weeks  Pt will perform grooming with setup at sinkside from w/c  Pt will bathe with min a at bedside  Pt will perform UE dressing with svn  Pt will perform LE dressing with min a equipment as needed  Pt will sit EOB x 30 min with svn assistance  Pt will transfer bed/chair/bsc with mod a with AE as needed  Pt will perform standing task x 1 min with mod assistance  Pt will tolerate 45 minutes of tx without fatigue      LT.Restore to max I with self care and mobility.                          Time Tracking:     OT Date of Treatment: 23  OT Start Time: 1401  OT Stop Time: 1430  OT Total Time (min): 29 min    Billable Minutes:Therapeutic Activity 15  Therapeutic Exercise 14    OT/GLADYS: OT          3/7/2023  "

## 2023-03-07 NOTE — NURSING
Nurses Note -- 4 Eyes      3/6/2023   7:28 PM      Skin assessed during: Q Shift Change      [] No Pressure Injuries Present    []Prevention Measures Documented      [x] Yes- Altered Skin Integrity Present or Discovered   [] LDA Added if Not in Epic (Describe Wound)   [x] New Altered Skin Integrity was Present on Admit and Documented in LDA   [] Wound Image Taken    Wound Care Consulted? No    Attending Nurse:  Curtis Lanier LPN     Second RN/Staff Member:  Suzanne Camargo RN

## 2023-03-07 NOTE — PLAN OF CARE
Problem: Physical Therapy  Goal: Physical Therapy Goal  Description: STG - 2 weeks  1. Pt will amb 4 ft with min A x 2.  2. Pt will perform bed transfer with min A.  3. Pt will transfer sit to stand with CGA.    LTG - 4 weeks  1. Pt will amb 4 ft with CGA.  2.Pt will perform be transfer with SBA.  3. Pt will transfer sit to stand with SBA.  4. Pt will have BLE strength of 4/5.      Outcome: Ongoing, Progressing

## 2023-03-07 NOTE — PT/OT/SLP PROGRESS
Physical Therapy Treatment    Patient Name:  Jessica Bay   MRN:  [id number]    Recommendations:     Discharge Recommendations: home with home health  Discharge Equipment Recommendations: bedside commode, hospital bed  Barriers to discharge: Decreased caregiver support    Assessment:     Jessica Bay is a 67 y.o. female admitted with a medical diagnosis of Bacterial meningitis.  She presents with the following impairments/functional limitations: weakness, impaired endurance, gait instability, impaired balance, decreased lower extremity function, decreased coordination, impaired functional mobility .    Pt in bed fatigued from previous OT session. Only agreeable to PT if she can stay in bed.    Rehab Prognosis: Fair; patient would benefit from acute skilled PT services to address these deficits and reach maximum level of function.    Recent Surgery: * No surgery found *      Plan:     During this hospitalization, patient to be seen 5 x/week to address the identified rehab impairments via gait training, therapeutic activities, therapeutic exercises, wheelchair management/training and progress toward the following goals:    Plan of Care Expires:  04/07/23    Subjective     Chief Complaint: Pt in bed fatigued from previous OT session. Only agreeable to PT if she can stay in bed.  Patient/Family Comments/goals: DC home.  Pain/Comfort:  Pain Rating 1: 0/10      Objective:     Communicated with pt prior to session.  Patient found HOB elevated with PICC line upon PT entry to room.     General Precautions: Standard, fall, hearing impaired  Orthopedic Precautions:    Braces:    Respiratory Status: Room air     Functional Mobility:  No functional talk addressed this visit.      AM-PAC 6 CLICK MOBILITY  Turning over in bed (including adjusting bedclothes, sheets and blankets)?: 4  Sitting down on and standing up from a chair with arms (e.g., wheelchair, bedside commode, etc.): 2  Moving from lying on back to sitting on the  side of the bed?: 3  Moving to and from a bed to a chair (including a wheelchair)?: 1  Need to walk in hospital room?: 1  Climbing 3-5 steps with a railing?: 1  Basic Mobility Total Score: 12       Treatment & Education:  Bed ex's x 15 reps BLE: AP, hip IR/ER, hip abd, SAQ x 10, resisted hip add using blanket roll, GS.    Patient left HOB elevated with call button in reach..    GOALS:   Multidisciplinary Problems       Physical Therapy Goals          Problem: Physical Therapy    Goal Priority Disciplines Outcome Goal Variances Interventions   Physical Therapy Goal     PT, PT/OT Ongoing, Progressing     Description: STG - 2 weeks  1. Pt will amb 4 ft with min A x 2.  2. Pt will perform bed transfer with min A.  3. Pt will transfer sit to stand with CGA.    LTG - 4 weeks  1. Pt will amb 4 ft with CGA.  2.Pt will perform be transfer with SBA.  3. Pt will transfer sit to stand with SBA.  4. Pt will have BLE strength of 4/5.                           Time Tracking:     PT Received On: 03/07/23  PT Start Time: 1449     PT Stop Time: 1500  PT Total Time (min): 11 min     Billable Minutes: Therapeutic Exercise 11 min.    Treatment Type: Treatment  PT/PTA: PT     PTA Visit Number: 0     03/07/2023

## 2023-03-07 NOTE — PT/OT/SLP EVAL
"Physical Therapy Evaluation    Patient Name:  Jessica Bay   MRN:  [id number]    Recommendations:     Discharge Recommendations: home with home health   Discharge Equipment Recommendations: hospital bed, bedside commode   Barriers to discharge: Decreased caregiver support and antibiotic therapy    Assessment:     Jessica Bay is a 67 y.o. female admitted with a medical diagnosis of Bacterial meningitis.  She presents with the following impairments/functional limitations: weakness, impaired endurance, gait instability, impaired balance, decreased lower extremity function, impaired functional mobility, impaired self care skills .    PMHX: HTN, CAD, kidney CA, NSTEMI 11/2022, fibromyalgia, CKD, [place]. Pt has history of meningitis 8 years ago. States CSF "leaks" from my left ear.    Rehab Prognosis: Fair; patient would benefit from acute skilled PT services to address these deficits and reach maximum level of function.    Recent Surgery: * No surgery found *      Plan:     During this hospitalization, patient to be seen 5 x/week to address the identified rehab impairments via gait training, therapeutic activities, therapeutic exercises, wheelchair management/training and progress toward the following goals:    Plan of Care Expires:  04/07/23    Subjective     Chief Complaint: Inability to transfer and amb. Pt reports hearing loss occurred 3 weeks prior to meningitis. Pt states she can't use a RW because "it get all jumbled up."  Patient/Family Comments/goals: Pt states DC plans unknown.  states that the pt needs to be able to walk into the bathroom.  Pain/Comfort:  Pain Rating 1: 0/10    Patients cultural, spiritual, [employer]alism conflicts given the current situation: no    Living Environment:  Pt lives with her  who uses a PWC. Pt has ramped entrance to home.  Prior to admission, patients level of function was modif with mobility using a PWC, indep with transfers to/from PWC, indep bed mob.She would park " "her PWC at her bathroom door and walk a few feet into the bathroom. She was modif indep with toilet transfer which her son attached fabricated rails using metal pipes to the toilet. Equipment used at home: grab bar, power chair (lift chair, walk in shower).  DME owned (not currently used): none.  Upon discharge, patient will have assistance from .    Objective:     Communicated with pt/ prior to session.  Patient found HOB elevated with PICC line  upon PT entry to room.    General Precautions: Standard, fall, hearing impaired  Orthopedic Precautions:    Braces:    Respiratory Status: Room air    Exams:  RLE ROM: Deficits: tight calf  RLE Strength: Deficits: 3+/5 hip flex/ext; 4-/5 hip abd/add, DF; 3-/5 knee ext; 4/5 PF  LLE ROM: Deficits: tight calf  LLE Strength: Deficits: 3+/5 hip flex/ext; 4-/5 hip abd/add, PF, 3-/5 knee ext; 4/5 DF    Functional Mobility:  Bed Mobility:     Rolling Left:  stand by assistance  Rolling Right: stand by assistance  Scooting: stand by assistance  Supine to Sit: stand by assistance  Sit to Supine: stand by assistance  Transfers:     Sit to Stand:  moderate assistance, of 1 persons, and blocking of tierney knees with no AD  Balance: Pt stood bedside for 45" with mod Ax1 statically with PT blocking knees.      AM-PAC 6 CLICK MOBILITY  Total Score:12       Treatment & Education:  EVAL only. Pt/ instructed in POC.    Patient left HOB elevated with   present .    GOALS:   Multidisciplinary Problems       Physical Therapy Goals          Problem: Physical Therapy    Goal Priority Disciplines Outcome Goal Variances Interventions   Physical Therapy Goal     PT, PT/OT Ongoing, Progressing     Description: STG - 2 weeks  1. Pt will amb 4 ft with min A x 2.  2. Pt will perform bed transfer with min A.  3. Pt will transfer sit to stand with CGA.    LTG - 4 weeks  1. Pt will amb 4 ft with CGA.  2.Pt will perform be transfer with SBA.  3. Pt will transfer sit to stand with " SBA.  4. Pt will have BLE strength of 4/5.                           History:     Past Medical History:   Diagnosis Date    Cancer     Diabetes mellitus, type 2     Fibromyalgia     History of kidney cancer 2018    Hyperlipidemia     Hypertension     Migraine headache     Renal cell carcinoma     Renal disorder        Past Surgical History:   Procedure Laterality Date    NEPHRECTOMY         Time Tracking:     PT Received On: 03/06/23  PT Start Time: 1355     PT Stop Time: 1419  PT Total Time (min): 24 min     Billable Minutes: Evaluation 24 min      03/07/2023

## 2023-03-08 LAB
GLUCOSE SERPL-MCNC: 126 MG/DL (ref 70–105)
GLUCOSE SERPL-MCNC: 165 MG/DL (ref 70–105)
GLUCOSE SERPL-MCNC: 195 MG/DL (ref 70–105)
GLUCOSE SERPL-MCNC: 247 MG/DL (ref 70–105)

## 2023-03-08 PROCEDURE — 63600175 PHARM REV CODE 636 W HCPCS: Performed by: HOSPITALIST

## 2023-03-08 PROCEDURE — 97535 SELF CARE MNGMENT TRAINING: CPT

## 2023-03-08 PROCEDURE — 63600175 PHARM REV CODE 636 W HCPCS

## 2023-03-08 PROCEDURE — 97110 THERAPEUTIC EXERCISES: CPT

## 2023-03-08 PROCEDURE — 25000003 PHARM REV CODE 250

## 2023-03-08 PROCEDURE — 97110 THERAPEUTIC EXERCISES: CPT | Mod: CQ

## 2023-03-08 PROCEDURE — 27000958

## 2023-03-08 PROCEDURE — 82962 GLUCOSE BLOOD TEST: CPT

## 2023-03-08 PROCEDURE — 25000003 PHARM REV CODE 250: Performed by: HOSPITALIST

## 2023-03-08 PROCEDURE — 11000004 HC SNF PRIVATE

## 2023-03-08 PROCEDURE — A4216 STERILE WATER/SALINE, 10 ML: HCPCS | Performed by: HOSPITALIST

## 2023-03-08 PROCEDURE — 63600175 PHARM REV CODE 636 W HCPCS: Performed by: NURSE PRACTITIONER

## 2023-03-08 RX ORDER — SODIUM CHLORIDE 0.9 % (FLUSH) 0.9 %
10 SYRINGE (ML) INJECTION
Status: DISCONTINUED | OUTPATIENT
Start: 2023-03-08 | End: 2023-03-16 | Stop reason: HOSPADM

## 2023-03-08 RX ORDER — SODIUM CHLORIDE 0.9 % (FLUSH) 0.9 %
20 SYRINGE (ML) INJECTION EVERY 6 HOURS
Status: DISCONTINUED | OUTPATIENT
Start: 2023-03-08 | End: 2023-03-16 | Stop reason: HOSPADM

## 2023-03-08 RX ADMIN — Medication 10 ML: at 12:03

## 2023-03-08 RX ADMIN — SODIUM BICARBONATE 650 MG: 650 TABLET ORAL at 08:03

## 2023-03-08 RX ADMIN — INSULIN DETEMIR 40 UNITS: 100 INJECTION, SOLUTION SUBCUTANEOUS at 08:03

## 2023-03-08 RX ADMIN — QUETIAPINE 50 MG: 25 TABLET ORAL at 08:03

## 2023-03-08 RX ADMIN — INSULIN ASPART 1 UNITS: 100 INJECTION, SOLUTION INTRAVENOUS; SUBCUTANEOUS at 08:03

## 2023-03-08 RX ADMIN — PANTOPRAZOLE SODIUM 40 MG: 40 TABLET, DELAYED RELEASE ORAL at 08:03

## 2023-03-08 RX ADMIN — CARVEDILOL 12.5 MG: 6.25 TABLET, FILM COATED ORAL at 08:03

## 2023-03-08 RX ADMIN — TICAGRELOR 90 MG: 90 TABLET ORAL at 08:03

## 2023-03-08 RX ADMIN — CALCITRIOL CAPSULES 0.25 MCG 0.25 MCG: 0.25 CAPSULE ORAL at 08:03

## 2023-03-08 RX ADMIN — Medication 10 ML: at 02:03

## 2023-03-08 RX ADMIN — MUPIROCIN: 20 OINTMENT TOPICAL at 08:03

## 2023-03-08 RX ADMIN — Medication 20 ML: at 05:03

## 2023-03-08 RX ADMIN — CEFTRIAXONE SODIUM 2 G: 1 INJECTION, POWDER, FOR SOLUTION INTRAMUSCULAR; INTRAVENOUS at 05:03

## 2023-03-08 RX ADMIN — Medication 10 ML: at 05:03

## 2023-03-08 RX ADMIN — Medication 10 ML: at 06:03

## 2023-03-08 RX ADMIN — ENOXAPARIN SODIUM 30 MG: 100 INJECTION SUBCUTANEOUS at 05:03

## 2023-03-08 RX ADMIN — ASPIRIN 81 MG CHEWABLE TABLET 81 MG: 81 TABLET CHEWABLE at 08:03

## 2023-03-08 RX ADMIN — ALLOPURINOL 100 MG: 100 TABLET ORAL at 08:03

## 2023-03-08 RX ADMIN — ATORVASTATIN CALCIUM 80 MG: 80 TABLET, FILM COATED ORAL at 08:03

## 2023-03-08 RX ADMIN — SODIUM BICARBONATE 650 MG: 650 TABLET ORAL at 02:03

## 2023-03-08 RX ADMIN — DULOXETINE 30 MG: 30 CAPSULE, DELAYED RELEASE ORAL at 08:03

## 2023-03-08 RX ADMIN — AMLODIPINE BESYLATE 5 MG: 5 TABLET ORAL at 08:03

## 2023-03-08 NOTE — PT/OT/SLP PROGRESS
Physical Therapy Treatment    Patient Name:  Jessica Bay   MRN:  [id number]    Recommendations:     Discharge Recommendations: home with home health  Discharge Equipment Recommendations: bedside commode, hospital bed  Barriers to discharge: Decreased caregiver support    Assessment:     Jessica Bay is a 67 y.o. female admitted with a medical diagnosis of Bacterial meningitis.  She presents with the following impairments/functional limitations: weakness, impaired endurance, gait instability, impaired balance, decreased lower extremity function, decreased coordination, impaired functional mobility. Patient requested to perform exercises in bed this date. Patient was provided with visual, verbal, and tactile cues for proper form of therapy tasks. PTA asked if patient wanted to transfer to  to be transported to OT for treatment, but patient stated she did not want to have to sit up in chair for long due to discomfort from wc so she was left sitting on EOB.     Rehab Prognosis: Fair; patient would benefit from acute skilled PT services to address these deficits and reach maximum level of function.    Recent Surgery: * No surgery found *      Plan:     During this hospitalization, patient to be seen 5 x/week to address the identified rehab impairments via gait training, therapeutic activities, therapeutic exercises, wheelchair management/training and progress toward the following goals:    Plan of Care Expires:  04/07/23    Subjective     Chief Complaint: Patient stated that she was sore on her bottom, which she stated is why she did not want to sit in wc due to discomfort.   Patient/Family Comments/goals: DC home.  Pain/Comfort:  Pain Rating 1:  (Patient stated that she was sore on her bottom, but she did not give pain rating.)      Objective:     Communicated with pt prior to session. Patient found HOB elevated with PICC line upon PTA entry to room.     General Precautions: Standard, fall, hearing  impaired  Orthopedic Precautions: N/A  Braces: N/A  Respiratory Status: Room air     Functional Mobility:  Bed mobility: Supine to sitting on EOB with SBA      AM-PAC 6 CLICK MOBILITY  Turning over in bed (including adjusting bedclothes, sheets and blankets)?: 4  Sitting down on and standing up from a chair with arms (e.g., wheelchair, bedside commode, etc.): 2  Moving from lying on back to sitting on the side of the bed?: 3  Moving to and from a bed to a chair (including a wheelchair)?: 1  Need to walk in hospital room?: 1  Climbing 3-5 steps with a railing?: 1  Basic Mobility Total Score: 12       Treatment & Education:  Ankle pumps 20x, SLRs 5x2, Hip ADD 20x, SAQs 20x, bridging 10x, seated marching 10x, LAQs 20x    Patient left sitting edge of bed with call button in reach and OT notified.    GOALS:   Multidisciplinary Problems       Physical Therapy Goals          Problem: Physical Therapy    Goal Priority Disciplines Outcome Goal Variances Interventions   Physical Therapy Goal     PT, PT/OT Ongoing, Progressing     Description: STG - 2 weeks  1. Pt will amb 4 ft with min A x 2.  2. Pt will perform bed transfer with min A.  3. Pt will transfer sit to stand with CGA.    LTG - 4 weeks  1. Pt will amb 4 ft with CGA.  2.Pt will perform be transfer with SBA.  3. Pt will transfer sit to stand with SBA.  4. Pt will have BLE strength of 4/5.                           Time Tracking:     PT Received On: 03/08/23  PT Start Time: 1355     PT Stop Time: 1414  PT Total Time (min): 19 min     Billable Minutes: Therapeutic Activity 2 and Therapeutic Exercise 17    Treatment Type: Treatment  PT/PTA: PTA     PTA Visit Number: 1     03/08/2023

## 2023-03-08 NOTE — PT/OT/SLP PROGRESS
"Occupational Therapy   Treatment    Name: Jessica Bay  MRN: [id number]  Admitting Diagnosis:  Bacterial meningitis       Recommendations:     Discharge Recommendations: home health OT, home health PT, home with home health  Discharge Equipment Recommendations:  3-in-1 commode, hip kit, hospital bed  Barriers to discharge:  None    Assessment:     Jessica Bay is a 67 y.o. female with a medical diagnosis of Bacterial meningitis.  She presents with no new complaints. Performance deficits affecting function are weakness, impaired endurance, impaired self care skills, impaired functional mobility, gait instability, impaired balance, decreased lower extremity function.     Rehab Prognosis:  Good; patient would benefit from acute skilled OT services to address these deficits and reach maximum level of function.       Plan:     Patient to be seen 5 x/week to address the above listed problems via self-care/home management, therapeutic activities, therapeutic exercises, wheelchair management/training  Plan of Care Expires: 04/07/23  Plan of Care Reviewed with: patient, spouse    Subjective     Pain/Comfort:       Objective:     Communicated with: pt and nursing prior to session.  Patient found HOB elevated with PICC line upon OT entry to room.    General Precautions: Standard, fall, hearing impaired    Orthopedic Precautions:N/A  Braces: N/A  Respiratory Status: Room air     Occupational Performance:     Bed Mobility:    Overall SBA and cues for optimal effectiveness    Functional Mobility/Transfers:  Patient completed Sit <> Stand Transfer with stand by assistance  with  hand-held assist   Patient completed Toilet Transfer Step Transfer technique with stand by assistance with  hand-held assist  Functional Mobility: overall SBA    Activities of Daily Living:  Toileting: minimum assistance and of 1 persons pt states she cannot reach to wipe due to "too much of me down there."  OT assisted her in getting "butt paste" on " "chaffed areas and getting brief back in place.  Pt preferred to roll back and forth to have OT assist with brief placement rather than standing.  After session over, OT let CNA know the above information onhow pt handled C urination and subsequent pericare.  OT recommended bowel and bladder program every 2 hours due to pt states she is "incontinent now."  CNA states, "I don't think that's true."  Unclear if pt downplaying her abilities.      [facility] 6 Click ADL:      Treatment & Education:  OT engaged pt in 1.5 # dowel in sh flexion, ch press, and horiz abd/add x 10 reps x 2 sets while upright seated on EOB.  Then red theraputty x 5 minutes as well for FM and hand  strength.  After this, OT persuaded her to complete ADL toileting at [facility] while OT present.  See above for details of session.    Patient left HOB elevated with call button in reach    GOALS:   Multidisciplinary Problems       Occupational Therapy Goals          Problem: Occupational Therapy    Goal Priority Disciplines Outcome Interventions   Occupational Therapy Goal     OT, PT/OT Ongoing, Progressing    Description: STG: within 2 weeks  Pt will perform grooming with setup at sinkside from w/c  Pt will bathe with min a at bedside  Pt will perform UE dressing with svn  Pt will perform LE dressing with min a equipment as needed  Pt will sit EOB x 30 min with svn assistance  Pt will transfer bed/chair/bsc with mod a with AE as needed  Pt will perform standing task x 1 min with mod assistance  Pt will tolerate 45 minutes of tx without fatigue      LT.Restore to max I with self care and mobility.                          Time Tracking:     OT Date of Treatment: 23  OT Start Time: 1450  OT Stop Time: 1515  OT Total Time (min): 25 min    Billable Minutes:Self Care/Home Management 12  Therapeutic Exercise 13    OT/GLADYS: OT          3/8/2023  "

## 2023-03-08 NOTE — PLAN OF CARE
Problem: Fall Injury Risk  Goal: Absence of Fall and Fall-Related Injury  Outcome: Ongoing, Progressing  Intervention: Promote Injury-Free Environment  Flowsheets (Taken 3/8/2023 1606)  Safety Promotion/Fall Prevention: assistive device/personal item within reach   Plan of care reviewed with patient. Patients status ongoing progressing.

## 2023-03-08 NOTE — NURSING
Nurses Note -- 4 Eyes      3/8/2023   3:38 PM      Skin assessed during: Q Shift Change      [x] No Pressure Injuries Present    []Prevention Measures Documented      [] Yes- Altered Skin Integrity Present or Discovered   [] LDA Added if Not in Epic (Describe Wound)   [] New Altered Skin Integrity was Present on Admit and Documented in LDA   [] Wound Image Taken    Wound Care Consulted? No    Attending Nurse:  MONTANA PHAM RN     Second RN/Staff Member:  SUN.

## 2023-03-08 NOTE — PROGRESS NOTES
Clinical Pharmacy Chart Review Note      Admit Date: 3/3/2023   LOS: 5 days       Jessica Bay is a 67 y.o. female admitted to [facility] for PT/OT after hospitalization for bacterial meningitis.    Active Hospital Problems    Diagnosis  POA    *Bacterial meningitis [G00.9]  Yes    Hearing loss [H91.90]  Yes    Hypertension [I10]  Yes    Dyslipidemia [E78.5]  Yes    CKD (chronic kidney disease), stage IV [N18.4]  Yes    Type 2 diabetes mellitus with diabetic chronic kidney disease [E11.22]  Yes      Resolved Hospital Problems   No resolved problems to display.     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    Restless legs syndrome 10/17/2022    Dyslipidemia 09/20/2021    Acquired absence of kidney 09/20/2021    CKD (chronic kidney disease), stage IV 09/20/2021    Type 2 diabetes mellitus with diabetic chronic kidney disease 09/20/2021    CAD (coronary artery disease) 03/05/2020       Scheduled Meds:    allopurinoL  100 mg Oral Daily    amLODIPine  5 mg Oral Daily    aspirin  81 mg Oral Daily    atorvastatin  80 mg Oral Daily    calcitRIOL  0.25 mcg Oral Daily    carvediloL  12.5 mg Oral BID    cefTRIAXone (ROCEPHIN) IVPB  2 g Intravenous Q12H    DULoxetine  30 mg Oral Daily    enoxaparin  30 mg Subcutaneous Daily    insulin detemir U-100  40 Units Subcutaneous QHS    mupirocin   Nasal BID    pantoprazole  40 mg Oral Daily    QUEtiapine  50 mg Oral BID    sodium bicarbonate  650 mg Oral TID    sodium chloride 0.9%  20 mL  Intravenous Q6H    ticagrelor  90 mg Oral BID     Continuous Infusions:   PRN Meds: acetaminophen, calcium carbonate, cyclobenzaprine, dextrose 50%, dextrose 50%, glucagon (human recombinant), glucose, glucose, insulin aspart U-100, melatonin, ondansetron, senna-docusate 8.6-50 mg, Flushing PICC Protocol **AND** sodium chloride 0.9% **AND** sodium chloride 0.9%, zinc oxide    OBJECTIVE:     Vital Signs (Last 24H)  Temp:  [97.5 °F (36.4 °C)-98.3 °F (36.8 °C)]   Pulse:  [79-91]   Resp:  [20]   BP: (138-147)/(63-71)   SpO2:  [96 %-97 %]     Laboratory:  CBC:   Recent Labs   Lab 03/03/23  0849 03/04/23  0544   WBC 7.27 5.61   RBC 2.63* 2.48*   HGB 8.4* 8.1*   HCT 27.5* 26.3*    205   .6* 106.0*   MCH 31.9* 32.7*   MCHC 30.5* 30.8*     BMP:   Recent Labs   Lab 03/03/23  0850 03/04/23  0544   * 199*    143   K 4.4 4.3   * 109*   CO2 23 23   BUN 69* 57*   CREATININE 3.00* 2.63*   CALCIUM 9.2 8.9     .    ASSESSMENT/PLAN:   Estimated Creatinine Clearance: 24.7 mL/min (A) (based on SCr of 2.63 mg/dL (H)).Medications reviewed, no dose adjustments needed. Weekly swing bed medication regimen review complete.  Will continue to monitor.    Hermes De La Cruz, Pharm. D.  Director of Pharmacy  [facility]  [phone]  [email or web address]

## 2023-03-08 NOTE — NURSING
Nurses Note -- 4 Eyes      3/7/2023   7:23 PM      Skin assessed during: Q Shift Change      [] No Pressure Injuries Present    [x]Prevention Measures Documented      [] Yes- Altered Skin Integrity Present or Discovered   [] LDA Added if Not in Epic (Describe Wound)   [] New Altered Skin Integrity was Present on Admit and Documented in LDA   [] Wound Image Taken    Wound Care Consulted? No    Attending Nurse:  Curtis Lanier LPN     Second RN/Staff Member:  Martina Andrade RN

## 2023-03-09 LAB
GLUCOSE SERPL-MCNC: 122 MG/DL (ref 70–105)
GLUCOSE SERPL-MCNC: 183 MG/DL (ref 70–105)
GLUCOSE SERPL-MCNC: 217 MG/DL (ref 70–105)
GLUCOSE SERPL-MCNC: 259 MG/DL (ref 70–105)

## 2023-03-09 PROCEDURE — 63600175 PHARM REV CODE 636 W HCPCS: Performed by: HOSPITALIST

## 2023-03-09 PROCEDURE — 63600175 PHARM REV CODE 636 W HCPCS: Performed by: NURSE PRACTITIONER

## 2023-03-09 PROCEDURE — 97110 THERAPEUTIC EXERCISES: CPT | Mod: CQ

## 2023-03-09 PROCEDURE — 11000004 HC SNF PRIVATE

## 2023-03-09 PROCEDURE — 25000003 PHARM REV CODE 250: Performed by: HOSPITALIST

## 2023-03-09 PROCEDURE — 63600175 PHARM REV CODE 636 W HCPCS

## 2023-03-09 PROCEDURE — A4216 STERILE WATER/SALINE, 10 ML: HCPCS | Performed by: HOSPITALIST

## 2023-03-09 PROCEDURE — 97530 THERAPEUTIC ACTIVITIES: CPT

## 2023-03-09 PROCEDURE — 25000003 PHARM REV CODE 250

## 2023-03-09 PROCEDURE — 97535 SELF CARE MNGMENT TRAINING: CPT

## 2023-03-09 PROCEDURE — 27000958

## 2023-03-09 PROCEDURE — 82962 GLUCOSE BLOOD TEST: CPT

## 2023-03-09 RX ORDER — ETHYL ALCOHOL 70 %
SOLUTION, NON-ORAL TOPICAL
Status: DISCONTINUED | OUTPATIENT
Start: 2023-03-09 | End: 2023-03-16 | Stop reason: HOSPADM

## 2023-03-09 RX ADMIN — SODIUM BICARBONATE 650 MG: 650 TABLET ORAL at 09:03

## 2023-03-09 RX ADMIN — PANTOPRAZOLE SODIUM 40 MG: 40 TABLET, DELAYED RELEASE ORAL at 09:03

## 2023-03-09 RX ADMIN — SKIN PROTECTANT 5 PACKET: 935 OINTMENT TOPICAL at 07:03

## 2023-03-09 RX ADMIN — Medication 20 ML: at 06:03

## 2023-03-09 RX ADMIN — ALLOPURINOL 100 MG: 100 TABLET ORAL at 09:03

## 2023-03-09 RX ADMIN — TICAGRELOR 90 MG: 90 TABLET ORAL at 09:03

## 2023-03-09 RX ADMIN — CEFTRIAXONE SODIUM 2 G: 1 INJECTION, POWDER, FOR SOLUTION INTRAMUSCULAR; INTRAVENOUS at 06:03

## 2023-03-09 RX ADMIN — ENOXAPARIN SODIUM 30 MG: 100 INJECTION SUBCUTANEOUS at 05:03

## 2023-03-09 RX ADMIN — INSULIN DETEMIR 40 UNITS: 100 INJECTION, SOLUTION SUBCUTANEOUS at 09:03

## 2023-03-09 RX ADMIN — INSULIN ASPART 1 UNITS: 100 INJECTION, SOLUTION INTRAVENOUS; SUBCUTANEOUS at 09:03

## 2023-03-09 RX ADMIN — ASPIRIN 81 MG CHEWABLE TABLET 81 MG: 81 TABLET CHEWABLE at 09:03

## 2023-03-09 RX ADMIN — Medication 20 ML: at 12:03

## 2023-03-09 RX ADMIN — INSULIN ASPART 3 UNITS: 100 INJECTION, SOLUTION INTRAVENOUS; SUBCUTANEOUS at 11:03

## 2023-03-09 RX ADMIN — QUETIAPINE 50 MG: 25 TABLET ORAL at 09:03

## 2023-03-09 RX ADMIN — ATORVASTATIN CALCIUM 80 MG: 80 TABLET, FILM COATED ORAL at 09:03

## 2023-03-09 RX ADMIN — SODIUM BICARBONATE 650 MG: 650 TABLET ORAL at 03:03

## 2023-03-09 RX ADMIN — CALCITRIOL CAPSULES 0.25 MCG 0.25 MCG: 0.25 CAPSULE ORAL at 09:03

## 2023-03-09 RX ADMIN — Medication 20 ML: at 11:03

## 2023-03-09 RX ADMIN — DULOXETINE 30 MG: 30 CAPSULE, DELAYED RELEASE ORAL at 09:03

## 2023-03-09 RX ADMIN — CARVEDILOL 12.5 MG: 6.25 TABLET, FILM COATED ORAL at 09:03

## 2023-03-09 RX ADMIN — AMLODIPINE BESYLATE 5 MG: 5 TABLET ORAL at 09:03

## 2023-03-09 RX ADMIN — CEFTRIAXONE SODIUM 2 G: 1 INJECTION, POWDER, FOR SOLUTION INTRAMUSCULAR; INTRAVENOUS at 05:03

## 2023-03-09 NOTE — PT/OT/SLP PROGRESS
"Occupational Therapy   Treatment    Name: Jessica Bay  MRN: [id number]  Admitting Diagnosis:  Bacterial meningitis       Recommendations:     Discharge Recommendations: home health OT, home health PT, home with home health  Discharge Equipment Recommendations:  3-in-1 commode, hip kit, hospital bed  Barriers to discharge:  None    Assessment:     Jessica Bay is a 67 y.o. female with a medical diagnosis of Bacterial meningitis.  She presents with agreeable to bedside ADL treatment with OT. Performance deficits affecting function are weakness, impaired endurance, impaired self care skills, impaired functional mobility, gait instability, impaired balance, decreased lower extremity function.     Rehab Prognosis:  Good; patient would benefit from acute skilled OT services to address these deficits and reach maximum level of function.       Plan:     Patient to be seen 5 x/week to address the above listed problems via self-care/home management, therapeutic activities, therapeutic exercises, wheelchair management/training  Plan of Care Expires: 04/07/23  Plan of Care Reviewed with: patient, spouse    Subjective     Pain/Comfort:   Pt complains of w/c seat being hard and a "bar being right under my bottom."  OT went and changed out her w/c today to a slightly taller one, and this was much more satisfactory to pt. She states it was much more comfortable.    Objective:     Communicated with: pt prior to session.  Patient found sitting edge of bed with PICC line upon OT entry to room.    General Precautions: Standard, fall, hearing impaired    Orthopedic Precautions:N/A  Braces: N/A  Respiratory Status: Room air     Occupational Performance:     Bed Mobility:    Patient completed Rolling/Turning to Left with  modified independence  Patient completed Rolling/Turning to Right with modified independence  Patient completed Scooting/Bridging with modified independence  Patient completed Supine to Sit with modified " independence  Patient completed Sit to Supine with modified independence     Functional Mobility/Transfers:  Patient completed Sit <> Stand Transfer with supervision  with  hand-held assist and grab bars(s)   Patient completed Bed <> Chair Transfer using Step Transfer technique with supervision with hand-held assist and grab bars(s)  Functional Mobility: w/c mobility in hallway with independence after setup and education on proper use of w/c wheel handles.    Activities of Daily Living:  Grooming: independence sinkside  Bathing: supervision and minimum assistance svn with UB, min a with lower  Upper Body Dressing: supervision setup  Lower Body Dressing: contact guard assistance when standing to pull up LB clothing  Toileting: stand by assistance overall at [facility] today       [facility] 6 Click ADL:      Treatment & Education:  OT completed above tx with OT for ADL and to encourage pt independence.  During session, OT noticed a small amount of blood on white pad under pt bare bottom.  Upon looking over pt skin areas and folds, OT could not see any blood or open wounds.  Notified nursing.  Nursing states they will look into this.      Pt also able to propel w/c in hallway today with only setup to get her started.  She propelled >150 feet, needing only vc's on where to point w/c directionally.  She did need help over threshold and through doorway.  Also her  is supposed to be bringing her w/c from home today.    Patient left up in chair with  LPTA present    GOALS:   Multidisciplinary Problems       Occupational Therapy Goals          Problem: Occupational Therapy    Goal Priority Disciplines Outcome Interventions   Occupational Therapy Goal     OT, PT/OT Ongoing, Progressing    Description: STG: within 2 weeks  Pt will perform grooming with setup at sinkside from w/c  Pt will bathe with min a at bedside  Pt will perform UE dressing with svn  Pt will perform LE dressing with min a equipment as needed  Pt will sit EOB x  30 min with svn assistance  Pt will transfer bed/chair/bsc with mod a with AE as needed  Pt will perform standing task x 1 min with mod assistance  Pt will tolerate 45 minutes of tx without fatigue      LT.Restore to max I with self care and mobility.                          Time Tracking:     OT Date of Treatment: 23  OT Start Time: 829  OT Stop Time: 915  OT Total Time (min): 46 min    Billable Minutes:Self Care/Home Management 31  Therapeutic Activity 15    OT/GLADYS: OT          3/9/2023

## 2023-03-09 NOTE — PT/OT/SLP PROGRESS
Physical Therapy Treatment    Patient Name:  Jessica Bay   MRN:  [id number]    Recommendations:     Discharge Recommendations: home with home health  Discharge Equipment Recommendations: bedside commode, hospital bed  Barriers to discharge: Decreased caregiver support    Assessment:     Jessica Bay is a 67 y.o. female admitted with a medical diagnosis of Bacterial meningitis.  She presents with the following impairments/functional limitations: weakness, impaired endurance, impaired self care skills, impaired functional mobility, gait instability, impaired balance, decreased lower extremity function, decreased coordination. Patient was provided with visual and verbal cues for proper form of therapy tasks. Patient's  brought power [employer] to PT gym, so patient transferred to [place] and she was left sitting in power [employer]. Patient with no reports of adverse effects after completion of treatment.     Rehab Prognosis: Fair; patient would benefit from acute skilled PT services to address these deficits and reach maximum level of function.    Recent Surgery: * No surgery found *      Plan:     During this hospitalization, patient to be seen 5 x/week to address the identified rehab impairments via gait training, therapeutic activities, therapeutic exercises, wheelchair management/training and progress toward the following goals:    Plan of Care Expires:  04/07/23    Subjective     Chief Complaint: Patient stated that she had pain in her bilateral hips.   Patient/Family Comments/goals: DC home.  Pain/Comfort:  Pain Rating 1: 3/10  Location - Side 1: Bilateral  Location - Orientation 1: generalized  Location 1: hip  Pain Addressed 1: Reposition      Objective:     Communicated with pt prior to session. Patient found up in chair with PICC line and was received from OT.    General Precautions: Standard, fall, hearing impaired  Orthopedic Precautions: N/A  Braces: N/A  Respiratory Status: Room air     Functional  Mobility:  WC to power wc transfer: SBA/CGA using stand pivot transfer  Sit to stand: 5x in parallel bars and 1x with no AD with SBA/CGA      AM-PAC 6 CLICK MOBILITY  Turning over in bed (including adjusting bedclothes, sheets and blankets)?: 4  Sitting down on and standing up from a chair with arms (e.g., wheelchair, bedside commode, etc.): 3  Moving from lying on back to sitting on the side of the bed?: 3  Moving to and from a bed to a chair (including a wheelchair)?: 2  Need to walk in hospital room?: 1  Climbing 3-5 steps with a railing?: 1  Basic Mobility Total Score: 14       Treatment & Education: ALL BLE 20x  LAQs 2#, Hip ADD squeezes, seated marching with red TB, HS curls with red TB, hip EXT with red TB, Hip ABD with red TB, sit to  parallel bars 5x    Patient left up in chair with call button in reach.    GOALS:   Multidisciplinary Problems       Physical Therapy Goals          Problem: Physical Therapy    Goal Priority Disciplines Outcome Goal Variances Interventions   Physical Therapy Goal     PT, PT/OT Ongoing, Progressing     Description: STG - 2 weeks  1. Pt will amb 4 ft with min A x 2.  2. Pt will perform bed transfer with min A.  3. Pt will transfer sit to stand with CGA.    LTG - 4 weeks  1. Pt will amb 4 ft with CGA.  2.Pt will perform be transfer with SBA.  3. Pt will transfer sit to stand with SBA.  4. Pt will have BLE strength of 4/5.                           Time Tracking:     PT Received On: 03/09/23  PT Start Time: 0910     PT Stop Time: 0932  PT Total Time (min): 22 min     Billable Minutes: Therapeutic Activity 2 and Therapeutic Exercise 20    Treatment Type: Treatment  PT/PTA: PTA     PTA Visit Number: 2     03/09/2023

## 2023-03-09 NOTE — PLAN OF CARE
Patient discussed in multidisciplinary meeting. PT/OT reports patient is doing well. Will continue to receive iv abx until next weel. Will continue to follow patient for discharge needs.

## 2023-03-10 LAB
BACTERIA SPEC BFLD CULT: NORMAL
GLUCOSE SERPL-MCNC: 104 MG/DL (ref 70–105)
GLUCOSE SERPL-MCNC: 155 MG/DL (ref 70–105)
GLUCOSE SERPL-MCNC: 220 MG/DL (ref 70–105)
GLUCOSE SERPL-MCNC: 243 MG/DL (ref 70–105)

## 2023-03-10 PROCEDURE — 11000004 HC SNF PRIVATE

## 2023-03-10 PROCEDURE — 25000003 PHARM REV CODE 250: Performed by: HOSPITALIST

## 2023-03-10 PROCEDURE — 63600175 PHARM REV CODE 636 W HCPCS

## 2023-03-10 PROCEDURE — 27000958

## 2023-03-10 PROCEDURE — 82962 GLUCOSE BLOOD TEST: CPT

## 2023-03-10 PROCEDURE — 25000003 PHARM REV CODE 250

## 2023-03-10 PROCEDURE — 97530 THERAPEUTIC ACTIVITIES: CPT | Mod: CQ

## 2023-03-10 PROCEDURE — 99308 SBSQ NF CARE LOW MDM 20: CPT | Mod: ,,, | Performed by: HOSPITALIST

## 2023-03-10 PROCEDURE — 97110 THERAPEUTIC EXERCISES: CPT | Mod: CQ

## 2023-03-10 PROCEDURE — 63600175 PHARM REV CODE 636 W HCPCS: Performed by: HOSPITALIST

## 2023-03-10 PROCEDURE — 97530 THERAPEUTIC ACTIVITIES: CPT

## 2023-03-10 PROCEDURE — 97110 THERAPEUTIC EXERCISES: CPT

## 2023-03-10 PROCEDURE — 99308 PR NURSING FAC CARE, SUBSEQ, MINOR COMPLIC: ICD-10-PCS | Mod: ,,, | Performed by: HOSPITALIST

## 2023-03-10 PROCEDURE — A4216 STERILE WATER/SALINE, 10 ML: HCPCS | Performed by: HOSPITALIST

## 2023-03-10 RX ORDER — INSULIN ASPART 100 [IU]/ML
2 INJECTION, SOLUTION INTRAVENOUS; SUBCUTANEOUS
Status: DISCONTINUED | OUTPATIENT
Start: 2023-03-10 | End: 2023-03-13

## 2023-03-10 RX ADMIN — ALLOPURINOL 100 MG: 100 TABLET ORAL at 08:03

## 2023-03-10 RX ADMIN — CEFTRIAXONE SODIUM 2 G: 1 INJECTION, POWDER, FOR SOLUTION INTRAMUSCULAR; INTRAVENOUS at 05:03

## 2023-03-10 RX ADMIN — CALCITRIOL CAPSULES 0.25 MCG 0.25 MCG: 0.25 CAPSULE ORAL at 08:03

## 2023-03-10 RX ADMIN — TICAGRELOR 90 MG: 90 TABLET ORAL at 09:03

## 2023-03-10 RX ADMIN — CARVEDILOL 12.5 MG: 6.25 TABLET, FILM COATED ORAL at 08:03

## 2023-03-10 RX ADMIN — ATORVASTATIN CALCIUM 80 MG: 80 TABLET, FILM COATED ORAL at 08:03

## 2023-03-10 RX ADMIN — SODIUM BICARBONATE 650 MG: 650 TABLET ORAL at 09:03

## 2023-03-10 RX ADMIN — ASPIRIN 81 MG CHEWABLE TABLET 81 MG: 81 TABLET CHEWABLE at 08:03

## 2023-03-10 RX ADMIN — QUETIAPINE 50 MG: 25 TABLET ORAL at 09:03

## 2023-03-10 RX ADMIN — DULOXETINE 30 MG: 30 CAPSULE, DELAYED RELEASE ORAL at 08:03

## 2023-03-10 RX ADMIN — ENOXAPARIN SODIUM 30 MG: 100 INJECTION SUBCUTANEOUS at 05:03

## 2023-03-10 RX ADMIN — Medication 20 ML: at 11:03

## 2023-03-10 RX ADMIN — Medication 20 ML: at 05:03

## 2023-03-10 RX ADMIN — AMLODIPINE BESYLATE 5 MG: 5 TABLET ORAL at 08:03

## 2023-03-10 RX ADMIN — SODIUM BICARBONATE 650 MG: 650 TABLET ORAL at 02:03

## 2023-03-10 RX ADMIN — INSULIN ASPART 1 UNITS: 100 INJECTION, SOLUTION INTRAVENOUS; SUBCUTANEOUS at 09:03

## 2023-03-10 RX ADMIN — INSULIN ASPART 2 UNITS: 100 INJECTION, SOLUTION INTRAVENOUS; SUBCUTANEOUS at 05:03

## 2023-03-10 RX ADMIN — INSULIN DETEMIR 42 UNITS: 100 INJECTION, SOLUTION SUBCUTANEOUS at 09:03

## 2023-03-10 RX ADMIN — TICAGRELOR 90 MG: 90 TABLET ORAL at 08:03

## 2023-03-10 RX ADMIN — SODIUM BICARBONATE 650 MG: 650 TABLET ORAL at 08:03

## 2023-03-10 RX ADMIN — Medication 20 ML: at 12:03

## 2023-03-10 RX ADMIN — QUETIAPINE 50 MG: 25 TABLET ORAL at 08:03

## 2023-03-10 RX ADMIN — CARVEDILOL 12.5 MG: 6.25 TABLET, FILM COATED ORAL at 09:03

## 2023-03-10 RX ADMIN — PANTOPRAZOLE SODIUM 40 MG: 40 TABLET, DELAYED RELEASE ORAL at 08:03

## 2023-03-10 RX ADMIN — INSULIN ASPART 2 UNITS: 100 INJECTION, SOLUTION INTRAVENOUS; SUBCUTANEOUS at 11:03

## 2023-03-10 NOTE — PT/OT/SLP PROGRESS
Physical Therapy Treatment    Patient Name:  Jessica Bay   MRN:  [id number]    Recommendations:     Discharge Recommendations: home with home health  Discharge Equipment Recommendations: bedside commode, hospital bed  Barriers to discharge: Decreased caregiver support    Assessment:     Jessica Bay is a 67 y.o. female admitted with a medical diagnosis of Bacterial meningitis.  She presents with the following impairments/functional limitations: weakness, impaired endurance, impaired self care skills, impaired functional mobility, gait instability, impaired balance, decreased lower extremity function, decreased coordination. Patient was provided with visual and verbal cues for proper form of therapy tasks. Patient able to transfer with less assistance this date as noted below. Patient with no reports of increased pain or adverse effects after completion of treatment.     Rehab Prognosis: Fair; patient would benefit from acute skilled PT services to address these deficits and reach maximum level of function.    Recent Surgery: * No surgery found *      Plan:     During this hospitalization, patient to be seen 5 x/week to address the identified rehab impairments via gait training, therapeutic activities, therapeutic exercises, wheelchair management/training and progress toward the following goals:    Plan of Care Expires:  04/07/23    Subjective     Chief Complaint: Patient stated that she had pain in her low back.   Patient/Family Comments/goals: DC home.  Pain/Comfort:  Pain Rating 1: 3/10  Location - Side 1: Bilateral  Location - Orientation 1: lower  Location 1: back  Pain Addressed 1: Reposition      Objective:     Communicated with pt prior to session. Patient found sitting edge of bed with PICC line upon PTA entry to room.     General Precautions: Standard, fall, hearing impaired  Orthopedic Precautions: N/A  Braces: N/A  Respiratory Status: Room air     Functional Mobility:  Bed to bedside commode: SBA  "using stand pivot transfer  Sit to stand: from EOB with SBA, from bedside commode with modified independence   Bedside commode to power wc with modified independence       AM-PAC 6 CLICK MOBILITY  Turning over in bed (including adjusting bedclothes, sheets and blankets)?: 4  Sitting down on and standing up from a chair with arms (e.g., wheelchair, bedside commode, etc.): 4  Moving from lying on back to sitting on the side of the bed?: 3  Moving to and from a bed to a chair (including a wheelchair)?: 4  Need to walk in hospital room?: 2  Climbing 3-5 steps with a railing?: 1  Basic Mobility Total Score: 18       Treatment & Education: ALL BLE 20x  LAQs 2#, Hip ADD squeezes with 3" hold, seated marching with red TB, HS curls with red TB, Glute sets with 3" hold, Hip ABD with red TB    Patient left up in chair with  CNA present and call button in sight .    GOALS:   Multidisciplinary Problems       Physical Therapy Goals          Problem: Physical Therapy    Goal Priority Disciplines Outcome Goal Variances Interventions   Physical Therapy Goal     PT, PT/OT Ongoing, Progressing     Description: STG - 2 weeks  1. Pt will amb 4 ft with min A x 2.  2. Pt will perform bed transfer with min A.  3. Pt will transfer sit to stand with CGA.    LTG - 4 weeks  1. Pt will amb 4 ft with CGA.  2.Pt will perform be transfer with SBA.  3. Pt will transfer sit to stand with SBA.  4. Pt will have BLE strength of 4/5.                           Time Tracking:     PT Received On: 03/10/23  PT Start Time: 0930     PT Stop Time: 1000  PT Total Time (min): 30 min     Billable Minutes: Therapeutic Activity 8 and Therapeutic Exercise 22    Treatment Type: Treatment  PT/PTA: PTA     PTA Visit Number: 3     03/10/2023  "

## 2023-03-10 NOTE — SUBJECTIVE & OBJECTIVE
Interval History: still with hearing trouble. Working with therapy.      Review of Systems   HENT:          Hearing diminished    Respiratory:  Negative for shortness of breath.    Cardiovascular:  Negative for chest pain.   Gastrointestinal:  Negative for abdominal pain, nausea and vomiting.   Psychiatric/Behavioral:  Negative for agitation and confusion.    All other systems reviewed and are negative.  Objective:     Vital Signs (Most Recent):  Temp: 98.2 °F (36.8 °C) (03/10/23 0702)  Pulse: 83 (03/10/23 0702)  Resp: 20 (03/10/23 0702)  BP: (!) 136/49 (03/10/23 0702)  SpO2: 99 % (03/10/23 0702)   Vital Signs (24h Range):  Temp:  [98 °F (36.7 °C)-98.2 °F (36.8 °C)] 98.2 °F (36.8 °C)  Pulse:  [83-93] 83  Resp:  [20] 20  SpO2:  [97 %-99 %] 99 %  BP: (114-136)/(49-62) 136/49     Weight: 98.4 kg (216 lb 14.9 oz)  Body mass index is 38.43 kg/m².    Intake/Output Summary (Last 24 hours) at 3/10/2023 1157  Last data filed at 3/9/2023 2210  Gross per 24 hour   Intake 480 ml   Output --   Net 480 ml      Physical Exam  Vitals and nursing note reviewed.   Constitutional:       General: She is not in acute distress.     Appearance: Normal appearance. She is normal weight. She is not ill-appearing, toxic-appearing or diaphoretic.   HENT:      Head: Normocephalic and atraumatic.      Right Ear: There is no impacted cerumen.      Left Ear: There is no impacted cerumen.      Ears:      Comments: Hearing diminished      Nose: Nose normal. No congestion or rhinorrhea.      Mouth/Throat:      Mouth: Mucous membranes are moist.   Eyes:      Extraocular Movements: Extraocular movements intact.      Conjunctiva/sclera: Conjunctivae normal.      Pupils: Pupils are equal, round, and reactive to light.   Cardiovascular:      Rate and Rhythm: Normal rate and regular rhythm.      Pulses: Normal pulses.      Heart sounds: Normal heart sounds. No murmur heard.    No friction rub. No gallop.   Pulmonary:      Effort: Pulmonary effort is  normal. No respiratory distress.      Breath sounds: Normal breath sounds. No stridor. No wheezing or rhonchi.   Chest:      Chest wall: No tenderness.   Abdominal:      General: Abdomen is flat. Bowel sounds are normal. There is no distension.      Palpations: Abdomen is soft.      Tenderness: There is no abdominal tenderness. There is no guarding.   Musculoskeletal:         General: No swelling or tenderness. Normal range of motion.      Cervical back: Normal range of motion.   Skin:     General: Skin is warm and dry.      Capillary Refill: Capillary refill takes less than 2 seconds.      Coloration: Skin is not jaundiced or pale.   Neurological:      General: No focal deficit present.      Mental Status: She is alert and oriented to person, place, and time.      Cranial Nerves: No cranial nerve deficit.      Sensory: No sensory deficit.      Motor: Weakness present.   Psychiatric:         Mood and Affect: Mood normal.         Behavior: Behavior normal.         Thought Content: Thought content normal.         Judgment: Judgment normal.       Significant Labs: All pertinent labs within the past 24 hours have been reviewed.  Recent Lab Results         03/10/23  1100   03/10/23  0554   03/09/23  2032   03/09/23  1724        POC Glucose 220   104   217   183               Significant Imaging: I have reviewed all pertinent imaging results/findings within the past 24 hours.

## 2023-03-10 NOTE — HOSPITAL COURSE
3/10 doing well but still with hearing difficulty     3/13 still w hearing trouble, doing some better with therapy.  Labs reviewed today, increased insulin     3/16 DC home today, will need Lift chair at home for standing assistance due to debility.  DC PICC line.  Finished ABX.  Follow up with PCP and referred for hearing eval.

## 2023-03-10 NOTE — PLAN OF CARE
Problem: Fall Injury Risk  Goal: Absence of Fall and Fall-Related Injury  Outcome: Ongoing, Progressing  Intervention: Promote Injury-Free Environment  Flowsheets (Taken 3/10/2023 2278)  Safety Promotion/Fall Prevention:   instructed to call staff for mobility   room near unit station   Plan of care reviewed with patient. Patients status ongoing progressing.

## 2023-03-10 NOTE — PROGRESS NOTES
[facility]  [facility] Medicine  Progress Note    Patient Name: Jessica Bay  MRN: [id number]  Patient Class: IP- Swing   Admission Date: 3/3/2023  Length of Stay: 7 days  Attending Physician: Clyde Flood DO  Primary Care Provider: Jag Lopez MD        Subjective:     Principal Problem:Bacterial meningitis        HPI:  Patient is a 67-year-old female with a past medical history of diabetes, history of kidney cancer status post nephrectomy, has single kidney, dyslipidemia, hypertension, coronary artery disease status post NSTEMI November 2022 and stent placement to the RCA, history of fibromyalgia, and chronic kidney disease.  Patient was admitted to patient was transferred from [facility] to [facility] on 02/12/2023 with altered mental status and fever, patient has a history of meningitis in 2015.  Patient was admitted to the hospital underwent lumbar puncture which showed evidence of acute meningitis, but patient see an F cultures remain negative, g stain showed no organisms.  Her urine grew out E coli and  her blood culture on February 12th grew out Staphylococcus pneumonia since his to Rocephin.  Patient was continued on Rocephin bacteremia dose and will continue to need 2-4 weeks of IV Rocephin 2 g daily.      Overview/Hospital Course:  3/10 doing well but still with hearing difficulty       Interval History: still with hearing trouble. Working with therapy.      Review of Systems   HENT:          Hearing diminished    Respiratory:  Negative for shortness of breath.    Cardiovascular:  Negative for chest pain.   Gastrointestinal:  Negative for abdominal pain, nausea and vomiting.   Psychiatric/Behavioral:  Negative for agitation and confusion.    All other systems reviewed and are negative.  Objective:     Vital Signs (Most Recent):  Temp: 98.2 °F (36.8 °C) (03/10/23 0702)  Pulse: 83 (03/10/23 0702)  Resp: 20 (03/10/23  0702)  BP: (!) 136/49 (03/10/23 0702)  SpO2: 99 % (03/10/23 0702)   Vital Signs (24h Range):  Temp:  [98 °F (36.7 °C)-98.2 °F (36.8 °C)] 98.2 °F (36.8 °C)  Pulse:  [83-93] 83  Resp:  [20] 20  SpO2:  [97 %-99 %] 99 %  BP: (114-136)/(49-62) 136/49     Weight: 98.4 kg (216 lb 14.9 oz)  Body mass index is 38.43 kg/m².    Intake/Output Summary (Last 24 hours) at 3/10/2023 1157  Last data filed at 3/9/2023 2210  Gross per 24 hour   Intake 480 ml   Output --   Net 480 ml      Physical Exam  Vitals and nursing note reviewed.   Constitutional:       General: She is not in acute distress.     Appearance: Normal appearance. She is normal weight. She is not ill-appearing, toxic-appearing or diaphoretic.   HENT:      Head: Normocephalic and atraumatic.      Right Ear: There is no impacted cerumen.      Left Ear: There is no impacted cerumen.      Ears:      Comments: Hearing diminished      Nose: Nose normal. No congestion or rhinorrhea.      Mouth/Throat:      Mouth: Mucous membranes are moist.   Eyes:      Extraocular Movements: Extraocular movements intact.      Conjunctiva/sclera: Conjunctivae normal.      Pupils: Pupils are equal, round, and reactive to light.   Cardiovascular:      Rate and Rhythm: Normal rate and regular rhythm.      Pulses: Normal pulses.      Heart sounds: Normal heart sounds. No murmur heard.    No friction rub. No gallop.   Pulmonary:      Effort: Pulmonary effort is normal. No respiratory distress.      Breath sounds: Normal breath sounds. No stridor. No wheezing or rhonchi.   Chest:      Chest wall: No tenderness.   Abdominal:      General: Abdomen is flat. Bowel sounds are normal. There is no distension.      Palpations: Abdomen is soft.      Tenderness: There is no abdominal tenderness. There is no guarding.   Musculoskeletal:         General: No swelling or tenderness. Normal range of motion.      Cervical back: Normal range of motion.   Skin:     General: Skin is warm and dry.      Capillary  Refill: Capillary refill takes less than 2 seconds.      Coloration: Skin is not jaundiced or pale.   Neurological:      General: No focal deficit present.      Mental Status: She is alert and oriented to person, place, and time.      Cranial Nerves: No cranial nerve deficit.      Sensory: No sensory deficit.      Motor: Weakness present.   Psychiatric:         Mood and Affect: Mood normal.         Behavior: Behavior normal.         Thought Content: Thought content normal.         Judgment: Judgment normal.       Significant Labs: All pertinent labs within the past 24 hours have been reviewed.  Recent Lab Results         03/10/23  1100   03/10/23  0554   03/09/23  2032 03/09/23  1724        POC Glucose 220   104   217   183               Significant Imaging: I have reviewed all pertinent imaging results/findings within the past 24 hours.      Assessment/Plan:      * Bacterial meningitis  Continue with ABX.  Monitor symptoms.       Hearing loss    Unsure if related to meningitis or not. Follow.  Will need outpatient eval.     Type 2 diabetes mellitus with diabetic chronic kidney disease  Patient's FSGs are controlled on current medication regimen.  Last A1c reviewed-   Lab Results   Component Value Date    HGBA1C 8.3 (H) 03/04/2023     Most recent fingerstick glucose reviewed- No results for input(s): POCTGLUCOSE in the last 24 hours.  Current correctional scale  Low  Maintain anti-hyperglycemic dose as follows-   Antihyperglycemics (From admission, onward)    Start     Stop Route Frequency Ordered    03/04/23 2100  insulin detemir U-100 injection 40 Units         -- SubQ Nightly 03/04/23 1841    03/04/23 0009  insulin aspart U-100 injection 0-5 Units         -- SubQ Before meals & nightly PRN 03/03/23 2309        Hold Oral hypoglycemics while patient is in the hospital.    CKD (chronic kidney disease), stage IV  Avoid nephrotoxic drugs.  Monitor labs.        Dyslipidemia  Continue home  meds      Hypertension  Continue home meds        VTE Risk Mitigation (From admission, onward)         Ordered     enoxaparin injection 30 mg  Daily         03/03/23 2237     IP VTE HIGH RISK PATIENT  Once         03/03/23 2237                Discharge Planning   RAMOS:      Code Status: Full Code   Is the patient medically ready for discharge?:     Reason for patient still in hospital (select all that apply): Patient trending condition and Treatment  Discharge Plan A: Home with family, Home Health                  Clyde Flood DO  Department of Hospital Medicine   [facility]

## 2023-03-10 NOTE — PT/OT/SLP PROGRESS
Occupational Therapy   Treatment    Name: Jessica Bay  MRN: [id number]  Admitting Diagnosis:  Bacterial meningitis       Recommendations:     Discharge Recommendations: home health OT, home health PT, home with home health  Discharge Equipment Recommendations:  3-in-1 commode, hip kit, hospital bed  Barriers to discharge:  None    Assessment:     Jessica Bay is a 67 y.o. female with a medical diagnosis of Bacterial meningitis.  She presents with sleeping initially, needing a little encouragement to wake up and participate. Performance deficits affecting function are weakness, impaired endurance, impaired self care skills, impaired functional mobility, gait instability, impaired balance, decreased lower extremity function.     Rehab Prognosis:  Good; patient would benefit from acute skilled OT services to address these deficits and reach maximum level of function.       Plan:     Patient to be seen 5 x/week to address the above listed problems via self-care/home management, therapeutic activities, therapeutic exercises, wheelchair management/training  Plan of Care Expires: 04/07/23  Plan of Care Reviewed with: patient, spouse    Subjective     Pain/Comfort:   No significant discomfort    Objective:     Communicated with: pt prior to session.  Patient found right sidelying with PICC line upon OT entry to room.    General Precautions: Standard, fall, hearing impaired    Orthopedic Precautions:N/A  Braces: N/A  Respiratory Status: Room air     Occupational Performance:     Bed Mobility:    Patient completed Scooting/Bridging with supervision  Patient completed Supine to Sit with supervision     Functional Mobility/Transfers:  Patient completed Sit <> Stand Transfer with supervision  with  grab bars(s)   Patient completed Bed <> Chair Transfer using Step Transfer technique with supervision with grab bars(s)  Functional Mobility: used PWC to get from her room to OT clinic today    Activities of Daily  Living:  Basically, pt is completing UBE with setup and LB with min a; toileting is completed with mod I with BSC at bedside now per pt report.      [facility] 6 Click ADL:      Treatment & Education:  OT engaged pt in UBE x 5 min with low resist, then green theraputty for small object search FM and  strength training. Pt handled all very well.    Patient left  up in PWC about to complete BSC transfer I'ly  with call button in reach    GOALS:   Multidisciplinary Problems       Occupational Therapy Goals          Problem: Occupational Therapy    Goal Priority Disciplines Outcome Interventions   Occupational Therapy Goal     OT, PT/OT Ongoing, Progressing    Description: STG: within 2 weeks  Pt will perform grooming with setup at sinkside from w/c  Pt will bathe with min a at bedside  Pt will perform UE dressing with svn  Pt will perform LE dressing with min a equipment as needed  Pt will sit EOB x 30 min with svn assistance  Pt will transfer bed/chair/bsc with mod a with AE as needed  Pt will perform standing task x 1 min with mod assistance  Pt will tolerate 45 minutes of tx without fatigue      LT.Restore to max I with self care and mobility.                          Time Tracking:     OT Date of Treatment: 03/10/23  OT Start Time: 1400  OT Stop Time: 1443  OT Total Time (min): 43 min    Billable Minutes:Therapeutic Activity 28  Therapeutic Exercise 15    OT/GLADYS: OT          3/10/2023

## 2023-03-11 LAB
ANION GAP SERPL CALCULATED.3IONS-SCNC: 12 MMOL/L (ref 7–16)
BASOPHILS # BLD AUTO: 0.02 K/UL (ref 0–0.2)
BASOPHILS NFR BLD AUTO: 0.4 % (ref 0–1)
BUN SERPL-MCNC: 51 MG/DL (ref 7–18)
BUN/CREAT SERPL: 16 (ref 6–20)
CALCIUM SERPL-MCNC: 9 MG/DL (ref 8.5–10.1)
CHLORIDE SERPL-SCNC: 107 MMOL/L (ref 98–107)
CO2 SERPL-SCNC: 26 MMOL/L (ref 21–32)
CREAT SERPL-MCNC: 3.14 MG/DL (ref 0.55–1.02)
DIFFERENTIAL METHOD BLD: ABNORMAL
EGFR (NO RACE VARIABLE) (RUSH/TITUS): 16 ML/MIN/1.73M²
EOSINOPHIL # BLD AUTO: 0.32 K/UL (ref 0–0.5)
EOSINOPHIL NFR BLD AUTO: 6.3 % (ref 1–4)
ERYTHROCYTE [DISTWIDTH] IN BLOOD BY AUTOMATED COUNT: 16.6 % (ref 11.5–14.5)
GLUCOSE SERPL-MCNC: 124 MG/DL (ref 70–105)
GLUCOSE SERPL-MCNC: 128 MG/DL (ref 74–106)
GLUCOSE SERPL-MCNC: 210 MG/DL (ref 70–105)
GLUCOSE SERPL-MCNC: 211 MG/DL (ref 70–105)
GLUCOSE SERPL-MCNC: 254 MG/DL (ref 70–105)
HCT VFR BLD AUTO: 27.7 % (ref 38–47)
HGB BLD-MCNC: 8.5 G/DL (ref 12–16)
LYMPHOCYTES # BLD AUTO: 1.75 K/UL (ref 1–4.8)
LYMPHOCYTES NFR BLD AUTO: 34.5 % (ref 27–41)
MCH RBC QN AUTO: 32.4 PG (ref 27–31)
MCHC RBC AUTO-ENTMCNC: 30.7 G/DL (ref 32–36)
MCV RBC AUTO: 105.7 FL (ref 80–96)
MONOCYTES # BLD AUTO: 0.55 K/UL (ref 0–0.8)
MONOCYTES NFR BLD AUTO: 10.8 % (ref 2–6)
MPC BLD CALC-MCNC: 9.8 FL (ref 9.4–12.4)
NEUTROPHILS # BLD AUTO: 2.43 K/UL (ref 1.8–7.7)
NEUTROPHILS NFR BLD AUTO: 48 % (ref 53–65)
PLATELET # BLD AUTO: 274 K/UL (ref 150–400)
POTASSIUM SERPL-SCNC: 3.7 MMOL/L (ref 3.5–5.1)
RBC # BLD AUTO: 2.62 M/UL (ref 4.2–5.4)
SODIUM SERPL-SCNC: 141 MMOL/L (ref 136–145)
WBC # BLD AUTO: 5.07 K/UL (ref 4.5–11)

## 2023-03-11 PROCEDURE — 63600175 PHARM REV CODE 636 W HCPCS: Performed by: HOSPITALIST

## 2023-03-11 PROCEDURE — 80048 BASIC METABOLIC PNL TOTAL CA: CPT

## 2023-03-11 PROCEDURE — 11000004 HC SNF PRIVATE

## 2023-03-11 PROCEDURE — 85025 COMPLETE CBC W/AUTO DIFF WBC: CPT

## 2023-03-11 PROCEDURE — 27000958

## 2023-03-11 PROCEDURE — 82962 GLUCOSE BLOOD TEST: CPT

## 2023-03-11 PROCEDURE — 63600175 PHARM REV CODE 636 W HCPCS

## 2023-03-11 PROCEDURE — 25000003 PHARM REV CODE 250: Performed by: HOSPITALIST

## 2023-03-11 PROCEDURE — 25000003 PHARM REV CODE 250

## 2023-03-11 PROCEDURE — A4216 STERILE WATER/SALINE, 10 ML: HCPCS | Performed by: HOSPITALIST

## 2023-03-11 RX ADMIN — INSULIN ASPART 2 UNITS: 100 INJECTION, SOLUTION INTRAVENOUS; SUBCUTANEOUS at 04:03

## 2023-03-11 RX ADMIN — SODIUM BICARBONATE 650 MG: 650 TABLET ORAL at 08:03

## 2023-03-11 RX ADMIN — CEFTRIAXONE SODIUM 2 G: 1 INJECTION, POWDER, FOR SOLUTION INTRAMUSCULAR; INTRAVENOUS at 05:03

## 2023-03-11 RX ADMIN — TICAGRELOR 90 MG: 90 TABLET ORAL at 08:03

## 2023-03-11 RX ADMIN — DULOXETINE 30 MG: 30 CAPSULE, DELAYED RELEASE ORAL at 08:03

## 2023-03-11 RX ADMIN — ATORVASTATIN CALCIUM 80 MG: 80 TABLET, FILM COATED ORAL at 08:03

## 2023-03-11 RX ADMIN — ENOXAPARIN SODIUM 30 MG: 100 INJECTION SUBCUTANEOUS at 04:03

## 2023-03-11 RX ADMIN — Medication 20 ML: at 05:03

## 2023-03-11 RX ADMIN — ASPIRIN 81 MG CHEWABLE TABLET 81 MG: 81 TABLET CHEWABLE at 08:03

## 2023-03-11 RX ADMIN — AMLODIPINE BESYLATE 5 MG: 5 TABLET ORAL at 08:03

## 2023-03-11 RX ADMIN — Medication 20 ML: at 12:03

## 2023-03-11 RX ADMIN — CARVEDILOL 12.5 MG: 6.25 TABLET, FILM COATED ORAL at 08:03

## 2023-03-11 RX ADMIN — QUETIAPINE 50 MG: 25 TABLET ORAL at 08:03

## 2023-03-11 RX ADMIN — PANTOPRAZOLE SODIUM 40 MG: 40 TABLET, DELAYED RELEASE ORAL at 08:03

## 2023-03-11 RX ADMIN — INSULIN ASPART 2 UNITS: 100 INJECTION, SOLUTION INTRAVENOUS; SUBCUTANEOUS at 11:03

## 2023-03-11 RX ADMIN — Medication 20 ML: at 11:03

## 2023-03-11 RX ADMIN — INSULIN ASPART 1 UNITS: 100 INJECTION, SOLUTION INTRAVENOUS; SUBCUTANEOUS at 08:03

## 2023-03-11 RX ADMIN — ALLOPURINOL 100 MG: 100 TABLET ORAL at 08:03

## 2023-03-11 RX ADMIN — INSULIN ASPART 2 UNITS: 100 INJECTION, SOLUTION INTRAVENOUS; SUBCUTANEOUS at 07:03

## 2023-03-11 RX ADMIN — INSULIN DETEMIR 42 UNITS: 100 INJECTION, SOLUTION SUBCUTANEOUS at 08:03

## 2023-03-11 RX ADMIN — CALCITRIOL CAPSULES 0.25 MCG 0.25 MCG: 0.25 CAPSULE ORAL at 08:03

## 2023-03-11 RX ADMIN — SODIUM BICARBONATE 650 MG: 650 TABLET ORAL at 03:03

## 2023-03-11 NOTE — NURSING
Nurses Note -- 4 Eyes      3/11/2023   2:46 PM      Skin assessed during: Daily Assessment      [x] No Pressure Injuries Present    []Prevention Measures Documented      [] Yes- Altered Skin Integrity Present or Discovered   [] LDA Added if Not in Epic (Describe Wound)   [] New Altered Skin Integrity was Present on Admit and Documented in LDA   [] Wound Image Taken    Wound Care Consulted? No    Attending Nurse:  Za Adler LPN     Second RN/Staff Member: VERONICA Stauffer RN

## 2023-03-12 LAB
GLUCOSE SERPL-MCNC: 198 MG/DL (ref 70–105)
GLUCOSE SERPL-MCNC: 205 MG/DL (ref 70–105)
GLUCOSE SERPL-MCNC: 238 MG/DL (ref 70–105)
GLUCOSE SERPL-MCNC: 93 MG/DL (ref 70–105)

## 2023-03-12 PROCEDURE — 25000003 PHARM REV CODE 250: Performed by: HOSPITALIST

## 2023-03-12 PROCEDURE — 82962 GLUCOSE BLOOD TEST: CPT

## 2023-03-12 PROCEDURE — 27000958

## 2023-03-12 PROCEDURE — 63600175 PHARM REV CODE 636 W HCPCS: Performed by: HOSPITALIST

## 2023-03-12 PROCEDURE — 25000003 PHARM REV CODE 250

## 2023-03-12 PROCEDURE — 63600175 PHARM REV CODE 636 W HCPCS

## 2023-03-12 PROCEDURE — 11000004 HC SNF PRIVATE

## 2023-03-12 PROCEDURE — A4216 STERILE WATER/SALINE, 10 ML: HCPCS | Performed by: HOSPITALIST

## 2023-03-12 RX ADMIN — SODIUM BICARBONATE 650 MG: 650 TABLET ORAL at 08:03

## 2023-03-12 RX ADMIN — INSULIN DETEMIR 42 UNITS: 100 INJECTION, SOLUTION SUBCUTANEOUS at 08:03

## 2023-03-12 RX ADMIN — TICAGRELOR 90 MG: 90 TABLET ORAL at 08:03

## 2023-03-12 RX ADMIN — INSULIN ASPART 2 UNITS: 100 INJECTION, SOLUTION INTRAVENOUS; SUBCUTANEOUS at 04:03

## 2023-03-12 RX ADMIN — CARVEDILOL 12.5 MG: 6.25 TABLET, FILM COATED ORAL at 09:03

## 2023-03-12 RX ADMIN — Medication 10 ML: at 06:03

## 2023-03-12 RX ADMIN — QUETIAPINE 50 MG: 25 TABLET ORAL at 08:03

## 2023-03-12 RX ADMIN — PANTOPRAZOLE SODIUM 40 MG: 40 TABLET, DELAYED RELEASE ORAL at 09:03

## 2023-03-12 RX ADMIN — INSULIN ASPART 2 UNITS: 100 INJECTION, SOLUTION INTRAVENOUS; SUBCUTANEOUS at 05:03

## 2023-03-12 RX ADMIN — ATORVASTATIN CALCIUM 80 MG: 80 TABLET, FILM COATED ORAL at 09:03

## 2023-03-12 RX ADMIN — INSULIN ASPART 2 UNITS: 100 INJECTION, SOLUTION INTRAVENOUS; SUBCUTANEOUS at 11:03

## 2023-03-12 RX ADMIN — CEFTRIAXONE SODIUM 2 G: 1 INJECTION, POWDER, FOR SOLUTION INTRAMUSCULAR; INTRAVENOUS at 05:03

## 2023-03-12 RX ADMIN — ASPIRIN 81 MG CHEWABLE TABLET 81 MG: 81 TABLET CHEWABLE at 09:03

## 2023-03-12 RX ADMIN — DULOXETINE 30 MG: 30 CAPSULE, DELAYED RELEASE ORAL at 09:03

## 2023-03-12 RX ADMIN — SODIUM BICARBONATE 650 MG: 650 TABLET ORAL at 03:03

## 2023-03-12 RX ADMIN — INSULIN ASPART 2 UNITS: 100 INJECTION, SOLUTION INTRAVENOUS; SUBCUTANEOUS at 08:03

## 2023-03-12 RX ADMIN — Medication 20 ML: at 06:03

## 2023-03-12 RX ADMIN — QUETIAPINE 50 MG: 25 TABLET ORAL at 09:03

## 2023-03-12 RX ADMIN — Medication 20 ML: at 11:03

## 2023-03-12 RX ADMIN — ALLOPURINOL 100 MG: 100 TABLET ORAL at 09:03

## 2023-03-12 RX ADMIN — ENOXAPARIN SODIUM 30 MG: 100 INJECTION SUBCUTANEOUS at 05:03

## 2023-03-12 RX ADMIN — CALCITRIOL CAPSULES 0.25 MCG 0.25 MCG: 0.25 CAPSULE ORAL at 09:03

## 2023-03-12 RX ADMIN — CARVEDILOL 12.5 MG: 6.25 TABLET, FILM COATED ORAL at 08:03

## 2023-03-12 RX ADMIN — SODIUM BICARBONATE 650 MG: 650 TABLET ORAL at 09:03

## 2023-03-12 RX ADMIN — TICAGRELOR 90 MG: 90 TABLET ORAL at 09:03

## 2023-03-12 RX ADMIN — AMLODIPINE BESYLATE 5 MG: 5 TABLET ORAL at 09:03

## 2023-03-12 RX ADMIN — Medication 20 ML: at 12:03

## 2023-03-12 NOTE — NURSING
Nurses Note -- 4 Eyes      03/11/2023  7:05 PM      Skin assessed during: Q Shift Change      [] No Pressure Injuries Present    [x]Prevention Measures Documented      [x] Yes- Altered Skin Integrity Present or Discovered   [] LDA Added if Not in Epic (Describe Wound)   [] New Altered Skin Integrity was Present on Admit and Documented in LDA   [] Wound Image Taken    Wound Care Consulted? No    Attending Nurse:  Claudean M Mitchell, LPN     Second RN/Staff Member:  Za Adler LPN

## 2023-03-13 LAB
GLUCOSE SERPL-MCNC: 146 MG/DL (ref 70–105)
GLUCOSE SERPL-MCNC: 189 MG/DL (ref 70–105)
GLUCOSE SERPL-MCNC: 191 MG/DL (ref 70–105)
GLUCOSE SERPL-MCNC: 240 MG/DL (ref 70–105)

## 2023-03-13 PROCEDURE — 97530 THERAPEUTIC ACTIVITIES: CPT

## 2023-03-13 PROCEDURE — 25000003 PHARM REV CODE 250

## 2023-03-13 PROCEDURE — 97110 THERAPEUTIC EXERCISES: CPT | Mod: CQ

## 2023-03-13 PROCEDURE — 63600175 PHARM REV CODE 636 W HCPCS

## 2023-03-13 PROCEDURE — 63600175 PHARM REV CODE 636 W HCPCS: Performed by: HOSPITALIST

## 2023-03-13 PROCEDURE — 99308 SBSQ NF CARE LOW MDM 20: CPT | Mod: ,,, | Performed by: HOSPITALIST

## 2023-03-13 PROCEDURE — 82962 GLUCOSE BLOOD TEST: CPT

## 2023-03-13 PROCEDURE — 11000004 HC SNF PRIVATE

## 2023-03-13 PROCEDURE — 25000003 PHARM REV CODE 250: Performed by: HOSPITALIST

## 2023-03-13 PROCEDURE — 27000958

## 2023-03-13 PROCEDURE — 97116 GAIT TRAINING THERAPY: CPT | Mod: CQ

## 2023-03-13 PROCEDURE — A4216 STERILE WATER/SALINE, 10 ML: HCPCS | Performed by: HOSPITALIST

## 2023-03-13 PROCEDURE — 99308 PR NURSING FAC CARE, SUBSEQ, MINOR COMPLIC: ICD-10-PCS | Mod: ,,, | Performed by: HOSPITALIST

## 2023-03-13 PROCEDURE — 97110 THERAPEUTIC EXERCISES: CPT

## 2023-03-13 RX ORDER — INSULIN ASPART 100 [IU]/ML
3 INJECTION, SOLUTION INTRAVENOUS; SUBCUTANEOUS
Status: DISCONTINUED | OUTPATIENT
Start: 2023-03-13 | End: 2023-03-16 | Stop reason: HOSPADM

## 2023-03-13 RX ADMIN — CARVEDILOL 12.5 MG: 6.25 TABLET, FILM COATED ORAL at 09:03

## 2023-03-13 RX ADMIN — ASPIRIN 81 MG CHEWABLE TABLET 81 MG: 81 TABLET CHEWABLE at 08:03

## 2023-03-13 RX ADMIN — Medication 20 ML: at 05:03

## 2023-03-13 RX ADMIN — QUETIAPINE 50 MG: 25 TABLET ORAL at 09:03

## 2023-03-13 RX ADMIN — Medication 20 ML: at 11:03

## 2023-03-13 RX ADMIN — CALCITRIOL CAPSULES 0.25 MCG 0.25 MCG: 0.25 CAPSULE ORAL at 08:03

## 2023-03-13 RX ADMIN — ACETAMINOPHEN 650 MG: 325 TABLET ORAL at 08:03

## 2023-03-13 RX ADMIN — INSULIN ASPART 2 UNITS: 100 INJECTION, SOLUTION INTRAVENOUS; SUBCUTANEOUS at 05:03

## 2023-03-13 RX ADMIN — ENOXAPARIN SODIUM 30 MG: 100 INJECTION SUBCUTANEOUS at 05:03

## 2023-03-13 RX ADMIN — PANTOPRAZOLE SODIUM 40 MG: 40 TABLET, DELAYED RELEASE ORAL at 08:03

## 2023-03-13 RX ADMIN — SODIUM BICARBONATE 650 MG: 650 TABLET ORAL at 08:03

## 2023-03-13 RX ADMIN — AMLODIPINE BESYLATE 5 MG: 5 TABLET ORAL at 08:03

## 2023-03-13 RX ADMIN — DULOXETINE 30 MG: 30 CAPSULE, DELAYED RELEASE ORAL at 09:03

## 2023-03-13 RX ADMIN — SODIUM BICARBONATE 650 MG: 650 TABLET ORAL at 03:03

## 2023-03-13 RX ADMIN — Medication 20 ML: at 06:03

## 2023-03-13 RX ADMIN — QUETIAPINE 50 MG: 25 TABLET ORAL at 08:03

## 2023-03-13 RX ADMIN — ATORVASTATIN CALCIUM 80 MG: 80 TABLET, FILM COATED ORAL at 08:03

## 2023-03-13 RX ADMIN — SODIUM BICARBONATE 650 MG: 650 TABLET ORAL at 09:03

## 2023-03-13 RX ADMIN — TICAGRELOR 90 MG: 90 TABLET ORAL at 09:03

## 2023-03-13 RX ADMIN — ALLOPURINOL 100 MG: 100 TABLET ORAL at 08:03

## 2023-03-13 RX ADMIN — INSULIN ASPART 2 UNITS: 100 INJECTION, SOLUTION INTRAVENOUS; SUBCUTANEOUS at 11:03

## 2023-03-13 RX ADMIN — INSULIN ASPART 2 UNITS: 100 INJECTION, SOLUTION INTRAVENOUS; SUBCUTANEOUS at 08:03

## 2023-03-13 RX ADMIN — CYCLOBENZAPRINE HYDROCHLORIDE 5 MG: 5 TABLET, FILM COATED ORAL at 09:03

## 2023-03-13 RX ADMIN — CARVEDILOL 12.5 MG: 6.25 TABLET, FILM COATED ORAL at 08:03

## 2023-03-13 RX ADMIN — INSULIN DETEMIR 44 UNITS: 100 INJECTION, SOLUTION SUBCUTANEOUS at 09:03

## 2023-03-13 RX ADMIN — TICAGRELOR 90 MG: 90 TABLET ORAL at 08:03

## 2023-03-13 RX ADMIN — INSULIN ASPART 3 UNITS: 100 INJECTION, SOLUTION INTRAVENOUS; SUBCUTANEOUS at 05:03

## 2023-03-13 NOTE — PLAN OF CARE
Problem: Physical Therapy  Goal: Physical Therapy Goal  Description: STG - 2 weeks  1. Pt will amb 4 ft with min A x 2.-MET  2. Pt will perform bed transfer with min A.-MET  3. Pt will transfer sit to stand with CGA.-MET    LTG - 4 weeks  1. Pt will amb 4 ft with CGA.-MET  2.Pt will perform bed transfer with SBA.-MET  3. Pt will transfer sit to stand with SBA.-MET  4. Pt will have BLE strength of 4/5.-PROGRESSING      Outcome: Ongoing, Progressing

## 2023-03-13 NOTE — PROGRESS NOTES
Clinical Pharmacy Chart Review Note      Admit Date: 3/3/2023   LOS: 10 days       Jessica Bay is a 67 y.o. female admitted to SNF for PT/OT after hospitalization for for bacterial meningitis.     Active Hospital Problems    Diagnosis  POA    *Bacterial meningitis [G00.9]  Yes    Hearing loss [H91.90]  Yes    Hypertension [I10]  Yes    Body mass index (BMI) 40.0-44.9, adult [Z68.41]  Not Applicable    Dyslipidemia [E78.5]  Yes    CKD (chronic kidney disease), stage IV [N18.4]  Yes    Type 2 diabetes mellitus with diabetic chronic kidney disease [E11.22]  Yes      Resolved Hospital Problems   No resolved problems to display.     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    Restless legs syndrome 10/17/2022    Dyslipidemia 09/20/2021    Acquired absence of kidney 09/20/2021    CKD (chronic kidney disease), stage IV 09/20/2021    Type 2 diabetes mellitus with diabetic chronic kidney disease 09/20/2021    CAD (coronary artery disease) 03/05/2020       Scheduled Meds:    allopurinoL  100 mg Oral Daily    amLODIPine  5 mg Oral Daily    aspirin  81 mg Oral Daily    atorvastatin  80 mg Oral Daily    calcitRIOL  0.25 mcg Oral Daily    carvediloL  12.5 mg Oral BID    DULoxetine  30 mg Oral Daily    enoxaparin  30 mg Subcutaneous Daily    insulin aspart U-100  3 Units Subcutaneous TIDWM    insulin detemir U-100  44 Units Subcutaneous QHS    pantoprazole  40 mg Oral Daily    QUEtiapine  50 mg Oral BID    sodium bicarbonate  650 mg  Oral TID    sodium chloride 0.9%  20 mL Intravenous Q6H    ticagrelor  90 mg Oral BID     Continuous Infusions:   PRN Meds: acetaminophen, calcium carbonate, cyclobenzaprine, dextrose 50%, dextrose 50%, glucagon (human recombinant), glucose, glucose, insulin aspart U-100, melatonin, ondansetron, senna-docusate 8.6-50 mg, Flushing PICC Protocol **AND** sodium chloride 0.9% **AND** sodium chloride 0.9%, vitamin A and D, zinc oxide    OBJECTIVE:     Vital Signs (Last 24H)  Temp:  [97.8 °F (36.6 °C)-98.3 °F (36.8 °C)]   Pulse:  [77-89]   Resp:  [20]   BP: (119-162)/(64-65)   SpO2:  [96 %-98 %]     Laboratory:  CBC:   Recent Labs   Lab 03/11/23 0627   WBC 5.07   RBC 2.62*   HGB 8.5*   HCT 27.7*      .7*   MCH 32.4*   MCHC 30.7*     BMP:   Recent Labs   Lab 03/11/23  0627   *      K 3.7      CO2 26   BUN 51*   CREATININE 3.14*   CALCIUM 9.0         ASSESSMENT/PLAN:     Estimated Creatinine Clearance: 19.4 mL/min (A) (based on SCr of 3.14 mg/dL (H)).  Medications reviewed, no dose adjustments needed Weekly swing bed medication regimen review completed. Will continue to monitor.

## 2023-03-13 NOTE — PLAN OF CARE
Problem: Occupational Therapy  Goal: Occupational Therapy Goal  Description: STG: within 2 weeks  Pt will perform grooming with setup at sinkside from w/c MET  Pt will bathe with min a at bedside MET  Pt will perform UE dressing with svn MET  Pt will perform LE dressing with min a equipment as needed MET  Pt will sit EOB x 30 min with svn assistance MET  Pt will transfer bed/chair/bsc with mod a with AE as needed MET  Pt will perform standing task x 1 min with mod assistance MET  Pt will tolerate 45 minutes of tx without fatigue ONGOING/PROGRESSING      LT.Restore to max I with self care and mobility.     Outcome: Ongoing, Progressing

## 2023-03-13 NOTE — SUBJECTIVE & OBJECTIVE
Interval History: still can't hear, otherwise working with therapy     Review of Systems   HENT:  Positive for hearing loss.    Respiratory:  Negative for shortness of breath.    Cardiovascular:  Negative for chest pain.   Gastrointestinal:  Negative for abdominal pain, nausea and vomiting.   Psychiatric/Behavioral:  Negative for agitation and confusion.    All other systems reviewed and are negative.  Objective:     Vital Signs (Most Recent):  Temp: 98.3 °F (36.8 °C) (03/13/23 0702)  Pulse: 77 (03/13/23 0702)  Resp: 20 (03/13/23 0702)  BP: (!) 162/64 (notified the nurse) (03/13/23 0702)  SpO2: 96 % (03/13/23 0702)   Vital Signs (24h Range):  Temp:  [97.8 °F (36.6 °C)-98.3 °F (36.8 °C)] 98.3 °F (36.8 °C)  Pulse:  [77-89] 77  Resp:  [20] 20  SpO2:  [96 %-98 %] 96 %  BP: (119-162)/(64-65) 162/64     Weight: 98.4 kg (216 lb 14.9 oz)  Body mass index is 38.43 kg/m².    Intake/Output Summary (Last 24 hours) at 3/13/2023 1146  Last data filed at 3/13/2023 0540  Gross per 24 hour   Intake 996 ml   Output 1 ml   Net 995 ml      Physical Exam  Vitals and nursing note reviewed.   Constitutional:       General: She is not in acute distress.     Appearance: Normal appearance. She is normal weight. She is not ill-appearing, toxic-appearing or diaphoretic.   HENT:      Head: Normocephalic and atraumatic.      Right Ear: There is no impacted cerumen.      Left Ear: There is no impacted cerumen.      Ears:      Comments: Hearing diminished      Nose: Nose normal. No congestion or rhinorrhea.      Mouth/Throat:      Mouth: Mucous membranes are moist.   Eyes:      Extraocular Movements: Extraocular movements intact.      Conjunctiva/sclera: Conjunctivae normal.      Pupils: Pupils are equal, round, and reactive to light.   Cardiovascular:      Rate and Rhythm: Normal rate and regular rhythm.      Pulses: Normal pulses.      Heart sounds: Normal heart sounds. No murmur heard.    No friction rub. No gallop.   Pulmonary:      Effort:  Pulmonary effort is normal. No respiratory distress.      Breath sounds: Normal breath sounds. No stridor. No wheezing or rhonchi.   Chest:      Chest wall: No tenderness.   Abdominal:      General: Abdomen is flat. Bowel sounds are normal. There is no distension.      Palpations: Abdomen is soft.      Tenderness: There is no abdominal tenderness. There is no guarding.   Musculoskeletal:         General: No swelling or tenderness. Normal range of motion.      Cervical back: Normal range of motion.   Skin:     General: Skin is warm and dry.      Capillary Refill: Capillary refill takes less than 2 seconds.      Coloration: Skin is not jaundiced or pale.   Neurological:      General: No focal deficit present.      Mental Status: She is alert and oriented to person, place, and time.      Cranial Nerves: No cranial nerve deficit.      Sensory: No sensory deficit.      Motor: Weakness present.   Psychiatric:         Mood and Affect: Mood normal.         Behavior: Behavior normal.         Thought Content: Thought content normal.         Judgment: Judgment normal.       Significant Labs: All pertinent labs within the past 24 hours have been reviewed.  Recent Lab Results         03/13/23  0559   03/12/23  1945   03/12/23  1551   03/12/23  1255        POC Glucose 146   238   205   198               Significant Imaging: I have reviewed all pertinent imaging results/findings within the past 24 hours.

## 2023-03-13 NOTE — ASSESSMENT & PLAN NOTE
Patient's FSGs are controlled on current medication regimen.  Last A1c reviewed-   Lab Results   Component Value Date    HGBA1C 8.3 (H) 03/04/2023     Most recent fingerstick glucose reviewed- No results for input(s): POCTGLUCOSE in the last 24 hours.  Current correctional scale  Low  Maintain anti-hyperglycemic dose as follows-   Antihyperglycemics (From admission, onward)    Start     Stop Route Frequency Ordered    03/13/23 2100  insulin detemir U-100 injection 44 Units         -- SubQ Nightly 03/13/23 1144    03/13/23 1645  insulin aspart U-100 injection 3 Units         -- SubQ 3 times daily with meals 03/13/23 1144    03/04/23 0009  insulin aspart U-100 injection 0-5 Units         -- SubQ Before meals & nightly PRN 03/03/23 2309        Hold Oral hypoglycemics while patient is in the hospital. Adjusting meal time and long acting insulin.

## 2023-03-13 NOTE — ASSESSMENT & PLAN NOTE
Body mass index is 38.43 kg/m². Morbid obesity complicates all aspects of disease management from diagnostic modalities to treatment. Weight loss encouraged and health benefits explained to patient.

## 2023-03-13 NOTE — PROGRESS NOTES
[facility]    Clinical Nutrition Follow-Up         Consult received 3/3/23 re: Assess/educate regarding nutritional needs    Date: 3/13/2023 2:08 PM    Jessica Bay is a 67 y.o. female with   Active Hospital Problems    Diagnosis  POA    *Bacterial meningitis [G00.9]  Yes    Hearing loss [H91.90]  Yes    Hypertension [I10]  Yes    Body mass index (BMI) 40.0-44.9, adult [Z68.41]  Not Applicable    Dyslipidemia [E78.5]  Yes    CKD (chronic kidney disease), stage IV [N18.4]  Yes    Type 2 diabetes mellitus with diabetic chronic kidney disease [E11.22]  Yes      Resolved Hospital Problems   No resolved problems to display.       PMH:  has a past medical history of Cancer, Diabetes mellitus, type 2, Fibromyalgia, History of kidney cancer (2018), Hyperlipidemia, Hypertension, Migraine headache, Renal cell carcinoma, and Renal disorder.    Diet Order: Diet diabetic  Oral Supplements: None    3/6: Tolerating diet. Adequate po intake/ good appetite.    3/13: Good appetite noted. Adequate po intake per flowsheets.    Anthropometrics:   Height: 5' 3" (160 cm)  Admit wt (3/3): 110.2 kg (242 lb)   IBW: 115 lbs  BMI (Calculated): 43  BMI Classification: Obesity Class III    3/10: 98 kg (216 lb)   26# weight decrease since admission - likely erroneous.     Labs:   Recent Labs   Lab 03/11/23  0627      K 3.7   BUN 51*   CREATININE 3.14*   *   CALCIUM 9.0          Last A1c:   Lab Results   Component Value Date    HGBA1C 8.3 (H) 03/04/2023    HGBA1C 6.9 (H) 11/22/2022     Lab Results   Component Value Date    RBC 2.62 (L) 03/11/2023    HGB 8.5 (L) 03/11/2023    HCT 27.7 (L) 03/11/2023    .7 (H) 03/11/2023    MCH 32.4 (H) 03/11/2023    MCHC 30.7 (L) 03/11/2023       Meds:   Scheduled Meds:   allopurinoL  100 mg Oral Daily    amLODIPine  5 mg Oral Daily    aspirin  81 mg Oral Daily    atorvastatin  80 mg Oral Daily    calcitRIOL  0.25 mcg Oral Daily    carvediloL  12.5 mg " Oral BID    DULoxetine  30 mg Oral Daily    enoxaparin  30 mg Subcutaneous Daily    insulin aspart U-100  3 Units Subcutaneous TIDWM    insulin detemir U-100  44 Units Subcutaneous QHS    pantoprazole  40 mg Oral Daily    QUEtiapine  50 mg Oral BID    sodium bicarbonate  650 mg Oral TID    sodium chloride 0.9%  20 mL Intravenous Q6H    ticagrelor  90 mg Oral BID     PRN Meds:.acetaminophen, calcium carbonate, cyclobenzaprine, dextrose 50%, dextrose 50%, glucagon (human recombinant), glucose, glucose, insulin aspart U-100, melatonin, ondansetron, senna-docusate 8.6-50 mg, Flushing PICC Protocol **AND** sodium chloride 0.9% **AND** sodium chloride 0.9%, vitamin A and D, zinc oxide    Physical Review:  General: Hearing difficulty  Abd/GI: soft/ non-tender; +BSx4Q's  Last Bowel Movement: 23  Ext: +1 trace generalized edema  Skin: skin intact, no wounds noted    Parminder Score:   Parminder Risk Assessment  Sensory Perception: 3-->slightly limited  Moisture: 3-->occasionally moist  Activity: 3-->walks occasionally  Mobility: 3-->slightly limited  Nutrition: 3-->adequate  Friction and Shear: 3-->no apparent problem  Parminder Score: 18    Malnutrition Screening Tool  Have you recently lost weight without trying?: No  Weight loss score: 0  Have you been eating poorly because of a decreased appetite?: No  Appetite score: 0  MST Score: 0    Estimated Nutrition Needs:   18-20 kcal/k0065-5322 kcal/day  1.2-1.6 g pro/kg IBW: 62-83 g protein/day   ~2000 ml fluid/day    Nutrition Diagnosis:  Altered nutrition-related labs values RT Dx of T2DM and CKD Stage IV AEB elevated A1C, glucose, BUN, and creatinine    Recommendations/ Intervention:  Continue diabetic diet as tolerated    Encourage po intake and hydration    Monitor labs - switch to renal diet if renal lytes elevated    Obtain new wt - ensure bed scale is zeroed out. Current weight is 26# less than admit wt    RD to monitor po intake, wt, nutrition-related labs, and meds      Nutrition Risk: moderate    Signed  Ale Rolle MS, RD, LD  Available via [employer]

## 2023-03-13 NOTE — CARE UPDATE
Patient's son reports patient is not receiving the proper diet. He states that he received a hamburger and and rive for lunch and that meal alone is full of carbs. I spoke with Bertha, dietary director and she will go by to discuss patient preferences.

## 2023-03-13 NOTE — PT/OT/SLP PROGRESS
Physical Therapy Treatment    Patient Name:  Jessica Bay   MRN:  [id number]    Recommendations:     Discharge Recommendations: home with home health  Discharge Equipment Recommendations: bedside commode, hospital bed  Barriers to discharge: Decreased caregiver support    Assessment:     Jessica Bay is a 67 y.o. female admitted with a medical diagnosis of Bacterial meningitis.  She presents with the following impairments/functional limitations: weakness, impaired endurance, gait instability, impaired balance. Patient was provided with visual and verbal cues for proper form of therapy tasks. Patient stated that she felt dizzy after she transferred from power wc to standard wc, but she stated that it subsided after approx. 15 seconds. Patient able to ambulate in parallel bars this date as noted below with on seated rest break in between gait trials. Patient stated that she had pain in her L knee during ambulation, but she stated that she no longer had pain after sitting. PTA wanted to attempt nustep this date, but patient stated that she could not peddle due to the bursitis in her hip so it was not performed. Patient with no reports of adverse effects after completion of treatment.     Rehab Prognosis: Fair; patient would benefit from acute skilled PT services to address these deficits and reach maximum level of function.    Recent Surgery: * No surgery found *      Plan:     During this hospitalization, patient to be seen 5 x/week to address the identified rehab impairments via gait training, therapeutic activities, therapeutic exercises, wheelchair management/training and progress toward the following goals:    Plan of Care Expires:  04/07/23    Subjective     Chief Complaint: Patient stated that she had pain in sacral spine prior to treatment, she also stated that she had a bad night last night.   Patient/Family Comments/goals: DC home.  Pain/Comfort:  Pain Rating 1: 5/10  Location - Side 1:  "Bilateral  Location - Orientation 1: generalized  Location 1: sacral spine  Pain Addressed 1: Reposition  Pain Rating Post-Intervention 1: 0/10      Objective:     Communicated with pt prior to session. Patient found up in chair with PICC line upon PTA entry to room.     General Precautions: Standard, fall, hearing impaired  Orthopedic Precautions: N/A  Braces: N/A  Respiratory Status: Room air     Functional Mobility:  Sit to stand: SBA/supervision  Power wc to standard wc: SBA usinf stand pivot transfer  Gait: Pt ambulated 4 feet with CGA and 4 feet with SBA both in parallel bars with wc trail       AM-PAC 6 CLICK MOBILITY  Turning over in bed (including adjusting bedclothes, sheets and blankets)?: 4  Sitting down on and standing up from a chair with arms (e.g., wheelchair, bedside commode, etc.): 4  Moving from lying on back to sitting on the side of the bed?: 3  Moving to and from a bed to a chair (including a wheelchair)?: 4  Need to walk in hospital room?: 3  Climbing 3-5 steps with a railing?: 1  Basic Mobility Total Score: 19       Treatment & Education: ALL BLE 20x  LAQs 2#, Hip ADD squeezes with 3" hold, seated marching with red TB, HS curls with red TB, Glute sets with 3" hold, Hip ABD with red TB    Patient left up in chair with  OT present.    GOALS:   Multidisciplinary Problems       Physical Therapy Goals          Problem: Physical Therapy    Goal Priority Disciplines Outcome Goal Variances Interventions   Physical Therapy Goal     PT, PT/OT Ongoing, Progressing     Description: STG - 2 weeks  1. Pt will amb 4 ft with min A x 2.  2. Pt will perform bed transfer with min A.  3. Pt will transfer sit to stand with CGA.    LTG - 4 weeks  1. Pt will amb 4 ft with CGA.  2.Pt will perform be transfer with SBA.  3. Pt will transfer sit to stand with SBA.  4. Pt will have BLE strength of 4/5.                           Time Tracking:     PT Received On: 03/13/23  PT Start Time: 0937     PT Stop Time: 1011  PT " Total Time (min): 34 min     Billable Minutes: Gait Training 8, Therapeutic Activity 3, and Therapeutic Exercise 23    Treatment Type: Treatment  PT/PTA: PTA     Number of PTA visits since last PT visit: 4     03/13/2023

## 2023-03-13 NOTE — PLAN OF CARE
Problem: Fall Injury Risk  Goal: Absence of Fall and Fall-Related Injury  Outcome: Ongoing, Progressing  Intervention: Promote Injury-Free Environment  Flowsheets (Taken 3/13/2023 2692)  Safety Promotion/Fall Prevention:   assistive device/personal item within reach   bedside commode chair   Plan of care reviewed with patient. Patients status ongoing progressing.

## 2023-03-13 NOTE — NURSING
Patient  informed me that she had a small amount of bright red blood from her nose after. Blowing and whipping her nose. No current bleeding noted. Will continue to monitor.

## 2023-03-13 NOTE — PROGRESS NOTES
[facility]  Hospital Medicine  Progress Note    Patient Name: Jessica Bay  MRN: [id number]  Patient Class: IP- Swing   Admission Date: 3/3/2023  Length of Stay: 10 days  Attending Physician: Clyde Flood DO  Primary Care Provider: Jag Lopez MD        Subjective:     Principal Problem:Bacterial meningitis        HPI:  Patient is a 67-year-old female with a past medical history of diabetes, history of kidney cancer status post nephrectomy, has single kidney, dyslipidemia, hypertension, coronary artery disease status post NSTEMI November 2022 and stent placement to the RCA, history of fibromyalgia, and chronic kidney disease.  Patient was admitted to patient was transferred from [facility] to [facility] on 02/12/2023 with altered mental status and fever, patient has a history of meningitis in 2015.  Patient was admitted to the hospital underwent lumbar puncture which showed evidence of acute meningitis, but patient see an F cultures remain negative, g stain showed no organisms.  Her urine grew out E coli and  her blood culture on February 12th grew out Staphylococcus pneumonia since his to Rocephin.  Patient was continued on Rocephin bacteremia dose and will continue to need 2-4 weeks of IV Rocephin 2 g daily.      Overview/Hospital Course:  3/10 doing well but still with hearing difficulty     3/13 still w hearing trouble, doing some better with therapy.  Labs reviewed today, increased insulin       Interval History: still can't hear, otherwise working with therapy     Review of Systems   HENT:  Positive for hearing loss.    Respiratory:  Negative for shortness of breath.    Cardiovascular:  Negative for chest pain.   Gastrointestinal:  Negative for abdominal pain, nausea and vomiting.   Psychiatric/Behavioral:  Negative for agitation and confusion.    All other systems reviewed and are negative.  Objective:     Vital Signs (Most  Recent):  Temp: 98.3 °F (36.8 °C) (03/13/23 0702)  Pulse: 77 (03/13/23 0702)  Resp: 20 (03/13/23 0702)  BP: (!) 162/64 (notified the nurse) (03/13/23 0702)  SpO2: 96 % (03/13/23 0702)   Vital Signs (24h Range):  Temp:  [97.8 °F (36.6 °C)-98.3 °F (36.8 °C)] 98.3 °F (36.8 °C)  Pulse:  [77-89] 77  Resp:  [20] 20  SpO2:  [96 %-98 %] 96 %  BP: (119-162)/(64-65) 162/64     Weight: 98.4 kg (216 lb 14.9 oz)  Body mass index is 38.43 kg/m².    Intake/Output Summary (Last 24 hours) at 3/13/2023 1146  Last data filed at 3/13/2023 0540  Gross per 24 hour   Intake 996 ml   Output 1 ml   Net 995 ml      Physical Exam  Vitals and nursing note reviewed.   Constitutional:       General: She is not in acute distress.     Appearance: Normal appearance. She is normal weight. She is not ill-appearing, toxic-appearing or diaphoretic.   HENT:      Head: Normocephalic and atraumatic.      Right Ear: There is no impacted cerumen.      Left Ear: There is no impacted cerumen.      Ears:      Comments: Hearing diminished      Nose: Nose normal. No congestion or rhinorrhea.      Mouth/Throat:      Mouth: Mucous membranes are moist.   Eyes:      Extraocular Movements: Extraocular movements intact.      Conjunctiva/sclera: Conjunctivae normal.      Pupils: Pupils are equal, round, and reactive to light.   Cardiovascular:      Rate and Rhythm: Normal rate and regular rhythm.      Pulses: Normal pulses.      Heart sounds: Normal heart sounds. No murmur heard.    No friction rub. No gallop.   Pulmonary:      Effort: Pulmonary effort is normal. No respiratory distress.      Breath sounds: Normal breath sounds. No stridor. No wheezing or rhonchi.   Chest:      Chest wall: No tenderness.   Abdominal:      General: Abdomen is flat. Bowel sounds are normal. There is no distension.      Palpations: Abdomen is soft.      Tenderness: There is no abdominal tenderness. There is no guarding.   Musculoskeletal:         General: No swelling or tenderness. Normal  range of motion.      Cervical back: Normal range of motion.   Skin:     General: Skin is warm and dry.      Capillary Refill: Capillary refill takes less than 2 seconds.      Coloration: Skin is not jaundiced or pale.   Neurological:      General: No focal deficit present.      Mental Status: She is alert and oriented to person, place, and time.      Cranial Nerves: No cranial nerve deficit.      Sensory: No sensory deficit.      Motor: Weakness present.   Psychiatric:         Mood and Affect: Mood normal.         Behavior: Behavior normal.         Thought Content: Thought content normal.         Judgment: Judgment normal.       Significant Labs: All pertinent labs within the past 24 hours have been reviewed.  Recent Lab Results         03/13/23  0559   03/12/23  1945   03/12/23  1551   03/12/23  1255        POC Glucose 146   238   205   198               Significant Imaging: I have reviewed all pertinent imaging results/findings within the past 24 hours.      Assessment/Plan:      * Bacterial meningitis  Continue with ABX.  Monitor symptoms.       Hearing loss    Unsure if related to meningitis or not. Follow.  Will need outpatient eval.     Type 2 diabetes mellitus with diabetic chronic kidney disease  Patient's FSGs are controlled on current medication regimen.  Last A1c reviewed-   Lab Results   Component Value Date    HGBA1C 8.3 (H) 03/04/2023     Most recent fingerstick glucose reviewed- No results for input(s): POCTGLUCOSE in the last 24 hours.  Current correctional scale  Low  Maintain anti-hyperglycemic dose as follows-   Antihyperglycemics (From admission, onward)    Start     Stop Route Frequency Ordered    03/13/23 2100  insulin detemir U-100 injection 44 Units         -- SubQ Nightly 03/13/23 1144    03/13/23 1645  insulin aspart U-100 injection 3 Units         -- SubQ 3 times daily with meals 03/13/23 1144    03/04/23 0009  insulin aspart U-100 injection 0-5 Units         -- SubQ Before meals &  nightly PRN 03/03/23 2309        Hold Oral hypoglycemics while patient is in the hospital. Adjusting meal time and long acting insulin.     CKD (chronic kidney disease), stage IV  Avoid nephrotoxic drugs.  Monitor labs.        Dyslipidemia  Continue home meds      Body mass index (BMI) 40.0-44.9, adult  Body mass index is 38.43 kg/m². Morbid obesity complicates all aspects of disease management from diagnostic modalities to treatment. Weight loss encouraged and health benefits explained to patient.         Hypertension  Continue home meds        VTE Risk Mitigation (From admission, onward)         Ordered     enoxaparin injection 30 mg  Daily         03/03/23 2237     IP VTE HIGH RISK PATIENT  Once         03/03/23 2237                Discharge Planning   RAMOS:      Code Status: Full Code   Is the patient medically ready for discharge?:     Reason for patient still in hospital (select all that apply): Patient trending condition and PT / OT recommendations  Discharge Plan A: Home with family, Home Health                  Clyde Flood DO  Department of Hospital Medicine   [facility]

## 2023-03-14 LAB
GLUCOSE SERPL-MCNC: 102 MG/DL (ref 70–105)
GLUCOSE SERPL-MCNC: 188 MG/DL (ref 70–105)
GLUCOSE SERPL-MCNC: 206 MG/DL (ref 70–105)
GLUCOSE SERPL-MCNC: 220 MG/DL (ref 70–105)

## 2023-03-14 PROCEDURE — 27000958

## 2023-03-14 PROCEDURE — 11000004 HC SNF PRIVATE

## 2023-03-14 PROCEDURE — 97116 GAIT TRAINING THERAPY: CPT | Mod: CQ

## 2023-03-14 PROCEDURE — 97535 SELF CARE MNGMENT TRAINING: CPT

## 2023-03-14 PROCEDURE — 63600175 PHARM REV CODE 636 W HCPCS

## 2023-03-14 PROCEDURE — 63600175 PHARM REV CODE 636 W HCPCS: Performed by: HOSPITALIST

## 2023-03-14 PROCEDURE — 25000003 PHARM REV CODE 250

## 2023-03-14 PROCEDURE — 82962 GLUCOSE BLOOD TEST: CPT

## 2023-03-14 PROCEDURE — 97530 THERAPEUTIC ACTIVITIES: CPT

## 2023-03-14 PROCEDURE — A4216 STERILE WATER/SALINE, 10 ML: HCPCS | Performed by: HOSPITALIST

## 2023-03-14 PROCEDURE — 97110 THERAPEUTIC EXERCISES: CPT | Mod: CQ

## 2023-03-14 PROCEDURE — 97110 THERAPEUTIC EXERCISES: CPT

## 2023-03-14 PROCEDURE — 25000003 PHARM REV CODE 250: Performed by: HOSPITALIST

## 2023-03-14 RX ADMIN — INSULIN ASPART 3 UNITS: 100 INJECTION, SOLUTION INTRAVENOUS; SUBCUTANEOUS at 11:03

## 2023-03-14 RX ADMIN — SENNOSIDES AND DOCUSATE SODIUM 1 TABLET: 8.6; 5 TABLET ORAL at 08:03

## 2023-03-14 RX ADMIN — DULOXETINE 30 MG: 30 CAPSULE, DELAYED RELEASE ORAL at 08:03

## 2023-03-14 RX ADMIN — Medication 20 ML: at 06:03

## 2023-03-14 RX ADMIN — ATORVASTATIN CALCIUM 80 MG: 80 TABLET, FILM COATED ORAL at 08:03

## 2023-03-14 RX ADMIN — INSULIN ASPART 1 UNITS: 100 INJECTION, SOLUTION INTRAVENOUS; SUBCUTANEOUS at 08:03

## 2023-03-14 RX ADMIN — CALCITRIOL CAPSULES 0.25 MCG 0.25 MCG: 0.25 CAPSULE ORAL at 08:03

## 2023-03-14 RX ADMIN — Medication 20 ML: at 05:03

## 2023-03-14 RX ADMIN — INSULIN DETEMIR 44 UNITS: 100 INJECTION, SOLUTION SUBCUTANEOUS at 08:03

## 2023-03-14 RX ADMIN — QUETIAPINE 50 MG: 25 TABLET ORAL at 08:03

## 2023-03-14 RX ADMIN — SODIUM BICARBONATE 650 MG: 650 TABLET ORAL at 08:03

## 2023-03-14 RX ADMIN — ENOXAPARIN SODIUM 30 MG: 100 INJECTION SUBCUTANEOUS at 04:03

## 2023-03-14 RX ADMIN — CARVEDILOL 12.5 MG: 6.25 TABLET, FILM COATED ORAL at 08:03

## 2023-03-14 RX ADMIN — INSULIN ASPART 3 UNITS: 100 INJECTION, SOLUTION INTRAVENOUS; SUBCUTANEOUS at 08:03

## 2023-03-14 RX ADMIN — AMLODIPINE BESYLATE 5 MG: 5 TABLET ORAL at 08:03

## 2023-03-14 RX ADMIN — PANTOPRAZOLE SODIUM 40 MG: 40 TABLET, DELAYED RELEASE ORAL at 08:03

## 2023-03-14 RX ADMIN — Medication 20 ML: at 12:03

## 2023-03-14 RX ADMIN — TICAGRELOR 90 MG: 90 TABLET ORAL at 08:03

## 2023-03-14 RX ADMIN — INSULIN ASPART 3 UNITS: 100 INJECTION, SOLUTION INTRAVENOUS; SUBCUTANEOUS at 04:03

## 2023-03-14 RX ADMIN — CYCLOBENZAPRINE HYDROCHLORIDE 5 MG: 5 TABLET, FILM COATED ORAL at 08:03

## 2023-03-14 RX ADMIN — INSULIN ASPART 2 UNITS: 100 INJECTION, SOLUTION INTRAVENOUS; SUBCUTANEOUS at 11:03

## 2023-03-14 RX ADMIN — ASPIRIN 81 MG CHEWABLE TABLET 81 MG: 81 TABLET CHEWABLE at 08:03

## 2023-03-14 RX ADMIN — SODIUM BICARBONATE 650 MG: 650 TABLET ORAL at 02:03

## 2023-03-14 RX ADMIN — ALLOPURINOL 100 MG: 100 TABLET ORAL at 08:03

## 2023-03-14 NOTE — PLAN OF CARE
Problem: Fall Injury Risk  Goal: Absence of Fall and Fall-Related Injury  Outcome: Ongoing, Progressing  Intervention: Promote Injury-Free Environment  Flowsheets (Taken 3/14/2023 5363)  Safety Promotion/Fall Prevention: assistive device/personal item within reach   Plan of care reviewed with patient. Patients status ongoing progressing.

## 2023-03-14 NOTE — PT/OT/SLP PROGRESS
Physical Therapy Treatment    Patient Name:  Jessica Bay   MRN:  [id number]    Recommendations:     Discharge Recommendations: home with home health  Discharge Equipment Recommendations: bedside commode, hospital bed  Barriers to discharge: Decreased caregiver support    Assessment:     Jessica Bay is a 67 y.o. female admitted with a medical diagnosis of Bacterial meningitis.  She presents with the following impairments/functional limitations: weakness, impaired endurance, gait instability, impaired balance. Patient was provided with visual and verbal cues for proper form of therapy tasks. Patient able to ambulate forward and backwards in parallel bars this date as noted below with seated rest breaks in between gait trials. Patient stated that her L knee was bothering her during gait training, and she also stated that her R hip was bothering her during seated marching with green TB so exercise was stopped after 10 reps. Patient with no reports of adverse effects after completion of treatment.     Rehab Prognosis: Fair; patient would benefit from acute skilled PT services to address these deficits and reach maximum level of function.    Recent Surgery: * No surgery found *      Plan:     During this hospitalization, patient to be seen 5 x/week to address the identified rehab impairments via gait training, therapeutic activities, therapeutic exercises, wheelchair management/training and progress toward the following goals:    Plan of Care Expires:  04/07/23    Subjective     Chief Complaint: Patient stated that she had pain in her R shoulder. She stated that this pain started when she had to pull herself up in bed at the last hospital she was at.   Patient/Family Comments/goals: DC home.  Pain/Comfort:  Pain Rating 1: 3/10  Location - Side 1: Right  Location - Orientation 1: generalized  Location 1: shoulder      Objective:     Communicated with pt prior to session. Patient found up in chair with PICC line upon  "PTA entry to room.     General Precautions: Standard, fall, hearing impaired  Orthopedic Precautions: N/A  Braces: N/A  Respiratory Status: Room air     Functional Mobility:  Sit to stand: SBA/supervision  Power wc <> standard wc: SBA usind stand pivot transfer  Gait: Pt ambulated 4 feet forwards and 4 feet backwards x 3 trials with SBA in parallel bars with wc trail       AM-PAC 6 CLICK MOBILITY  Turning over in bed (including adjusting bedclothes, sheets and blankets)?: 4  Sitting down on and standing up from a chair with arms (e.g., wheelchair, bedside commode, etc.): 4  Moving from lying on back to sitting on the side of the bed?: 3  Moving to and from a bed to a chair (including a wheelchair)?: 4  Need to walk in hospital room?: 3  Climbing 3-5 steps with a railing?: 1  Basic Mobility Total Score: 19       Treatment & Education: ALL BLE 20x  LAQs 2#, Hip ADD squeezes with 3" hold, seated marching with green TB, HS curls with green TB, Glute sets with 3" hold, Hip ABD with green TB, seated heel raises with 2#    Patient left up in chair with call button in reach.    GOALS:   Multidisciplinary Problems       Physical Therapy Goals          Problem: Physical Therapy    Goal Priority Disciplines Outcome Goal Variances Interventions   Physical Therapy Goal     PT, PT/OT Ongoing, Progressing     Description: STG - 2 weeks  1. Pt will amb 4 ft with min A x 2.-MET  2. Pt will perform bed transfer with min A.-MET  3. Pt will transfer sit to stand with CGA.-MET    LTG - 4 weeks  1. Pt will amb 4 ft with CGA.-MET  2.Pt will perform bed transfer with SBA.-MET  3. Pt will transfer sit to stand with SBA.-MET  4. Pt will have BLE strength of 4/5.-PROGRESSING                           Time Tracking:     PT Received On: 03/14/23  PT Start Time: 0855     PT Stop Time: 0927  PT Total Time (min): 32 min     Billable Minutes: Gait Training 8, Therapeutic Activity 3, and Therapeutic Exercise 21    Treatment Type: Treatment  PT/PTA: " PTA     Number of PTA visits since last PT visit: 5     03/14/2023

## 2023-03-14 NOTE — PT/OT/SLP PROGRESS
"Occupational Therapy   Treatment    Name: Jessica Bay  MRN: [id number]  Admitting Diagnosis:  Bacterial meningitis       Recommendations:     Discharge Recommendations: home health OT, home health PT, home with home health  Discharge Equipment Recommendations:  3-in-1 commode  Barriers to discharge:  None    Assessment:     Jessica Bay is a 67 y.o. female with a medical diagnosis of Bacterial meningitis.  She presents with multiple contradictory reports of her self care and functional mobility needs/abilities. Performance deficits affecting function are weakness, pain, impaired endurance, though all these are improving.  Pt, however, upon d/c date discussion states, "oh, I won't be able to go home for a long time because I'll need a lot of equipment at home."  Upon OT asking her what equipment she will need, she states her bathroom will likely need some structural changes.  However, after much discussion, pt also states, later in conversation, "Oh, I won't want home health therapy because they already assessed my bathroom when my  had his strokes, so there isn't anything that will need to be fixed.  I want to come to outpatient therapy rather than having home health."  These were not pt only contradictory statements during discussion, but unclear if pt is just afraid to go home or possibly is attention seeking.  Will follow and encourage pt in current abilities and ongoing independence.    Rehab Prognosis:  Good; patient would benefit from acute skilled OT services to address these deficits and reach maximum level of function.       Plan:     Patient to be seen 5 x/week to address the above listed problems via self-care/home management, therapeutic activities, therapeutic exercises, wheelchair management/training  Plan of Care Expires: 04/07/23  Plan of Care Reviewed with: patient, son    Subjective     Pain/Comfort:       Objective:     Communicated with: pt prior to session.  Patient found  right " "sidelying and napping  with PICC line upon OT entry to room.    General Precautions: Standard, fall, hearing impaired    Orthopedic Precautions:N/A  Braces: N/A  Respiratory Status: Room air     Occupational Performance:     Bed Mobility:    Patient completed Rolling/Turning to Right with modified independence  Patient completed Scooting/Bridging with modified independence  Patient completed Supine to Sit with modified independence     Functional Mobility/Transfers:  Patient completed Sit <> Stand Transfer with modified independence  with  grab bars(s)   Patient completed Bed <> Chair Transfer using Step Transfer technique with modified independence with grab bars(s)  Patient completed Toilet Transfer Step Transfer technique with modified independence with  grab bars  Functional Mobility: see PT notes, PT reports pt did very well with ambulation today    Activities of Daily Living:  Lower Body Dressing: pt managing clothing I'ly during toileting but today she needed OT to encourage her to try without asking for assistance; she has been I'ly dressing for several days now, so feel it was the report of upcoming d/c that had pt addled.  Toileting: pt has also been performing toileting I'ly these last few days, but pt reports today adamantly that  I can't wipe myself.  You're going to have to help me. OT attempted to talk pt through different ways of modifying task, but pt again inconsistently reporting which things work/don't work.  Finally, OT had pt to try without stating she couldn't, and she was able to wipe self effectively with encouragement.       [facility] 6 Click ADL:      Treatment & Education:  Pt stating she needs to "scale back" on shoulder exercises due to pain in R shoulder with increased effort.  OT modified slightly today but not much.  OT directed pt with pulleys, B arm 0.5 kg ball lifts 2 sets of 10 in sh flexion, ch press, and horiz abd/add per OT directions and demonstrations.  OT assessing pt for pain " "and comfort throughout, and pt tolerated well.  Tabletop towel scrubs completed B UE with 3 min and 3#.  Biofreeze applied at onset of session to "get ahead" of shoulder tenderness non pharmacologically.  Pt agreed.  Discussed again at end of session d/c plans, needs, desires for continuing therapy..the patient does not want home health therapy despite OT explaining to her that in order to have someone assess her home environment for modification needs, home health would need to do that.  She wants outpt therapy after d/c from here.  Lengthy conversation had about multiple aspects of d/c.  Will cont to encourage pt with all areas of need and help assess potential areas of further therapy needs.    Patient left up in chair with call button in reach    GOALS:   Multidisciplinary Problems       Occupational Therapy Goals          Problem: Occupational Therapy    Goal Priority Disciplines Outcome Interventions   Occupational Therapy Goal     OT, PT/OT Ongoing, Progressing    Description: STG: within 2 weeks  Pt will perform grooming with setup at sinkside from w/c MET  Pt will bathe with min a at bedside MET  Pt will perform UE dressing with svn MET  Pt will perform LE dressing with min a equipment as needed MET  Pt will sit EOB x 30 min with svn assistance MET  Pt will transfer bed/chair/bsc with mod a with AE as needed MET  Pt will perform standing task x 1 min with mod assistance MET  Pt will tolerate 45 minutes of tx without fatigue ONGOING/PROGRESSING      LT.Restore to max I with self care and mobility.                          Time Tracking:     OT Date of Treatment: 23  OT Start Time: 1340  OT Stop Time: 1425  OT Total Time (min): 45 min    Billable Minutes:Self Care/Home Management 15  Therapeutic Activity 15  Therapeutic Exercise 15    OT/GLADYS: OT          3/14/2023  "

## 2023-03-14 NOTE — PLAN OF CARE
Problem: Fall Injury Risk  Goal: Absence of Fall and Fall-Related Injury  3/14/2023 1624 by Jennie Cao RN  Outcome: Ongoing, Progressing  3/14/2023 1623 by Jennie Cao RN  Outcome: Ongoing, Progressing  Intervention: Promote Injury-Free Environment  3/14/2023 1624 by Jennie Cao RN  Flowsheets (Taken 3/14/2023 1624)  Safety Promotion/Fall Prevention: assistive device/personal item within reach  3/14/2023 1623 by Jennie Cao RN  Flowsheets (Taken 3/14/2023 1623)  Safety Promotion/Fall Prevention: assistive device/personal item within reach   Plan of care reviewed with patient. Patients status ongoing progressing.

## 2023-03-15 LAB
GLUCOSE SERPL-MCNC: 161 MG/DL (ref 70–105)
GLUCOSE SERPL-MCNC: 211 MG/DL (ref 70–105)
GLUCOSE SERPL-MCNC: 234 MG/DL (ref 70–105)
GLUCOSE SERPL-MCNC: 98 MG/DL (ref 70–105)

## 2023-03-15 PROCEDURE — 63600175 PHARM REV CODE 636 W HCPCS

## 2023-03-15 PROCEDURE — 63600175 PHARM REV CODE 636 W HCPCS: Performed by: HOSPITALIST

## 2023-03-15 PROCEDURE — 97110 THERAPEUTIC EXERCISES: CPT

## 2023-03-15 PROCEDURE — 11000004 HC SNF PRIVATE

## 2023-03-15 PROCEDURE — A4216 STERILE WATER/SALINE, 10 ML: HCPCS | Performed by: HOSPITALIST

## 2023-03-15 PROCEDURE — 25000003 PHARM REV CODE 250: Performed by: HOSPITALIST

## 2023-03-15 PROCEDURE — 25000003 PHARM REV CODE 250

## 2023-03-15 PROCEDURE — 97116 GAIT TRAINING THERAPY: CPT

## 2023-03-15 PROCEDURE — 97535 SELF CARE MNGMENT TRAINING: CPT

## 2023-03-15 PROCEDURE — 27000958

## 2023-03-15 PROCEDURE — 82962 GLUCOSE BLOOD TEST: CPT

## 2023-03-15 RX ADMIN — SODIUM BICARBONATE 650 MG: 650 TABLET ORAL at 08:03

## 2023-03-15 RX ADMIN — Medication 20 ML: at 05:03

## 2023-03-15 RX ADMIN — Medication 20 ML: at 11:03

## 2023-03-15 RX ADMIN — CALCITRIOL CAPSULES 0.25 MCG 0.25 MCG: 0.25 CAPSULE ORAL at 09:03

## 2023-03-15 RX ADMIN — INSULIN ASPART 3 UNITS: 100 INJECTION, SOLUTION INTRAVENOUS; SUBCUTANEOUS at 07:03

## 2023-03-15 RX ADMIN — INSULIN ASPART 2 UNITS: 100 INJECTION, SOLUTION INTRAVENOUS; SUBCUTANEOUS at 11:03

## 2023-03-15 RX ADMIN — INSULIN ASPART 1 UNITS: 100 INJECTION, SOLUTION INTRAVENOUS; SUBCUTANEOUS at 08:03

## 2023-03-15 RX ADMIN — ENOXAPARIN SODIUM 30 MG: 100 INJECTION SUBCUTANEOUS at 04:03

## 2023-03-15 RX ADMIN — INSULIN ASPART 3 UNITS: 100 INJECTION, SOLUTION INTRAVENOUS; SUBCUTANEOUS at 11:03

## 2023-03-15 RX ADMIN — SODIUM BICARBONATE 650 MG: 650 TABLET ORAL at 09:03

## 2023-03-15 RX ADMIN — CARVEDILOL 12.5 MG: 6.25 TABLET, FILM COATED ORAL at 08:03

## 2023-03-15 RX ADMIN — Medication 20 ML: at 02:03

## 2023-03-15 RX ADMIN — AMLODIPINE BESYLATE 5 MG: 5 TABLET ORAL at 09:03

## 2023-03-15 RX ADMIN — SENNOSIDES AND DOCUSATE SODIUM 1 TABLET: 8.6; 5 TABLET ORAL at 08:03

## 2023-03-15 RX ADMIN — INSULIN ASPART 3 UNITS: 100 INJECTION, SOLUTION INTRAVENOUS; SUBCUTANEOUS at 04:03

## 2023-03-15 RX ADMIN — CYCLOBENZAPRINE HYDROCHLORIDE 5 MG: 5 TABLET, FILM COATED ORAL at 08:03

## 2023-03-15 RX ADMIN — ASPIRIN 81 MG CHEWABLE TABLET 81 MG: 81 TABLET CHEWABLE at 09:03

## 2023-03-15 RX ADMIN — TICAGRELOR 90 MG: 90 TABLET ORAL at 08:03

## 2023-03-15 RX ADMIN — ALLOPURINOL 100 MG: 100 TABLET ORAL at 09:03

## 2023-03-15 RX ADMIN — PANTOPRAZOLE SODIUM 40 MG: 40 TABLET, DELAYED RELEASE ORAL at 09:03

## 2023-03-15 RX ADMIN — TICAGRELOR 90 MG: 90 TABLET ORAL at 09:03

## 2023-03-15 RX ADMIN — Medication 20 ML: at 06:03

## 2023-03-15 RX ADMIN — INSULIN DETEMIR 44 UNITS: 100 INJECTION, SOLUTION SUBCUTANEOUS at 08:03

## 2023-03-15 RX ADMIN — DULOXETINE 30 MG: 30 CAPSULE, DELAYED RELEASE ORAL at 09:03

## 2023-03-15 RX ADMIN — QUETIAPINE 50 MG: 25 TABLET ORAL at 08:03

## 2023-03-15 RX ADMIN — ATORVASTATIN CALCIUM 80 MG: 80 TABLET, FILM COATED ORAL at 09:03

## 2023-03-15 RX ADMIN — CARVEDILOL 12.5 MG: 6.25 TABLET, FILM COATED ORAL at 09:03

## 2023-03-15 RX ADMIN — SODIUM BICARBONATE 650 MG: 650 TABLET ORAL at 03:03

## 2023-03-15 RX ADMIN — QUETIAPINE 50 MG: 25 TABLET ORAL at 09:03

## 2023-03-15 RX ADMIN — ACETAMINOPHEN 650 MG: 325 TABLET ORAL at 11:03

## 2023-03-15 NOTE — PLAN OF CARE
Problem: Infection  Goal: Absence of Infection Signs and Symptoms  Outcome: Ongoing, Progressing  Intervention: Prevent or Manage Infection  Flowsheets (Taken 3/15/2023 6420)  Isolation Precautions:   precautions maintained   protective   Plan of care reviewed with patient. Patients status ongoing progressing.

## 2023-03-15 NOTE — PT/OT/SLP PROGRESS
Physical Therapy Treatment    Patient Name:  Jessica Bay   MRN:  [id number]    Recommendations:     Discharge Recommendations: home with home health  Discharge Equipment Recommendations: none  Barriers to discharge: Decreased caregiver support, infection    Assessment:     Jessica Bay is a 67 y.o. female admitted with a medical diagnosis of Bacterial meningitis.  She presents with the following impairments/functional limitations: weakness, impaired endurance, gait instability, impaired balance, pain .    Pt progressed to amb'n in parallel bars with only single UE support to simulated walking into her bathroom at home. Pt will have to be able to amb about a yard to access her toilet at home and we discussed possibly using a SC for that short distance. Pt is progressing towards meeting LTG 4.    Rehab Prognosis: Good; patient would benefit from acute skilled PT services to address these deficits and reach maximum level of function.    Recent Surgery: * No surgery found *      Plan:     During this hospitalization, patient to be seen 5 x/week to address the identified rehab impairments via gait training, therapeutic activities, therapeutic exercises and progress toward the following goals:    Plan of Care Expires:  04/07/23    Subjective     Chief Complaint: No new c/o.  Patient/Family Comments/goals: Pt to dc home when able.  Pain/Comfort:  Pain Rating 1: 3/10  Location - Side 1: Right  Location - Orientation 1: lower  Location 1: hip  Pain Addressed 1: Pre-medicate for activity      Objective:     Communicated with RN, PTA prior to session.  Patient found up in chair with PICC line upon PT entry to room.     General Precautions: Standard, fall  Orthopedic Precautions: N/A  Braces: N/A  Respiratory Status: Room air     Functional Mobility:  Transfers:     Sit to Stand:  stand by assistance with parallel bars  Gait: Pt amb'd in parallel bars using only single rail 6 ft x 2 reps including negotiating a 180 degree  turn with SBA.      AM-PAC 6 CLICK MOBILITY  Turning over in bed (including adjusting bedclothes, sheets and blankets)?: 4  Sitting down on and standing up from a chair with arms (e.g., wheelchair, bedside commode, etc.): 4  Moving from lying on back to sitting on the side of the bed?: 4  Moving to and from a bed to a chair (including a wheelchair)?: 4  Need to walk in hospital room?: 1  Climbing 3-5 steps with a railing?: 1  Basic Mobility Total Score: 18       Treatment & Education:  Pt amb'd in parallel bars as stated above. PT and pt discussed, and pt jareth a picture of, the set up of her bathroom at home. Discussed using a SC to amb a short distance of approximately 3 ft to simulate how she has to walk to her toilet which is a sharp 90 degree turn to the left away from a counter top which is on her right. Seated ther ex BLE X 20 reps each: resisted hip add using ball, resisted knee flex using red tband, resisted hip ext using blue tband, LAQ 3# 2x10, hip flex 2x10. MMT BLE: 4/5 tierney knee ext, 3-/5 right hip flex, 4-/5 left hip flex, 4+/5 tierney hip abd, 4/5 right DF, 4+/5 left DF, 5/5 tierney PF.    Patient left up in chair with call button in reach..    GOALS:   Multidisciplinary Problems       Physical Therapy Goals          Problem: Physical Therapy    Goal Priority Disciplines Outcome Goal Variances Interventions   Physical Therapy Goal     PT, PT/OT Ongoing, Progressing     Description: STG - 2 weeks  1. Pt will amb 4 ft with min A x 2.-MET  2. Pt will perform bed transfer with min A.-MET  3. Pt will transfer sit to stand with CGA.-MET    LTG - 4 weeks  1. Pt will amb 4 ft with CGA.-MET  2.Pt will perform bed transfer with SBA.-MET  3. Pt will transfer sit to stand with SBA.-MET  4. Pt will have BLE strength of 4/5.-PROGRESSING                           Time Tracking:     PT Received On: 03/15/23  PT Start Time: 0907     PT Stop Time: 0937  PT Total Time (min): 30 min     Billable Minutes: Gait Training 15,  Therapeutic Exercise 15, and Total Time 30 min    Treatment Type: Treatment  PT/PTA: PT     Number of PTA visits since last PT visit: 0     03/15/2023

## 2023-03-15 NOTE — PT/OT/SLP PROGRESS
Occupational Therapy   Treatment    Name: Jessica Bay  MRN: [id number]  Admitting Diagnosis:  Bacterial meningitis       Recommendations:     Discharge Recommendations: home health OT, home health PT, home with home health  Discharge Equipment Recommendations:  3-in-1 commode  Barriers to discharge:  None    Assessment:     Jessica Bay is a 67 y.o. female with a medical diagnosis of Bacterial meningitis.  She presents with feeling much more confident today and ready for d/c tomorrow if all equipment ready and to be delivered to her home; SB coordinator working on some of equipment; pt wanting lift chair as well.  It is not recommended due to pt is able to sit to stand well with grab bar or pushing from seating surface. However, OT relayed to pt and her  that they can drop by [employer] and talk to Bill about a lift chair and pricing.  They both relay good understanding, with opportunity to repeat back to OT what OT relayed regarding chair correctly. Performance deficits affecting function are weakness, pain, impaired endurance.     Rehab Prognosis:  Good; patient would benefit from acute skilled OT services to address these deficits and reach maximum level of function.       Plan:     Patient to be seen 5 x/week to address the above listed problems via self-care/home management, therapeutic activities, therapeutic exercises, wheelchair management/training  Plan of Care Expires: 04/07/23  Plan of Care Reviewed with: patient, son    Subjective     Pain/Comfort:       Objective:     Communicated with: pt and her , SB coordinator as well prior to session.  Patient found up in chair with  upon OT entry to room.    General Precautions: Standard, fall, hearing impaired    Orthopedic Precautions:N/A  Braces: N/A  Respiratory Status: Room air     Occupational Performance:     Bed Mobility:    Mod i    Functional Mobility/Transfers:  Patient completed Sit <> Stand Transfer with modified independence   with  grab bars(s)   Patient completed Toilet Transfer Step Transfer technique with modified independence with  grab bars  Functional Mobility: see PT    Activities of Daily Living:  Toileting: modified independence with grab bar for assist with transition      AMPAC 6 Click ADL:      Treatment & Education:  ADL transfer activity completed with pt to have pt demonstrate compentence and achievement of this goal.  Goal met today.      Patient left up in chair with call button in reach and  present    GOALS:   Multidisciplinary Problems       Occupational Therapy Goals          Problem: Occupational Therapy    Goal Priority Disciplines Outcome Interventions   Occupational Therapy Goal     OT, PT/OT Ongoing, Progressing    Description: STG: within 2 weeks  Pt will perform grooming with setup at sinkside from w/c MET  Pt will bathe with min a at bedside MET  Pt will perform UE dressing with svn MET  Pt will perform LE dressing with min a equipment as needed MET  Pt will sit EOB x 30 min with svn assistance MET  Pt will transfer bed/chair/bsc with mod a with AE as needed MET  Pt will perform standing task x 1 min with mod assistance MET  Pt will tolerate 45 minutes of tx without fatigue ONGOING/PROGRESSING      LT.Restore to max I with self care and mobility.                          Time Tracking:     OT Date of Treatment: 03/15/23  OT Start Time: 1105  OT Stop Time: 1121  OT Total Time (min): 16 min    Billable Minutes:Self Care/Home Management 16    OT/GLADYS: OT          3/15/2023

## 2023-03-15 NOTE — NURSING
Nurses Note -- 4 Eyes      3/14/2023   7:50 PM      Skin assessed during: Q Shift Change      [] No Pressure Injuries Present    []Prevention Measures Documented      [] Yes- Altered Skin Integrity Present or Discovered   [] LDA Added if Not in Epic (Describe Wound)   [x] New Altered Skin Integrity was Present on Admit and Documented in LDA   [] Wound Image Taken    Wound Care Consulted? No    Attending Nurse:  Curtis Lanier LPN     Second RN/Staff Member:  YASMANI PITTS

## 2023-03-16 VITALS
HEIGHT: 63 IN | HEART RATE: 80 BPM | OXYGEN SATURATION: 99 % | RESPIRATION RATE: 18 BRPM | WEIGHT: 216.94 LBS | DIASTOLIC BLOOD PRESSURE: 65 MMHG | BODY MASS INDEX: 38.44 KG/M2 | SYSTOLIC BLOOD PRESSURE: 150 MMHG | TEMPERATURE: 98 F

## 2023-03-16 PROBLEM — G00.9 BACTERIAL MENINGITIS: Status: RESOLVED | Noted: 2023-02-13 | Resolved: 2023-03-16

## 2023-03-16 LAB
GLUCOSE SERPL-MCNC: 113 MG/DL (ref 70–105)
GLUCOSE SERPL-MCNC: 232 MG/DL (ref 70–105)

## 2023-03-16 PROCEDURE — 82962 GLUCOSE BLOOD TEST: CPT

## 2023-03-16 PROCEDURE — 25000003 PHARM REV CODE 250

## 2023-03-16 PROCEDURE — 63600175 PHARM REV CODE 636 W HCPCS

## 2023-03-16 PROCEDURE — 97530 THERAPEUTIC ACTIVITIES: CPT

## 2023-03-16 PROCEDURE — 27000958

## 2023-03-16 PROCEDURE — A4216 STERILE WATER/SALINE, 10 ML: HCPCS | Performed by: HOSPITALIST

## 2023-03-16 PROCEDURE — 99316 NF DSCHRG MGMT 30 MIN+: CPT | Mod: ,,, | Performed by: HOSPITALIST

## 2023-03-16 PROCEDURE — 97116 GAIT TRAINING THERAPY: CPT | Mod: CQ

## 2023-03-16 PROCEDURE — 97110 THERAPEUTIC EXERCISES: CPT

## 2023-03-16 PROCEDURE — 63600175 PHARM REV CODE 636 W HCPCS: Performed by: HOSPITALIST

## 2023-03-16 PROCEDURE — 99316 PR NURSING FAC DISCHRGE DAY,MORE 30 MIN: ICD-10-PCS | Mod: ,,, | Performed by: HOSPITALIST

## 2023-03-16 PROCEDURE — 97110 THERAPEUTIC EXERCISES: CPT | Mod: CQ

## 2023-03-16 PROCEDURE — 25000003 PHARM REV CODE 250: Performed by: HOSPITALIST

## 2023-03-16 RX ADMIN — AMLODIPINE BESYLATE 5 MG: 5 TABLET ORAL at 10:03

## 2023-03-16 RX ADMIN — INSULIN ASPART 3 UNITS: 100 INJECTION, SOLUTION INTRAVENOUS; SUBCUTANEOUS at 11:03

## 2023-03-16 RX ADMIN — INSULIN ASPART 2 UNITS: 100 INJECTION, SOLUTION INTRAVENOUS; SUBCUTANEOUS at 11:03

## 2023-03-16 RX ADMIN — PANTOPRAZOLE SODIUM 40 MG: 40 TABLET, DELAYED RELEASE ORAL at 10:03

## 2023-03-16 RX ADMIN — INSULIN ASPART 3 UNITS: 100 INJECTION, SOLUTION INTRAVENOUS; SUBCUTANEOUS at 07:03

## 2023-03-16 RX ADMIN — QUETIAPINE 50 MG: 25 TABLET ORAL at 10:03

## 2023-03-16 RX ADMIN — CALCITRIOL CAPSULES 0.25 MCG 0.25 MCG: 0.25 CAPSULE ORAL at 10:03

## 2023-03-16 RX ADMIN — DULOXETINE 30 MG: 30 CAPSULE, DELAYED RELEASE ORAL at 10:03

## 2023-03-16 RX ADMIN — ASPIRIN 81 MG CHEWABLE TABLET 81 MG: 81 TABLET CHEWABLE at 10:03

## 2023-03-16 RX ADMIN — ALLOPURINOL 100 MG: 100 TABLET ORAL at 10:03

## 2023-03-16 RX ADMIN — CARVEDILOL 12.5 MG: 6.25 TABLET, FILM COATED ORAL at 10:03

## 2023-03-16 RX ADMIN — SODIUM BICARBONATE 650 MG: 650 TABLET ORAL at 10:03

## 2023-03-16 RX ADMIN — Medication 20 ML: at 05:03

## 2023-03-16 RX ADMIN — ATORVASTATIN CALCIUM 80 MG: 80 TABLET, FILM COATED ORAL at 10:03

## 2023-03-16 RX ADMIN — TICAGRELOR 90 MG: 90 TABLET ORAL at 10:03

## 2023-03-16 NOTE — NURSING
Nurses Note -- 4 Eyes      3/15/2023   7:29 PM      Skin assessed during: Q Shift Change      [] No Pressure Injuries Present    []Prevention Measures Documented      [] Yes- Altered Skin Integrity Present or Discovered   [] LDA Added if Not in Epic (Describe Wound)   [x] New Altered Skin Integrity was Present on Admit and Documented in LDA   [] Wound Image Taken    Wound Care Consulted? No    Attending Nurse:  Curtis Lanier LPN     Second RN/Staff Member:  BENJIE PALACIOS RN

## 2023-03-16 NOTE — DISCHARGE SUMMARY
[facility]  Hospital Medicine  Discharge Summary      Patient Name: Jessica Bay  MRN: [id number]  KANDIS: [id number]  Patient Class: IP- Swing  Admission Date: 3/3/2023  Hospital Length of Stay: 13 days  Discharge Date and Time:  03/16/2023 12:05 PM  Attending Physician: Clyde Flood DO   Discharging Provider: Clyde Flood DO  Primary Care Provider: Jag Lopez MD    Primary Care Team: Networked reference to record PCT     HPI:   Patient is a 67-year-old female with a past medical history of diabetes, history of kidney cancer status post nephrectomy, has single kidney, dyslipidemia, hypertension, coronary artery disease status post NSTEMI November 2022 and stent placement to the RCA, history of fibromyalgia, and chronic kidney disease.  Patient was admitted to patient was transferred from [facility] to [facility] on 02/12/2023 with altered mental status and fever, patient has a history of meningitis in 2015.  Patient was admitted to the hospital underwent lumbar puncture which showed evidence of acute meningitis, but patient see an F cultures remain negative, g stain showed no organisms.  Her urine grew out E coli and  her blood culture on February 12th grew out Staphylococcus pneumonia since his to Rocephin.  Patient was continued on Rocephin bacteremia dose and will continue to need 2-4 weeks of IV Rocephin 2 g daily.      * No surgery found *      Hospital Course:   3/10 doing well but still with hearing difficulty     3/13 still w hearing trouble, doing some better with therapy.  Labs reviewed today, increased insulin     3/16 DC home today, will need Lift chair at home for standing assistance due to debility.  DC PICC line.  Finished ABX.  Follow up with PCP and referred for hearing eval.         Goals of Care Treatment Preferences:  Code Status: Full Code      Consults:   Consults (From admission, onward)        Status  Ordering Provider     Inpatient consult to Registered Dietitian/Nutritionist  Once        Provider:  (Not yet assigned)    ROBERTO Zarco          No new Assessment & Plan notes have been filed under this hospital service since the last note was generated.  Service: Hospital Medicine    Final Active Diagnoses:    Diagnosis Date Noted POA    Hearing loss [H91.90] 03/06/2023 Yes    Hypertension [I10] 10/17/2022 Yes    Body mass index (BMI) 40.0-44.9, adult [Z68.41] 10/17/2022 Not Applicable    Dyslipidemia [E78.5] 09/20/2021 Yes    CKD (chronic kidney disease), stage IV [N18.4] 09/20/2021 Yes    Type 2 diabetes mellitus with diabetic chronic kidney disease [E11.22] 09/20/2021 Yes      Problems Resolved During this Admission:    Diagnosis Date Noted Date Resolved POA    PRINCIPAL PROBLEM:  Bacterial meningitis [G00.9] 02/13/2023 03/16/2023 Yes       Discharged Condition: good    Disposition: Home or Self Care    Follow Up:   Follow-up Information     Jag Lopez MD Follow up on 3/28/2023.    Specialty: Geriatric Medicine  Why: appointment time: 9:30 am  Contact information:  [address] [place] [address] [phone]             OUTPATIENT THERAPY Follow up.    Why: APPOINTMENT TIME:  TUESDAY AFTERNOON  Contact information:  [facility]  [address]  [place], [place]  [phone]                     Patient Instructions:   No discharge procedures on file.    Significant Diagnostic Studies: Labs: BMP: No results for input(s): GLU, NA, K, CL, CO2, BUN, CREATININE, CALCIUM, MG in the last 48 hours.    Pending Diagnostic Studies:     None         Medications:  Reconciled Home Medications:      Medication List      CONTINUE taking these medications    allopurinoL 100 MG tablet  Commonly known as: ZYLOPRIM  Take 100 mg by mouth once daily.     amLODIPine 5 MG tablet  Commonly known as: NORVASC  Take 1 tablet (5 mg total) by mouth once daily.     APPLE CIDER VINEGAR COMPLEX ORAL  Take 1  capsule by mouth once daily.     aspirin 81 MG EC tablet  Commonly known as: ECOTRIN  Take 81 mg by mouth once daily.     calcitRIOL 0.25 MCG Cap  Commonly known as: ROCALTROL  Take 0.25 mcg by mouth once daily.     carvediloL 12.5 MG tablet  Commonly known as: COREG  Take 12.5 mg by mouth 2 (two) times daily.     dextrose 5 % in water (D5W) 5 % PgBk 50 mL with cefTRIAXone 2 gram SolR 2 g  Inject 2 g into the vein every 12 (twelve) hours.     DULoxetine 30 MG capsule  Commonly known as: CYMBALTA  Take 30 mg by mouth once daily.     pantoprazole 40 MG tablet  Commonly known as: PROTONIX  Take 1 tablet (40 mg total) by mouth once daily.     QUEtiapine 50 MG tablet  Commonly known as: SEROQUEL  Take 1 tablet (50 mg total) by mouth 2 (two) times daily.     rosuvastatin 40 MG Tab  Commonly known as: CRESTOR  Take 40 mg by mouth once daily.     sodium bicarbonate 650 MG tablet  Take 650 mg by mouth 3 (three) times daily.     ticagrelor 90 mg tablet  Commonly known as: BRILINTA  Take 1 tablet (90 mg total) by mouth 2 (two) times a day.     tiZANidine 4 MG tablet  Commonly known as: ZANAFLEX  Take 4 mg by mouth 2 (two) times daily as needed.     TRESIBA FLEXTOUCH U-200 200 unit/mL (3 mL) insulin pen  Generic drug: insulin degludec  Inject 50 Units into the skin once daily.     VICTOZA 2-TONI 0.6 mg/0.1 mL (18 mg/3 mL) Pnij pen  Generic drug: liraglutide 0.6 mg/0.1 mL (18 mg/3 mL) subq PNIJ  Inject 0.6 mg into the skin once daily.        ASK your doctor about these medications    cyclobenzaprine 5 MG tablet  Commonly known as: FLEXERIL  Take 1 tablet (5 mg total) by mouth 3 (three) times daily as needed for Muscle spasms.  Ask about: Should I take this medication?            Indwelling Lines/Drains at time of discharge:   Lines/Drains/Airways     Peripherally Inserted Central Catheter Line  Duration           PICC Double Lumen 02/20/23 1417 left cephalic 23 days                Time spent on the discharge of patient: 35  minutes         Clyde Flood DO  Department of Hospital Medicine  [facility]

## 2023-03-16 NOTE — PT/OT/SLP PROGRESS
Occupational Therapy   Treatment    Name: Jessica Bay  MRN: [id number]  Admitting Diagnosis:  Bacterial meningitis       Recommendations:     Discharge Recommendations: home health OT, home health PT, home with home health  Discharge Equipment Recommendations:  3-in-1 commode  Barriers to discharge:  None    Assessment:     Jessica Bay is a 67 y.o. female with a medical diagnosis of Bacterial meningitis.  She presents with readiness to d/c home but does present with questions regarding   1)scheduling for outpt therapy upon d/c home  2)need of audiology appointment for post d/c from this facility; OT assisted pt with that all the way to getting her setup with HIM for retaining records to take with her to audiology appt  3)asking about lift chair and insurance needs etc.  OT checked on that for her today as well, nursing notified.    OT assisted her in addressing all to her good understanding per her report; OT also provided her with written information to contact the ENT after she discharges from this facility.  Pt relates very good understanding.    Performance deficits affecting function are weakness, pain, impaired endurance.     Rehab Prognosis:  Good; patient would benefit from acute skilled OT services to address these deficits and reach maximum level of function.       Plan:     Patient to be seen 5 x/week to address the above listed problems via self-care/home management, therapeutic activities, therapeutic exercises, wheelchair management/training  Plan of Care Expires: 04/07/23  Plan of Care Reviewed with: patient, son    Subjective     Pain/Comfort:   None reported     Objective:     Communicated with: pt prior to session.  Patient found up in chair with PICC line upon OT entry to room.    General Precautions: Standard, fall, hearing impaired    Orthopedic Precautions:N/A  Braces: N/A  Respiratory Status: Room air     Occupational Performance:     Bed Mobility:    Mod i    Functional  "Mobility/Transfers:  Mod i    Activities of Daily Living:  Mod I with all      [facility] 6 Click ADL:      Treatment & Education:  OT initiated pt with reciprocal pulleys to promote mobilization of BUE shoulders.  Pt report this is helpful.  After this, OT setup pt with green putty small object search to promote FM and  strength, while discussion ensued regarding multiple post d/c needs pt is "thinking" on.  OT assisted her with these as well.      Pt should have all needed upon d/c to home with family svn    Patient left up in chair with call button in reach    GOALS:   Multidisciplinary Problems       Occupational Therapy Goals          Problem: Occupational Therapy    Goal Priority Disciplines Outcome Interventions   Occupational Therapy Goal     OT, PT/OT Ongoing, Progressing    Description: STG: within 2 weeks  Pt will perform grooming with setup at sinkside from w/c MET  Pt will bathe with min a at bedside MET  Pt will perform UE dressing with svn MET  Pt will perform LE dressing with min a equipment as needed MET  Pt will sit EOB x 30 min with svn assistance MET  Pt will transfer bed/chair/bsc with mod a with AE as needed MET  Pt will perform standing task x 1 min with mod assistance MET  Pt will tolerate 45 minutes of tx without fatigue ONGOING/PROGRESSING      LT.Restore to max I with self care and mobility.                          Time Tracking:     OT Date of Treatment: 23  OT Start Time: 954  OT Stop Time: 1020  OT Total Time (min): 26 min    Billable Minutes:Therapeutic Activity 15  Therapeutic Exercise 11    OT/GLADYS: OT          3/16/2023  "

## 2023-03-16 NOTE — PT/OT/SLP PROGRESS
Physical Therapy Treatment    Patient Name:  Jessica Bay   MRN:  [id number]    Recommendations:     Discharge Recommendations: home with home health  Discharge Equipment Recommendations: none  Barriers to discharge: Decreased caregiver support, infection    Assessment:     Jessica Bay is a 67 y.o. female admitted with a medical diagnosis of Bacterial meningitis.  She presents with the following impairments/functional limitations: weakness, impaired endurance, gait instability, impaired balance. Patient was provided with visual and verbal cues for proper form of treatment tasks. Patient able to ambulate with SC in parallel bars as noted below this date. Patient stated that her L knee was sore after treatment.     Rehab Prognosis: Good; patient would benefit from acute skilled PT services to address these deficits and reach maximum level of function.    Recent Surgery: * No surgery found *      Plan:     During this hospitalization, patient to be seen 5 x/week to address the identified rehab impairments via gait training, therapeutic activities, therapeutic exercises and progress toward the following goals:    Plan of Care Expires:  04/07/23    Subjective     Chief Complaint: Patient stated that she had no pain prior to treatment, but she stated that she twisted her ankle while putting her shoes on earlier.   Patient/Family Comments/goals: Pt to dc home when able.  Pain/Comfort:  Pain Rating 1: 0/10      Objective:     Communicated with pt and OT prior to session. Patient found up in chair with PICC line upon PTA entry to room.     General Precautions: Standard, fall  Orthopedic Precautions: N/A  Braces: N/A  Respiratory Status: Room air     Functional Mobility:  Transfers:     Sit to Stand:  supervision with grab bars and straight cane  Toilet Transfer: modified independence with  rail in bathroom  using  Stand Pivot  Gait: Patient ambulated 12 feet with 180 degree turn in parallel bars with bilateral hand  rails once, and 2 trials 12 feet with 180 degree turn with SC in R hand and occasionally touching parallel bar with LUE with CGA/SBA.      AM-PAC 6 CLICK MOBILITY  Turning over in bed (including adjusting bedclothes, sheets and blankets)?: 4  Sitting down on and standing up from a chair with arms (e.g., wheelchair, bedside commode, etc.): 4  Moving from lying on back to sitting on the side of the bed?: 4  Moving to and from a bed to a chair (including a wheelchair)?: 4  Need to walk in hospital room?: 3  Climbing 3-5 steps with a railing?: 1  Basic Mobility Total Score: 20       Treatment & Education: BLE   LAQS with 2# 15x, HS curls with green TB 20x, Hip ABD 20x, seated marching 11x, ankle pumps 20x with 2#    Patient left up in chair with call button in reach.    GOALS:   Multidisciplinary Problems       Physical Therapy Goals          Problem: Physical Therapy    Goal Priority Disciplines Outcome Goal Variances Interventions   Physical Therapy Goal     PT, PT/OT Ongoing, Progressing     Description: STG - 2 weeks  1. Pt will amb 4 ft with min A x 2.-MET  2. Pt will perform bed transfer with min A.-MET  3. Pt will transfer sit to stand with CGA.-MET    LTG - 4 weeks  1. Pt will amb 4 ft with CGA.-MET  2.Pt will perform bed transfer with SBA.-MET  3. Pt will transfer sit to stand with SBA.-MET  4. Pt will have BLE strength of 4/5.-PROGRESSING                           Time Tracking:     PT Received On: 03/16/23  PT Start Time: 0907     PT Stop Time: 0937  PT Total Time (min): 30 min     Billable Minutes: Gait Training 10, Therapeutic Activity 5, and Therapeutic Exercise 15    Treatment Type: Treatment  PT/PTA: PTA     Number of PTA visits since last PT visit: 1 03/16/2023

## 2023-03-16 NOTE — PLAN OF CARE
03/16/23 1311   Final Note   Assessment Type Final Discharge Note   Anticipated Discharge Disposition Home   What phone number can be called within the next 1-3 days to see how you are doing after discharge? [phone]   Hospital Resources/Appts/Education Provided Provided patient/caregiver with written discharge plan information   Post-Acute Status   Discharge Delays None known at this time     Patient to dc home today with family. Reports she will continue outpatient therapy at [facility].

## 2023-03-17 ENCOUNTER — PATIENT OUTREACH (OUTPATIENT)
Dept: ADMINISTRATIVE | Facility: CLINIC | Age: 68
End: 2023-03-17

## 2023-03-17 NOTE — PROGRESS NOTES
C3 nurse spoke with patients  Enrique Bay for a TCC post hospital discharge follow up call. The patient does not have a scheduled HOSFU appointment with Jag Lopez MD  within 5-7 days post hospital discharge date 3/16/23. C3 nurse was unable to schedule HOSFU appointment in [facility].    Message sent to PCP staff requesting they contact patient and schedule follow up appointment.

## 2023-03-17 NOTE — PROGRESS NOTES
C3 nurse attempted to contact Jessica Bay  for a TCC post hospital discharge follow up call. No answer. No voicemail available. The patient has a scheduled HOSFU appointment with Jag Lopez MD  on 3/28/23 @ 603.

## 2023-03-27 LAB — CULTURE, FUNGUS (OTHER): NORMAL

## 2023-03-28 ENCOUNTER — OFFICE VISIT (OUTPATIENT)
Dept: FAMILY MEDICINE | Facility: CLINIC | Age: 68
End: 2023-03-28
Payer: MEDICARE

## 2023-03-28 VITALS
DIASTOLIC BLOOD PRESSURE: 70 MMHG | OXYGEN SATURATION: 100 % | HEIGHT: 63 IN | BODY MASS INDEX: 38.27 KG/M2 | HEART RATE: 87 BPM | TEMPERATURE: 98 F | WEIGHT: 216 LBS | RESPIRATION RATE: 20 BRPM | SYSTOLIC BLOOD PRESSURE: 120 MMHG

## 2023-03-28 DIAGNOSIS — R53.81 PHYSICAL DEBILITY: ICD-10-CM

## 2023-03-28 DIAGNOSIS — G47.01 INSOMNIA DUE TO MEDICAL CONDITION: Primary | ICD-10-CM

## 2023-03-28 DIAGNOSIS — G93.32 CHRONIC FATIGUE SYNDROME: ICD-10-CM

## 2023-03-28 DIAGNOSIS — Z86.61 HISTORY OF BACTERIAL MENINGITIS: ICD-10-CM

## 2023-03-28 DIAGNOSIS — E66.2 MORBID (SEVERE) OBESITY WITH ALVEOLAR HYPOVENTILATION: ICD-10-CM

## 2023-03-28 DIAGNOSIS — H91.90 HEARING LOSS, UNSPECIFIED HEARING LOSS TYPE, UNSPECIFIED LATERALITY: ICD-10-CM

## 2023-03-28 DIAGNOSIS — M53.3 COCCYGEAL PAIN: ICD-10-CM

## 2023-03-28 PROCEDURE — 99215 OFFICE O/P EST HI 40 MIN: CPT | Mod: ,,, | Performed by: STUDENT IN AN ORGANIZED HEALTH CARE EDUCATION/TRAINING PROGRAM

## 2023-03-28 PROCEDURE — 99215 PR OFFICE/OUTPT VISIT, EST, LEVL V, 40-54 MIN: ICD-10-PCS | Mod: ,,, | Performed by: STUDENT IN AN ORGANIZED HEALTH CARE EDUCATION/TRAINING PROGRAM

## 2023-03-28 RX ORDER — QUETIAPINE FUMARATE 25 MG/1
25 TABLET, FILM COATED ORAL NIGHTLY
Qty: 30 TABLET | Refills: 1 | Status: SHIPPED | OUTPATIENT
Start: 2023-03-28 | End: 2024-03-27

## 2023-03-28 NOTE — PROGRESS NOTES
"  Subjective     Patient ID: Jessica Bay is a 67 y.o. female.    Chief Complaint: Follow up from hospital discharges 3/16/2023 was in hospita    [facility]    67-year-old woman with the medical problems listed below who comes to clinic after recent hospitalization, LTAC admission, and swingbed stay at [facility] due to complications from bacterial meningitis.     Recovering from meningitis; Hx: had the crud for 2 weeks. Just took Tylenol and Mucinex didn't get any better didn't get any worse. Started getting an earache in her R ear and pain over the ear. Have a vague memory of calling 911 but beyond that has amnesia. 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     Hypnopompic hallucinations; post-ICU delirium and auditory hallucinations. Somewhat improved     Also with coccygeal pain; cannot lie back on her back. No position is comfortable except lying on her right side and leaning forward in her chair; This same issue occurred in 2018 after a prolonged illness requiring hospitalization.     9/30/22 recurrent R otitis media.     DM - Has appt with Dr. Dara sharma in several weeks     Was previously following with Dr. Kim mi neurology, who sent her to Dr. Garcia ENT- she would like to go back here.     Is not on CPAP but was told a long time ago she should be? Was using CPAP in the hospital.    Is still very week since coming home from [facility]; requesting a lift chair for physical debility; Jones talked to Bill at [employer]. Dennis from [facility] got her a hospital bed      Objective   /70   Pulse 87   Temp 98.3 °F (36.8 °C)   Resp 20   Ht 5' 3" (1.6 m)   Wt 98 kg (216 lb)   SpO2 100%   BMI 38.26 kg/m²     Physical Exam  Gen: well-groomed, well-dressed. No apparent distress  HEENT:  NCAT, symmetric  Pulm: normal work of breathing, no accessory muscle use; speaking complete sentences without having to stop  Neuro: CN II-XII grossly normal   Psych: pleasant affect, congruent mood. Linear " thought; good eye contact  SKIN: no rashes present on face, neck, hands       Assessment and Plan     Problem List Items Addressed This Visit       Chronic fatigue syndrome    Relevant Orders    Home Sleep Study    Hearing loss     ENT referral; may be related to the abx she received unfortunately.          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 Home sleep study to evaluate for NATASHA.          Relevant Medications    QUEtiapine (SEROQUEL) 25 MG Tab    Other Relevant Orders    Home Sleep Study    Coccygeal pain     Likely related to prolonged immobilization in the hospital + physical inactivity / debility. PT as per below         Relevant Orders    Ambulatory referral/consult to Physical/Occupational Therapy    Morbid (severe) obesity with alveolar hypoventilation     Ongoing difficulty with insomnia; continue seroquel low dose for now; discussed increased risk of mortality with 2nd gen antipsychotics, but I believe this has been helping her hallucinations and likely resolving critical care delirium. Home sleep study to evaluate for NATASHA.          Relevant Orders    Home Sleep Study    Physical debility     Continue PT/OT; DME order for lift chair started.          Relevant Orders    Ambulatory referral/consult to Physical/Occupational Therapy    HME - OTHER    History of bacterial meningitis     Resolved. Completed appropriate course of IV abx; still having hearing loss; refer to ENT to Dr. Garcia as per her preference.          Relevant Orders    Ambulatory referral/consult to ENT

## 2023-03-29 PROBLEM — Z86.61 HISTORY OF BACTERIAL MENINGITIS: Status: ACTIVE | Noted: 2023-03-29

## 2023-03-29 PROBLEM — R53.81 PHYSICAL DEBILITY: Status: ACTIVE | Noted: 2023-03-29

## 2023-03-29 PROBLEM — G47.01 INSOMNIA DUE TO MEDICAL CONDITION: Status: ACTIVE | Noted: 2023-03-29

## 2023-03-29 PROBLEM — E66.2 MORBID (SEVERE) OBESITY WITH ALVEOLAR HYPOVENTILATION: Status: ACTIVE | Noted: 2023-03-29

## 2023-03-29 PROBLEM — M53.3 COCCYGEAL PAIN: Status: ACTIVE | Noted: 2023-03-29

## 2023-03-29 NOTE — ASSESSMENT & PLAN NOTE
Continue PT/OT; DME order for lift chair started.   
ENT referral; may be related to the abx she received unfortunately.   
Likely related to prolonged immobilization in the hospital + physical inactivity / debility. PT as per below  
Ongoing difficulty with insomnia; continue seroquel low dose for now; discussed increased risk of mortality with 2nd gen antipsychotics, but I believe this has been helping her hallucinations and likely resolving critical care delirium. Home sleep study to evaluate for NATASHA.   
Ongoing difficulty with insomnia; continue seroquel low dose for now; discussed increased risk of mortality with 2nd gen antipsychotics, but I believe this has been helping her hallucinations and likely resolving critical care delirium. Home sleep study to evaluate for NATASHA.   
Resolved. Completed appropriate course of IV abx; still having hearing loss; refer to ENT to Dr. Garcia as per her preference.   
no dysuria, no frequency, and no hematuria.

## 2023-04-05 ENCOUNTER — CLINICAL SUPPORT (OUTPATIENT)
Dept: REHABILITATION | Facility: HOSPITAL | Age: 68
End: 2023-04-05
Attending: STUDENT IN AN ORGANIZED HEALTH CARE EDUCATION/TRAINING PROGRAM
Payer: MEDICARE

## 2023-04-05 DIAGNOSIS — M53.3 COCCYGEAL PAIN: ICD-10-CM

## 2023-04-05 DIAGNOSIS — R53.81 PHYSICAL DEBILITY: ICD-10-CM

## 2023-04-05 PROCEDURE — 97140 MANUAL THERAPY 1/> REGIONS: CPT

## 2023-04-05 PROCEDURE — 97163 PT EVAL HIGH COMPLEX 45 MIN: CPT

## 2023-04-05 NOTE — PROGRESS NOTES
[facility]  Physical Therapy Initial Evaluation    Name: Jessica Bay  Clinic Number: [id number]    Therapy Diagnosis: No diagnosis found.  Physician: Jag Lopez MD    Physician Orders: PT Eval and Treat   Medical Diagnosis from Referral: Coccygeal pain, physical debility  Evaluation Date: 4/5/2023  Authorization Period Expiration: ***  Plan of Care Expiration: ***  Visit # / Visits authorized: 1/ ***    Time In: ***  Time Out: ***  Total Appointment Time (timed & untimed codes): *** minutes    Precautions: Standard and Fall    Subjective   Date of onset: 3/28/2023  History of current condition - Jessica reports: Pt had recent hosp'n/Swing Bed admission for recurrent bacterial meningitis. Pt now diagnosed with physical debility and hearing loss.     Medical History:   Past Medical History:   Diagnosis Date    Cancer     Diabetes mellitus, type 2     Fibromyalgia     History of kidney cancer 2018    Hyperlipidemia     Hypertension     Migraine headache     Renal cell carcinoma     Renal disorder        Surgical History:   Jessica Bay  has a past surgical history that includes Nephrectomy.    Medications:   Jessica has a current medication list which includes the following prescription(s): allopurinol, amlodipine, aspirin, calcitriol, carvedilol, dextrose 5 % in water (D5W) 5 % PgBk 50 mL with cefTRIAXone 2 gram SolR 2 g, duloxetine, insulin degludec, victoza 2-rebeka, pantoprazole, pectin/vit b6/mins 4/c.vinegar, quetiapine, rosuvastatin, sodium bicarbonate, ticagrelor, and tizanidine.    Allergies:   Review of patient's allergies indicates:   Allergen Reactions    Bactrim [sulfamethoxazole-trimethoprim] Blisters    Benadryl [diphenhydramine hcl]     Daypro [oxaprozin]         Imaging, {Mri/ctscan/bone scan:66210}: ***    Prior Therapy: Pt had PT/OT at [facility] [place] in the Swing Bed unit.  Social History:  lives with their spouse  Occupation: retired  Prior Level of Function: ***  Current Level of Function:  "***    Pain:  Current {0-10:20507::"0"}/10, worst {0-10:20507::"0"}/10, best {0-10:20507::"0"}/10   Location: {RIGHT LEFT BILATERAL:26763} {LOCATION ON BODY:08143}  Description: {Pain Description:72910}  Aggravating Factors: {Causes; Pain:07486}  Easing Factors: {Pain (activities that relieve):95037}    Pts goals: ***    Objective       Range of motion:  Motion Right Left    Hip flexion  {wfl range of motion:82238}  {wfl range of motion:56769}   Hip extension  {wfl range of motion:93461}  {wfl range of motion:30495}   Hip abduction  {wfl range of motion:57899}  {wfl range of motion:21947}   Hip adduction  {wfl range of motion:65704}  {wfl range of motion:70423}   Internal rotation       External rotation       Knee extension  {wfl range of motion:67439}  {wfl range of motion:77898}   Knee flexion  {wfl range of motion:70093}  {wfl range of motion:34717}   Ankle DF  {wfl range of motion:64080}  {wfl range of motion:63209}   Ankle PF  {wfl range of motion:46807}  {wfl range of motion:08799}   Ankle Inversion       Ankle Eversion           Manual muscle test   Muscle Right  Left    Hip flexion  {MMT strength:11730:::1}  {MMT strength:14153:::1}   Hip extension  {MMT strength:34508:::1}  {MMT strength:08574:::1}   Hip abduction  {MMT strength:40498:::1}  {MMT strength:71027:::1}   Hip adduction  {MMT strength:11811:::1}  {MMT strength:22830:::1}   Hip internal rotation  {MMT strength:86990:::1}  {MMT strength:09870:::1}   Hip external rotation  {MMT strength:74038:::1}  {MMT strength:03130:::1}   Knee extension  {MMT strength:56701:::1}  {MMT strength:16123:::1}   Knee flexion  {MMT strength:76401:::1}  {MMT strength:54999:::1}   Ankle DF  {MMT strength:84054:::1}  {MMT strength:50740:::1}   Ankle PF  {MMT strength:45616:::1}  {MMT strength:84085:::1}   Ankle inversion  {MMT strength:41791:::1}  {MMT strength:21401:::1}   Ankle eversion  {MMT strength:76059:::1}  {MMT strength:25520:::1}       Gait:  Weight bearing " "precautions: WBAT  Assistive device: {Assistive Device:95732}  Ambulation distance and deviations:  Stairs:  Comments:    Limitation/Restriction for FOTO Intake Survey    Therapist reviewed FOTO scores for Jessica Bay on 4/5/2023.   FOTO documents entered into [employer] - see Media section.    Limitation Score: ***%         TREATMENT   Treatment Time In: ***  Treatment Time Out: ***  Total Treatment time (time-based codes) separate from Evaluation: *** minutes    Jessica received the treatments listed below:  {OTW Treatment:95303}    Home Exercises and Patient Education Provided    Education provided:   - ***    Written Home Exercises Provided: {Blank single:14592::"yes","Patient instructed to cont prior HEP"}.  Exercises were reviewed and Jessica was able to demonstrate them prior to the end of the session.  Jessica demonstrated {Desc; good/fair/poor:02641} understanding of the education provided.     See EMR under {Blank single:56503::"Media","Patient Instructions"} for exercises provided {Blank single:89829::"4/5/2023","prior visit"}.    Assessment   Jessica is a 67 y.o. female referred to outpatient Physical Therapy with a medical diagnosis of ***. Pt presents with ***    Pt prognosis is {REHAB PROGNOSIS OHS:18289}.   Pt will benefit from skilled outpatient Physical Therapy to address the deficits stated above and in the chart below, provide pt/family education, and to maximize pt's level of independence.     Plan of care discussed with patient: Yes  Pt's spiritual, cultural and educational needs considered and patient is agreeable to the plan of care and goals as stated below:     Anticipated Barriers for therapy: ***      Short Term Goals: *** weeks   ***    Long Term Goals: *** weeks   ***    Plan   Plan of care Certification: 4/5/2023 to ***.    Outpatient Physical Therapy {NUMBERS 1-5:28121} times weekly for {0-10:20507::"0"} weeks to include the following interventions: {TX PLAN:78796}.     Cassandra Lanier, " PT

## 2023-04-05 NOTE — PLAN OF CARE
"[facility]  Physical Therapy Initial Evaluation    Name: Jessica Bay  Clinic Number: [id number]    Therapy Diagnosis: No diagnosis found.  Physician: Jag Lopez MD    Physician Orders: PT Eval and Treat   Medical Diagnosis from Referral: Coccygeal pain, physical debility  Evaluation Date: 4/5/2023  Authorization Period Expiration: 5/12/2023    Visit # / Visits authorized: 1/ 11    Time In: 1504  Time Out: 1546  Total Appointment Time (timed & untimed codes): 42 minutes    Precautions: Standard and Fall    Subjective   Date of onset: 3/28/2023  History of current condition - Jessica reports: Pt had recent hosp'n/Swing Bed admission for recurrent bacterial meningitis. Pt now diagnosed with physical debility and hearing loss. Pt states she is cancer free. She c/o coccygeal pain since hosp'n stating, "It feels like coccyx is out of place. Doctor thinks it's muscles." States she can no longer lie supine because, "it feels like I'm sitting on a golf ball. I can't find a comfortable position." States that this happened before when she was hospitalized.      Medical History:   Past Medical History:   Diagnosis Date    Cancer     Diabetes mellitus, type 2     Fibromyalgia     History of kidney cancer 2018    Hyperlipidemia     Hypertension     Migraine headache     Renal cell carcinoma     Renal disorder        Surgical History:   Jessica Bay  has a past surgical history that includes right Nephrectomy 2018.    Medications:   Jessica has a current medication list which includes the following prescription(s): allopurinol, amlodipine, aspirin, calcitriol, carvedilol, dextrose 5 % in water (D5W) 5 % PgBk 50 mL with cefTRIAXone 2 gram SolR 2 g, duloxetine, insulin degludec, victoza 2-rebeka, pantoprazole, pectin/vit b6/mins 4/c.vinegar, quetiapine, rosuvastatin, sodium bicarbonate, ticagrelor, and tizanidine.    Allergies:   Review of patient's allergies indicates:   Allergen Reactions    Bactrim " [sulfamethoxazole-trimethoprim] Blisters    Benadryl [diphenhydramine hcl]     Daypro [oxaprozin]         Imaging, none:     Prior Therapy[place] [facility] [place] [facility] [place]  Social History:  lives with their spouse  Occupation: retired  Prior Level of Function: Pt uses PWC for primary mobility in home and community. Modif indep with transfers and bed mob. She had no coccyx pain.  Current Level of Function: Pt can no longer lie on her back which is affecting her sleep ability. Has increased coccyx pain. She was able to walk 10 ft without AD this morning.     DME: PWC, hosp bed, grab bar at toilet. Lift chair has been ordered.  Pain:  Current 3/10, worst 10/10, best 3/10   Location: left coccyx   Description: Burning and Sharp  Aggravating Factors: Sitting, lying supine  Easing Factors: lying on right side, Tylenol    Pts goals: abolish coccygeal pain. Pt states that walking goal not a priority at this time.    Objective       Range of motion:  Motion Right Left    Hip flexion  115  116   Hip extension  -15  -15   Hip abduction  WITHIN FUNCTIONAL LIMITS  WITHIN FUNCTIONAL LIMITS   Hip adduction  WITHIN FUNCTIONAL LIMITS  WITHIN FUNCTIONAL LIMITS   Internal rotation       External rotation       Knee extension  WITHIN FUNCTIONAL LIMITS  WITHIN FUNCTIONAL LIMITS   Knee flexion  124  115   Ankle DF  5  5   Ankle PF  WITHIN FUNCTIONAL LIMITS  WFL   Ankle Inversion       Ankle Eversion           Manual muscle test   Muscle Right  Left    Hip flexion  MMT strength: 3-/5  MMT strength: 4-/5   Hip extension  MMT strength: 3+/5  MMT strength: 3+/5   Hip abduction  MMT strength: 5/5  MMT strength: 5/5   Hip adduction  MMT strength: 4+/5  MMT strength: 4+/5   Hip internal rotation      Hip external rotation       Knee extension  MMT strength: 4/5  MMT strength: 4/5   Knee flexion  MMT strength: 4-/5  MMT strength: 4/5   Ankle DF  MMT strength: 4-/5  MMT strength: 4-/5   Ankle  PF  MMT strength: 4+/5  MMT strength: 4+/5   Ankle inversion       Ankle eversion           Gait:  Weight bearing precautions: WBAT  Assistive device: NT  Ambulation distance and deviations:pt states she walked 10 ft this morning.  Stairs:unable  Comments: Pt did not state a walking goal. Pt has tightness in tierney piriformis, glutes and hip flexors. She has multiple painful trigger point tierney hips anteriorly/posteriorly/laterally, as well as along tierney lumbar paraspinals and sacral edges. She has no pain when palpated directly about coccyx.    Limitation/Restriction for FOTO Intake Survey    Therapist reviewed FOTO scores for Jessica Bay on 4/5/2023.   FOTO documents entered into [employer] - see Media section.    Limitation Score: 40%         TREATMENT   Treatment Time In: 1504   Treatment Time Out: 1546  Total Treatment time (time-based codes) separate from Evaluation: 8 minutes    Jessica received the treatments listed below:  MANUAL THERAPY TECHNIQUES including trigger point release and STM were applied to left lat thigh and glutes for 8 minutes. Foam rolling trialed, but pt was unable to tolerate due to presence of multiple trigger points.    Home Exercises and Patient Education Provided    Education provided:   - Pt instructed to try to gradually lower HOB, results of eval, POC, treatment options.    Written Home Exercises Provided: NA.  Exercises were reviewed and Jessica was able to demonstrate them prior to the end of the session.  Jessica demonstrated NA understanding of the education provided.     See EMR under NA for exercises provided NA.    Assessment   Jessica is a 67 y.o. female referred to outpatient Physical Therapy with a medical diagnosis of Coccygeal pain and debility. Pt states her primary goal is to decrease her coccyx pain. She did not state a walking goal at this time. Pt presents with decreased ROM tierney hips, BLE weakness and pain. She reports coccygeal pain of 3 to 10/10 which is effecting her  sleep and positioning. She has multiple painful trigger points about tierney hips and low back. PT indicated to address coccyx pain, address trigger point pain, AND improve BLE strength and ROM.     Pt prognosis is Good.   Pt will benefit from skilled outpatient Physical Therapy to address the deficits stated above and in the chart below, provide pt/family education, and to maximize pt's level of independence.     Plan of care discussed with patient: Yes  Pt's spiritual, cultural and educational needs considered and patient is agreeable to the plan of care and goals as stated below:     Anticipated Barriers for therapy: none      Short Term Goals: 3 weeks   Pt will be indep with HEP.  Pt will report coccyx pain no higher than 6/10.  Pt will have ROM tierney ankle DF of 10 degrees.  Pt's flexibility in tierney piriformis, ITB and hip flexors WFL.    Long Term Goals: 5 weeks   Pt will be indep with self-treatment techniques.  Pt will report coccyx pain no higher than 3/10.  Pt will be able to lie supine comfortably.  Pt will gain grossly 1/2 muscle grade strength BLE.    Plan   Plan of care Certification: 4/5/2023 to 5/12/2023.    Outpatient Physical Therapy 2 times weekly for 5 weeks to include the following interventions: Electrical Stimulation IFC, Manual Therapy, Moist Heat/ Ice, Patient Education, and Therapeutic Exercise.     Cassandra Lanier, PT

## 2023-04-12 ENCOUNTER — CLINICAL SUPPORT (OUTPATIENT)
Dept: REHABILITATION | Facility: HOSPITAL | Age: 68
End: 2023-04-12
Attending: STUDENT IN AN ORGANIZED HEALTH CARE EDUCATION/TRAINING PROGRAM
Payer: MEDICARE

## 2023-04-12 DIAGNOSIS — M53.3 COCCYGEAL PAIN: Primary | ICD-10-CM

## 2023-04-12 PROCEDURE — 97110 THERAPEUTIC EXERCISES: CPT | Mod: CQ

## 2023-04-12 PROCEDURE — 97140 MANUAL THERAPY 1/> REGIONS: CPT | Mod: CQ

## 2023-04-12 NOTE — PROGRESS NOTES
"  [facility]   Physical Therapy Treatment Note     Name: Jessica Bay  [facility] Number: [id number]    Therapy Diagnosis: No diagnosis found.  Physician: Jag Lopez MD    Visit Date: 4/12/2023    Physician Orders: PT Eval and Treat   Medical Diagnosis from Referral: Coccygeal pain, physical debility  Evaluation Date: 4/5/2023  Authorization Period Expiration: 5/12/2023     Visit # / Visits authorized: 2/11     Time In: 1407  Time Out: 1458  Total Appointment Time (timed & untimed codes): 51 minutes     Precautions: Standard and Fall    PTA Visit #: 1/5       SUBJECTIVE     Pt reports: Patient stated that she had no pain prior to treatment.  She  NA  compliant with home exercise program.  Response to previous treatment: Patient with no adverse effects   Functional change: Patient with no pain this date.     Pain: 0/10  Location: NA    OBJECTIVE     Objective Measures updated at progress report unless specified.     Treatment     Jessica received the treatments listed below:      therapeutic exercises to develop ROM and flexibility for 41 minutes including:    Calf stretch with stretch strap  2 x 30" each    Passive hip flexion stretch  2 x 30" each    Seated Piriformis stretch  2 x 30" hold    Seated marching  10x    Hip ABD with red TB 15x    Hip ADD squeezes  15x with 3 " hold                    manual therapy techniques: Trigger point release and foam rolling were applied to the: L glute, piriformis, low back, and IT band for 10 minutes    supervised modalities after being cleared for contradictions: IFC Electrical Stimulation:  Jessica received IFC Electrical Stimulation for pain control applied to the NA. Pt received stimulation at NA % scan at a frequency of NA for 0 minutes. Jessica tolderated treatment well without any adverse effects. (Not today)     hot pack for 0 minutes to NA.    cold pack for 0 minutes to NA.    Patient Education and Home Exercises     Home Exercises " Provided and Patient Education Provided     Education provided:   - HEP    Written Home Exercises Provided: yes. Exercises were reviewed and Jessica was able to demonstrate them prior to the end of the session.  Jessica demonstrated good  understanding of the education provided. See media for exercises provided during therapy sessions    ASSESSMENT     Patient was provided with visual, verbal, and tactile cues for proper form and hold times of therapy tasks. Patient with multiple tender and painful trigger points upon palpation, which she reported improved with manual therapy. Patient declined MHP this date and stated that she had no pain after completion of treatment.     Jessica Is progressing well towards her goals.   Pt prognosis is Good.     Pt will continue to benefit from skilled outpatient physical therapy to address the deficits listed in the problem list box on initial evaluation, provide pt/family education and to maximize pt's level of independence in the home and community environment.     Pt's spiritual, cultural and educational needs considered and pt agreeable to plan of care and goals.     Anticipated Barriers for therapy: none        Short Term Goals: 3 weeks   Pt will be indep with HEP.  Pt will report coccyx pain no higher than 6/10.  Pt will have ROM tierney ankle DF of 10 degrees.  Pt's flexibility in tierney piriformis, ITB and hip flexors WFL.     Long Term Goals: 5 weeks   Pt will be indep with self-treatment techniques.  Pt will report coccyx pain no higher than 3/10.  Pt will be able to lie supine comfortably.  Pt will gain grossly 1/2 muscle grade strength BLE.    PLAN     Plan of care Certification: 4/5/2023 to 5/12/2023.     Outpatient Physical Therapy 2 times weekly for 5 weeks to include the following interventions: Electrical Stimulation IFC, Manual Therapy, Moist Heat/ Ice, Patient Education, and Therapeutic Exercise.    Ale Rashid, PTA

## 2023-04-17 ENCOUNTER — CLINICAL SUPPORT (OUTPATIENT)
Dept: REHABILITATION | Facility: HOSPITAL | Age: 68
End: 2023-04-17
Attending: STUDENT IN AN ORGANIZED HEALTH CARE EDUCATION/TRAINING PROGRAM
Payer: MEDICARE

## 2023-04-17 DIAGNOSIS — M53.3 COCCYGEAL PAIN: Primary | ICD-10-CM

## 2023-04-17 PROCEDURE — 97110 THERAPEUTIC EXERCISES: CPT | Mod: CQ

## 2023-04-17 PROCEDURE — 97140 MANUAL THERAPY 1/> REGIONS: CPT | Mod: CQ

## 2023-04-17 NOTE — PROGRESS NOTES
"  [facility]   Physical Therapy Treatment Note     Name: Jessica RIVER [facility] Number: [id number]    Therapy Diagnosis:   Encounter Diagnosis   Name Primary?    Coccygeal pain Yes     Physician: Jag Lopez MD    Visit Date: 4/17/2023    Physician Orders: PT Eval and Treat   Medical Diagnosis from Referral: Coccygeal pain, physical debility  Evaluation Date: 4/5/2023  Authorization Period Expiration: 5/12/2023     Visit # / Visits authorized: 3/11     Time In: 1406  Time Out: 1444  Total Appointment Time (timed & untimed codes): 38 minutes     Precautions: Standard and Fall    PTA Visit #: 2/5       SUBJECTIVE     Pt reports: Patient stated that her pain was in her lower back and shoulders. Patient stated that she has been having leg cramps at night. Patient stated that she has not been resting due to hallucinations. Patient stated that she thinks she could be having a fibromyalgia flare up.    She was not compliant with home exercise program.  Response to previous treatment: Patient stated that she was fatigued and had increased pain everywhere, but not in her tailbone.  Functional change: Patient with no pain this date.     Pain: 4/10  Location: Low back and bilateral shoulders     OBJECTIVE     Objective Measures updated at progress report unless specified.     Treatment     Jessica received the treatments listed below:      therapeutic exercises to develop ROM and flexibility for 28 minutes including:    Calf stretch with stretch strap  2 x 30" each    Passive hip flexion stretch  2 x 30" each    Seated Piriformis stretch  2 x 30" on LLE, 1 x 30" on RLE   Seated marching  10x2   Hip ABD with red TB 20x    Hip ADD squeezes  20x with 3 " hold                    manual therapy techniques: Trigger point release and foam rolling were applied to the: L glute, piriformis, low back, and IT band for 10 minutes    supervised modalities after being cleared for contradictions: IFC Electrical " Stimulation:  Jessica received IFC Electrical Stimulation for pain control applied to the NA. Pt received stimulation at NA % scan at a frequency of NA for 0 minutes. Jessica tolderated treatment well without any adverse effects. (Not today)     hot pack for 0 minutes to NA.    cold pack for 0 minutes to NA.    Patient Education and Home Exercises     Home Exercises Provided and Patient Education Provided     Education provided:   - HEP    Written Home Exercises Provided: Patient instructed to cont prior HEP. Exercises were reviewed and Jessica was able to demonstrate them prior to the end of the session.  Jessica demonstrated good  understanding of the education provided. See media for exercises provided during therapy sessions    ASSESSMENT     Patient was provided with visual, verbal, and tactile cues for proper form and hold times of therapy tasks. Patient with multiple tender and painful trigger points upon palpation, which she reported eased with manual therapy. Patient declined MHP this date. She stated that she had no pain in her low back after completion of treatment.     Jessica Is progressing well towards her goals.   Pt prognosis is Good.     Pt will continue to benefit from skilled outpatient physical therapy to address the deficits listed in the problem list box on initial evaluation, provide pt/family education and to maximize pt's level of independence in the home and community environment.     Pt's spiritual, cultural and educational needs considered and pt agreeable to plan of care and goals.     Anticipated Barriers for therapy: none        Short Term Goals: 3 weeks   Pt will be indep with HEP.  Pt will report coccyx pain no higher than 6/10.  Pt will have ROM tierney ankle DF of 10 degrees.  Pt's flexibility in tierney piriformis, ITB and hip flexors WFL.     Long Term Goals: 5 weeks   Pt will be indep with self-treatment techniques.  Pt will report coccyx pain no higher than 3/10.  Pt will be able to  lie supine comfortably.  Pt will gain grossly 1/2 muscle grade strength BLE.    PLAN     Plan of care Certification: 4/5/2023 to 5/12/2023.     Outpatient Physical Therapy 2 times weekly for 5 weeks to include the following interventions: Electrical Stimulation IFC, Manual Therapy, Moist Heat/ Ice, Patient Education, and Therapeutic Exercise.    Ale Rashid, PTA

## 2023-04-18 ENCOUNTER — TELEPHONE (OUTPATIENT)
Dept: FAMILY MEDICINE | Facility: CLINIC | Age: 68
End: 2023-04-18
Payer: MEDICARE

## 2023-04-18 NOTE — TELEPHONE ENCOUNTER
04/17/23 Talked to pt. on phone.She would like to hold off on sleep study at this time due to a busy schedule.She states she will let us know when she can have done.

## 2023-04-19 ENCOUNTER — CLINICAL SUPPORT (OUTPATIENT)
Dept: REHABILITATION | Facility: HOSPITAL | Age: 68
End: 2023-04-19
Attending: STUDENT IN AN ORGANIZED HEALTH CARE EDUCATION/TRAINING PROGRAM
Payer: MEDICARE

## 2023-04-19 DIAGNOSIS — M53.3 COCCYGEAL PAIN: Primary | ICD-10-CM

## 2023-04-19 PROCEDURE — 97110 THERAPEUTIC EXERCISES: CPT | Mod: CQ

## 2023-04-19 NOTE — PROGRESS NOTES
"  [facility]   Physical Therapy Treatment Note     Name: Jessica RIVER [facility] Number: [id number]    Therapy Diagnosis:   Encounter Diagnosis   Name Primary?    Coccygeal pain Yes     Physician: Jag Lopez MD    Visit Date: 4/19/2023    Physician Orders: PT Eval and Treat   Medical Diagnosis from Referral: Coccygeal pain, physical debility  Evaluation Date: 4/5/2023  Authorization Period Expiration: 5/12/2023     Visit # / Visits authorized: 4/11     Time In: 1418  Time Out: 1445  Total Appointment Time (timed & untimed codes): 27 minutes     Precautions: Standard and Fall    PTA Visit #: 3/5       SUBJECTIVE     Pt reports: Patient stated that her pain was in her bilateral neck and shoulders. She stated that she thinks her pain is due to her fibromyalgia.  She was compliant with home exercise program.  Response to previous treatment: Patient stated that she was fatigued.   Functional change: Patient with no pain this date.     Pain: 3/10  Location: Bilateral neck and bilateral shoulders     OBJECTIVE     Objective Measures updated at progress report unless specified.     Treatment     Jessica received the treatments listed below:      therapeutic exercises to develop ROM and flexibility for 27 minutes including:    Calf stretch with stretch strap  2 x 30" each    Passive hip flexion stretch  2 x 30" each    Seated Piriformis stretch  2 x 30" on BLE   Seated marching  10x2   Hip ABD with green TB 15x    Hip ADD squeezes  20x with 3 " hold                    manual therapy techniques: Trigger point release and foam rolling were applied to the: L glute, piriformis, low back, and IT band for 0 minutes    supervised modalities after being cleared for contradictions: IFC Electrical Stimulation:  Jessica received IFC Electrical Stimulation for pain control applied to the NA. Pt received stimulation at NA % scan at a frequency of NA for 0 minutes. Jessica tolderated treatment well without " any adverse effects. (Not today)     hot pack for 0 minutes to NA.    cold pack for 0 minutes to NA.    Patient Education and Home Exercises     Home Exercises Provided and Patient Education Provided     Written Home Exercises Provided: Patient instructed to cont prior HEP. Exercises were reviewed and Jessica was able to demonstrate them prior to the end of the session.  Jessica demonstrated good  understanding of the education provided. See media for exercises provided during therapy sessions    ASSESSMENT     Patient was provided with visual, verbal, and tactile cues for proper form and hold times of therapy tasks. Patient declined MHP this date. She stated that her pain was in her neck and shoulders, and that she had no pain in her back after treatment.    Jessica Is progressing well towards her goals.   Pt prognosis is Good.     Pt will continue to benefit from skilled outpatient physical therapy to address the deficits listed in the problem list box on initial evaluation, provide pt/family education and to maximize pt's level of independence in the home and community environment.     Pt's spiritual, cultural and educational needs considered and pt agreeable to plan of care and goals.     Anticipated Barriers for therapy: none        Short Term Goals: 3 weeks   Pt will be indep with HEP.  Pt will report coccyx pain no higher than 6/10.  Pt will have ROM tierney ankle DF of 10 degrees.  Pt's flexibility in tierney piriformis, ITB and hip flexors WFL.     Long Term Goals: 5 weeks   Pt will be indep with self-treatment techniques.  Pt will report coccyx pain no higher than 3/10.  Pt will be able to lie supine comfortably.  Pt will gain grossly 1/2 muscle grade strength BLE.    PLAN     Plan of care Certification: 4/5/2023 to 5/12/2023.     Outpatient Physical Therapy 2 times weekly for 5 weeks to include the following interventions: Electrical Stimulation IFC, Manual Therapy, Moist Heat/ Ice, Patient Education, and  Therapeutic Exercise.    Ale Rashid, PTA

## 2023-04-25 NOTE — PROGRESS NOTES
Ms. Bay would benefit from a lift chair due to severe physical debility following meningitis and recent myocardial infarction. A seat lift mechanism would be beneficial to improve and retard deterioration in her condition. She states she is unable to stand up from the chair in her home without /taxing effort/assistance. Once standing, she does have the ability to walk.

## 2023-04-26 ENCOUNTER — CLINICAL SUPPORT (OUTPATIENT)
Dept: REHABILITATION | Facility: HOSPITAL | Age: 68
End: 2023-04-26
Attending: STUDENT IN AN ORGANIZED HEALTH CARE EDUCATION/TRAINING PROGRAM
Payer: MEDICARE

## 2023-04-26 DIAGNOSIS — M53.3 COCCYGEAL PAIN: Primary | ICD-10-CM

## 2023-04-26 PROCEDURE — 97110 THERAPEUTIC EXERCISES: CPT

## 2023-04-26 NOTE — PROGRESS NOTES
"  [facility]   Physical Therapy Treatment Note     Name: Jessica Bay  [facility] Number: [id number]    Therapy Diagnosis:   No diagnosis found.    Physician: Jag Lopez MD    Visit Date: 4/26/2023    Physician Orders: PT Eval and Treat   Medical Diagnosis from Referral: Coccygeal pain, physical debility  Evaluation Date: 4/5/2023  Authorization Period Expiration: 5/12/2023     Visit # / Visits authorized: 5/11     Time In: 1411  Time Out: 1455  Total Appointment Time (timed & untimed codes): 44 minutes     Precautions: Standard and Fall    PTA Visit #: 0/5       SUBJECTIVE     Pt reports: Patient stated that her coccyx has not hurt in the past week, but does have LBP,SHLDR pain and neck pain. States she went to MD yesterday about her hearing loss. Was told she has some permanent loss, but will know more when she has an MRI.  She was compliant with home exercise program.  Response to previous treatment: Patient voiced no adverse response.  Functional change: Patient reports now able to lie fully supine when sleeping.     Pain: 0/10, 6/10  Location: coccyx, LBP/NECK/SHLDRS    OBJECTIVE     Right ankle DF= 5 deg , Left ankle DF= 10 deg.    Treatment     Jessica received the treatments listed below:      therapeutic exercises to develop ROM and flexibility for 44 minutes including:    *Calf stretch with stretch strap  2 x 30" each    Passive hip flexion stretch  2 x 30" each    *Seated Piriformis stretch  2 x 30" on BLE   Seated marching 1# 2x10   Hip ABD with green TB 2x15    Hip ADD squeezes  20x with 3 " hold    Resisted knee flex, green tband 2x15   Resisted hip ext, green tband seated 2x10           manual therapy techniques: Trigger point release and foam rolling were applied to the: L glute, piriformis, low back, and IT band for 0 minutes    supervised modalities after being cleared for contradictions: IFC Electrical Stimulation:  Jessica received IFC Electrical Stimulation for " pain control applied to the NA. Pt received stimulation at NA % scan at a frequency of NA for 0 minutes. Jessica tolderated treatment well without any adverse effects. (Not today)     hot pack for 0 minutes to NA.    cold pack for 0 minutes to NA.    Patient Education and Home Exercises     Home Exercises Provided and Patient Education Provided     Written Home Exercises Provided: Patient instructed to cont prior HEP. Exercises were reviewed and Jessica was able to demonstrate them prior to the end of the session.  Jessica demonstrated good  understanding of the education provided. See media for exercises provided during therapy sessions    ASSESSMENT     Patient was treated in sitting, per her request, throughout treatment session. Pt has met STG 2 and LTG 2-3. PT was unable to assess flexibility of hips due to pt wanting to remain in sitting position.    Jessica Is progressing well towards her goals.   Pt prognosis is Good.     Pt will continue to benefit from skilled outpatient physical therapy to address the deficits listed in the problem list box on initial evaluation, provide pt/family education and to maximize pt's level of independence in the home and community environment.     Pt's spiritual, cultural and educational needs considered and pt agreeable to plan of care and goals.     Anticipated Barriers for therapy: none        Short Term Goals: 3 weeks   Pt will be indep with HEP.  Pt will report coccyx pain no higher than 6/10.-MET  Pt will have ROM tierney ankle DF of 10 degrees.-PROGRESSING  Pt's flexibility in tierney piriformis, ITB and hip flexors WFL.     Long Term Goals: 5 weeks   Pt will be indep with self-treatment techniques.  Pt will report coccyx pain no higher than 3/10.-MET  Pt will be able to lie supine comfortably.-MET  Pt will gain grossly 1/2 muscle grade strength BLE.    PLAN     Plan of care Certification: 4/5/2023 to 5/12/2023.     Outpatient Physical Therapy 2 times weekly for 5 weeks to  include the following interventions: Electrical Stimulation IFC, Manual Therapy, Moist Heat/ Ice, Patient Education, and Therapeutic Exercise.    Cassandra Lanier, PT

## 2023-04-28 ENCOUNTER — DOCUMENTATION ONLY (OUTPATIENT)
Dept: REHABILITATION | Facility: HOSPITAL | Age: 68
End: 2023-04-28
Payer: MEDICARE

## 2023-04-28 NOTE — PROGRESS NOTES
Pt's  called to cancel pt's afternoon appt for today due to pt not feeling well due to her Fibromyalgia.

## 2023-05-01 ENCOUNTER — CLINICAL SUPPORT (OUTPATIENT)
Dept: REHABILITATION | Facility: HOSPITAL | Age: 68
End: 2023-05-01
Attending: STUDENT IN AN ORGANIZED HEALTH CARE EDUCATION/TRAINING PROGRAM
Payer: MEDICARE

## 2023-05-01 DIAGNOSIS — M53.3 COCCYGEAL PAIN: Primary | ICD-10-CM

## 2023-05-01 PROCEDURE — 97110 THERAPEUTIC EXERCISES: CPT

## 2023-05-01 NOTE — PROGRESS NOTES
"  [facility]   Physical Therapy Treatment Note     Name: Jessica Bay  [facility] Number: [id number]    Therapy Diagnosis:   Encounter Diagnosis   Name Primary?    Coccygeal pain Yes       Physician: Jag Lopez MD    Visit Date: 5/1/2023    Physician Orders: PT Eval and Treat   Medical Diagnosis from Referral: Coccygeal pain, physical debility  Evaluation Date: 4/5/2023  Authorization Period Expiration: 5/12/2023     Visit # / Visits authorized: 6/11 (pt has missed 2 visits)     Time In: 1405  Time Out: 1454  Total Appointment Time (timed & untimed codes): 49 minutes     Precautions: Standard and Fall    PTA Visit #: 0/5       SUBJECTIVE     Pt reports: Patient stated that she is still having fibromyalgia pain. States she thinks her kidneys are getting worse.  She was compliant with home exercise program.  Response to previous treatment: Patient voiced no adverse response.  Functional change: Patient reports now able to lie fully supine when sleeping.     Pain: 2/10, 4/10  Location: coccyx, NECK/SHLDRS    OBJECTIVE     Right ankle DF= 5 deg , Left ankle DF= 10 deg.    Treatment     Jessica received the treatments listed below:      therapeutic exercises to develop ROM and flexibility for 49 minutes including:    *Calf stretch with stretch strap, seated  2 x 30" each    Passive hip flexion stretch  2 x 30" each (not today)   *Seated Piriformis stretch  2 x 30" on BLE (not today)   *Seated marching 1# 2x15   *Hip ABD with green TB, seated 2x15    Hip ADD squeezes  20x with 3 " hold (not today)   Resisted knee flex, green tband 2x15   Resisted hip ext, green tband seated 2x10 (not today)   *Bridging 1X10     *SLR 1X5   *standing hip abd with abdom bracing 1x10 (LLE), 1X5 (RLE)   *standing PF 1X10               manual therapy techniques: Trigger point release and foam rolling were applied to the: L glute, piriformis, low back, and IT band for 0 minutes (not today)    supervised modalities " after being cleared for contradictions: IFC Electrical Stimulation:  Jessica received IFC Electrical Stimulation for pain control applied to the NA. Pt received stimulation at NA % scan at a frequency of NA for 0 minutes. Jessica tolderated treatment well without any adverse effects. (Not today)     hot pack for 0 minutes to NA.    cold pack for 0 minutes to NA.    Patient Education and Home Exercises     Home Exercises Provided and Patient Education Provided     Written Home Exercises Provided: Patient instructed to cont prior HEP. Exercises were reviewed and Jessica was able to demonstrate them prior to the end of the session.  Jessica demonstrated good  understanding of the education provided. See media for exercises provided during therapy sessions    ASSESSMENT     Patient informed that next week is her last week and pt voiced no dissent. Pt issued new HEP for BLE strengthening.    Jessica Is progressing well towards her goals.   Pt prognosis is Good.     Pt will continue to benefit from skilled outpatient physical therapy to address the deficits listed in the problem list box on initial evaluation, provide pt/family education and to maximize pt's level of independence in the home and community environment.     Pt's spiritual, cultural and educational needs considered and pt agreeable to plan of care and goals.     Anticipated Barriers for therapy: none        Short Term Goals: 3 weeks   Pt will be indep with HEP.-PROGRESSING. New HEP issued  5/1/23  Pt will report coccyx pain no higher than 6/10.-MET  Pt will have ROM tierney ankle DF of 10 degrees.-PROGRESSING  Pt's flexibility in tierney piriformis, ITB and hip flexors WFL.     Long Term Goals: 5 weeks   Pt will be indep with self-treatment techniques.  Pt will report coccyx pain no higher than 3/10.-MET  Pt will be able to lie supine comfortably.-MET  Pt will gain grossly 1/2 muscle grade strength BLE.    PLAN       NEXT VISIT: Measure ROM tierney DF.    Plan of care  Certification: 4/5/2023 to 5/12/2023.     Outpatient Physical Therapy 2 times weekly for 5 weeks to include the following interventions: Electrical Stimulation IFC, Manual Therapy, Moist Heat/ Ice, Patient Education, and Therapeutic Exercise.    Cassandra Lanier PT

## 2023-05-03 ENCOUNTER — CLINICAL SUPPORT (OUTPATIENT)
Dept: REHABILITATION | Facility: HOSPITAL | Age: 68
End: 2023-05-03
Attending: STUDENT IN AN ORGANIZED HEALTH CARE EDUCATION/TRAINING PROGRAM
Payer: MEDICARE

## 2023-05-03 DIAGNOSIS — M53.3 COCCYGEAL PAIN: Primary | ICD-10-CM

## 2023-05-03 PROCEDURE — 97110 THERAPEUTIC EXERCISES: CPT | Mod: CQ

## 2023-05-03 NOTE — PROGRESS NOTES
"  [facility]   Physical Therapy Treatment Note     Name: Jessica Bay  [facility] Number: [id number]    Therapy Diagnosis:   No diagnosis found.      Physician: Jag Lopez MD    Visit Date: 5/3/2023    Physician Orders: PT Eval and Treat   Medical Diagnosis from Referral: Coccygeal pain, physical debility  Evaluation Date: 4/5/2023  Authorization Period Expiration: 5/12/2023     Visit # / Visits authorized: 7/11 (pt has missed 2 visits)     Time In: 1402  Time Out: 1438  Total Appointment Time (timed & untimed codes): 36 minutes     Precautions: Standard and Fall    PTA Visit #: 1/5       SUBJECTIVE     Pt reports: Patient stated that her R lateral calf pain woke her up last night.   She  was partially  compliant with home exercise program.  Response to previous treatment: Fatigue   Functional change: Patient reports now able to lie fully supine when sleeping.     Pain: 5/10, 5/10  Location: Lateral R calf, neck and shoulder      OBJECTIVE     Right ankle DF= 6 deg , Left ankle DF= 10 deg.    Treatment     Jessica received the treatments listed below:      therapeutic exercises to develop ROM and flexibility for 36 minutes including:    *Calf stretch with stretch strap, seated  2 x 30" each    Passive hip flexion stretch  2 x 30" each (not today)   *Seated Piriformis stretch  2 x 30" on BLE (not today)   *Seated marching 1# 2x15   *Hip ABD with green TB, seated 2x15    Hip ADD squeezes  30x with 3" hold    Resisted knee flex, green tband 2x15   Resisted hip ext, green tband seated 2x10 (not today)   *Bridging 2x10     *SLR 2x5   *standing hip abd with abdom bracing 1x10 (LLE), 1x5 (RLE) (Not today)   *standing PF 2x5               manual therapy techniques: Trigger point release and foam rolling were applied to the: L glute, piriformis, low back, and IT band for 0 minutes (not today)    supervised modalities after being cleared for contradictions: IFC Electrical Stimulation:  Jessica " received IFC Electrical Stimulation for pain control applied to the NA. Pt received stimulation at NA % scan at a frequency of NA for 0 minutes. Jessica tolderated treatment well without any adverse effects. (Not today)     hot pack for 0 minutes to NA.    cold pack for 0 minutes to NA.    Patient Education and Home Exercises     Home Exercises Provided and Patient Education Provided     Written Home Exercises Provided: Patient instructed to cont prior HEP. Exercises were reviewed and Jessica was able to demonstrate them prior to the end of the session.  Jessica demonstrated good  understanding of the education provided. See media for exercises provided during therapy sessions    ASSESSMENT     Patient required occasional rest breaks throughout treatment due to fatigue. Patient requested to stop treatment early due to reports of fatigue.     Jessica Is progressing well towards her goals.   Pt prognosis is Good.     Pt will continue to benefit from skilled outpatient physical therapy to address the deficits listed in the problem list box on initial evaluation, provide pt/family education and to maximize pt's level of independence in the home and community environment.     Pt's spiritual, cultural and educational needs considered and pt agreeable to plan of care and goals.     Anticipated Barriers for therapy: none        Short Term Goals: 3 weeks   Pt will be indep with HEP.-PROGRESSING. New HEP issued  5/1/23  Pt will report coccyx pain no higher than 6/10.-MET  Pt will have ROM tierney ankle DF of 10 degrees.-PROGRESSING  Pt's flexibility in tierney piriformis, ITB and hip flexors WFL.     Long Term Goals: 5 weeks   Pt will be indep with self-treatment techniques.  Pt will report coccyx pain no higher than 3/10.-MET  Pt will be able to lie supine comfortably.-MET  Pt will gain grossly 1/2 muscle grade strength BLE.    PLAN       Plan of care Certification: 4/5/2023 to 5/12/2023.     Outpatient Physical Therapy 2 times  weekly for 5 weeks to include the following interventions: Electrical Stimulation IFC, Manual Therapy, Moist Heat/ Ice, Patient Education, and Therapeutic Exercise.    Ale Rashid, PTA

## 2023-05-08 ENCOUNTER — DOCUMENTATION ONLY (OUTPATIENT)
Dept: REHABILITATION | Facility: HOSPITAL | Age: 68
End: 2023-05-08
Payer: MEDICARE

## 2023-05-09 ENCOUNTER — OFFICE VISIT (OUTPATIENT)
Dept: FAMILY MEDICINE | Facility: CLINIC | Age: 68
End: 2023-05-09
Payer: MEDICARE

## 2023-05-09 VITALS
HEART RATE: 81 BPM | SYSTOLIC BLOOD PRESSURE: 102 MMHG | BODY MASS INDEX: 38.27 KG/M2 | HEIGHT: 63 IN | TEMPERATURE: 98 F | RESPIRATION RATE: 20 BRPM | WEIGHT: 216 LBS | OXYGEN SATURATION: 98 % | DIASTOLIC BLOOD PRESSURE: 66 MMHG

## 2023-05-09 DIAGNOSIS — G47.01 INSOMNIA DUE TO MEDICAL CONDITION: ICD-10-CM

## 2023-05-09 DIAGNOSIS — M79.7 FIBROMYALGIA: ICD-10-CM

## 2023-05-09 DIAGNOSIS — A40.3 SEPSIS DUE TO STREPTOCOCCUS PNEUMONIAE, UNSPECIFIED WHETHER ACUTE ORGAN DYSFUNCTION PRESENT: ICD-10-CM

## 2023-05-09 DIAGNOSIS — E11.22 TYPE 2 DIABETES MELLITUS WITH DIABETIC CHRONIC KIDNEY DISEASE, UNSPECIFIED CKD STAGE, UNSPECIFIED WHETHER LONG TERM INSULIN USE: Primary | ICD-10-CM

## 2023-05-09 DIAGNOSIS — M53.3 COCCYGEAL PAIN: ICD-10-CM

## 2023-05-09 DIAGNOSIS — I10 HYPERTENSION, UNSPECIFIED TYPE: ICD-10-CM

## 2023-05-09 DIAGNOSIS — E78.5 DYSLIPIDEMIA: ICD-10-CM

## 2023-05-09 DIAGNOSIS — G47.20 SLEEP-WAKE DISORDER: ICD-10-CM

## 2023-05-09 PROCEDURE — 99215 PR OFFICE/OUTPT VISIT, EST, LEVL V, 40-54 MIN: ICD-10-PCS | Mod: ,,, | Performed by: STUDENT IN AN ORGANIZED HEALTH CARE EDUCATION/TRAINING PROGRAM

## 2023-05-09 PROCEDURE — 99215 OFFICE O/P EST HI 40 MIN: CPT | Mod: ,,, | Performed by: STUDENT IN AN ORGANIZED HEALTH CARE EDUCATION/TRAINING PROGRAM

## 2023-05-09 RX ORDER — FLASH GLUCOSE SENSOR
KIT MISCELLANEOUS
COMMUNITY
Start: 2023-05-01

## 2023-05-09 RX ORDER — FLASH GLUCOSE SCANNING READER
EACH MISCELLANEOUS
COMMUNITY
Start: 2023-03-17

## 2023-05-09 NOTE — PATIENT INSTRUCTIONS
Stopping quetiapine (Seroquel)    - Take 1/2 tablet every day for 2 weeks, then  - Take 1/2 tablet every other day for 2 weeks, then stop completely      Call to set up your home sleep study. If you have sleep apnea, treating it with a CPAP machine can help you rest completely at night which is necessary to help your body and mind recover.

## 2023-05-09 NOTE — PROGRESS NOTES
"Subjective     Patient ID: Jessica Bay is a 67 y.o. female.    Chief Complaint: Follow-up (F/u  from Meningitis)    HPI    67-year-old woman with the medical problems listed below who comes to clinic for follow-up.    She has continue to have difficulty sleeping at night. She states her visual hallucinations have improved quite a bit, but auditory hallucinations have "not improved whatsoever." They are associated with falling asleep and waking up from sleep. She has also had difficulty with restless legs. This hasn't been an ongoing bothersome problem, but has bothered her last night, right before she goes to sleep.     Nephrology appointment scheduled for tomorrow.   Dr. Garcia appointment next Monday.   5/25 CT, MRI - ENT.   Neurology appointment is not until August.     7 appointments this month - she feels overwhelmed.    PT has been helpful and her coccygeal pain has improved substantially.     For HTN: amlodipine 5mg , carvedilol 12.5 mg BID,     DM: tresiba U-200 50 units daily, Viictoza 0.6 mg daily     Fibromyalgia: tiaznadine 4 mg BID prn, duloxetine 30 mg daily    Gout: allopurinol 100 mg daily          Objective     /66   Pulse 81   Temp 98.1 °F (36.7 °C)   Resp 20   Ht 5' 3" (1.6 m)   Wt 98 kg (216 lb)   SpO2 98%   BMI 38.26 kg/m²     Physical Exam    Gen: well-groomed, well-dressed. No apparent distress  HEENT:  NCAT, symmetric  Pulm: normal work of breathing, no accessory muscle use; speaking complete sentences without having to stop  Neuro: CN II-XII grossly normal   Psych: pleasant affect, congruent mood. Linear thought; good eye contact  SKIN: no rashes present on face, neck, hands        Assessment and Plan     Problem List Items Addressed This Visit       Hypertension     The current medical regimen is effective;  continue present plan and medications. Her current BP is slightly low but she is asymptomatic today. Continue to monitor BP at home.            Dyslipidemia     The " current medical regimen is effective;  continue present plan and medications. Recheck lipids at next visit.           Fibromyalgia     Fairly well controlled, but need to discuss d/c her cymbalta - this is not a great option given her advancing CKD           Type 2 diabetes mellitus with diabetic chronic kidney disease - Primary     Last A1c slightly above goal at approx 8%; she just got labs for her appointment with nephrology - should discuss this at next visit and optimize her DM regimen. She has been very sick recently so has not been able to do the following prevention; will continue to discuss at subsequent visits:  - statin adherence  - daily foot exam  - pneumonia vaccine   - annual eye exam           Sepsis due to Streptococcus pneumoniae    Insomnia due to medical condition     Ongoing difficulty with insomnia; continue seroquel low dose for now; discussed increased risk of mortality with 2nd gen antipsychotics, but I believe this has been helping her hallucinations and likely resolving critical care delirium. Home sleep study to evaluate for NATASHA.            Coccygeal pain     Improved substantially after PT at [facility]. Continue HEP           Sleep-wake disorder     Continuing to have hypnopompic/hypnogogic hallucinations. Was started on Seroquel. Has gone donwn somewhat on the dosage but now is developing RLS; Discussed weaning this over several weeks and aggressively pursuing eval/treatment for sleep apnea / sleep-disordered breathing. These antipsychotics are not likely to help her symptoms and also increase mortality.               Stopping quetiapine (Seroquel)    - Take 1/2 tablet every day for 2 weeks, then  - Take 1/2 tablet every other day for 2 weeks, then stop completely      Call to set up your home sleep study. If you have sleep apnea, treating it with a CPAP machine can help you rest completely at night which is necessary to help your body and mind recover.       Face to face encounter time 30  minutes.    Non face to face encounter time 25 minutes.  Reviewing separate obtained history, counseling and educating the patient, family and/or other caregivers, ordering medications, tests or procedures; referring and communicating with other health care professionals; documenting clinical information in the electronic medical record; care coordination; independently interpreting results and communicating results to the patient, family, and/or caregivers.    Total time for visit  55 minutes

## 2023-05-10 PROBLEM — G47.20 SLEEP-WAKE DISORDER: Status: ACTIVE | Noted: 2023-05-10

## 2023-05-11 ENCOUNTER — CLINICAL SUPPORT (OUTPATIENT)
Dept: REHABILITATION | Facility: HOSPITAL | Age: 68
End: 2023-05-11
Attending: STUDENT IN AN ORGANIZED HEALTH CARE EDUCATION/TRAINING PROGRAM
Payer: MEDICARE

## 2023-05-11 DIAGNOSIS — M53.3 COCCYGEAL PAIN: Primary | ICD-10-CM

## 2023-05-11 PROCEDURE — 97110 THERAPEUTIC EXERCISES: CPT

## 2023-05-11 NOTE — ASSESSMENT & PLAN NOTE
Fairly well controlled, but need to discuss d/c her cymbalta - this is not a great option given her advancing CKD

## 2023-05-11 NOTE — PROGRESS NOTES
"  [facility]   Physical Therapy Treatment Note     Name: Jessica Bay  [facility] Number: [id number]    Therapy Diagnosis:   No diagnosis found.      Physician: Jag Lopez MD    Visit Date: 5/11/2023    Physician Orders: PT Eval and Treat   Medical Diagnosis from Referral: Coccygeal pain, physical debility  Evaluation Date: 4/5/2023  Authorization Period Expiration: 5/12/2023     Visit # / Visits authorized: 8/11 (pt has missed 3 visits)     Time In: 1402  Time Out: 1440  Total Appointment Time (timed & untimed codes): 38 minutes     Precautions: Standard and Fall    PTA Visit #: 0/5       SUBJECTIVE     Pt reports: Patient stated that she will have to start dialysis due to poor kidney function.  She  was partially  compliant with home exercise program.  Response to previous treatment: Fatigue   Functional change: Patient reports now able to lie fully supine when sleeping.     Pain: 0/10, 0/10  Location: Lateral R calf, neck and shoulder      OBJECTIVE     ROM: Right ankle DF= 22 deg , Left ankle DF= 25 deg.       Manual muscle test   Muscle Right  Left    Hip flexion  MMT strength: 4/5  MMT strength: 4/5   Hip extension  MMT strength: 4+/5  MMT strength: 4+/5   Hip abduction  MMT strength: 5/5  MMT strength: 5/5   Hip adduction  MMT strength: 5/5  MMT strength: 5/5   Hip internal rotation       Hip external rotation       Knee extension  MMT strength: 4+/5  MMT strength: 4+/5   Knee flexion  MMT strength: 4+/5  MMT strength: 4+/5   Ankle DF  MMT strength: 4+/5  MMT strength: 4+/5   Ankle PF  MMT strength: 4+/5  MMT strength: 5/5       Treatment     Jessica received the treatments listed below:      therapeutic exercises to develop ROM and flexibility for 38 minutes including: MMT BLE and re-measuring tierney Dfand review of HEP.    *Calf stretch with stretch strap, seated  2 x 30" each    Passive hip flexion stretch  2 x 30" each (not today)   *Seated Piriformis stretch  2 x 30" on BLE " "(not today)   *Seated marching green tband 1x20   *Hip ABD with green TB, seated 1x20   Hip ADD squeezes  30x with 3" hold    Resisted knee flex, green tband 2x15 (not today)   Resisted hip ext, green tband seated 2x10 (not today)   *Bridging 2x10 (not today)     *SLR 1x10 - from wc   *standing hip abd with abdom bracing 1x10 (LLE), 1x5 (RLE) (Not today)   *standing PF 1x10               manual therapy techniques: Trigger point release and foam rolling were applied to the: L glute, piriformis, low back, and IT band for 0 minutes (not today)    supervised modalities after being cleared for contradictions: IFC Electrical Stimulation:  Jessica received IFC Electrical Stimulation for pain control applied to the NA. Pt received stimulation at NA % scan at a frequency of NA for 0 minutes. Jessica tolderated treatment well without any adverse effects. (Not today)     hot pack for 0 minutes to NA.    cold pack for 0 minutes to NA.    Patient Education and Home Exercises     Home Exercises Provided and Patient Education Provided -Pt instructed to cont performing HEP at least 3x/week and to cont walking.    Written Home Exercises Provided: Patient instructed to cont prior HEP. Exercises were reviewed and Jessica was able to demonstrate them prior to the end of the session.  Jessica demonstrated good  understanding of the education provided. See media for exercises provided during therapy sessions    ASSESSMENT     Pt is agreeable to dc today. She has met all STG/LTGs.    Jessica Is progressing well towards her goals.   Pt prognosis is Good.     Pt will continue to benefit from skilled outpatient physical therapy to address the deficits listed in the problem list box on initial evaluation, provide pt/family education and to maximize pt's level of independence in the home and community environment.     Pt's spiritual, cultural and educational needs considered and pt agreeable to plan of care and goals.     Anticipated Barriers " for therapy: none        Short Term Goals: 3 weeks   Pt will be indep with HEP.-MET  Pt will report coccyx pain no higher than 6/10.-MET  Pt will have ROM tierney ankle DF of 10 degrees.-MET  Pt's flexibility in tierney piriformis, ITB and hip flexors WFL.-MET     Long Term Goals: 5 weeks   Pt will be indep with self-treatment techniques.-MET  Pt will report coccyx pain no higher than 3/10.-MET  Pt will be able to lie supine comfortably.-MET  Pt will gain grossly 1/2 muscle grade strength BLE.-MET    PLAN       Plan of care Certification: 4/5/2023 to 5/12/2023.     Outpatient Physical Therapy 2 times weekly for 5 weeks to include the following interventions: Electrical Stimulation IFC, Manual Therapy, Moist Heat/ Ice, Patient Education, and Therapeutic Exercise.    Cassandra Lanier, PT

## 2023-05-11 NOTE — ASSESSMENT & PLAN NOTE
Ongoing difficulty with insomnia; continue seroquel low dose for now; discussed increased risk of mortality with 2nd gen antipsychotics, but I believe this has been helping her hallucinations and likely resolving critical care delirium. Home sleep study to evaluate for NATASHA.

## 2023-05-11 NOTE — ASSESSMENT & PLAN NOTE
The current medical regimen is effective;  continue present plan and medications. Her current BP is slightly low but she is asymptomatic today. Continue to monitor BP at home.

## 2023-05-11 NOTE — ASSESSMENT & PLAN NOTE
The current medical regimen is effective;  continue present plan and medications. Recheck lipids at next visit.

## 2023-05-11 NOTE — ASSESSMENT & PLAN NOTE
Last A1c slightly above goal at approx 8%; she just got labs for her appointment with nephrology - should discuss this at next visit and optimize her DM regimen. She has been very sick recently so has not been able to do the following prevention; will continue to discuss at subsequent visits:  - statin adherence  - daily foot exam  - pneumonia vaccine   - annual eye exam

## 2023-05-11 NOTE — PLAN OF CARE
PHYSICAL THERAPY DISCHARGE:    This patient was originally evaluated at our facility on: 4/5/2023         Pt attended PT for a total of 8/11 Visits receiving: Manual Therapy, Ther ex, Postural Education, Home Exercise Instruction     This patient's last visit occurred on: 5/11/2023      Short Term Goals: 3 weeks   Pt will be indep with HEP.-MET  Pt will report coccyx pain no higher than 6/10.-MET  Pt will have ROM tierney ankle DF of 10 degrees.-MET  Pt's flexibility in tierney piriformis, ITB and hip flexors WFL.-MET     Long Term Goals: 5 weeks   Pt will be indep with self-treatment techniques.-MET  Pt will report coccyx pain no higher than 3/10.-MET  Pt will be able to lie supine comfortably.-MET  Pt will gain grossly 1/2 muscle grade strength BLE.-MET       Pt was DC'd from our care secondary to: Pt has met all goals set for her. She no longer has any coccyx pain.       Therapist: Cassandra Lanier, PT  5/11/2023

## 2023-05-11 NOTE — ASSESSMENT & PLAN NOTE
Continuing to have hypnopompic/hypnogogic hallucinations. Was started on Seroquel. Has gone donwn somewhat on the dosage but now is developing RLS; Discussed weaning this over several weeks and aggressively pursuing eval/treatment for sleep apnea / sleep-disordered breathing. These antipsychotics are not likely to help her symptoms and also increase mortality.

## 2023-06-06 RX ORDER — SODIUM BICARBONATE 650 MG/1
650 TABLET ORAL 3 TIMES DAILY
Qty: 540 TABLET | Refills: 0 | Status: SHIPPED | OUTPATIENT
Start: 2023-06-06 | End: 2023-12-03

## 2023-06-09 DIAGNOSIS — Z71.89 COMPLEX CARE COORDINATION: ICD-10-CM

## 2023-07-31 NOTE — ASSESSMENT & PLAN NOTE
E coli sensitive to Rocephin   In an effort to ensure that our patients LiveWell, a Team Member has reviewed your chart and identified an opportunity to provide the best care possible. An attempt was made to discuss or schedule overdue Preventive or Disease Management screening.     The Outcome was Contact was made, appointment scheduled. Care Gaps include Colorectal Cancer Screening.     Dropped off at 9:50am

## 2023-08-07 DIAGNOSIS — I21.4 NSTEMI (NON-ST ELEVATED MYOCARDIAL INFARCTION): ICD-10-CM

## 2023-08-07 RX ORDER — TICAGRELOR 90 MG/1
90 TABLET ORAL 2 TIMES DAILY
Qty: 60 TABLET | Refills: 5 | Status: SHIPPED | OUTPATIENT
Start: 2023-08-07

## 2023-09-01 ENCOUNTER — OFFICE VISIT (OUTPATIENT)
Dept: FAMILY MEDICINE | Facility: CLINIC | Age: 68
End: 2023-09-01
Payer: MEDICARE

## 2023-09-01 VITALS
OXYGEN SATURATION: 98 % | WEIGHT: 234 LBS | HEART RATE: 77 BPM | BODY MASS INDEX: 41.46 KG/M2 | HEIGHT: 63 IN | DIASTOLIC BLOOD PRESSURE: 71 MMHG | SYSTOLIC BLOOD PRESSURE: 113 MMHG

## 2023-09-01 DIAGNOSIS — R53.81 PHYSICAL DEBILITY: Primary | ICD-10-CM

## 2023-09-01 DIAGNOSIS — N18.4 CKD (CHRONIC KIDNEY DISEASE), STAGE IV: ICD-10-CM

## 2023-09-01 DIAGNOSIS — I25.10 CORONARY ARTERY DISEASE, UNSPECIFIED VESSEL OR LESION TYPE, UNSPECIFIED WHETHER ANGINA PRESENT, UNSPECIFIED WHETHER NATIVE OR TRANSPLANTED HEART: ICD-10-CM

## 2023-09-01 DIAGNOSIS — G47.01 INSOMNIA DUE TO MEDICAL CONDITION: ICD-10-CM

## 2023-09-01 DIAGNOSIS — H91.90 HEARING LOSS, UNSPECIFIED HEARING LOSS TYPE, UNSPECIFIED LATERALITY: ICD-10-CM

## 2023-09-01 DIAGNOSIS — G47.33 SEVERE OBSTRUCTIVE SLEEP APNEA: ICD-10-CM

## 2023-09-01 PROCEDURE — 99215 OFFICE O/P EST HI 40 MIN: CPT | Mod: ,,, | Performed by: STUDENT IN AN ORGANIZED HEALTH CARE EDUCATION/TRAINING PROGRAM

## 2023-09-01 PROCEDURE — 99215 PR OFFICE/OUTPT VISIT, EST, LEVL V, 40-54 MIN: ICD-10-PCS | Mod: ,,, | Performed by: STUDENT IN AN ORGANIZED HEALTH CARE EDUCATION/TRAINING PROGRAM

## 2023-09-01 NOTE — PROGRESS NOTES
"Subjective     Patient ID: Jessica Bay is a 68 y.o. female.    Chief Complaint: Follow-up, Chronic Kidney Disease, and Diabetes    HPI    68-year-old woman with multiple medical problems here for follow-up. She has recently had surgery with ENT as well as vascular for dialysis access. She is wearing hearing aids now. She is still not sleeping well.        Objective     /71   Pulse 77   Ht 5' 3" (1.6 m)   Wt 106.1 kg (234 lb)   SpO2 98%   BMI 41.45 kg/m²     Wt Readings from Last 3 Encounters:   09/01/23 1116 106.1 kg (234 lb)   05/09/23 1312 98 kg (216 lb)   03/28/23 0935 98 kg (216 lb)         Physical Exam    Gen: well-groomed, well-dressed. No apparent distress  HEENT:  NCAT, symmetric; R mastoid process non-tender with no erythea  Pulm: normal work of breathing, no accessory muscle use; speaking complete sentences without having to stop  Ext: RUE bruised and swollen; thrill palpable  Neuro: CN II-XII grossly normal   Psych: pleasant affect, congruent mood. Linear thought; good eye contact  SKIN: no rashes present on face, neck, hands         Assessment and Plan     Problem List Items Addressed This Visit       CKD (chronic kidney disease), stage IV    CAD (coronary artery disease)    Hearing loss    Insomnia due to medical condition    Physical debility - Primary    Severe obstructive sleep apnea     68-year-old with multiple medical problems here for follow-up. Her nephrologist is managing her Stage IV CKD and they are preparing for dialysis. She has short-interval follow-up scheduled with her vascular surgeon. She is continuing to follow with ENT for hearing loss managing her complications from recent bacterial meningitis. Home sleep study (see scanned media) showed severe NATASHA - will try to get CPAP for her; I counseled her that the degree of her sleep apnea warrants eval by a specialist, but at this point she is worn out and really would prefer not to add an additional physician at this time. " Will order auto-titrating CPAP for now and see if this improves her sleep. No longer taking seroquel (do not think this is safe for her)     Ms. Bay would benefit from a lift chair in her house. She can't stand up on your own from a regular chair. Without the chair, she would be confined to another chair or bed.    Face to face encounter time 25 minutes.    Non face to face encounter time 25 minutes.  Reviewing separate obtained history, counseling and educating the patient, family and/or other caregivers, ordering medications, tests or procedures; referring and communicating with other health care professionals; documenting clinical information in the electronic medical record; care coordination; independently interpreting results and communicating results to the patient, family, and/or caregivers.    Total time for visit  50 minutes

## 2023-09-20 RX ORDER — ACYCLOVIR 400 MG/1
TABLET ORAL
COMMUNITY
Start: 2023-07-25 | End: 2023-09-20 | Stop reason: SDUPTHER

## 2023-09-20 RX ORDER — ACYCLOVIR 400 MG/1
400 TABLET ORAL 3 TIMES DAILY
Qty: 30 TABLET | Refills: 0 | Status: SHIPPED | OUTPATIENT
Start: 2023-09-20 | End: 2023-12-12

## 2023-10-09 ENCOUNTER — OFFICE VISIT (OUTPATIENT)
Dept: FAMILY MEDICINE | Facility: CLINIC | Age: 68
End: 2023-10-09
Payer: MEDICARE

## 2023-10-09 VITALS
HEART RATE: 85 BPM | DIASTOLIC BLOOD PRESSURE: 81 MMHG | TEMPERATURE: 9 F | SYSTOLIC BLOOD PRESSURE: 131 MMHG | HEIGHT: 63 IN | OXYGEN SATURATION: 97 % | BODY MASS INDEX: 41.45 KG/M2

## 2023-10-09 DIAGNOSIS — J32.9 SINUSITIS, UNSPECIFIED CHRONICITY, UNSPECIFIED LOCATION: Primary | ICD-10-CM

## 2023-10-09 PROCEDURE — 99213 PR OFFICE/OUTPT VISIT, EST, LEVL III, 20-29 MIN: ICD-10-PCS | Mod: ,,, | Performed by: STUDENT IN AN ORGANIZED HEALTH CARE EDUCATION/TRAINING PROGRAM

## 2023-10-09 PROCEDURE — 99213 OFFICE O/P EST LOW 20 MIN: CPT | Mod: ,,, | Performed by: STUDENT IN AN ORGANIZED HEALTH CARE EDUCATION/TRAINING PROGRAM

## 2023-10-09 RX ORDER — VITAMIN A 3000 MCG
2 CAPSULE ORAL
Qty: 60 ML | Refills: 12 | Status: SHIPPED | OUTPATIENT
Start: 2023-10-09

## 2023-10-09 RX ORDER — SODIUM CHLORIDE/ALOE VERA
1 GEL (GRAM) NASAL EVERY 8 HOURS PRN
Qty: 14.1 G | Refills: 0 | Status: SHIPPED | OUTPATIENT
Start: 2023-10-09

## 2023-10-09 NOTE — PATIENT INSTRUCTIONS
Step 1: Use your saline spray in both nostrils. This will help flush out any irritants and help with swelling and inflammation in the sinuses. This is important to help your ears drain properly   Step 2: Wait two or three minutes, blow your nose gently, and use the flonase  Step 3: You can use the nasal gel several times throughout the day for any nasal drainage, dryness, or discomfort. This will be especially helpful at night prior to putting on the CPAP mask    - Try Claritin (generic: loratadine) as needed for nasal sneezing / sniffling / runny nose / drainage

## 2023-10-09 NOTE — PROGRESS NOTES
"Subjective     Patient ID: Jessica Bay is a 68 y.o. female.    Chief Complaint: sinus trouble   HPI    She is requesting abx; no fever, chills, sore throat, cough. Blood nasal discharge, worse in the morning. Says it's her "usual fall crud"    Refuses covid/flu testing.     Appears well / at her baseline.    Just started CPAP last week; using nasal pllow mask        Objective   /81 (BP Location: Left arm)   Pulse 85   Temp (!) 9 °F (-12.8 °C) (Oral)   Ht 5' 3" (1.6 m)   SpO2 97%   BMI 41.45 kg/m²       Physical Exam  Gen: well-groomed, well-dressed. No apparent distress  HEENT:  NCAT, symmetric  Pulm: normal work of breathing, no accessory muscle use; speaking complete sentences without having to stop  Neuro: CN II-XII grossly normal   Psych: pleasant affect, congruent mood. Linear thought; good eye contact  SKIN: no rashes present on face, neck, hands       Assessment and Plan     Problem List Items Addressed This Visit    None  Visit Diagnoses       Sinusitis, unspecified chronicity, unspecified location    -  Primary    Relevant Medications    sodium chloride (SALINE NASAL) 0.65 % nasal spray    sodium chloride-aloe vera (AYR SALINE) Gel          Likely from CPAP machine; good sinus care with saline spray; aloe vera nasal gel qhs, prn antihistamines and watchful waiting; advised her to notify my  if symptoms worsen or fail to improve toward the end of the week. risks, benefits, and alternatives to abx were discussed. CPAP seems to be helping somewhat; she has data that the AHI is improving. We still have not received a report but will be on the lookout.     Step 1: Use your saline spray in both nostrils. This will help flush out any irritants and help with swelling and inflammation in the sinuses. This is important to help your ears drain properly   Step 2: Wait two or three minutes, blow your nose gently, and use the flonase  Step 3: You can use the nasal gel several times throughout the day for " any nasal drainage, dryness, or discomfort. This will be especially helpful at night prior to putting on the CPAP mask    - Try Claritin (generic: loratadine) as needed for nasal sneezing / sniffling / runny nose / drainage

## 2023-12-12 RX ORDER — ACYCLOVIR 400 MG/1
400 TABLET ORAL 2 TIMES DAILY
Qty: 60 TABLET | Refills: 0 | Status: SHIPPED | OUTPATIENT
Start: 2023-12-12

## 2024-01-09 DIAGNOSIS — Z71.89 COMPLEX CARE COORDINATION: ICD-10-CM

## 2024-07-01 RX ORDER — SODIUM BICARBONATE 650 MG/1
650 TABLET ORAL 3 TIMES DAILY
Qty: 540 TABLET | Refills: 0 | Status: SHIPPED | OUTPATIENT
Start: 2024-07-01

## 2024-07-23 ENCOUNTER — TELEPHONE (OUTPATIENT)
Dept: FAMILY MEDICINE | Facility: CLINIC | Age: 69
End: 2024-07-23
Payer: COMMERCIAL

## 2024-07-23 DIAGNOSIS — N18.6 ESRD (END STAGE RENAL DISEASE): Primary | ICD-10-CM

## 2024-07-23 PROCEDURE — 80048 BASIC METABOLIC PNL TOTAL CA: CPT | Performed by: STUDENT IN AN ORGANIZED HEALTH CARE EDUCATION/TRAINING PROGRAM

## 2024-07-23 NOTE — TELEPHONE ENCOUNTER
Needs potassium level drawn per nephrology - fax results to [facility] in [place]     [phone](Center Phone)  [phone](Center Fax)

## 2024-07-26 ENCOUNTER — PATIENT OUTREACH (OUTPATIENT)
Dept: ADMINISTRATIVE | Facility: CLINIC | Age: 69
End: 2024-07-26

## 2024-07-26 ENCOUNTER — EXTERNAL HOSPITAL ADMISSION (OUTPATIENT)
Dept: ADMINISTRATIVE | Facility: CLINIC | Age: 69
End: 2024-07-26

## 2024-07-26 NOTE — PROGRESS NOTES
C3 nurse attempted to contact patient. The following occurred:   C3 nurse attempted to contact Jessica Bay  for a T[facility] post hospital discharge follow up call. The patient is unable to conduct the call @ this time. The patient requested a callback.    The patient has a scheduled HOSFU appointment with Jag Lopez MD  on 8/1/24 @ 1000.

## 2024-08-20 ENCOUNTER — TELEPHONE (OUTPATIENT)
Dept: FAMILY MEDICINE | Facility: CLINIC | Age: 69
End: 2024-08-20
Payer: COMMERCIAL

## 2024-08-20 RX ORDER — ACYCLOVIR 400 MG/1
400 TABLET ORAL 2 TIMES DAILY
Qty: 60 TABLET | Refills: 0 | OUTPATIENT
Start: 2024-08-20

## 2024-08-20 RX ORDER — ACYCLOVIR 400 MG/1
200 TABLET ORAL 2 TIMES DAILY
Qty: 60 TABLET | Refills: 0 | Status: SHIPPED | OUTPATIENT
Start: 2024-08-20

## 2024-08-23 NOTE — TELEPHONE ENCOUNTER
Refilled acyclovir at lower dose more appropriate for ESRD; discussed with patient's HD nephrologist, Dr. Adan Salgado; at this time she is still receiving HD via [facility] and has upcoming appts with vascular surgeon.

## 2025-01-10 RX ORDER — SODIUM BICARBONATE 650 MG/1
650 TABLET ORAL 3 TIMES DAILY
Qty: 540 TABLET | Refills: 0 | Status: SHIPPED | OUTPATIENT
Start: 2025-01-10

## 2025-04-23 ENCOUNTER — PATIENT OUTREACH (OUTPATIENT)
Facility: HOSPITAL | Age: 70
End: 2025-04-23
Payer: COMMERCIAL

## 2025-04-23 NOTE — PROGRESS NOTES
Population Health Chart Review & Patient Outreach Details    Updates Requested / Reviewed:  [x]  Care Team Updated      Health Maintenance Topics Addressed and Outreach Outcomes / Actions Taken:  PCP [x] Telephone outreach for updated pcp. No answer, vm deactivated. Patient sees Navarro Siddiqui for Diabetes.

## 2025-05-02 ENCOUNTER — PATIENT OUTREACH (OUTPATIENT)
Dept: ADMINISTRATIVE | Facility: CLINIC | Age: 70
End: 2025-05-02

## 2025-05-02 ENCOUNTER — EXTERNAL HOSPITAL ADMISSION (OUTPATIENT)
Dept: ADMINISTRATIVE | Facility: CLINIC | Age: 70
End: 2025-05-02

## 2025-05-02 NOTE — PROGRESS NOTES
C3 nurse spoke with son who is unable to complete the call at this time. Son requested a callback. Patient has a HOSFU with Dr Jag Lopez on 5/6/25 @ 300.

## 2025-07-29 RX ORDER — SODIUM BICARBONATE 650 MG/1
650 TABLET ORAL 3 TIMES DAILY
Qty: 540 TABLET | Refills: 0 | Status: SHIPPED | OUTPATIENT
Start: 2025-07-29